# Patient Record
Sex: FEMALE | Race: WHITE | NOT HISPANIC OR LATINO | Employment: FULL TIME | URBAN - METROPOLITAN AREA
[De-identification: names, ages, dates, MRNs, and addresses within clinical notes are randomized per-mention and may not be internally consistent; named-entity substitution may affect disease eponyms.]

---

## 2019-02-12 ENCOUNTER — COMMUNICATION - HEALTHEAST (OUTPATIENT)
Dept: SURGERY | Facility: CLINIC | Age: 44
End: 2019-02-12

## 2019-02-21 ENCOUNTER — COMMUNICATION - HEALTHEAST (OUTPATIENT)
Dept: SURGERY | Facility: CLINIC | Age: 44
End: 2019-02-21

## 2019-02-22 DIAGNOSIS — Z12.31 VISIT FOR SCREENING MAMMOGRAM: ICD-10-CM

## 2019-02-22 PROCEDURE — 77067 SCR MAMMO BI INCL CAD: CPT | Mod: TC

## 2019-02-26 ENCOUNTER — MYC MEDICAL ADVICE (OUTPATIENT)
Dept: FAMILY MEDICINE | Facility: CLINIC | Age: 44
End: 2019-02-26

## 2019-02-26 NOTE — TELEPHONE ENCOUNTER
Per protocol, will route encounter to be cosigned by provider for Verbal Orders.  Alice Velasquez RN

## 2019-02-26 NOTE — TELEPHONE ENCOUNTER
Please call the number listed in the letter to see if orders are placed. This is confusing. Kristen Kehr PA-C

## 2019-02-26 NOTE — TELEPHONE ENCOUNTER
Per [facility] states when additional views are needed the orders are pended to the provider to sign them.   Patient is informed she can all and schedule an appointment.    Per protocol, will route encounter to be cosigned by provider for Verbal Orders.  Alice Velasquez RN

## 2019-03-04 ENCOUNTER — HOSPITAL ENCOUNTER (OUTPATIENT)
Dept: MAMMOGRAPHY | Facility: CLINIC | Age: 44
Discharge: HOME | End: 2019-03-04
Attending: PHYSICIAN ASSISTANT | Admitting: PHYSICIAN ASSISTANT
Payer: COMMERCIAL

## 2019-03-04 DIAGNOSIS — R92.8 ABNORMAL MAMMOGRAM: ICD-10-CM

## 2019-03-04 PROCEDURE — G0279 TOMOSYNTHESIS, MAMMO: HCPCS

## 2019-03-05 ENCOUNTER — OFFICE VISIT - HEALTHEAST (OUTPATIENT)
Dept: SURGERY | Facility: CLINIC | Age: 44
End: 2019-03-05

## 2019-03-05 ENCOUNTER — AMBULATORY - HEALTHEAST (OUTPATIENT)
Dept: LAB | Facility: CLINIC | Age: 44
End: 2019-03-05

## 2019-03-05 DIAGNOSIS — E66.01 MORBID OBESITY (H): ICD-10-CM

## 2019-03-05 DIAGNOSIS — R03.0 ELEVATED BLOOD PRESSURE READING WITHOUT DIAGNOSIS OF HYPERTENSION: ICD-10-CM

## 2019-03-05 LAB
ALBUMIN SERPL-MCNC: 4.2 G/DL (ref 3.5–5)
ALP SERPL-CCNC: 58 U/L (ref 45–120)
ALT SERPL W P-5'-P-CCNC: 18 U/L (ref 0–45)
ALT SERPL-CCNC: 18 U/L (ref 0–45)
ANION GAP SERPL CALCULATED.3IONS-SCNC: 12 MMOL/L (ref 5–18)
AST SERPL W P-5'-P-CCNC: 17 U/L (ref 0–40)
AST SERPL-CCNC: 17 U/L (ref 0–40)
BILIRUB SERPL-MCNC: 0.3 MG/DL (ref 0–1)
BUN SERPL-MCNC: 9 MG/DL (ref 8–22)
CALCIUM SERPL-MCNC: 9.5 MG/DL (ref 8.5–10.5)
CHLORIDE BLD-SCNC: 106 MMOL/L (ref 98–107)
CHOLEST SERPL-MCNC: 213 MG/DL
CHOLEST SERPL-MCNC: 213 MG/DL
CO2 SERPL-SCNC: 22 MMOL/L (ref 22–31)
CREAT SERPL-MCNC: 0.8 MG/DL (ref 0.6–1.1)
CREAT SERPL-MCNC: 0.8 MG/DL (ref 0.6–1.1)
ERYTHROCYTE [DISTWIDTH] IN BLOOD BY AUTOMATED COUNT: 11.3 % (ref 11–14.5)
FASTING STATUS PATIENT QL REPORTED: ABNORMAL
FERRITIN SERPL-MCNC: 22 NG/ML (ref 10–130)
FOLATE SERPL-MCNC: 9.9 NG/ML
GFR SERPL CREATININE-BSD FRML MDRD: >60 ML/MIN/1.73M2
GFR SERPL CREATININE-BSD FRML MDRD: >60 ML/MIN/1.73M2
GLUCOSE BLD-MCNC: 89 MG/DL (ref 70–125)
GLUCOSE SERPL-MCNC: 89 MG/DL (ref 70–125)
HBA1C MFR BLD: 5.3 % (ref 3.5–6)
HBA1C MFR BLD: 5.3 % (ref 3.5–6)
HCT VFR BLD AUTO: 42 % (ref 35–47)
HDLC SERPL-MCNC: 61 MG/DL
HDLC SERPL-MCNC: 61 MG/DL
HGB BLD-MCNC: 14.2 G/DL (ref 12–16)
LDLC SERPL CALC-MCNC: 122 MG/DL
LDLC SERPL CALC-MCNC: 122 MG/DL
MCH RBC QN AUTO: 30 PG (ref 27–34)
MCHC RBC AUTO-ENTMCNC: 33.9 G/DL (ref 32–36)
MCV RBC AUTO: 88 FL (ref 80–100)
PLATELET # BLD AUTO: 332 THOU/UL (ref 140–440)
PMV BLD AUTO: 7.7 FL (ref 7–10)
POTASSIUM BLD-SCNC: 4.1 MMOL/L (ref 3.5–5)
POTASSIUM SERPL-SCNC: 4.1 MMOL/L (ref 3.5–5)
PROT SERPL-MCNC: 7.2 G/DL (ref 6–8)
PTH-INTACT SERPL-MCNC: 86 PG/ML (ref 10–86)
RBC # BLD AUTO: 4.75 MILL/UL (ref 3.8–5.4)
SODIUM SERPL-SCNC: 140 MMOL/L (ref 136–145)
TRIGL SERPL-MCNC: 150 MG/DL
TRIGL SERPL-MCNC: 150 MG/DL
TSH SERPL DL<=0.005 MIU/L-ACNC: 2.12 UIU/ML (ref 0.3–5)
TSH SERPL-ACNC: 2.12 UIU/ML (ref 0.3–5)
VIT B12 SERPL-MCNC: 439 PG/ML (ref 213–816)
WBC: 7.1 THOU/UL (ref 4–11)

## 2019-03-05 ASSESSMENT — MIFFLIN-ST. JEOR: SCORE: 1868.06

## 2019-03-06 ENCOUNTER — AMBULATORY - HEALTHEAST (OUTPATIENT)
Dept: SURGERY | Facility: CLINIC | Age: 44
End: 2019-03-06

## 2019-03-06 LAB
25(OH)D3 SERPL-MCNC: 20.2 NG/ML (ref 30–80)
25(OH)D3 SERPL-MCNC: 20.2 NG/ML (ref 30–80)

## 2019-03-08 LAB
ANNOTATION COMMENT IMP: NORMAL
VIT A SERPL-MCNC: 0.51 MG/L (ref 0.3–1.2)
VIT B1 PYROPHOSHATE BLD-SCNC: 129 NMOL/L (ref 70–180)
VITAMIN A (RETINYL PALMITATE): 0.03 MG/L (ref 0–0.1)
ZINC SERPL-MCNC: 81 UG/DL (ref 60–120)

## 2019-03-14 NOTE — PROGRESS NOTES
"  SUBJECTIVE:   Lilian Quinones is a 43 year old female who presents to clinic today for the following health issues:    IUD consult    Patient is in the process of working with a bariatric surgeon in hopes of having surgery early this summer. One thing they have told her is that she will need to look into long term contraception. Currently using condoms. Between her and her , they have 4 children, so not planning on any further child bearing.  Has done oral contraceptive pills, vaginal ring and Depo Provera in the past-years ago. She is interested in an IUD. Menses likely due next week.     Problem list and histories reviewed & adjusted, as indicated.  Additional history: as documented    Patient Active Problem List   Diagnosis     NO ACTIVE PROBLEMS     Overweight     Atypical mole     Right knee pain, unspecified chronicity     HAO (generalized anxiety disorder)     Hyperhydrosis disorder     Past Surgical History:   Procedure Laterality Date     BIOPSY      Skin Biopsy     BREAST SURGERY      Breast reduction sergury        Social History     Tobacco Use     Smoking status: Never Smoker     Smokeless tobacco: Never Used   Substance Use Topics     Alcohol use: Yes     Family History   Problem Relation Age of Onset     Heart Disease Maternal Grandmother      Pancreatic Cancer Paternal Grandmother      Melanoma No family hx of            Reviewed and updated as needed this visit by clinical staff       Reviewed and updated as needed this visit by Provider         ROS:  Constitutional, HEENT, cardiovascular, pulmonary, gi and gu systems are negative, except as otherwise noted.    OBJECTIVE:     /78 (BP Location: Right arm, Patient Position: Sitting, Cuff Size: Adult Large)   Pulse 83   Temp 97.7  F (36.5  C) (Oral)   Ht 1.715 m (5' 7.5\")   Wt 116.6 kg (257 lb)   LMP 02/22/2019 (Exact Date)   SpO2 96%   BMI 39.66 kg/m    Body mass index is 39.66 kg/m .  GENERAL: healthy, alert and no " distress  RESP: lungs clear to auscultation - no rales, rhonchi or wheezes  CV: regular rate and rhythm, normal S1 S2, no S3 or S4, no murmur, click or rub, no peripheral edema and peripheral pulses strong  ABDOMEN: soft, nontender, no hepatosplenomegaly, no masses and bowel sounds normal  MS: no gross musculoskeletal defects noted, no edema  SKIN: no suspicious lesions or rashes  PSYCH: mentation appears normal, affect normal/bright    ASSESSMENT/PLAN:   1. Counseling for birth control regarding intrauterine device (IUD)  Discussed with bariatric surgery, avoiding pregnancy is recommended for the first 18 months after procedure and patient not interested in further child bearing. Discussed options for contraception with patient and she is interested in either Mirena or Kyleena. We discussed insertion, removal, possible side effects, menstrual changes. Reviewed risk of uterine perforation with insertion and discussed possible need for surgical removal. Patient advised to take 600 mg Ibuprofen prior to insertion. Patient will call with onset of menses-likely next week and can schedule insertion so this is in place and effective prior to bariatric surgery.    ASIF Mast [facility]

## 2019-03-15 ENCOUNTER — OFFICE VISIT (OUTPATIENT)
Dept: OBGYN | Facility: CLINIC | Age: 44
End: 2019-03-15
Payer: COMMERCIAL

## 2019-03-15 VITALS
HEIGHT: 68 IN | BODY MASS INDEX: 38.95 KG/M2 | SYSTOLIC BLOOD PRESSURE: 119 MMHG | DIASTOLIC BLOOD PRESSURE: 78 MMHG | OXYGEN SATURATION: 96 % | TEMPERATURE: 97.7 F | WEIGHT: 257 LBS | HEART RATE: 83 BPM

## 2019-03-15 DIAGNOSIS — Z30.09 COUNSELING FOR BIRTH CONTROL REGARDING INTRAUTERINE DEVICE (IUD): Primary | ICD-10-CM

## 2019-03-15 PROCEDURE — 99203 OFFICE O/P NEW LOW 30 MIN: CPT | Performed by: NURSE PRACTITIONER

## 2019-03-15 ASSESSMENT — MIFFLIN-ST. JEOR: SCORE: 1851.3

## 2019-03-15 ASSESSMENT — PAIN SCALES - GENERAL: PAINLEVEL: NO PAIN (0)

## 2019-03-20 ENCOUNTER — OFFICE VISIT (OUTPATIENT)
Dept: OBGYN | Facility: CLINIC | Age: 44
End: 2019-03-20
Payer: COMMERCIAL

## 2019-03-20 VITALS
DIASTOLIC BLOOD PRESSURE: 84 MMHG | HEIGHT: 68 IN | HEART RATE: 64 BPM | SYSTOLIC BLOOD PRESSURE: 132 MMHG | WEIGHT: 261.8 LBS | BODY MASS INDEX: 39.68 KG/M2 | TEMPERATURE: 97.9 F | OXYGEN SATURATION: 95 %

## 2019-03-20 DIAGNOSIS — Z30.430 ENCOUNTER FOR IUD INSERTION: Primary | ICD-10-CM

## 2019-03-20 PROCEDURE — 58300 INSERT INTRAUTERINE DEVICE: CPT | Performed by: NURSE PRACTITIONER

## 2019-03-20 ASSESSMENT — PAIN SCALES - GENERAL: PAINLEVEL: NO PAIN (0)

## 2019-03-20 ASSESSMENT — MIFFLIN-ST. JEOR: SCORE: 1873.08

## 2019-03-20 NOTE — PROGRESS NOTES
Lilian Quinones is a 43 year old  who presents today requesting placement of a Mirena IUD.    The patient meets and is agreeable to the following conditions:  She is not interested in conception in the near future.   She currently is in a stable, monogamous relationship.   There is no previous history of pelvic inflammatory disease.   There is no previous history of ectopic pregnancy.   She is willing to check monthly for the IUD string.   There is no history of unresolved abnormal uterine bleeding.   There is no history of an unresolved abnormal PAP smear.   She has no history of Salvatore's disease or an allergy to copper (for Paraguard).   She has had a PAP smear within the ACOG screening guideline.   She denies the possibility of pregnancy.     We discussed risks, benefits, and alternatives including but not limited to:   Possibility of pregnancy and ectopic pregnancy.  Possibility of pelvic inflammatory disease, particularly with new partners.  Risk of uterine perforation or IUD expulsion.  Possibility of difficult removal.  Spotting or heavy bleeding.  Cramping, pain or infection during or after insertion.    The patient was given patient information on the IUD and the patient education brochure from the .  The patient has given consent to proceed with placement of the IUD.  She wishes to proceed.  All questions answered.    PROCEDURE:    Type of IUD: Mirena    The patient has taken 600 mg of Ibuprofen prior to the procedure. She is placed in a dorsal lithotomy position and a pelvic exam is performed to determine the position of the uterus. Vagina is long and cervix was difficult to see clearly due to vaginal walls partially covering the os. With manipulation, the cervix is identified and cleaned with betadine. A single tooth tenaculum is applied to the anterior lip of the cervix for stabilization. The uterus sounded to 7.5 cm. (Target sound depth is 6.5 cm to 8.5 cm.) The IUD insertion tube is  prepared to manufacturers recommendations and inserted into the uterus under sterile conditions in the usual fashion. The IUD string is then cut to 2.5 cm.    The patient tolerated this procedure without immediate complication.  The patient is to return or call immediately for any unexplained fever, abdominal or pelvic pain, excessive bleeding, possibility of pregnancy, foul-smelling discharge, sense that the IUD has been expelled.  All questions were answered.    Return to clinic in 1 month for IUD check  Lot#: [id number]  Exp: 06/2021  NDC#: 10859-791-95    No GOLD CNP

## 2019-03-20 NOTE — LETTER
"                                                                          Affirmation of Informed Consent for Surgery or Invasive Procedure    1.  I, (print patient's name) Lilian Quinones,  1975,   a.  Agree that I will have (include both the medical term and patient words):           Chief Complaint   Patient presents with     IUD     Insertion      b.  At [facility].     c.  The reason for this procedure is (medical condition):  contraception.   d.  This will be done or supervised by:  ASIF Mast CNP.   e.  My doctor may have help from others.  Help could include opening or closing         the wound. Help might also include taking grafts, cutting out tissue,                           implanting devices.  I have been told who will help, if known.     2.  I have talked to my doctor or health care team about:   a.  What the procedure is and what will happen.   b   How it may help me (the benefits).   c.  How it may harm me (the most likely and most serious risks).   d.  The long-term effects the procedure might have.    e.  My other choices for treatment.  The risks and benefits of those choices.    f.   What will likely happen if I say \"no\" to this procedure.    g.  How I might feel right after and how quickly I might be expected to recover.      h.  What medicines will be used to manage pain or sedate me.     3.  I agree that:  (If I do not agree with a statement, I have crossed it out and              initialed next to it.)       a.  I will ask questions.     b.  No one has promised me definite results.    c.  If serious problems are found during the procedure, the treatment may                    change.   d.  If I have \"do not resuscitate\" (DNR) wishes, I have discussed this with my                              doctor and they will be put on hold during the procedure.   e. Students and other appropriate people, approved by the facility, may watch                      the procedure " and help with tasks they are qualified for.                                                    f.  Pictures or videos may be taken. They may be used for medical or                  educational reasons only.       g. Tissues or organs removed from my body as part of the normal course of the                    procedure may be used for research or teaching purposes. They will be                  disposed of with respect.                    h.  If a staff person is exposed to my blood or body fluids, my blood will be drawn                   and tested for HIV and hepatitis.  I understand that by law, the test results will                    go:         -  In my medical record.                         -  To the Employee Occupational Health Service and/or Infection Control                                  at this facility.    -  To the [place] [facility] officials.                 i.  I may have a blood transfusion: I have talked to my doctor or care team about:    -  Why I may need a blood transfusion.     - The risks, benefits, and side effects of transfusion - and the risks of not        Having one.     -  Blood safety and other options for treatment.        Consent for blood transfusion obtained during a hospital admission is valid for the entire hospital stay.  Consent  for blood transfusion obtained in the clinic setting is valid for a year from the signature date.                                4.  I understand that:   a.  I can change my mind.  If I do, I must tell my doctor or team as soon as                           possible.              b.  The team members may change during the procedure.                c.   The team will double-check who I am.  They will ask me what I am having                         done and the site of the site of the procedure.  This is done for my safety.    My questions have been answered.  I agree to the procedure.  I have made my special needs and instructions known.      Lilian BOWMAN  Joni      3/20/2019 3:04 PM  Patient (or representative)/Relationship to patient             Date  Time    For telephone consent:      3/20/2019  3:04 PM         Date  Time  Print name of patient (or representative)/relationship to patient    Witness:  I have verified that the signature is that of the patient or patient's representative and that this has been signed before the procedure:    NAME:        3/20/2019  3:04 PM         Date  Time  Person verifying patient's name or patient representative's signature     Provider:  I have discussed the procedure and the information stated above with the patient (or the patient's representative) and answered their questions. The patient or their representative consented to the procedure:      ASIF Mast CNP    3/20/2019  3:04 PM  Physician or Provider's Signature(s)   Date  Time       Intepreter (if used):       3/20/2019  3:04 PM                                   Name       Language/Organization Date  Time    Consent for procedure valid for 30 days after patient signature date     [facility]  AFFIRMATION OF INFORMED CONSENT FOR SURGERY OR INVASIVE PROCEDURE               Original - Medical Records

## 2019-04-04 ENCOUNTER — OFFICE VISIT - HEALTHEAST (OUTPATIENT)
Dept: SURGERY | Facility: CLINIC | Age: 44
End: 2019-04-04

## 2019-04-04 ENCOUNTER — COMMUNICATION - HEALTHEAST (OUTPATIENT)
Dept: SURGERY | Facility: CLINIC | Age: 44
End: 2019-04-04

## 2019-04-04 DIAGNOSIS — Z71.3 NUTRITIONAL COUNSELING: ICD-10-CM

## 2019-04-04 DIAGNOSIS — E66.01 OBESITY, CLASS III, BMI 40-49.9 (MORBID OBESITY) (H): ICD-10-CM

## 2019-04-04 ASSESSMENT — MIFFLIN-ST. JEOR: SCORE: 1858.98

## 2019-04-09 ENCOUNTER — OFFICE VISIT - HEALTHEAST (OUTPATIENT)
Dept: SURGERY | Facility: CLINIC | Age: 44
End: 2019-04-09

## 2019-04-09 DIAGNOSIS — E66.01 MORBID OBESITY WITH BMI OF 40.0-44.9, ADULT (H): ICD-10-CM

## 2019-05-02 ENCOUNTER — AMBULATORY - HEALTHEAST (OUTPATIENT)
Dept: SURGERY | Facility: CLINIC | Age: 44
End: 2019-05-02

## 2019-05-09 ENCOUNTER — TRANSFERRED RECORDS (OUTPATIENT)
Dept: HEALTH INFORMATION MANAGEMENT | Facility: CLINIC | Age: 44
End: 2019-05-09

## 2019-05-09 ENCOUNTER — OFFICE VISIT - HEALTHEAST (OUTPATIENT)
Dept: SURGERY | Facility: CLINIC | Age: 44
End: 2019-05-09

## 2019-05-09 DIAGNOSIS — E66.01 OBESITY, CLASS III, BMI 40-49.9 (MORBID OBESITY) (H): ICD-10-CM

## 2019-05-09 DIAGNOSIS — Z71.3 NUTRITIONAL COUNSELING: ICD-10-CM

## 2019-05-09 DIAGNOSIS — E66.01 MORBID OBESITY WITH BMI OF 40.0-44.9, ADULT (H): ICD-10-CM

## 2019-05-09 ASSESSMENT — MIFFLIN-ST. JEOR: SCORE: 1857.62

## 2019-05-13 ENCOUNTER — COMMUNICATION - HEALTHEAST (OUTPATIENT)
Dept: SURGERY | Facility: CLINIC | Age: 44
End: 2019-05-13

## 2019-05-13 ENCOUNTER — AMBULATORY - HEALTHEAST (OUTPATIENT)
Dept: SURGERY | Facility: CLINIC | Age: 44
End: 2019-05-13

## 2019-05-15 ENCOUNTER — OFFICE VISIT - HEALTHEAST (OUTPATIENT)
Dept: SURGERY | Facility: CLINIC | Age: 44
End: 2019-05-15

## 2019-05-15 ENCOUNTER — TRANSFERRED RECORDS (OUTPATIENT)
Dept: HEALTH INFORMATION MANAGEMENT | Facility: CLINIC | Age: 44
End: 2019-05-15

## 2019-05-15 DIAGNOSIS — E66.01 MORBID OBESITY (H): ICD-10-CM

## 2019-05-15 DIAGNOSIS — G89.29 CHRONIC PAIN OF LEFT KNEE: ICD-10-CM

## 2019-05-15 DIAGNOSIS — G89.29 CHRONIC PAIN OF RIGHT KNEE: ICD-10-CM

## 2019-05-15 DIAGNOSIS — M25.562 CHRONIC PAIN OF LEFT KNEE: ICD-10-CM

## 2019-05-15 DIAGNOSIS — M25.561 CHRONIC PAIN OF RIGHT KNEE: ICD-10-CM

## 2019-05-15 ASSESSMENT — MIFFLIN-ST. JEOR: SCORE: 1863.07

## 2019-05-17 ENCOUNTER — AMBULATORY - HEALTHEAST (OUTPATIENT)
Dept: SURGERY | Facility: CLINIC | Age: 44
End: 2019-05-17

## 2019-06-10 ENCOUNTER — MEDICAL CORRESPONDENCE (OUTPATIENT)
Dept: HEALTH INFORMATION MANAGEMENT | Facility: CLINIC | Age: 44
End: 2019-06-10

## 2019-06-10 ENCOUNTER — AMBULATORY - HEALTHEAST (OUTPATIENT)
Dept: SURGERY | Facility: CLINIC | Age: 44
End: 2019-06-10

## 2019-06-10 DIAGNOSIS — Z71.89 ENCOUNTER FOR PRE-BARIATRIC SURGERY COUNSELING AND EDUCATION: ICD-10-CM

## 2019-06-10 DIAGNOSIS — Z98.84 S/P BARIATRIC SURGERY: ICD-10-CM

## 2019-06-10 DIAGNOSIS — K21.9 ACID REFLUX: ICD-10-CM

## 2019-06-10 DIAGNOSIS — Z01.818 PRE-OP TESTING: ICD-10-CM

## 2019-06-10 DIAGNOSIS — K91.2 POSTSURGICAL MALABSORPTION: ICD-10-CM

## 2019-06-10 DIAGNOSIS — R63.4 RAPID WEIGHT LOSS: ICD-10-CM

## 2019-06-10 ASSESSMENT — MIFFLIN-ST. JEOR: SCORE: 1868.06

## 2019-06-14 NOTE — PROGRESS NOTES
[facility]  [address] [facility] [id number] [phone]  Dept: [phone]    PRE-OP EVALUATION:  Today's date: 2019    Lilian Quinones (: 1975) presents for pre-operative evaluation assessment as requested by Dr. Myles.  She requires evaluation and anesthesia risk assessment prior to undergoing surgery/procedure for treatment of weight control .    Fax number for surgical facility: [phone]  Primary Physician: Kehr, Kristen M  Type of Anesthesia Anticipated: General    Patient has a Health Care Directive or Living Will:  YES     Preop Questions 2019   Who is doing your surgery? Dr Hillman   What are you having done? Gastric sleeve   Date of Surgery/Procedure: 2019   Facility or Hospital where procedure/surgery will be performed: [facility]   1.  Do you have a history of Heart attack, stroke, stent, coronary bypass surgery, or other heart surgery? No   2.  Do you ever have any pain or discomfort in your chest? No   3.  Do you have a history of  Heart Failure? No   4.   Are you troubled by shortness of breath when:  walking on a level surface, or up a slight hill, or at night? No   5.  Do you currently have a cold, bronchitis or other respiratory infection? No   6.  Do you have a cough, shortness of breath, or wheezing? No   7.  Do you sometimes get pains in the calves of your legs when you walk? No   8. Do you or anyone in your family have previous history of blood clots? No   9.  Do you or does anyone in your family have a serious bleeding problem such as prolonged bleeding following surgeries or cuts? No   10. Have you ever had problems with anemia or been told to take iron pills? No   11. Have you had any abnormal blood loss such as black, tarry or bloody stools, or abnormal vaginal bleeding? No   12. Have you ever had a blood transfusion? No   13. Have you or any of your relatives ever had problems with anesthesia? No   14. Do you have sleep apnea,  "excessive snoring or daytime drowsiness? No   15. Do you have any prosthetic heart valves? No   16. Do you have prosthetic joints? No   17. Is there any chance that you may be pregnant? No         HPI:     HPI related to upcoming procedure: Pt to undergo laparoscopy sleeve gastrectomy due to morbid obesity      See problem list for active medical problems.  Problems all longstanding and stable, except as noted/documented.  See ROS for pertinent symptoms related to these conditions.      MEDICAL HISTORY:     Patient Active Problem List    Diagnosis Date Noted     HAO (generalized anxiety disorder) 08/22/2016     Priority: Medium     Hyperhydrosis disorder 08/22/2016     Priority: Medium     Overweight 07/14/2016     Priority: Medium     Atypical mole 07/14/2016     Priority: Medium     Right knee pain, unspecified chronicity 07/14/2016     Priority: Medium     NO ACTIVE PROBLEMS      Priority: Medium      Past Medical History:   Diagnosis Date     IUD (intrauterine device) in place 03/20/2019    Mirena     NO ACTIVE PROBLEMS      Past Surgical History:   Procedure Laterality Date     BIOPSY      Skin Biopsy     BREAST SURGERY      Breast reduction sergury      Current Outpatient Medications   Medication Sig Dispense Refill     levonorgestrel (MIRENA) 20 MCG/24HR IUD 1 each by Intrauterine route once       OTC products: None, except as noted above    Allergies   Allergen Reactions     Amoxicillin Rash     Penicillins Rash      Latex Allergy: NO    Social History     Tobacco Use     Smoking status: Never Smoker     Smokeless tobacco: Never Used   Substance Use Topics     Alcohol use: Yes     History   Drug Use No       REVIEW OF SYSTEMS:   Constitutional, neuro, ENT, endocrine, pulmonary, cardiac, gastrointestinal, genitourinary, musculoskeletal, integument and psychiatric systems are negative, except as otherwise noted.    EXAM:   /83   Pulse 81   Temp 98.8  F (37.1  C) (Oral)   Ht 1.715 m (5' 7.5\")   Wt 117 " kg (258 lb)   BMI 39.81 kg/m      GENERAL APPEARANCE: healthy, alert and no distress     EYES: EOMI, PERRL     HENT: ear canals and TM's normal and nose and mouth without ulcers or lesions     NECK: no adenopathy, no asymmetry, masses, or scars and thyroid normal to palpation     RESP: lungs clear to auscultation - no rales, rhonchi or wheezes     CV: regular rates and rhythm, normal S1 S2, no S3 or S4 and no murmur, click or rub     ABDOMEN:  soft, nontender, no HSM or masses and bowel sounds normal     MS: extremities normal- no gross deformities noted, no evidence of inflammation in joints, FROM in all extremities.     PSYCH: mentation appears normal. and affect normal/bright    DIAGNOSTICS:   EKG: appears normal, NSR, normal axis, normal intervals, no acute ST/T changes c/w ischemia, no LVH by voltage criteria    CXR:WNL    INR/PTT and CMP pending    Lab Results   Component Value Date    WBC 8.7 06/19/2019     Lab Results   Component Value Date    RBC 5.05 06/19/2019     Lab Results   Component Value Date    HGB 15.0 06/19/2019     Lab Results   Component Value Date    HCT 44.3 06/19/2019     No components found for: MCT  Lab Results   Component Value Date    MCV 88 06/19/2019     Lab Results   Component Value Date    MCH 29.7 06/19/2019     Lab Results   Component Value Date    MCHC 33.9 06/19/2019     Lab Results   Component Value Date    RDW 12.5 06/19/2019     Lab Results   Component Value Date     06/19/2019     With normal diff    UA RESULTS:  Recent Labs   Lab Test 06/19/19  1749   COLOR Yellow   APPEARANCE Clear   URINEGLC Negative   URINEBILI Negative   URINEKETONE Negative   SG 1.020   UBLD Trace*   URINEPH 5.5   PROTEIN Negative   UROBILINOGEN 0.2   NITRITE Negative   LEUKEST Negative   RBCU O - 2   WBCU 0 - 5           IMPRESSION:   Reason for surgery/procedure: Pt to undergo laparoscopic sleeve gastrectomy due to morbid obesity    The proposed surgical procedure is considered INTERMEDIATE  risk.    REVISED CARDIAC RISK INDEX  The patient has the following serious cardiovascular risks for perioperative complications such as (MI, PE, VFib and 3  AV Block):  No serious cardiac risks  INTERPRETATION: 0 risks: Class I (very low risk - 0.4% complication rate)    The patient has the following additional risks for perioperative complications:  No identified additional risks      ICD-10-CM    1. Preop general physical exam Z01.818 INR     Partial thromboplastin time     *UA reflex to Microscopic and Culture ([facility] (except [place] and [place])     Comprehensive metabolic panel     CBC with platelets and differential     XR Chest 2 Views     EKG 12-lead complete w/read - Clinics   2. Morbid obesity (H) E66.01        RECOMMENDATIONS:         --Patient is to take all scheduled medications on the day of surgery EXCEPT for modifications listed below.  --Patient is on no chronic medications    APPROVAL GIVEN to proceed with proposed procedure, without further diagnostic evaluation       Signed Electronically by: Arina Wilson MD    Copy of this evaluation report is provided to requesting physician.    [facility] Preop Guidelines    Revised Cardiac Risk Index

## 2019-06-19 ENCOUNTER — OFFICE VISIT (OUTPATIENT)
Dept: FAMILY MEDICINE | Facility: CLINIC | Age: 44
End: 2019-06-19
Payer: COMMERCIAL

## 2019-06-19 ENCOUNTER — ANCILLARY PROCEDURE (OUTPATIENT)
Dept: GENERAL RADIOLOGY | Facility: CLINIC | Age: 44
End: 2019-06-19
Attending: FAMILY MEDICINE
Payer: COMMERCIAL

## 2019-06-19 VITALS
HEIGHT: 68 IN | TEMPERATURE: 98.8 F | DIASTOLIC BLOOD PRESSURE: 83 MMHG | HEART RATE: 81 BPM | WEIGHT: 258 LBS | BODY MASS INDEX: 39.1 KG/M2 | SYSTOLIC BLOOD PRESSURE: 131 MMHG

## 2019-06-19 DIAGNOSIS — Z01.818 PREOP GENERAL PHYSICAL EXAM: Primary | ICD-10-CM

## 2019-06-19 DIAGNOSIS — Z01.818 PREOP GENERAL PHYSICAL EXAM: ICD-10-CM

## 2019-06-19 DIAGNOSIS — E66.01 MORBID OBESITY (H): ICD-10-CM

## 2019-06-19 LAB
ALBUMIN UR-MCNC: NEGATIVE MG/DL
APPEARANCE UR: CLEAR
BACTERIA #/AREA URNS HPF: ABNORMAL /HPF
BASOPHILS # BLD AUTO: 0 10E9/L (ref 0–0.2)
BASOPHILS NFR BLD AUTO: 0.3 %
BILIRUB UR QL STRIP: NEGATIVE
COLOR UR AUTO: YELLOW
DIFFERENTIAL METHOD BLD: NORMAL
EOSINOPHIL # BLD AUTO: 0.3 10E9/L (ref 0–0.7)
EOSINOPHIL NFR BLD AUTO: 3.5 %
ERYTHROCYTE [DISTWIDTH] IN BLOOD BY AUTOMATED COUNT: 12.5 % (ref 10–15)
GLUCOSE UR STRIP-MCNC: NEGATIVE MG/DL
HCT VFR BLD AUTO: 44.3 % (ref 35–47)
HGB BLD-MCNC: 15 G/DL (ref 11.7–15.7)
HGB UR QL STRIP: ABNORMAL
KETONES UR STRIP-MCNC: NEGATIVE MG/DL
LEUKOCYTE ESTERASE UR QL STRIP: NEGATIVE
LYMPHOCYTES # BLD AUTO: 2.5 10E9/L (ref 0.8–5.3)
LYMPHOCYTES NFR BLD AUTO: 28.2 %
MCH RBC QN AUTO: 29.7 PG (ref 26.5–33)
MCHC RBC AUTO-ENTMCNC: 33.9 G/DL (ref 31.5–36.5)
MCV RBC AUTO: 88 FL (ref 78–100)
MONOCYTES # BLD AUTO: 0.7 10E9/L (ref 0–1.3)
MONOCYTES NFR BLD AUTO: 7.8 %
NEUTROPHILS # BLD AUTO: 5.2 10E9/L (ref 1.6–8.3)
NEUTROPHILS NFR BLD AUTO: 60.2 %
NITRATE UR QL: NEGATIVE
PH UR STRIP: 5.5 PH (ref 5–7)
PLATELET # BLD AUTO: 310 10E9/L (ref 150–450)
RBC # BLD AUTO: 5.05 10E12/L (ref 3.8–5.2)
RBC #/AREA URNS AUTO: ABNORMAL /HPF
SOURCE: ABNORMAL
SP GR UR STRIP: 1.02 (ref 1–1.03)
UROBILINOGEN UR STRIP-ACNC: 0.2 EU/DL (ref 0.2–1)
WBC # BLD AUTO: 8.7 10E9/L (ref 4–11)
WBC #/AREA URNS AUTO: ABNORMAL /HPF

## 2019-06-19 PROCEDURE — 99214 OFFICE O/P EST MOD 30 MIN: CPT | Mod: 25 | Performed by: FAMILY MEDICINE

## 2019-06-19 PROCEDURE — 85025 COMPLETE CBC W/AUTO DIFF WBC: CPT | Performed by: FAMILY MEDICINE

## 2019-06-19 PROCEDURE — 36415 COLL VENOUS BLD VENIPUNCTURE: CPT | Performed by: FAMILY MEDICINE

## 2019-06-19 PROCEDURE — 93000 ELECTROCARDIOGRAM COMPLETE: CPT | Performed by: FAMILY MEDICINE

## 2019-06-19 PROCEDURE — 85610 PROTHROMBIN TIME: CPT | Performed by: FAMILY MEDICINE

## 2019-06-19 PROCEDURE — 81001 URINALYSIS AUTO W/SCOPE: CPT | Performed by: FAMILY MEDICINE

## 2019-06-19 PROCEDURE — 80053 COMPREHEN METABOLIC PANEL: CPT | Performed by: FAMILY MEDICINE

## 2019-06-19 PROCEDURE — 71046 X-RAY EXAM CHEST 2 VIEWS: CPT

## 2019-06-19 PROCEDURE — 85730 THROMBOPLASTIN TIME PARTIAL: CPT | Performed by: FAMILY MEDICINE

## 2019-06-19 ASSESSMENT — MIFFLIN-ST. JEOR: SCORE: 1850.84

## 2019-06-19 NOTE — LETTER
June 21, 2019    Lilian Quinones  [address] [address]            Dear Lilian,    Pre-op labs    If you have any questions or concerns, please call myself or my nurse at [phone].    Sincerely,    Arina Wilson MD/farheen    Results for orders placed or performed in visit on 06/19/19   INR   Result Value Ref [facility]    INR 1.00 0.86 - 1.14   Partial thromboplastin time   Result Value Ref [facility]    PTT 31 22 - 37 sec   *UA reflex to Microscopic and Culture ([facility] (except [place] and [place])   Result Value Ref [facility]    Color Urine Yellow     Appearance Urine Clear     Glucose Urine Negative NEG^Negative mg/dL    Bilirubin Urine Negative NEG^Negative    Ketones Urine Negative NEG^Negative mg/dL    Specific Gravity Urine 1.020 1.003 - 1.035    Blood Urine Trace (A) NEG^Negative    pH Urine 5.5 5.0 - 7.0 pH    Protein Albumin Urine Negative NEG^Negative mg/dL    Urobilinogen Urine 0.2 0.2 - 1.0 EU/dL    Nitrite Urine Negative NEG^Negative    Leukocyte Esterase Urine Negative NEG^Negative    Source Midstream Urine    Comprehensive metabolic panel   Result Value Ref [facility]    Sodium 139 133 - 144 mmol/L    Potassium 3.9 3.4 - 5.3 mmol/L    Chloride 106 94 - 109 mmol/L    Carbon Dioxide 24 20 - 32 mmol/L    Anion Gap 9 3 - 14 mmol/L    Glucose 94 70 - 99 mg/dL    Urea Nitrogen 18 7 - 30 mg/dL    Creatinine 0.72 0.52 - 1.04 mg/dL    GFR Estimate >90 >60 mL/min/[1.73_m2]    GFR Estimate If Black >90 >60 mL/min/[1.73_m2]    Calcium 9.2 8.5 - 10.1 mg/dL    Bilirubin Total 0.3 0.2 - 1.3 mg/dL    Albumin 4.4 3.4 - 5.0 g/dL    Protein Total 8.0 6.8 - 8.8 g/dL    Alkaline Phosphatase 53 40 - 150 U/L    ALT 33 0 - 50 U/L    AST 15 0 - 45 U/L   CBC with platelets and differential   Result Value Ref [facility]    WBC 8.7 4.0 - 11.0 10e9/L    RBC Count 5.05 3.8 - 5.2 10e12/L    Hemoglobin 15.0 11.7 - 15.7 g/dL    Hematocrit 44.3 35.0 - 47.0 %    MCV 88 78 - 100 fl    MCH 29.7 26.5 - 33.0 pg     MCHC 33.9 31.5 - 36.5 g/dL    RDW 12.5 10.0 - 15.0 %    Platelet Count 310 150 - 450 10e9/L    % Neutrophils 60.2 %    % Lymphocytes 28.2 %    % Monocytes 7.8 %    % Eosinophils 3.5 %    % Basophils 0.3 %    Absolute Neutrophil 5.2 1.6 - 8.3 10e9/L    Absolute Lymphocytes 2.5 0.8 - 5.3 10e9/L    Absolute Monocytes 0.7 0.0 - 1.3 10e9/L    Absolute Eosinophils 0.3 0.0 - 0.7 10e9/L    Absolute Basophils 0.0 0.0 - 0.2 10e9/L    Diff Method Automated Method    Urine Microscopic   Result Value Ref [facility]    WBC Urine 0 - 5 OTO5^0 - 5 /HPF    RBC Urine O - 2 OTO2^O - 2 /HPF    Bacteria Urine Few (A) NEG^Negative /HPF

## 2019-06-20 ENCOUNTER — TELEPHONE (OUTPATIENT)
Dept: FAMILY MEDICINE | Facility: CLINIC | Age: 44
End: 2019-06-20

## 2019-06-20 LAB
ALBUMIN SERPL-MCNC: 4.4 G/DL (ref 3.4–5)
ALP SERPL-CCNC: 53 U/L (ref 40–150)
ALT SERPL W P-5'-P-CCNC: 33 U/L (ref 0–50)
ANION GAP SERPL CALCULATED.3IONS-SCNC: 9 MMOL/L (ref 3–14)
APTT PPP: 31 SEC (ref 22–37)
AST SERPL W P-5'-P-CCNC: 15 U/L (ref 0–45)
BILIRUB SERPL-MCNC: 0.3 MG/DL (ref 0.2–1.3)
BUN SERPL-MCNC: 18 MG/DL (ref 7–30)
CALCIUM SERPL-MCNC: 9.2 MG/DL (ref 8.5–10.1)
CHLORIDE SERPL-SCNC: 106 MMOL/L (ref 94–109)
CO2 SERPL-SCNC: 24 MMOL/L (ref 20–32)
CREAT SERPL-MCNC: 0.72 MG/DL (ref 0.52–1.04)
GFR SERPL CREATININE-BSD FRML MDRD: >90 ML/MIN/{1.73_M2}
GLUCOSE SERPL-MCNC: 94 MG/DL (ref 70–99)
INR PPP: 1 (ref 0.86–1.14)
POTASSIUM SERPL-SCNC: 3.9 MMOL/L (ref 3.4–5.3)
PROT SERPL-MCNC: 8 G/DL (ref 6.8–8.8)
SODIUM SERPL-SCNC: 139 MMOL/L (ref 133–144)

## 2019-06-20 NOTE — TELEPHONE ENCOUNTER
[facility] is calling to request copy of pre op done 06/19, requesting all pre op, labs, EKG, and xray. Please FAX: [phone]. Thank you.

## 2019-06-21 ENCOUNTER — ANESTHESIA - HEALTHEAST (OUTPATIENT)
Dept: SURGERY | Facility: CLINIC | Age: 44
End: 2019-06-21

## 2019-06-21 ENCOUNTER — COMMUNICATION - HEALTHEAST (OUTPATIENT)
Dept: SURGERY | Facility: CLINIC | Age: 44
End: 2019-06-21

## 2019-06-21 NOTE — PROGRESS NOTES
I faxed the Pre Op, EKG, labs and chest xray to [facility]/Dr. Hillman @[phone].  Brigitte Capone,

## 2019-06-21 NOTE — TELEPHONE ENCOUNTER
I faxed the Pre Op, EKG, labs and chest xray to [facility]/Dr. Hillman @[phone].  Brigitte Capone,

## 2019-06-24 ENCOUNTER — SURGERY - HEALTHEAST (OUTPATIENT)
Dept: SURGERY | Facility: CLINIC | Age: 44
End: 2019-06-24

## 2019-06-24 ASSESSMENT — MIFFLIN-ST. JEOR: SCORE: 1854.45

## 2019-06-28 ENCOUNTER — COMMUNICATION - HEALTHEAST (OUTPATIENT)
Dept: SURGERY | Facility: CLINIC | Age: 44
End: 2019-06-28

## 2019-07-01 ENCOUNTER — AMBULATORY - HEALTHEAST (OUTPATIENT)
Dept: SURGERY | Facility: CLINIC | Age: 44
End: 2019-07-01

## 2019-07-01 DIAGNOSIS — Z98.84 S/P BARIATRIC SURGERY: ICD-10-CM

## 2019-07-01 DIAGNOSIS — Z71.3 DIETARY COUNSELING: ICD-10-CM

## 2019-07-01 DIAGNOSIS — E66.01 OBESITY, MORBID, BMI 40.0-49.9 (H): ICD-10-CM

## 2019-07-17 ENCOUNTER — OFFICE VISIT - HEALTHEAST (OUTPATIENT)
Dept: SURGERY | Facility: CLINIC | Age: 44
End: 2019-07-17

## 2019-07-17 DIAGNOSIS — E66.01 MORBID OBESITY DUE TO EXCESS CALORIES (H): ICD-10-CM

## 2019-07-17 ASSESSMENT — MIFFLIN-ST. JEOR: SCORE: 1763.73

## 2019-08-01 ENCOUNTER — AMBULATORY - HEALTHEAST (OUTPATIENT)
Dept: SURGERY | Facility: CLINIC | Age: 44
End: 2019-08-01

## 2019-08-01 DIAGNOSIS — Z71.3 NUTRITIONAL COUNSELING: ICD-10-CM

## 2019-08-01 DIAGNOSIS — Z98.84 BARIATRIC SURGERY STATUS: ICD-10-CM

## 2019-08-06 ASSESSMENT — ENCOUNTER SYMPTOMS
WEAKNESS: 0
ABDOMINAL PAIN: 0
JOINT SWELLING: 0
BREAST MASS: 0
FEVER: 0
SORE THROAT: 0
HEARTBURN: 0
NERVOUS/ANXIOUS: 0
PALPITATIONS: 0
SHORTNESS OF BREATH: 0
DYSURIA: 0
DIARRHEA: 0
ARTHRALGIAS: 0
FREQUENCY: 0
NAUSEA: 0
COUGH: 0
MYALGIAS: 1
CONSTIPATION: 1
PARESTHESIAS: 0
HEMATURIA: 0
HEADACHES: 0
DIZZINESS: 0
CHILLS: 0
EYE PAIN: 0
HEMATOCHEZIA: 0

## 2019-08-09 ENCOUNTER — OFFICE VISIT (OUTPATIENT)
Dept: FAMILY MEDICINE | Facility: CLINIC | Age: 44
End: 2019-08-09
Payer: COMMERCIAL

## 2019-08-09 ENCOUNTER — ANCILLARY PROCEDURE (OUTPATIENT)
Dept: GENERAL RADIOLOGY | Facility: CLINIC | Age: 44
End: 2019-08-09
Attending: NURSE PRACTITIONER
Payer: COMMERCIAL

## 2019-08-09 VITALS
OXYGEN SATURATION: 98 % | BODY MASS INDEX: 36.57 KG/M2 | HEART RATE: 77 BPM | WEIGHT: 237 LBS | TEMPERATURE: 98.2 F | SYSTOLIC BLOOD PRESSURE: 134 MMHG | RESPIRATION RATE: 14 BRPM | DIASTOLIC BLOOD PRESSURE: 80 MMHG

## 2019-08-09 DIAGNOSIS — S46.812A STRAIN OF LEFT TRAPEZIUS MUSCLE, INITIAL ENCOUNTER: ICD-10-CM

## 2019-08-09 DIAGNOSIS — M25.512 ACUTE PAIN OF LEFT SHOULDER: ICD-10-CM

## 2019-08-09 DIAGNOSIS — M25.512 ACUTE PAIN OF LEFT SHOULDER: Primary | ICD-10-CM

## 2019-08-09 PROCEDURE — 99214 OFFICE O/P EST MOD 30 MIN: CPT | Mod: 25 | Performed by: NURSE PRACTITIONER

## 2019-08-09 PROCEDURE — 73030 X-RAY EXAM OF SHOULDER: CPT | Mod: LT

## 2019-08-09 PROCEDURE — 96372 THER/PROPH/DIAG INJ SC/IM: CPT | Performed by: NURSE PRACTITIONER

## 2019-08-09 RX ORDER — TIZANIDINE 2 MG/1
2 TABLET ORAL 3 TIMES DAILY PRN
Qty: 30 TABLET | Refills: 0 | Status: SHIPPED | OUTPATIENT
Start: 2019-08-09 | End: 2019-11-22

## 2019-08-09 RX ORDER — HYDROCODONE BITARTRATE AND ACETAMINOPHEN 5; 325 MG/1; MG/1
1 TABLET ORAL EVERY 6 HOURS PRN
Qty: 10 TABLET | Refills: 0 | Status: SHIPPED | OUTPATIENT
Start: 2019-08-09 | End: 2019-08-16

## 2019-08-09 RX ORDER — KETOROLAC TROMETHAMINE 30 MG/ML
60 INJECTION, SOLUTION INTRAMUSCULAR; INTRAVENOUS ONCE
Status: COMPLETED | OUTPATIENT
Start: 2019-08-09 | End: 2019-08-09

## 2019-08-09 RX ADMIN — KETOROLAC TROMETHAMINE 60 MG: 30 INJECTION, SOLUTION INTRAMUSCULAR; INTRAVENOUS at 14:53

## 2019-08-09 NOTE — PATIENT INSTRUCTIONS
Suspect strain of trapezius muscle   toradol injection given today  (strong ibuprofen in shot form)   Tylenol 650 mg- 1000 mg three times a day as needed   Will treat with muscle relaxer   zanaflex three times a day as needed   Norco every 6 hours as needed for severe pain only   Ice 20 minutes on 20 minutes off   Start physical therapy if not improved in 1 week

## 2019-08-09 NOTE — PROGRESS NOTES
Subjective     Lilian Quinones is a 44 year old female who presents to clinic today for the following health issues:    HPI   Musculoskeletal problem/pain      Duration: 3 days    Description  Location: Left shoulder, into neck and back    Intensity:  moderate    Accompanying signs and symptoms: radiation of pain to neck and back    History  Previous similar problem: no   Previous evaluation:  none    Precipitating or alleviating factors:  Trauma or overuse: no   Aggravating factors include: using shoulder    Therapies tried and outcome: tylenol, not helping, heat/ice not helping        Reviewed and updated as needed this visit by Provider         Review of Systems   ROS COMP: Constitutional, HEENT, cardiovascular, pulmonary, gi and gu systems are negative, except as otherwise noted.      Objective    There were no vitals taken for this visit.  There is no height or weight on file to calculate BMI.  Physical Exam   Constitutional: She is oriented to person, place, and time. She appears well-developed. She appears distressed.   Eyes: Pupils are equal, round, and reactive to light. EOM are normal.   Neck: Full passive range of motion without pain.   Cardiovascular: Normal rate, regular rhythm and normal heart sounds.   Pulmonary/Chest: Effort normal and breath sounds normal.   Abdominal: Soft. Bowel sounds are normal.   Musculoskeletal:        Left shoulder: She exhibits decreased range of motion and tenderness. She exhibits no swelling.        Arms:  Neurological: She is alert and oriented to person, place, and time. No cranial nerve deficit.        Diagnostic Test Results:  Xray - left shoulder unremarkable        Assessment & Plan       ICD-10-CM    1. Acute pain of left shoulder M25.512 XR Shoulder Left 2 Views     ketorolac (TORADOL) injection 60 mg     PHYSICAL THERAPY REFERRAL   2. Strain of left trapezius muscle, initial encounter S46.812A tiZANidine (ZANAFLEX) 2 MG tablet     HYDROcodone-acetaminophen (NORCO)  5-325 MG tablet     ketorolac (TORADOL) injection 60 mg     PHYSICAL THERAPY REFERRAL      suspect strain for left trapezuis muscle   Patient was given a shot of toradol in office- unable to toelrate NSAIDs due to bariatric surgery HX   zanaflex prescribed   Small RX for norco prescribed   physical therapy referral placed   She has appointment planned with PCP next week         Patient Instructions     Suspect strain of trapezius muscle   toradol injection given today  (strong ibuprofen in shot form)   Tylenol 650 mg- 1000 mg three times a day as needed   Will treat with muscle relaxer   zanaflex three times a day as needed   Norco every 6 hours as needed for severe pain only   Ice 20 minutes on 20 minutes off   Start physical therapy if not improved in 1 week        Return in about 2 weeks (around 8/23/2019).    Brielle Hope NP  [facility]

## 2019-08-09 NOTE — NURSING NOTE
"Chief Complaint   Patient presents with     Neck Pain       Initial /80 (BP Location: Right arm, Patient Position: Chair, Cuff Size: Adult Large)   Pulse 77   Temp 98.2  F (36.8  C) (Tympanic)   Resp 14   Wt 107.5 kg (237 lb)   SpO2 98%   BMI 36.57 kg/m   Estimated body mass index is 36.57 kg/m  as calculated from the following:    Height as of 6/19/19: 1.715 m (5' 7.5\").    Weight as of this encounter: 107.5 kg (237 lb).    Patient presents to the clinic using No DME    Health Maintenance that is potentially due pending provider review:   04/05/1988  HIV SCREENING     07/14/2017  PREVENTIVE CARE VISIT          n/a    Is there anyone who you would like to be able to receive your results? No  If yes have patient fill out DANY      "

## 2019-08-12 ENCOUNTER — OFFICE VISIT (OUTPATIENT)
Dept: FAMILY MEDICINE | Facility: CLINIC | Age: 44
End: 2019-08-12
Payer: COMMERCIAL

## 2019-08-12 VITALS
DIASTOLIC BLOOD PRESSURE: 81 MMHG | WEIGHT: 235 LBS | OXYGEN SATURATION: 97 % | HEART RATE: 68 BPM | SYSTOLIC BLOOD PRESSURE: 122 MMHG | BODY MASS INDEX: 35.61 KG/M2 | TEMPERATURE: 98.2 F | HEIGHT: 68 IN

## 2019-08-12 DIAGNOSIS — Z12.4 SCREENING FOR MALIGNANT NEOPLASM OF CERVIX: ICD-10-CM

## 2019-08-12 DIAGNOSIS — S46.812D STRAIN OF LEFT TRAPEZIUS MUSCLE, SUBSEQUENT ENCOUNTER: ICD-10-CM

## 2019-08-12 DIAGNOSIS — Z00.00 ROUTINE GENERAL MEDICAL EXAMINATION AT A HEALTH CARE FACILITY: Primary | ICD-10-CM

## 2019-08-12 PROCEDURE — 87624 HPV HI-RISK TYP POOLED RSLT: CPT | Performed by: PHYSICIAN ASSISTANT

## 2019-08-12 PROCEDURE — 99396 PREV VISIT EST AGE 40-64: CPT | Performed by: PHYSICIAN ASSISTANT

## 2019-08-12 PROCEDURE — G0145 SCR C/V CYTO,THINLAYER,RESCR: HCPCS | Performed by: PHYSICIAN ASSISTANT

## 2019-08-12 PROCEDURE — 99212 OFFICE O/P EST SF 10 MIN: CPT | Performed by: PHYSICIAN ASSISTANT

## 2019-08-12 ASSESSMENT — ENCOUNTER SYMPTOMS
CONSTIPATION: 1
HEADACHES: 0
SHORTNESS OF BREATH: 0
WEAKNESS: 0
ARTHRALGIAS: 0
HEMATOCHEZIA: 0
NERVOUS/ANXIOUS: 0
DIARRHEA: 0
FEVER: 0
PARESTHESIAS: 0
HEARTBURN: 0
ABDOMINAL PAIN: 0
SORE THROAT: 0
EYE PAIN: 0
FREQUENCY: 0
BREAST MASS: 0
JOINT SWELLING: 0
DIZZINESS: 0
CHILLS: 0
HEMATURIA: 0
NAUSEA: 0
MYALGIAS: 1
PALPITATIONS: 0
COUGH: 0
DYSURIA: 0

## 2019-08-12 ASSESSMENT — PAIN SCALES - GENERAL: PAINLEVEL: NO PAIN (0)

## 2019-08-12 ASSESSMENT — MIFFLIN-ST. JEOR: SCORE: 1746.51

## 2019-08-12 NOTE — PROGRESS NOTES
SUBJECTIVE:   CC: Lilian Quinones is an 44 year old woman who presents for preventive health visit.     Healthy Habits:     Getting at least 3 servings of Calcium per day:  NO    Bi-annual eye exam:  NO    Dental care twice a year:  Yes    Sleep apnea or symptoms of sleep apnea:  None    Diet:  Other    Frequency of exercise:  2-3 days/week    Duration of exercise:  30-45 minutes    Taking medications regularly:  Yes    Medication side effects:  None    PHQ-2 Total Score: 0    Additional concerns today:  Yes            Today's PHQ-2 Score:   PHQ-2 ( 1999 [employer]) 8/6/2019   Q1: Little interest or pleasure in doing things 0   Q2: Feeling down, depressed or hopeless 0   PHQ-2 Score 0   Q1: Little interest or pleasure in doing things Not at all   Q2: Feeling down, depressed or hopeless Not at all   PHQ-2 Score 0       Abuse: Current or Past(Physical, Sexual or Emotional)- No  Do you feel safe in your environment? Yes    Social History     Tobacco Use     Smoking status: Never Smoker     Smokeless tobacco: Never Used   Substance Use Topics     Alcohol use: Yes     If you drink alcohol do you typically have >3 drinks per day or >7 drinks per week? No    Alcohol Use 8/6/2019   Prescreen: >3 drinks/day or >7 drinks/week? No   Prescreen: >3 drinks/day or >7 drinks/week? -   No flowsheet data found.    Reviewed orders with patient.  Reviewed health maintenance and updated orders accordingly - Yes  BP Readings from Last 3 Encounters:   08/12/19 122/81   08/09/19 134/80   06/19/19 131/83    Wt Readings from Last 3 Encounters:   08/12/19 106.6 kg (235 lb)   08/09/19 107.5 kg (237 lb)   06/19/19 117 kg (258 lb)                  Patient Active Problem List   Diagnosis     NO ACTIVE PROBLEMS     Overweight     Atypical mole     Right knee pain, unspecified chronicity     HAO (generalized anxiety disorder)     Hyperhydrosis disorder     Past Surgical History:   Procedure Laterality Date     BIOPSY      Skin Biopsy     BREAST  SURGERY      Breast reduction sergury        Social History     Tobacco Use     Smoking status: Never Smoker     Smokeless tobacco: Never Used   Substance Use Topics     Alcohol use: Yes     Family History   Problem Relation Age of Onset     Heart Disease Maternal Grandmother      Pancreatic Cancer Paternal Grandmother      Diabetes Paternal Grandmother      Breast Cancer Mother      Diabetes Father      Hypertension Maternal Grandfather      Hyperlipidemia Maternal Grandfather      Other Cancer Paternal Grandfather      Melanoma No family hx of          Current Outpatient Medications   Medication Sig Dispense Refill     HYDROcodone-acetaminophen (NORCO) 5-325 MG tablet Take 1 tablet by mouth every 6 hours as needed for severe pain 10 tablet 0     levonorgestrel (MIRENA) 20 MCG/24HR IUD 1 each by Intrauterine route once       omeprazole (PRILOSEC) 20 MG DR capsule Take 20 mg by mouth       tiZANidine (ZANAFLEX) 2 MG tablet Take 1 tablet (2 mg) by mouth 3 times daily as needed for muscle spasms 30 tablet 0     Ursodiol (ACTIGALL PO)        Allergies   Allergen Reactions     Amoxicillin Rash     Penicillins Rash       Mammogram Screening: Patient under age 50, mutual decision reflected in health maintenance.      Pertinent mammograms are reviewed under the imaging tab.  History of abnormal Pap smear:   Last 3 Pap Results:   PAP (no units)   Date Value   09/19/2016 NIL     Last 3 Pap and HPV Results:   PAP / HPV Latest Ref Rng & Units 9/19/2016   PAP - NIL   HPV 16 DNA NEG Negative   HPV 18 DNA NEG Negative   OTHER HR HPV NEG Negative     PAP / HPV Latest Ref Rng & Units 9/19/2016   PAP - NIL   HPV 16 DNA NEG Negative   HPV 18 DNA NEG Negative   OTHER HR HPV NEG Negative     Reviewed and updated as needed this visit by clinical staff  Tobacco  Allergies  Meds  Med Hx  Surg Hx  Fam Hx  Soc Hx        Reviewed and updated as needed this visit by Provider        Past Medical History:   Diagnosis Date     IUD  "(intrauterine device) in place 03/20/2019    Mirena     NO ACTIVE PROBLEMS       Past Surgical History:   Procedure Laterality Date     BIOPSY      Skin Biopsy     BREAST SURGERY      Breast reduction sergury        Review of Systems   Constitutional: Negative for chills and fever.   HENT: Negative for congestion, ear pain, hearing loss and sore throat.    Eyes: Negative for pain and visual disturbance.   Respiratory: Negative for cough and shortness of breath.    Cardiovascular: Negative for chest pain, palpitations and peripheral edema.   Gastrointestinal: Positive for constipation. Negative for abdominal pain, diarrhea, heartburn, hematochezia and nausea.   Breasts:  Negative for tenderness, breast mass and discharge.   Genitourinary: Negative for dysuria, frequency, genital sores, hematuria, pelvic pain, urgency, vaginal bleeding and vaginal discharge.   Musculoskeletal: Positive for myalgias. Negative for arthralgias and joint swelling.   Skin: Negative for rash.   Neurological: Negative for dizziness, weakness, headaches and paresthesias.   Psychiatric/Behavioral: Negative for mood changes. The patient is not nervous/anxious.      PROBLEM TO ADD....  Shoulder pain / posterior muscle strain on the left. She was seen in UC 3 days ago. She is taking the muscle relaxant, she has not made the appointment with Physical Therapy yet.     OBJECTIVE:   /81   Pulse 68   Temp 98.2  F (36.8  C) (Oral)   Ht 1.715 m (5' 7.5\")   Wt 106.6 kg (235 lb)   SpO2 97%   BMI 36.26 kg/m    Physical Exam  GENERAL: healthy, alert and no distress  EYES: Eyes grossly normal to inspection, PERRL and conjunctivae and sclerae normal  HENT: ear canals and TM's normal, nose and mouth without ulcers or lesions  NECK: no adenopathy, no asymmetry, masses, or scars and thyroid normal to palpation  RESP: lungs clear to auscultation - no rales, rhonchi or wheezes  BREAST: normal without masses, tenderness or nipple discharge and no " "palpable axillary masses or adenopathy  CV: regular rate and rhythm, normal S1 S2, no S3 or S4, no murmur, click or rub, no peripheral edema and peripheral pulses strong  ABDOMEN: soft, nontender, no hepatosplenomegaly, no masses and bowel sounds normal   (female): normal female external genitalia, normal urethral meatus, vaginal mucosa pink, moist, well rugated, and normal cervix/adnexa/uterus without masses. IUD strings visualized / bloody discharge.   MS: tenderness over the posterior aspect of the left shoulder. Rhomboids and trazpezius very tender. Limited movement of the shoulder secondary to pain.   SKIN: no suspicious lesions or rashes  NEURO: Normal strength and tone, mentation intact and speech normal  PSYCH: mentation appears normal, affect normal/bright    Diagnostic Test Results:  Labs reviewed in Epic    ASSESSMENT/PLAN:   1. Routine general medical examination at a health care facility  Health maintenance reviewed and updated.    2. Screening for malignant neoplasm of cervix  - Pap imaged thin layer screen with HPV - recommended age 30 - 65 years (select HPV order below)  - HPV High Risk Types DNA Cervical    3. Strain of left trapezius muscle, subsequent encounter  Continue use of the muscle relaxant. She is unable to use NSAIDS due to recent gastric surgery.   Start Physical Therapy.   Heat / stretching encouraged   Offered a refill of the muscle relaxant, but she has plenty as of now and will contact me via [employer] if needed.       COUNSELING:  Reviewed preventive health counseling, as reflected in patient instructions       Regular exercise       Healthy diet/nutrition    Estimated body mass index is 36.26 kg/m  as calculated from the following:    Height as of this encounter: 1.715 m (5' 7.5\").    Weight as of this encounter: 106.6 kg (235 lb).    Weight management plan: Discussed healthy diet and exercise guidelines     reports that she has never smoked. She has never used smokeless " tobacco.      Counseling Resources:  ATP IV Guidelines  Pooled Cohorts Equation Calculator  Breast Cancer Risk Calculator  FRAX Risk Assessment  ICSI Preventive Guidelines  Dietary Guidelines for Americans, 2010  USDA's MyPlate  ASA Prophylaxis  Lung CA Screening    Kristen M. Kehr, PA-C  [facility]

## 2019-08-12 NOTE — PATIENT INSTRUCTIONS
Make the appointment with Physical Therapy  Work with heat / stretching and muscle relaxant.         Preventive Health Recommendations  Female Ages 40 to 49    Yearly exam:     See your health care provider every year in order to  1. Review health changes.   2. Discuss preventive care.    3. Review your medicines if your doctor prescribed any.      Get a Pap test every three years (unless you have an abnormal result and your provider advises testing more often).      If you get Pap tests with HPV test, you only need to test every 5 years, unless you have an abnormal result. You do not need a Pap test if your uterus was removed (hysterectomy) and you have not had cancer.      You should be tested each year for STDs (sexually transmitted diseases), if you're at risk.     Ask your doctor if you should have a mammogram.      Have a colonoscopy (test for colon cancer) if someone in your family has had colon cancer or polyps before age 50.       Have a cholesterol test every 5 years.       Have a diabetes test (fasting glucose) after age 45. If you are at risk for diabetes, you should have this test every 3 years.    Shots: Get a flu shot each year. Get a tetanus shot every 10 years.     Nutrition:     Eat at least 5 servings of fruits and vegetables each day.    Eat whole-grain bread, whole-wheat pasta and brown rice instead of white grains and rice.    Get adequate Calcium and Vitamin D.      Lifestyle    Exercise at least 150 minutes a week (an average of 30 minutes a day, 5 days a week). This will help you control your weight and prevent disease.    Limit alcohol to one drink per day.    No smoking.     Wear sunscreen to prevent skin cancer.    See your dentist every six months for an exam and cleaning.

## 2019-08-12 NOTE — NURSING NOTE
"Chief Complaint   Patient presents with     Physical       Initial /81   Pulse 68   Temp 98.2  F (36.8  C) (Oral)   Ht 1.715 m (5' 7.5\")   Wt 106.6 kg (235 lb)   SpO2 97%   BMI 36.26 kg/m   Estimated body mass index is 36.26 kg/m  as calculated from the following:    Height as of this encounter: 1.715 m (5' 7.5\").    Weight as of this encounter: 106.6 kg (235 lb).  Medication Reconciliation: complete    OMERO Alvarado MA    "

## 2019-08-14 ENCOUNTER — HOSPITAL ENCOUNTER (OUTPATIENT)
Dept: PHYSICAL THERAPY | Facility: CLINIC | Age: 44
Setting detail: THERAPIES SERIES
End: 2019-08-14
Attending: NURSE PRACTITIONER
Payer: COMMERCIAL

## 2019-08-14 DIAGNOSIS — M25.512 ACUTE PAIN OF LEFT SHOULDER: ICD-10-CM

## 2019-08-14 DIAGNOSIS — S46.812A STRAIN OF LEFT TRAPEZIUS MUSCLE, INITIAL ENCOUNTER: ICD-10-CM

## 2019-08-14 PROCEDURE — 97110 THERAPEUTIC EXERCISES: CPT | Mod: GP | Performed by: PHYSICAL THERAPIST

## 2019-08-14 PROCEDURE — 97140 MANUAL THERAPY 1/> REGIONS: CPT | Mod: GP | Performed by: PHYSICAL THERAPIST

## 2019-08-14 PROCEDURE — 97162 PT EVAL MOD COMPLEX 30 MIN: CPT | Mod: GP | Performed by: PHYSICAL THERAPIST

## 2019-08-14 NOTE — PROGRESS NOTES
"   08/14/19 1600   General Information   Type of Visit Initial OP Ortho PT Evaluation   Start of Care Date 08/14/19   Referring Physician Brielle Hope NP   Patient/Family Goals Statement To not have pain   Orders Evaluate and Treat   Date of Order 08/09/19   Certification Required? No   Medical Diagnosis acute L shouler pain/ trapezius strain   Body Part(s)   Body Part(s) Cervical Spine   Presentation and Etiology   Pertinent history of current problem (include personal factors and/or comorbidities that impact the POC) Pt presents w/ L interscap / UT pain > neck pain which flared up 8/7/19.  Pt states she had a similar episode inthe winter which resolved and noted it was creeping back up a couple months ago.  Intermittent  pain 10/10.  Pt states it can also radiate into L upper arm.  Negative numbness/ tingling or HA.  No noted weakness has been \"babying it\".   Pt notes neck stiffness but this has improved from 8/9/19.   Shoulder xray in chart negative.   Pt is R dominant.  Meds: hydrocodone (has 1 left), tylenol,  mm relaxers at night.    PMHX: gastric sleeve June 2019.   Moderate: job requirements.    Impairments A. Pain;D. Decreased ROM;E. Decreased flexibility;F. Decreased strength and endurance   Pain quality A. Sharp;B. Dull;C. Aching;E. Shooting   Pain exacerbation comment Walking w/ arm hanging down.  Sleeping on R side.  Waking 2X/night even though she takes sleep meds.   cervical extension and R rotation.  Wearing purse on opposite shoulder currently due to pain.  Driving    Sitting at work --using the computer   Pain/symptoms eased by A. Sitting;C. Rest;E. Changing positions;H. Cold   Progression of symptoms since onset: Worsened  (in the past week)   Prior Level of Function   Functional Level Prior Comment less active recently due to surgery   Current Level of Function   Patient role/employment history A. 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     Fall Risk Screen   Fall screen completed by " PT   Have you fallen 2 or more times in the past year? No   Have you fallen and had an injury in the past year? No   Is patient a fall risk? No   Abuse Screen (yes response referral indicated)   Feels Unsafe at Home or Work/School no   Feels Threatened by Someone no   Does Anyone Try to Keep You From Having Contact with Others or Doing Things Outside Your Home? no   Cervical Spine   Posture FH and often times flexed fwd,  L shoulder slightly elevated. dowagers hump,  Decreased thoracic kyphosis   Cervical Flexion ROM WNL, notes pulling down the spine   Cervical Extension ROM to neutral stops due to pain   Cervical Right Side Bending ROM 50% notes pulling into L UE   Cervical Left Side Bending ROM 75% notes L sided pain and mild pull on R    Cervical Right Rotation ROM 75%   Cervical Left Rotation ROM 80%   Shoulder AROM Screen B shoulder AROM WNL noted discomfort w/ ABD   Shoulder Shrug (C2-C4) Strength 5/5   Shoulder Abd (C5) Strength did not fully assess strength due to pain / guarding   Alar Ligament Test neg but testing or sitting bothered the arm --pt was not sure which   Transverse Ligament Test neg.   Spurling Test unable to test as pt is unable to extend at all   Cervical Distraction Test supine manual traction increased arm symptoms.     Segmental Mobility-Cervical significant decrease in sideglides throughout the neck,  Pt is very guarded.      Segmental Mobility-Thoracic ERSL C7, T1   Palpation Mild tenderness L Levator and interscap.     Planned Therapy Interventions   Planned Therapy Interventions manual therapy;ROM;strengthening;stretching;neuromuscular re-education   Planned Modality Interventions   Planned Modality Interventions Electrical stimulation  (Pt has home tens unit)   Clinical Impression   Criteria for Skilled Therapeutic Interventions Met yes, treatment indicated   PT Diagnosis L trap strain, questionable cervical radiculopathy   Influenced by the following impairments pain, limited  motion, decreased strength   Functional limitations due to impairments sitting, walking, sleeping, neck mobility, driving   Clinical Presentation Evolving/Changing   Clinical Presentation Rationale recent flareup, irritable symptoms   Clinical Decision Making (Complexity) Moderate complexity   Therapy Frequency 2 times/Week   Predicted Duration of Therapy Intervention (days/wks) 4 weeks then1X/wk x 2 = 10 visits   Risk & Benefits of therapy have been explained Yes   Patient, Family & other staff in agreement with plan of care Yes   Education Assessment   Barriers to Learning No barriers   Ortho Goal 1   Goal Description 1.  Pt will have improved posture and neck extension to 50% for ADL's and pan no > 3/10   Target Date 09/04/19   Ortho Goal 2   Goal Description 2.  Pt will be able to walk 15 min w/ arm swing and pain no > 3/10   Target Date 10/03/19   Ortho Goal 3   Goal Description 3.  Pt will be able to sleep on R side and wake no > 4X/wk due to symptoms   Target Date 10/03/19   Ortho Goal 4   Goal Description 4.  Pt will be to sit X 1 hour at computer w/ pain no > 3/10   Target Date 10/03/19   Ortho Goal 5   Goal Description 5.  Pt will be independent and consistent w/ HEP   Target Date 10/03/19   Total Evaluation Time   PT Eval, Moderate Complexity Minutes (32496) 30     Thank you for this referral,    Mini Rubio, PT,  CEAS   #[id number]  [facility]  [phone]

## 2019-08-15 ENCOUNTER — COMMUNICATION - HEALTHEAST (OUTPATIENT)
Dept: SURGERY | Facility: CLINIC | Age: 44
End: 2019-08-15

## 2019-08-15 ENCOUNTER — TELEPHONE (OUTPATIENT)
Dept: FAMILY MEDICINE | Facility: CLINIC | Age: 44
End: 2019-08-15

## 2019-08-15 LAB
COPATH REPORT: NORMAL
PAP: NORMAL

## 2019-08-15 NOTE — TELEPHONE ENCOUNTER
Lilian saw Suzanne last Friday for her back.  She is seeing PT and now is requesting both Prednisone and pain meds.  She uses  Pharmacy and she knows she will need to  the pain RX..    Caren [facility]

## 2019-08-15 NOTE — TELEPHONE ENCOUNTER
08-09-19 OV, acute lt shoulder pain, strain lt trap.  Referred to PT, luke yesterday. Has PT appt again tomorrow. States therapist mentioned has a lot of inflammation, suggested possible steroid.  Pt calling to request prednisone and Norco refill for persistent pain. Was given Norco qty 10 tabs per OV.  Informed Suzanne back in clinic tomorrow.  Please advise.  CUATE Monterroso RN

## 2019-08-16 ENCOUNTER — HOSPITAL ENCOUNTER (OUTPATIENT)
Dept: PHYSICAL THERAPY | Facility: CLINIC | Age: 44
Setting detail: THERAPIES SERIES
End: 2019-08-16
Attending: NURSE PRACTITIONER
Payer: COMMERCIAL

## 2019-08-16 DIAGNOSIS — S46.812A STRAIN OF LEFT TRAPEZIUS MUSCLE, INITIAL ENCOUNTER: ICD-10-CM

## 2019-08-16 LAB
FINAL DIAGNOSIS: NORMAL
HPV HR 12 DNA CVX QL NAA+PROBE: NEGATIVE
HPV16 DNA SPEC QL NAA+PROBE: NEGATIVE
HPV18 DNA SPEC QL NAA+PROBE: NEGATIVE
SPECIMEN DESCRIPTION: NORMAL
SPECIMEN SOURCE CVX/VAG CYTO: NORMAL

## 2019-08-16 PROCEDURE — 97140 MANUAL THERAPY 1/> REGIONS: CPT | Mod: GP | Performed by: PHYSICAL THERAPIST

## 2019-08-16 PROCEDURE — 97110 THERAPEUTIC EXERCISES: CPT | Mod: GP | Performed by: PHYSICAL THERAPIST

## 2019-08-16 RX ORDER — PREDNISONE 20 MG/1
40 TABLET ORAL DAILY
Qty: 10 TABLET | Refills: 0 | Status: SHIPPED | OUTPATIENT
Start: 2019-08-16 | End: 2019-11-22

## 2019-08-16 RX ORDER — HYDROCODONE BITARTRATE AND ACETAMINOPHEN 5; 325 MG/1; MG/1
1 TABLET ORAL EVERY 6 HOURS PRN
Qty: 10 TABLET | Refills: 0 | Status: SHIPPED | OUTPATIENT
Start: 2019-08-16 | End: 2019-11-22

## 2019-08-16 NOTE — TELEPHONE ENCOUNTER
Refilled norco she will need to   Prednisone 40 mg for 5 days in AM   If not improved recheck in 1-2 weeks  Suzanne LINDA      Pt was informed/ advises as above.  Script placed at  for pt to .  CUATE Monterroso RN

## 2019-08-16 NOTE — PROGRESS NOTES
Refilled norco she will need to   Prednisone 40 mg for 5 days in AM   If not improved recheck in 1-2 weeks  Suzanne LINDA

## 2019-09-04 ENCOUNTER — TELEPHONE (OUTPATIENT)
Dept: FAMILY MEDICINE | Facility: CLINIC | Age: 44
End: 2019-09-04

## 2019-09-04 DIAGNOSIS — M25.512 LEFT SHOULDER PAIN, UNSPECIFIED CHRONICITY: Primary | ICD-10-CM

## 2019-09-04 NOTE — TELEPHONE ENCOUNTER
She will need to schedule an appointment in Orthopedics to discuss cortisone. There is a referral in place. Please give her information for Orthopedics.   Kristen Kehr PA-C

## 2019-09-04 NOTE — TELEPHONE ENCOUNTER
Reason for call:  Patient reporting a symptom    Symptom or request: shoulder pain    Duration (how long have symptoms been present): one month    Have you been treated for this before? Yes    Additional comments: she tried physical therapy and chiropractor with no relief. The chiropractor suggested she try cortisone injections. She would like a call back with suggestions on where she should go for this.     Phone Number patient can be reached at:  Home number on file [phone] (home)    Best Time:  any    Can we leave a detailed message on this number:  YES    Call taken on 9/4/2019 at 11:35 AM by Mora Lee

## 2019-09-04 NOTE — TELEPHONE ENCOUNTER
Patient c/o left shoulder pain, patient has gone to physical therapy and chiropractor. Was advised may need cortisone shot.   Did discuss shoulder pain with Kristen Kehr,PA-C when seen for physical last month  Would like to get cortisone injection at this time      To provider to advise    Lupe SHARMA, RN, CPN

## 2019-09-11 ENCOUNTER — ANCILLARY PROCEDURE (OUTPATIENT)
Dept: GENERAL RADIOLOGY | Facility: CLINIC | Age: 44
End: 2019-09-11
Attending: PEDIATRICS
Payer: COMMERCIAL

## 2019-09-11 ENCOUNTER — OFFICE VISIT (OUTPATIENT)
Dept: ORTHOPEDICS | Facility: CLINIC | Age: 44
End: 2019-09-11
Attending: PHYSICIAN ASSISTANT
Payer: COMMERCIAL

## 2019-09-11 VITALS
DIASTOLIC BLOOD PRESSURE: 82 MMHG | SYSTOLIC BLOOD PRESSURE: 126 MMHG | WEIGHT: 234 LBS | BODY MASS INDEX: 35.46 KG/M2 | HEIGHT: 68 IN

## 2019-09-11 DIAGNOSIS — M54.12 CERVICAL RADICULOPATHY: ICD-10-CM

## 2019-09-11 DIAGNOSIS — M54.12 CERVICAL RADICULOPATHY: Primary | ICD-10-CM

## 2019-09-11 PROCEDURE — 99244 OFF/OP CNSLTJ NEW/EST MOD 40: CPT | Performed by: PEDIATRICS

## 2019-09-11 PROCEDURE — 72040 X-RAY EXAM NECK SPINE 2-3 VW: CPT

## 2019-09-11 RX ORDER — CYCLOBENZAPRINE HCL 5 MG
5 TABLET ORAL 2 TIMES DAILY PRN
Qty: 30 TABLET | Refills: 0 | Status: SHIPPED | OUTPATIENT
Start: 2019-09-11 | End: 2019-11-22

## 2019-09-11 ASSESSMENT — MIFFLIN-ST. JEOR: SCORE: 1741.98

## 2019-09-11 NOTE — PATIENT INSTRUCTIONS
Plan:  - Today's Plan of Care:  MRI of the cervical spine - Call [phone] to schedule MRI  Cyclobenzaprine    -We also discussed other future treatment options:  Referral to Spine, Consideration of injection    Follow Up: In clinic with Dr. Galaviz after MRI (wait at least 1-2 days)    Concerning signs and symptoms were reviewed.  The patient expressed understanding of this management plan and all questions were answered at this time.    Richelle Galaviz MD CAQ  Primary Care Sports Medicine  [facility]

## 2019-09-11 NOTE — LETTER
9/11/2019         RE: Lilian Quinones  [address] [facility] [address]        Dear Colleague,    Thank you for referring your patient, Lilian Quinones, to the [facility]. Please see a copy of my visit note below.    Sports Medicine Clinic Visit    PCP: Kehr, Kristen M    Lilian Quinones is a 44 year old female who is seen  in consultation at the request of  Kristen M Kehr PA-C presenting with shoulder and left arm pain    Injury: She reports left shoulder and arm pain for 1 month. She reports pain in her neck occasionally and the pain radiates to her shoulder forearm and ulnar hand. She reports reports numbness in there last 3 finger. She states the pain increases with sleeping, driving, and typing (certain positions). She is right hand dominate    Location of Pain: right arm and neck  Duration of Pain: 1 month(s)  Rating of Pain at worst: 10/10  Rating of Pain Currently: 6/10  Symptoms are better with: Rest  Symptoms are worse with: sleeping, driving, typing  Additional Features:   Positive: paresthesias, numbness and weakness   Negative: swelling, bruising, popping, grinding, catching, locking and instability  Other evaluation and/or treatments so far consists of: Other medications: Physical therapy, chiropractic and prednisone, Zanaflex, Norco  Prior History of related problems: nothing    Social History:     Review of Systems  Skin: no bruising, no swelling  Musculoskeletal: as above  Neurologic: yes numbness, paresthesias  Remainder of review of systems is negative including constitutional, CV, pulmonary, GI, except as noted in HPI or medical history.    Patient's current problem list, past medical and surgical history, and family history were reviewed.    Patient Active Problem List   Diagnosis     NO ACTIVE PROBLEMS     Overweight     Atypical mole     Right knee pain, unspecified chronicity     HAO (generalized anxiety disorder)     Hyperhydrosis disorder  "    Past Medical History:   Diagnosis Date     IUD (intrauterine device) in place 03/20/2019    Mirena     NO ACTIVE PROBLEMS      Past Surgical History:   Procedure Laterality Date     BIOPSY      Skin Biopsy     BREAST SURGERY      Breast reduction sergury      Family History   Problem Relation Age of Onset     Heart Disease Maternal Grandmother      Pancreatic Cancer Paternal Grandmother      Diabetes Paternal Grandmother      Breast Cancer Mother      Diabetes Father      Hypertension Maternal Grandfather      Hyperlipidemia Maternal Grandfather      Other Cancer Paternal Grandfather      Melanoma No family hx of          Objective  /82   Ht 1.715 m (5' 7.5\")   Wt 106.1 kg (234 lb)   BMI 36.11 kg/m       GENERAL APPEARANCE: healthy, alert and no distress   GAIT: NORMAL  SKIN: no suspicious lesions or rashes  HEENT: Sclera clear, anicteric  CV: good peripheral pulses  RESP: Breathing not labored  NEURO: Normal strength and tone, mentation intact and speech normal  PSYCH:  mentation appears normal and affect normal/bright    Cervical Spine Exam    Inspection:       No visible deformity        normal lordotic curvature maintained    Posture:      rounded shoulders and upper back    Tender:      paraspinal muscles       upper border of trapezius    Non-Tender:      remainder of cervical spine area    Range of Motion:       Limited extension and lateral rotation left    Painful Motions:       extension        lateral rotation    Strength:     C4 (shoulder shrug)  symmetric 5/5       C5 (shoulder abduction) symmetric 5/5       C6 (elbow flexion) symmetric 5/5       C7 (elbow extension) symmetric 5/5       C8 (finger abduction, thumb flexion) symmetric 5/5    Reflexes:      C5 (biceps) symmetric normal       C6 (supinator) symmetric normal       C7 (triceps) symmetric normal    Sensation:     grossly intact througout bilateral upper extremities    Special Tests:      neg (-) Spurling    Skin:     well " perfused       capillary refill brisk    Lymphatics:      no edema noted in the upper extremities     Radiology  I ordered, visualized and reviewed these images with the patient  Xr Cervical Spine 2/3 Vws  Result Date: 9/11/2019  CERVICAL SPINE 2/3 VIEWS  9/11/2019 4:22 PM HISTORY: Cervical radiculopathy. COMPARISON: None.   IMPRESSION: Reversal of cervical spine lordosis. Alignment otherwise intact. Mild intervertebral disc space narrowing at C5-C6 and C6-C7. No definite prevertebral soft tissue swelling. No definite acute fracture. DAVON GONZALEZ MD    Assessment:  1. Cervical radiculopathy      With lack of improvement with physical therapy, will obtain an MRI.  Will trial cyclobenzaprine for pain control (I reviewed the mechanism of action as well as risk/benefit profile of medications. Questions answered.)    Plan:  - Today's Plan of Care:  MRI of the cervical spine - Call [phone] to schedule MRI  Cyclobenzaprine    -We also discussed other future treatment options:  Referral to Spine, Consideration of injection    Follow Up: In clinic with Dr. Galaviz after MRI (wait at least 1-2 days)    Concerning signs and symptoms were reviewed.  The patient expressed understanding of this management plan and all questions were answered at this time.    Thanks for the opportunity to participate in the care of this patient, I will keep you updated on their progress.    CC: Kristen M Kehr PA-C Sarah Kinsella, MD CAQ  Primary Care Sports Medicine  [facility]    Again, thank you for allowing me to participate in the care of your patient.        Sincerely,        Richelle Galaviz MD

## 2019-09-11 NOTE — PROGRESS NOTES
Sports Medicine Clinic Visit    PCP: Kehr, Kristen M Carriroque Quinones is a 44 year old female who is seen  in consultation at the request of  Kristen M Kehr PA-C presenting with shoulder and left arm pain    Injury: She reports left shoulder and arm pain for 1 month. She reports pain in her neck occasionally and the pain radiates to her shoulder forearm and ulnar hand. She reports reports numbness in there last 3 finger. She states the pain increases with sleeping, driving, and typing (certain positions). She is right hand dominate    Location of Pain: right arm and neck  Duration of Pain: 1 month(s)  Rating of Pain at worst: 10/10  Rating of Pain Currently: 6/10  Symptoms are better with: Rest  Symptoms are worse with: sleeping, driving, typing  Additional Features:   Positive: paresthesias, numbness and weakness   Negative: swelling, bruising, popping, grinding, catching, locking and instability  Other evaluation and/or treatments so far consists of: Other medications: Physical therapy, chiropractic and prednisone, Zanaflex, Norco  Prior History of related problems: nothing    Social History:     Review of Systems  Skin: no bruising, no swelling  Musculoskeletal: as above  Neurologic: yes numbness, paresthesias  Remainder of review of systems is negative including constitutional, CV, pulmonary, GI, except as noted in HPI or medical history.    Patient's current problem list, past medical and surgical history, and family history were reviewed.    Patient Active Problem List   Diagnosis     NO ACTIVE PROBLEMS     Overweight     Atypical mole     Right knee pain, unspecified chronicity     HAO (generalized anxiety disorder)     Hyperhydrosis disorder     Past Medical History:   Diagnosis Date     IUD (intrauterine device) in place 03/20/2019    Mirena     NO ACTIVE PROBLEMS      Past Surgical History:   Procedure Laterality Date     BIOPSY      Skin Biopsy     BREAST SURGERY      Breast reduction sergury   "    Family History   Problem Relation Age of Onset     Heart Disease Maternal Grandmother      Pancreatic Cancer Paternal Grandmother      Diabetes Paternal Grandmother      Breast Cancer Mother      Diabetes Father      Hypertension Maternal Grandfather      Hyperlipidemia Maternal Grandfather      Other Cancer Paternal Grandfather      Melanoma No family hx of          Objective  /82   Ht 1.715 m (5' 7.5\")   Wt 106.1 kg (234 lb)   BMI 36.11 kg/m      GENERAL APPEARANCE: healthy, alert and no distress   GAIT: NORMAL  SKIN: no suspicious lesions or rashes  HEENT: Sclera clear, anicteric  CV: good peripheral pulses  RESP: Breathing not labored  NEURO: Normal strength and tone, mentation intact and speech normal  PSYCH:  mentation appears normal and affect normal/bright    Cervical Spine Exam    Inspection:       No visible deformity        normal lordotic curvature maintained    Posture:      rounded shoulders and upper back    Tender:      paraspinal muscles       upper border of trapezius    Non-Tender:      remainder of cervical spine area    Range of Motion:       Limited extension and lateral rotation left    Painful Motions:       extension        lateral rotation    Strength:     C4 (shoulder shrug)  symmetric 5/5       C5 (shoulder abduction) symmetric 5/5       C6 (elbow flexion) symmetric 5/5       C7 (elbow extension) symmetric 5/5       C8 (finger abduction, thumb flexion) symmetric 5/5    Reflexes:      C5 (biceps) symmetric normal       C6 (supinator) symmetric normal       C7 (triceps) symmetric normal    Sensation:     grossly intact througout bilateral upper extremities    Special Tests:      neg (-) Spurling    Skin:     well perfused       capillary refill brisk    Lymphatics:      no edema noted in the upper extremities     Radiology  I ordered, visualized and reviewed these images with the patient  Xr Cervical Spine 2/3 Vws  Result Date: 9/11/2019  CERVICAL SPINE 2/3 VIEWS  9/11/2019 " 4:22 PM HISTORY: Cervical radiculopathy. COMPARISON: None.   IMPRESSION: Reversal of cervical spine lordosis. Alignment otherwise intact. Mild intervertebral disc space narrowing at C5-C6 and C6-C7. No definite prevertebral soft tissue swelling. No definite acute fracture. DAVON GONZALEZ MD    Assessment:  1. Cervical radiculopathy      With lack of improvement with physical therapy, will obtain an MRI.  Will trial cyclobenzaprine for pain control (I reviewed the mechanism of action as well as risk/benefit profile of medications. Questions answered.)    Plan:  - Today's Plan of Care:  MRI of the cervical spine - Call [phone] to schedule MRI  Cyclobenzaprine    -We also discussed other future treatment options:  Referral to Spine, Consideration of injection    Follow Up: In clinic with Dr. Galaviz after MRI (wait at least 1-2 days)    Concerning signs and symptoms were reviewed.  The patient expressed understanding of this management plan and all questions were answered at this time.    Thanks for the opportunity to participate in the care of this patient, I will keep you updated on their progress.    CC: Kristen M Kehr PA-C Sarah Kinsella, MD CA  Primary Care Sports Medicine  [facility]

## 2019-09-16 ENCOUNTER — HOSPITAL ENCOUNTER (OUTPATIENT)
Dept: MRI IMAGING | Facility: CLINIC | Age: 44
Discharge: HOME | End: 2019-09-16
Attending: PEDIATRICS | Admitting: PEDIATRICS
Payer: COMMERCIAL

## 2019-09-16 DIAGNOSIS — M54.12 CERVICAL RADICULOPATHY: ICD-10-CM

## 2019-09-16 PROCEDURE — 72141 MRI NECK SPINE W/O DYE: CPT

## 2019-09-17 NOTE — PROGRESS NOTES
"   OUTPATIENT PHYSICAL THERAPY DISCHARGE SUMMARY   Brielle Hope NP 8/14/19 to 08/16/19 1400   Signing Clinician's Name / Credentials   Signing clinician's name / credentials Mini Rubio, PT [id number]   Session Number   Session Number 2 [id number]   Ortho Goal 1   Goal Description 1.  Pt will have improved posture and neck extension to 50% for ADL's and pan no > 3/10   Target Date 09/04/19   Ortho Goal 2   Goal Description 2.  Pt will be able to walk 15 min w/ arm swing and pain no > 3/10   Target Date 10/03/19   Ortho Goal 3   Goal Description 3.  Pt will be able to sleep on R side and wake no > 4X/wk due to symptoms   Target Date 10/03/19   Ortho Goal 4   Goal Description 4.  Pt will be to sit X 1 hour at computer w/ pain no > 3/10   Target Date 10/03/19   Ortho Goal 5   Goal Description 5.  Pt will be independent and consistent w/ HEP   Target Date 10/03/19   Subjective Report   Subjective Report Pt states she is gradually improving.  Pt did get Rx for prednisone 2/ day x 5 days and 10 additional pain meds.  Pt had to drive to [place] to get the meds.   Pt states she cleaned her house today---had to modify her technque.  Pain level 6/10.  Pt states ex went good and she has used the tens unit which felt good.    Pt will be getting headphone for work.  Pt states in the middle of the night when she gets up to go to the bathroom she notes hot/pain and spasming lateral upper arm.      Objective Measures   Objective Measure Remains w/ FH but less severe compared to 8/14/19   Details CROM flex WNL, ext to neutral., B rot 80%    Therapeutic Procedure/exercise   Treatment Detail   shoulder rolls and neck rotation.  L UT and L levator stretch 30\" B.   Doorframe pec strech 30\"  Cervical ext jovanni X 10   Manual Therapy   Treatment Detail MET for ERSR T1.  MET X 2  and Direct inferior mobes L 1st rib.   MWM cervical rotation L.   Cervical sideglides    Plan   Home program ex as above, heat/ice, tens   Plan  Pt planned to follow " up w/ provider for possible additional tests.  Discharge from physical therapy as pt has not rescheduled.     Comments   Comments Pt cont to work towards goals.

## 2019-09-17 NOTE — ADDENDUM NOTE
Encounter addended by: Mini Rubio, PT on: 9/17/2019 1:55 PM   Actions taken: Sign clinical note, Flowsheet accepted, Episode resolved

## 2019-09-18 ENCOUNTER — OFFICE VISIT (OUTPATIENT)
Dept: ORTHOPEDICS | Facility: CLINIC | Age: 44
End: 2019-09-18
Payer: COMMERCIAL

## 2019-09-18 VITALS
DIASTOLIC BLOOD PRESSURE: 76 MMHG | BODY MASS INDEX: 35.46 KG/M2 | SYSTOLIC BLOOD PRESSURE: 111 MMHG | HEIGHT: 68 IN | WEIGHT: 234 LBS

## 2019-09-18 DIAGNOSIS — M54.12 CERVICAL RADICULOPATHY: Primary | ICD-10-CM

## 2019-09-18 DIAGNOSIS — M50.20 CERVICAL DISC HERNIATION: ICD-10-CM

## 2019-09-18 PROCEDURE — 99214 OFFICE O/P EST MOD 30 MIN: CPT | Performed by: PEDIATRICS

## 2019-09-18 ASSESSMENT — MIFFLIN-ST. JEOR: SCORE: 1741.98

## 2019-09-18 NOTE — PATIENT INSTRUCTIONS
Plan:  - Today's Plan of Care:  Referral to a Spine Surgeon  Referral to Pain Management for possible cervical WENCESLAO    Follow Up: as needed    If you have any further questions for your physician or physician s care team you can call [phone] and use option 3 to leave a voice message. Calls received during business hours will be returned same day.

## 2019-09-18 NOTE — PROGRESS NOTES
Sports Medicine Clinic Visit - Interim History September 18, 2019    PCP: Kehr, Kristen M Carrie GRACIE Joni is a 44 year old female who is seen in f/u up for    Cervical radiculopathy  Cervical disc herniation. Since last visit on 9/11/19 patient has completed an MRI of the cervical spine. She reports minimal improvement in her pain and symptoms. She states the Cyclobenzaprine helps her sleep better.  Pain is still into left shoulder blade and arm with numbness.  Here to review MRI.    Symptoms are better with: Rest  Symptoms are worse with: sleeping, driving, typing  Additional Features:   Positive: paresthesias, numbness and weakness   Negative: swelling, bruising, popping, grinding, catching, locking and instability    Social History:     Review of Systems  Skin: no bruising, no swelling  Musculoskeletal: as above  Neurologic: yes numbness, paresthesias  Remainder of review of systems is negative including constitutional, CV, pulmonary, GI, except as noted in HPI or medical history.    Patient's current problem list, past medical and surgical history, and family history were reviewed.    Patient Active Problem List   Diagnosis     NO ACTIVE PROBLEMS     Overweight     Atypical mole     Right knee pain, unspecified chronicity     HAO (generalized anxiety disorder)     Hyperhydrosis disorder     Past Medical History:   Diagnosis Date     IUD (intrauterine device) in place 03/20/2019    Mirena     NO ACTIVE PROBLEMS      Past Surgical History:   Procedure Laterality Date     BIOPSY      Skin Biopsy     BREAST SURGERY      Breast reduction sergury      Family History   Problem Relation Age of Onset     Heart Disease Maternal Grandmother      Pancreatic Cancer Paternal Grandmother      Diabetes Paternal Grandmother      Breast Cancer Mother      Diabetes Father      Hypertension Maternal Grandfather      Hyperlipidemia Maternal Grandfather      Other Cancer Paternal Grandfather      Melanoma No family hx of   "      Objective  /76 (BP Location: Left arm, Patient Position: Chair, Cuff Size: Adult Regular)   Ht 1.715 m (5' 7.5\")   Wt 106.1 kg (234 lb)   BMI 36.11 kg/m      GENERAL APPEARANCE: healthy, alert and no distress   GAIT: NORMAL  SKIN: no suspicious lesions or rashes  HEENT: Sclera clear, anicteric  CV: good peripheral pulses  RESP: Breathing not labored  NEURO: Normal strength and tone, mentation intact and speech normal  PSYCH:  mentation appears normal and affect normal/bright     Cervical Spine Exam  Inspection:       No visible deformity        normal lordotic curvature maintained     Posture:      rounded shoulders and upper back     Tender:      paraspinal muscles       upper border of trapezius     Non-Tender:      remainder of cervical spine area     Range of Motion:       Limited extension and lateral rotation left     Painful Motions:       extension        lateral rotation     Strength:     C4 (shoulder shrug)  symmetric 5/5       C5 (shoulder abduction) symmetric 5/5       C6 (elbow flexion) symmetric 5/5       C7 (elbow extension) symmetric 5/5       C8 (finger abduction, thumb flexion) symmetric 5/5     Reflexes:      C5 (biceps) symmetric normal       C6 (supinator) symmetric normal       C7 (triceps) symmetric normal     Sensation:     grossly intact througout bilateral upper extremities     Special Tests:      neg (-) Spurling     Skin:     well perfused       capillary refill brisk     Lymphatics:      no edema noted in the upper extremities     Radiology  I ordered, visualized and reviewed these images with the patient  MRI CERVICAL SPINE WITHOUT CONTRAST 9/16/2019 5:10 PM      HISTORY: Evaluate stenosis and left-sided narrowing. Cervical  radiculopathy.     TECHNIQUE: Multiplanar, multisequence MRI of the cervical spine  without contrast.      COMPARISON: Cervical spine radiographs 9/11/2019.     FINDINGS: Again seen is reversal of the normal cervical lordosis  without significant " spondylolisthesis in the sagittal plane. No acute  fracture. Mild to moderate disc space narrowing at C5-C6 and C6-C7. No  concerning focal marrow signal abnormality. Mild indentation on the  ventral aspect of the cord at multiple levels, without evidence for  cord signal change/edema or myelomalacia. Multiple small perineural  cysts are present in the cervical spine. Mildly low-lying position of  the cerebellar tonsils. Paraspinous soft tissues are unremarkable.     Segmental analysis:  C2-C3: No spinal or neural foraminal stenosis.     C3-C4: Shallow symmetric disc bulge with effacement of the ventral  thecal sac and minimal mass effect on the ventral cord without cord  signal change. Minimal uncovertebral and facet arthropathy. No  significant neural foraminal stenosis.     C4-C5: Shallow symmetric disc bulge with effacement of the ventral  thecal sac and minimal mass effect of the ventral cord without cord  signal change. Minimal uncovertebral and facet arthropathy. No  significant neural foraminal stenosis.     C5-C6: Shallow symmetric disc bulge with effacement of the ventral  thecal sac and minimal mass effect on the ventral cord without cord  signal change. Minimal uncovertebral and facet arthropathy. No  significant neural foraminal stenosis.     C6-C7: There is a large left central/lateral recess disc extrusion  that extends partially into the left neural foramen (series 9 image  26). The herniated disc material measures approximately 10 mm  transverse x 6 mm AP x 10 mm craniocaudal (series 6 image 10, series  10 image 45). The disc herniation indents the left ventral aspect of  the cord and leads to mild to moderate central spinal stenosis and  moderate to severe left neural foraminal stenosis. The right neural  foramen is patent. There is mild uncovertebral and facet arthropathy.     C7-T1: No spinal or neural foraminal stenosis.                                                                       IMPRESSION: Degenerative changes of the cervical spine, most  pronounced at C6-C7 where there is a left central/lateral recess disc  extrusion that partially extends into the left neural foramen. Please  see the body of the report for additional details.       Assessment:  1. Cervical radiculopathy    2. Cervical disc herniation      Large cervical disc herniation, we discussed spine referral.  Will trial cervical WENCESLAO for pain relief in the interim.    Plan:  - Today's Plan of Care:  Referral to a Spine Surgeon  Referral to Pain Management for possible cervical WENCESLAO    Follow Up: as needed    Concerning signs and symptoms were reviewed.  The patient expressed understanding of this management plan and all questions were answered at this time.    Richelle Galaviz MD CAQ  Primary Care Sports Medicine  [facility]

## 2019-09-18 NOTE — LETTER
9/18/2019         RE: Lilian Quinones  [id number] [facility] [address]        Dear Colleague,    Thank you for referring your patient, Lilian Quinones, to the [facility] [place]. Please see a copy of my visit note below.    Sports Medicine Clinic Visit - Interim History September 18, 2019    PCP: Kehr, Kristen M    Lilian Quinones is a 44 year old female who is seen in f/u up for    Cervical radiculopathy  Cervical disc herniation. Since last visit on 9/11/19 patient has completed an MRI of the cervical spine. She reports minimal improvement in her pain and symptoms. She states the Cyclobenzaprine helps her sleep better.  Pain is still into left shoulder blade and arm with numbness.  Here to review MRI.    Symptoms are better with: Rest  Symptoms are worse with: sleeping, driving, typing  Additional Features:   Positive: paresthesias, numbness and weakness   Negative: swelling, bruising, popping, grinding, catching, locking and instability    Social History: [facility]    Review of Systems  Skin: no bruising, no swelling  Musculoskeletal: as above  Neurologic: yes numbness, paresthesias  Remainder of review of systems is negative including constitutional, CV, pulmonary, GI, except as noted in HPI or medical history.    Patient's current problem list, past medical and surgical history, and family history were reviewed.    Patient Active Problem List   Diagnosis     NO ACTIVE PROBLEMS     Overweight     Atypical mole     Right knee pain, unspecified chronicity     HAO (generalized anxiety disorder)     Hyperhydrosis disorder     Past Medical History:   Diagnosis Date     IUD (intrauterine device) in place 03/20/2019    Mirena     NO ACTIVE PROBLEMS      Past Surgical History:   Procedure Laterality Date     BIOPSY      Skin Biopsy     BREAST SURGERY      Breast reduction sergury      Family History   Problem Relation Age of Onset     Heart Disease Maternal Grandmother      Pancreatic  "Cancer Paternal Grandmother      Diabetes Paternal Grandmother      Breast Cancer Mother      Diabetes Father      Hypertension Maternal Grandfather      Hyperlipidemia Maternal Grandfather      Other Cancer Paternal Grandfather      Melanoma No family hx of        Objective  /76 (BP Location: Left arm, Patient Position: Chair, Cuff Size: Adult Regular)   Ht 1.715 m (5' 7.5\")   Wt 106.1 kg (234 lb)   BMI 36.11 kg/m       GENERAL APPEARANCE: healthy, alert and no distress   GAIT: NORMAL  SKIN: no suspicious lesions or rashes  HEENT: Sclera clear, anicteric  CV: good peripheral pulses  RESP: Breathing not labored  NEURO: Normal strength and tone, mentation intact and speech normal  PSYCH:  mentation appears normal and affect normal/bright     Cervical Spine Exam  Inspection:       No visible deformity        normal lordotic curvature maintained     Posture:      rounded shoulders and upper back     Tender:      paraspinal muscles       upper border of trapezius     Non-Tender:      remainder of cervical spine area     Range of Motion:       Limited extension and lateral rotation left     Painful Motions:       extension        lateral rotation     Strength:     C4 (shoulder shrug)  symmetric 5/5       C5 (shoulder abduction) symmetric 5/5       C6 (elbow flexion) symmetric 5/5       C7 (elbow extension) symmetric 5/5       C8 (finger abduction, thumb flexion) symmetric 5/5     Reflexes:      C5 (biceps) symmetric normal       C6 (supinator) symmetric normal       C7 (triceps) symmetric normal     Sensation:     grossly intact througout bilateral upper extremities     Special Tests:      neg (-) Sp[place]ling     Skin:     well perfused       capillary refill brisk     Lymphatics:      no edema noted in the upper extremities     Radiology  I ordered, visualized and reviewed these images with the patient  MRI CERVICAL SPINE WITHOUT CONTRAST 9/16/2019 5:10 PM      HISTORY: Evaluate stenosis and left-sided narrowing. " Cervical  radiculopathy.     TECHNIQUE: Multiplanar, multisequence MRI of the cervical spine  without contrast.      COMPARISON: Cervical spine radiographs 9/11/2019.     FINDINGS: Again seen is reversal of the normal cervical lordosis  without significant spondylolisthesis in the sagittal plane. No acute  fracture. Mild to moderate disc space narrowing at C5-C6 and C6-C7. No  concerning focal marrow signal abnormality. Mild indentation on the  ventral aspect of the cord at multiple levels, without evidence for  cord signal change/edema or myelomalacia. Multiple small perineural  cysts are present in the cervical spine. Mildly low-lying position of  the cerebellar tonsils. Paraspinous soft tissues are unremarkable.     Segmental analysis:  C2-C3: No spinal or neural foraminal stenosis.     C3-C4: Shallow symmetric disc bulge with effacement of the ventral  thecal sac and minimal mass effect on the ventral cord without cord  signal change. Minimal uncovertebral and facet arthropathy. No  significant neural foraminal stenosis.     C4-C5: Shallow symmetric disc bulge with effacement of the ventral  thecal sac and minimal mass effect of the ventral cord without cord  signal change. Minimal uncovertebral and facet arthropathy. No  significant neural foraminal stenosis.     C5-C6: Shallow symmetric disc bulge with effacement of the ventral  thecal sac and minimal mass effect on the ventral cord without cord  signal change. Minimal uncovertebral and facet arthropathy. No  significant neural foraminal stenosis.     C6-C7: There is a large left central/lateral recess disc extrusion  that extends partially into the left neural foramen (series 9 image  26). The herniated disc material measures approximately 10 mm  transverse x 6 mm AP x 10 mm craniocaudal (series 6 image 10, series  10 image 45). The disc herniation indents the left ventral aspect of  the cord and leads to mild to moderate central spinal stenosis and  moderate  to severe left neural foraminal stenosis. The right neural  foramen is patent. There is mild uncovertebral and facet arthropathy.     C7-T1: No spinal or neural foraminal stenosis.                                                                      IMPRESSION: Degenerative changes of the cervical spine, most  pronounced at C6-C7 where there is a left central/lateral recess disc  extrusion that partially extends into the left neural foramen. Please  see the body of the report for additional details.       Assessment:  1. Cervical radiculopathy    2. Cervical disc herniation      Large cervical disc herniation, we discussed spine referral.  Will trial cervical WENCESLAO for pain relief in the interim.    Plan:  - Today's Plan of Care:  Referral to a Spine Surgeon  Referral to Pain Management for possible cervical WENCESLAO    Follow Up: as needed    Concerning signs and symptoms were reviewed.  The patient expressed understanding of this management plan and all questions were answered at this time.    Richelle Galaviz MD CA  Primary Care Sports Medicine  [facility]      Again, thank you for allowing me to participate in the care of your patient.        Sincerely,        Richelle Galaviz MD

## 2019-09-19 ENCOUNTER — TELEPHONE (OUTPATIENT)
Dept: PALLIATIVE MEDICINE | Facility: CLINIC | Age: 44
End: 2019-09-19

## 2019-09-19 NOTE — TELEPHONE ENCOUNTER
New order from Dr Galaviz came in for Lumbar WENCESLAO dx: Cervical Radiculopathy. When talking to pt she states her order says Cervical WENCESLAO. New order needs to be corrected and placed.      Carol DEL REAL    [facility]

## 2019-09-19 NOTE — TELEPHONE ENCOUNTER
Pre-screening Questions for Radiology Injections:    Injection to be done at which interventional clinic site? [facility] [place] or [place]    Instruct patient to arrive as directed prior to the scheduled appointment time:    [place] minutes before      [place]: 30 minutes before; if IV needed 1 hour before     Procedure ordered by Dr. Galaviz    Procedure ordered? Cervical WENCESLAO        Transforaminal Cervical WENCESLAO - Dr. Homa Evans ONLY    What insurance would patient like us to bill for this procedure? [employer] of [place]      Worker's comp or MVA (motor vehicle accident) -Any injection DO NOT SCHEDULE and route to Sonali Hensley.      [employer] insurance - For SI joint injections, DO NOT SCHEDULE and route Sonali Ayden.       [employer] - Any injection besides hip/shoulder/knee joint DO NOT SCHEDULE and route to Sonali Hensley. She will obtain PA and call pt back to schedule procedure or notify pt of denial.       [employer]-Route to Sonali for review      IF SCHEDULING IN [place] AND NEEDS A PA, IT IS OKAY TO SCHEDULE. [place] HANDLES THEIR OWN PA'S AFTER THE PATIENT IS SCHEDULED. PLEASE SCHEDULE AT LEAST 1 WEEK OUT SO A PA CAN BE OBTAINED.      Any chance of pregnancy? NO   If YES, do NOT schedule and route to RN pool    Is an  needed? No     Patient has a drive home? (mandatory) YES: INFORMED    Is patient taking any blood thinners (plavix, coumadin, jantoven, warfarin, heparin, pradaxa or dabigatran )? No   If hold needed, do NOT schedule, route to RN pool     Is patient taking any aspirin products (includes Excedrin and Fiorinal)? No     If more than 325mg/day do NOT schedule; route to RN pool     For CERVICAL procedures, hold all aspirin products for 6 days.     Tell pt that if aspirin product is not held for 6 days, the procedure WILL BE cancelled.      Does the patient have a bleeding or clotting disorder? No     If YES, okay to schedule AND route to RN nurse pool    For any  patients with platelet count <100, must be forwarded to provider    Is patient diabetic?  No  If YES, have them bring their glucometer.    Does patient have an active infection or treated for one within the past week? No     Is patient currently taking any antibiotics?  No     For patients on chronic, preventative, or prophylactic antibiotics, procedures may be scheduled.     For patients on antibiotics for active or recent infection:antibiotic course must have been completed for 4 days    Is patient currently taking any steroid medications? (i.e. Prednisone, Medrol)  No     For patients on steroid medications, course must have been completed for 4 days    Reviewed with patient:  If you are started on any steroids or antibiotics between now and your appointment, you must contact us because the procedure may need to be cancelled.  Yes    Is patient actively being treated for cancer or immunocompromised? No  If YES, do NOT schedule and route to RN pool     Are you able to get on and off an exam table with minimal or no assistance? Yes  If NO, do NOT schedule and route to RN pool    Are you able to roll over and lay on your stomach with minimal or no assistance? Yes  If NO, do NOT schedule and route to RN pool     Any allergies to contrast dye, iodine, shellfish, or numbing and steroid medications? No  If YES, route to RN pool AND add allergy information to appointment notes    Allergies: Amoxicillin and Penicillins      Has the patient had a flu shot or any other vaccinations within 7 days before or after the procedure.  No     Does patient have an MRI/CT?  YES: MRI  (SI joint, hip injections, lumbar sympathetic blocks, and stellate ganglion blocks do not require an MRI)    Was the MRI done w/in the last 3 years?  Yes    Was MRI done at [place]? Yes      If not, where was it done? N/A       If MRI was not done at [place], [facility] or [facility] do NOT schedule and route to nursing.  If pt has an imaging disc, the  injection may be scheduled but pt has to bring disc to appt. If they show up w/out disc the injection cannot be done    Reminders (please tell patient if applicable):       Instructed pt to arrive 30 minutes early for IV start if this is for a cervical procedure, ALL sympathetic (stellate ganglion, hypogastric, or lumbar sympathetic block) and all sedation procedures (RFA, spinal cord stimulation trials).  YES: INFORMED   -IVs are not routinely placed for Dr. Quinones cervical cases   -Dr. Levi: IVs for cervical ESIs and cervical TBDs (not CMBBs/facet inj)      If NPO for sedation, informed patient that it is okay to take medications with sips of water (except if they are to hold blood thinners).  Not Applicable   *DO take blood pressure medication if it is prescribed*      If this is for a cervical WENCESLAO, informed patient that aspirin needs to be held for 6 days.   YES: INFORMED      For all patients not having spinal cord stimulator (SCS) trials or radiofrequency ablations (RFAs), informed patient:    IV sedation is not provided for this procedure.  If you feel that an oral anti-anxiety medication is needed, you can discuss this further with your referring provider or primary care provider.  The Pain Clinic provider will discuss specifics of what the procedure includes at your appointment.  Most procedures last 10-20 minutes.  We use numbing medications to help with any discomfort during the procedure.  YES: INFORMED       Do not schedule procedures requiring IV placement in the first appointment of the day or first appointment after lunch. Do NOT schedule at 0745, 0815 or 1245. REVIEWED      For patients 85 or older we recommend having an adult stay w/ them for the remainder of the day.   N/A    Does the patient have any questions?  NO  Richelle Hummel  [facility]

## 2019-10-04 ENCOUNTER — ANCILLARY PROCEDURE (OUTPATIENT)
Dept: RADIOLOGY | Facility: CLINIC | Age: 44
End: 2019-10-04
Attending: PAIN MEDICINE
Payer: COMMERCIAL

## 2019-10-04 ENCOUNTER — RADIOLOGY INJECTION OFFICE VISIT (OUTPATIENT)
Dept: PALLIATIVE MEDICINE | Facility: CLINIC | Age: 44
End: 2019-10-04
Payer: COMMERCIAL

## 2019-10-04 VITALS
OXYGEN SATURATION: 98 % | RESPIRATION RATE: 16 BRPM | HEART RATE: 62 BPM | DIASTOLIC BLOOD PRESSURE: 63 MMHG | SYSTOLIC BLOOD PRESSURE: 105 MMHG

## 2019-10-04 DIAGNOSIS — M54.12 CERVICAL RADICULOPATHY: Primary | ICD-10-CM

## 2019-10-04 DIAGNOSIS — M54.12 CERVICAL RADICULOPATHY: ICD-10-CM

## 2019-10-04 PROCEDURE — 62321 NJX INTERLAMINAR CRV/THRC: CPT | Performed by: PAIN MEDICINE

## 2019-10-04 ASSESSMENT — PAIN SCALES - GENERAL: PAINLEVEL: MODERATE PAIN (4)

## 2019-10-04 NOTE — PROGRESS NOTES
[facility] - Procedure Note    Date of Visit: 10/4/2019    Procedure performed: C7-T1 interlaminar epidural steroid injection with fluoroscopic guidance  Diagnosis: Cervical spondylosis; Cervical radiculitis/radiculopathy  : Chema Levi MD  Anesthesia: none    Indications: Lilian Quinones is a 44 year old female who is seen for cervical epidural steroid injection. The patient describes left-sided neck pain into her upper extremity. The patient has been exhibiting symptoms consistent with cervical intraspinal inflammation and radiculopathy. Symptoms have been persistent, disabling, and intermittently severe. The patient reports minimal improvement with conservative treatment, including meds/PT.    Cervical MRI   Segmental analysis:  C2-C3: No spinal or neural foraminal stenosis.     C3-C4: Shallow symmetric disc bulge with effacement of the ventral  thecal sac and minimal mass effect on the ventral cord without cord  signal change. Minimal uncovertebral and facet arthropathy. No  significant neural foraminal stenosis.     C4-C5: Shallow symmetric disc bulge with effacement of the ventral  thecal sac and minimal mass effect of the ventral cord without cord  signal change. Minimal uncovertebral and facet arthropathy. No  significant neural foraminal stenosis.     C5-C6: Shallow symmetric disc bulge with effacement of the ventral  thecal sac and minimal mass effect on the ventral cord without cord  signal change. Minimal uncovertebral and facet arthropathy. No  significant neural foraminal stenosis.     C6-C7: There is a large left central/lateral recess disc extrusion  that extends partially into the left neural foramen (series 9 image  26). The herniated disc material measures approximately 10 mm  transverse x 6 mm AP x 10 mm craniocaudal (series 6 image 10, series  10 image 45). The disc herniation indents the left ventral aspect of  the cord and leads to mild to moderate central  spinal stenosis and  moderate to severe left neural foraminal stenosis. The right neural  foramen is patent. There is mild uncovertebral and facet arthropathy.     C7-T1: No spinal or neural foraminal stenosis.                                                                      IMPRESSION: Degenerative changes of the cervical spine, most  pronounced at C6-C7 where there is a left central/lateral recess disc  extrusion that partially extends into the left neural foramen. Please  see the body of the report for additional details.     Allergies:      Allergies   Allergen Reactions     Amoxicillin Rash     Penicillins Rash        Vitals:  /73   Pulse 73     Review of Systems: The patient denies recent fever, chills, illness, use of antibiotics or anticoagulants. All other 10-point review of systems negative.     Procedure: The procedure and risks were explained, and informed written consent was obtained from the patient. Risks include but are not limited to: infection, bleeding, increased pain, and damage to soft tissue, nerve, muscle, and vasculature structures. After getting informed consent, patient was brought into the procedure suite and was placed in a prone position on the procedure table. A Pause for the Cause was performed. Patient was prepped and draped in sterile fashion.     The C7-T1 interspace was identified with use of fluoroscopy in AP view. A 25-gauge, 1.5 inch needle was used to anesthetize the skin and subcutaneous tissue entry site with a total of 2 ml of 1% lidocaine. Under fluoroscopic visualization, a 22-gauge, 3.5 inch Tuohy epidural needle was slowly advanced towards the epidural space a few millimeters left of midline. The latter part of the needle advancement was guided with fluoroscopy in the lateral view. The epidural space was identified using loss of resistance technique. After negative aspiration for heme and cerebrospinal fluid, a total of 1 mL of Omnipaque was injected to  confirm needle placement. 9 mL of contrast was wasted. Epidurogram confirmed spread within the posterior epidural space. 2 ml of 10mg/ml of dexamethasone and 1 ml of preservative free 0.25% bupivacaine was injected. The needle was removed.  Images were saved to PACS.    The patient tolerated the procedure well, and there was no evidence of procedural complications. No new sensory or motor deficits were noted following the procedure. The patient was stable and able to ambulate on discharge home. Post-procedure instructions were provided.     Pre-procedure pain score: 8/10 in the neck, 8/10 in the arm  Post-procedure pain score: 2/10 in the neck, 2/10 in the arm    Assessment/Plan: Lilian Quinones is a 44 year old female s/p cervical interlaminar epidural steroid injection today for cervical spondylosis and radiculitis/radiculopathy.     1. Following today's procedure, the patient was advised to contact the [facility] for any of the following:   Fever, chills, or night sweats   New onset of pain, numbness, or weakness   Any questions/concerns regarding the procedure  If unable to contact the Pain Center, the patient was instructed to go to a local Emergency Room for any complications.   2. The patient will receive a follow-up call in 1 week.   3. Follow-up with referring provider in 2 weeks for post-procedure evaluation.    [facility]

## 2019-10-04 NOTE — NURSING NOTE
Discharge Information    IV Discontiued Time:  1500 intact    Amount of Fluid Infused:  SL    Discharge Criteria = When patient returns to baseline or as per MD order    Consciousness:  Pt is fully awake    Circulation:  BP +/- 20% of pre-procedure level    Respiration:  Patient is able to breathe deeply    O2 Sat:  Patient is able to maintain O2 Sat >92% on room air    Activity:  Moves 4 extremities on command    Ambulation:  Patient is able to stand and walk or stand and pivot into wheelchair    Dressing:  Clean/dry or No Dressing    Notes:   Discharge instructions and AVS given to patient    Patient meets criteria for discharge?  YES    Admitted to PCU?  No    Responsible adult present to accompany patient home?  Yes    Signature/Title:    Osvaldo Colvin RN  RN Care Coordinator  [facility]

## 2019-10-04 NOTE — PATIENT INSTRUCTIONS
[facility]   Procedure Discharge Instructions    Today you saw:    Dr. Chema Levi      You had an:  Cervical Epidural steroid injection    Medications used:  Lidocaine   Bupivacaine   Dexamethasone   Omnipaque               If you were holding your blood thinning medication, please restart taking it: N/A    Be cautious Numbness and/or weakness may occur for up to 6-8 hours after the procedure due to effect of the local anesthetic    Do not drive for 6 hours. The effect of the local anesthetic could slow your reflexes.     You may resume your regular activities after 24 hours    Avoid strenuous activity for the first 24 hours    You may shower, however avoid swimming, tub baths or hot tubs for 24 hours following your procedure    You may have a mild to moderate increase in pain for several days following the injection.    It may take up to 14 days for the steroid medication to start working although you may feel the effect as early as a few days after the procedure.       You may use ice packs for 10-15 minutes, 3 to 4 times a day at the injection site for comfort    Do not use heat to painful areas for 6 to 8 hours. This will give the local anesthetic time to wear off and prevent you from accidentally burning your skin.     Unless you have been directed to avoid the use of anti-inflammatory medications (NSAIDS), you may use medications such as ibuprofen, Aleve or Tylenol for pain control if needed.     Possible side effects of steroids that you may experience include flushing, elevated blood pressure, increased appetite, mild headaches and restlessness.  All of these symptoms will get better with time.    If you experience any of the following, call the Pain Clinic during work hours (Mon-Friday 8-4:30 pm) at [phone] or the Provider Line after hours at [phone]:  -Fever over 100 degree F  -Swelling, bleeding, redness, drainage, warmth at the injection site  -Progressive weakness or  numbness in your arms  -Unusual headache that is not relieved by Tylenol or other pain reliever  -Unusual new onset of pain that is not improving

## 2019-10-04 NOTE — NURSING NOTE
20 gauge Peripheral IV inserted into left anticubital - attempts: 1    Osvaldo Colvin, RN  Care Coordinator   [facility]

## 2019-10-04 NOTE — NURSING NOTE
Pre-procedure Intake    Have you been fasting? NA    If yes, for how long? NA    Are you taking a prescribed blood thinner such as coumadin, Plavix, Xarelto?    No    If yes, when did you take your last dose? NA    Do you take aspirin?  No    If cervical procedure, have you held aspirin for 6 days?   NA    Do you have any allergies to contrast dye, iodine, steroid and/or numbing medications?  NO    Are you currently taking antibiotics or have an active infection?  NO    Have you had a fever/elevated temperature within the past week? NO    Are you currently taking oral steroids? NO    Do you have a ? Yes       Are you pregnant or breastfeeding?  NO    Are the vital signs normal?  Yes      Jeannie Merritt CMA ([facility])

## 2019-10-17 ENCOUNTER — OFFICE VISIT (OUTPATIENT)
Dept: NEUROSURGERY | Facility: CLINIC | Age: 44
End: 2019-10-17
Payer: COMMERCIAL

## 2019-10-17 VITALS
TEMPERATURE: 98.3 F | BODY MASS INDEX: 32.74 KG/M2 | HEART RATE: 69 BPM | HEIGHT: 68 IN | OXYGEN SATURATION: 96 % | WEIGHT: 216 LBS | DIASTOLIC BLOOD PRESSURE: 75 MMHG | SYSTOLIC BLOOD PRESSURE: 107 MMHG

## 2019-10-17 DIAGNOSIS — M54.12 CERVICAL RADICULOPATHY: Primary | ICD-10-CM

## 2019-10-17 PROCEDURE — 99203 OFFICE O/P NEW LOW 30 MIN: CPT | Performed by: NEUROLOGICAL SURGERY

## 2019-10-17 ASSESSMENT — MIFFLIN-ST. JEOR: SCORE: 1668.27

## 2019-10-17 ASSESSMENT — PAIN SCALES - GENERAL: PAINLEVEL: MILD PAIN (2)

## 2019-10-17 NOTE — PROGRESS NOTES
I was asked by Dr. Galaviz to see this patient in consultation    44F w/ left C6-7 disc herniation.  Initially had pain last winter, but severe flare this summer.  Sharp pain from left neck to triceps and lateral three digits with numbness.  Did PT without improvement.  Underwent WENCESLAO 2 weeks ago with near complete pain resolution.  MR with left C6-7 disc herniation.       Past Medical History:   Diagnosis Date     IUD (intrauterine device) in place 03/20/2019    Mirena     NO ACTIVE PROBLEMS      Past Surgical History:   Procedure Laterality Date     BIOPSY      Skin Biopsy     BREAST SURGERY      Breast reduction sergury      Social History     Socioeconomic History     Marital status:      Spouse name: Not on file     Number of children: Not on file     Years of education: Not on file     Highest education level: Not on file   Occupational History     Not on file   Social Needs     Financial resource strain: Not on file     Food insecurity:     Worry: Not on file     Inability: Not on file     Transportation needs:     Medical: Not on file     Non-medical: Not on file   Tobacco Use     Smoking status: Never Smoker     Smokeless tobacco: Never Used   Substance and Sexual Activity     Alcohol use: Yes     Drug use: No     Sexual activity: Yes     Partners: Male     Birth control/protection: I.U.D.   Lifestyle     Physical activity:     Days per week: Not on file     Minutes per session: Not on file     Stress: Not on file   Relationships     Social connections:     Talks on phone: Not on file     Gets together: Not on file     Attends Mandaen service: Not on file     Active member of club or organization: Not on file     Attends meetings of clubs or organizations: Not on file     Relationship status: Not on file     Intimate partner violence:     Fear of current or ex partner: Not on file     Emotionally abused: Not on file     Physically abused: Not on file     Forced sexual activity: Not on file   Other  "Topics Concern     Parent/sibling w/ CABG, MI or angioplasty before 65F 55M? No   Social History Narrative     Not on file     Family History   Problem Relation Age of Onset     Heart Disease Maternal Grandmother      Pancreatic Cancer Paternal Grandmother      Diabetes Paternal Grandmother      Breast Cancer Mother      Diabetes Father      Hypertension Maternal Grandfather      Hyperlipidemia Maternal Grandfather      Other Cancer Paternal Grandfather      Melanoma No family hx of         ROS: 10 point ROS neg other than the symptoms noted above in the HPI.    Physical Exam  /75 (BP Location: Right arm, Patient Position: Sitting, Cuff Size: Adult Large)   Pulse 69   Temp 98.3  F (36.8  C) (Oral)   Ht 1.727 m (5' 8\")   Wt 98 kg (216 lb)   SpO2 96%   BMI 32.84 kg/m    HEENT:  Normocephalic, atraumatic.  PERRLA.  EOM s intact.  Visual fields full to gross exam  Neck:  Supple, non-tender, without lymphadenopathy.  Heart:  No peripheral edema  Lungs:  No SOB  Abdomen:  Non-distended.   Skin:  Warm and dry.  Extremities:  No edema, cyanosis or clubbing.  Psychiatric:  No apparent distress  Musculoskeletal:  Normal bulk and tone    NEUROLOGICAL EXAMINATION:     Mental status:  Alert and Oriented x 3, speech is fluent.  Cranial nerves:  II-XII intact.   Motor:    Shoulder Abduction:  Right:  5/5   Left:  5/5  Biceps:                      Right:  5/5   Left:  5/5  Triceps:                     Right:  5/5   Left:  5/5  Wrist Extensors:       Right:  5/5   Left:  5/5  Wrist Flexors:           Right:  5/5   Left:  5/5  interosseus :            Right:  5/5   Left:  5/5  Hip Flexion:                Right: 5/5  Left:  5/5  Quadriceps:             Right:  5/5  Left:  5/5  Hamstrings:             Right:  5/5  Left:  5/5  Gastroc Soleus:        Right:  5/5  Left:  5/5  Tib/Ant:                      Right:  5/5  Left:  5/5  EHL:                     Right:  5/5  Left:  5/5  Sensation:  Intact  Reflexes:  Negative " Babinski.  Negative Clonus.  Negative Ladd's.  Coordination:  Smooth finger to nose testing.   Negative pronator drift.  Smooth tandem walking.    A/P:    I had a discussion with the patient, reviewing the history, symptoms, and imaging     Improving after WENCESLAO  Plan for conservative management  If pain recurs, she will call our clinic, and we will either order another WENCESLAO or she will return to discuss possible C6-7 ACDF

## 2019-10-17 NOTE — NURSING NOTE
"Lilian Quinones is a 44 year old female who presents for:  Chief Complaint   Patient presents with     Consult For     Cervical disc herniation.         Initial Vitals:  /75 (BP Location: Right arm, Patient Position: Sitting, Cuff Size: Adult Large)   Pulse 69   Temp 98.3  F (36.8  C) (Oral)   Ht 5' 8\" (1.727 m)   Wt 216 lb (98 kg)   SpO2 96%   BMI 32.84 kg/m   Estimated body mass index is 32.84 kg/m  as calculated from the following:    Height as of this encounter: 5' 8\" (1.727 m).    Weight as of this encounter: 216 lb (98 kg).. Body surface area is 2.17 meters squared. BP completed using cuff size: large  Mild Pain (2)        Nursing Comments: Patient states since the epidural injection she only has pain down her left arm and in her left shoulder.         Lindsay Youssef, [facility]\    "

## 2019-11-21 ENCOUNTER — TELEPHONE (OUTPATIENT)
Dept: FAMILY MEDICINE | Facility: CLINIC | Age: 44
End: 2019-11-21

## 2019-11-21 NOTE — TELEPHONE ENCOUNTER
Please call this patient to get her scheduled on one of my same day appointment spots.   Kristen Kehr PA-C

## 2019-11-21 NOTE — TELEPHONE ENCOUNTER
Patient states her son committed suicide last week and she would like to know if you will prescribe something for her anxiety.  Please call if you do so.    Thank you.

## 2019-11-21 NOTE — TELEPHONE ENCOUNTER
ROSENDO at [phone], per provider wants her to be scheduled into her Same Day spot on 11/25/19.  BA Valencia

## 2019-11-21 NOTE — TELEPHONE ENCOUNTER
Increased anxiety, feeling overwhelmed. Is sleeping ok at night as takes Melatonin    Patient requesting prescription for anti-anxiety medication patient feels would help get her through the day    To provider to advise    Lupe JAMESN, RN, CPN

## 2019-11-22 ENCOUNTER — OFFICE VISIT (OUTPATIENT)
Dept: FAMILY MEDICINE | Facility: CLINIC | Age: 44
End: 2019-11-22
Payer: COMMERCIAL

## 2019-11-22 VITALS
TEMPERATURE: 99 F | BODY MASS INDEX: 31.57 KG/M2 | WEIGHT: 207.6 LBS | SYSTOLIC BLOOD PRESSURE: 121 MMHG | HEART RATE: 83 BPM | DIASTOLIC BLOOD PRESSURE: 73 MMHG

## 2019-11-22 DIAGNOSIS — F43.21 GRIEF REACTION: Primary | ICD-10-CM

## 2019-11-22 PROCEDURE — 99214 OFFICE O/P EST MOD 30 MIN: CPT | Performed by: NURSE PRACTITIONER

## 2019-11-22 RX ORDER — LORAZEPAM 1 MG/1
1 TABLET ORAL EVERY 8 HOURS PRN
Qty: 30 TABLET | Refills: 0 | Status: SHIPPED | OUTPATIENT
Start: 2019-11-22 | End: 2019-11-25

## 2019-11-22 ASSESSMENT — ENCOUNTER SYMPTOMS
NERVOUS/ANXIOUS: 1
DIAPHORESIS: 0
DECREASED CONCENTRATION: 1
SLEEP DISTURBANCE: 0
ACTIVITY CHANGE: 1
APPETITE CHANGE: 1
COUGH: 0
FEVER: 0
SHORTNESS OF BREATH: 0
AGITATION: 0
FATIGUE: 1

## 2019-11-22 NOTE — PROGRESS NOTES
Subjective     Lilian Quinones is a 44 year old female who presents to clinic today for the following health issues:    DARIEN Quintana is a 44 y.o female who presents with her mother for support.  She reports her 17 y.o son committed suicide 10 days ago on 11/12/19.  Her  found him at home.  Reports her family has been extremely supportive as well as her . States family has been at her home around the clock.  Reports she is sleeping well, taking Melatonin, which she has taken chronically.  States mornings when first waking up are the hardest- she feels extremely anxious and relives his death all over.  She is trying to eat regular meals and has been taking her vitamins and protein shakes.  She has attended 1 group therapy session with her  and felt it was helpful.  She is also looking into see a therapist as well.  She is scheduled to see her PCP in 3 days to follow-up.  She denies SI/HI or thoughts of such.       Patient Active Problem List   Diagnosis     NO ACTIVE PROBLEMS     Overweight     Atypical mole     Right knee pain, unspecified chronicity     HAO (generalized anxiety disorder)     Hyperhydrosis disorder     Past Surgical History:   Procedure Laterality Date     BIOPSY      Skin Biopsy     BREAST SURGERY      Breast reduction sergury        Social History     Tobacco Use     Smoking status: Never Smoker     Smokeless tobacco: Never Used   Substance Use Topics     Alcohol use: Yes     Family History   Problem Relation Age of Onset     Heart Disease Maternal Grandmother      Pancreatic Cancer Paternal Grandmother      Diabetes Paternal Grandmother      Breast Cancer Mother      Diabetes Father      Hypertension Maternal Grandfather      Hyperlipidemia Maternal Grandfather      Other Cancer Paternal Grandfather      Melanoma No family hx of          Current Outpatient Medications   Medication Sig Dispense Refill     levonorgestrel (MIRENA) 20 MCG/24HR IUD 1 each by Intrauterine route  once       LORazepam (ATIVAN) 1 MG tablet Take 1 tablet (1 mg) by mouth every 8 hours as needed for anxiety 30 tablet 0     Ursodiol (ACTIGALL PO)        Allergies   Allergen Reactions     Amoxicillin Rash     Penicillins Rash       Reviewed and updated as needed this visit by Provider  Tobacco  Allergies  Meds  Problems  Med Hx  Surg Hx  Fam Hx         Review of Systems   Constitutional: Positive for activity change, appetite change and fatigue. Negative for diaphoresis and fever.   Respiratory: Negative for cough and shortness of breath.    Cardiovascular: Negative for chest pain.   Psychiatric/Behavioral: Positive for decreased concentration and mood changes. Negative for agitation, behavioral problems, self-injury, sleep disturbance and suicidal ideas. The patient is nervous/anxious.             Objective    /73   Pulse 83   Temp 99  F (37.2  C) (Oral)   Wt 94.2 kg (207 lb 9.6 oz)   BMI 31.57 kg/m    Body mass index is 31.57 kg/m .  Physical Exam  Vitals signs reviewed.   Constitutional:       Appearance: Normal appearance. She is well-developed.   HENT:      Head: Normocephalic.   Cardiovascular:      Rate and Rhythm: Normal rate and regular rhythm.   Pulmonary:      Effort: Pulmonary effort is normal.      Breath sounds: Normal breath sounds.   Skin:     General: Skin is warm and dry.   Neurological:      Mental Status: She is alert and oriented to person, place, and time.   Psychiatric:         Attention and Perception: Attention normal.         Mood and Affect: Mood normal.         Speech: Speech normal.         Behavior: Behavior normal.         Thought Content: Thought content normal.         Judgment: Judgment normal.      Comments: Tearful.  Appropriate in conversation with good eye contact.                 Assessment & Plan     1. Grief reaction  - discussed potential drowsiness. Advised to not take if driving.  Keep follow-up appt with PCP in 3 days.  Recommend continue group therapy  in addition to therapist.  ER if develops SI/HI.   - LORazepam (ATIVAN) 1 MG tablet; Take 1 tablet (1 mg) by mouth every 8 hours as needed for anxiety  Dispense: 30 tablet; Refill: 0       Return in about 3 days (around 11/25/2019).    Denia Nuñez NP  [facility]

## 2019-11-22 NOTE — TELEPHONE ENCOUNTER
Call to patient, advise appointment today to bridge patient through the weekend until sees Kristen Kehr, PA-C.   Next 5 appointments (look out 90 days)    Nov 22, 2019  1:20 PM CST  SHORT with Denia Nuñez NP  [facility] [address] [facility] [address]  [phone]   Nov 25, 2019 12:00 PM CST  SHORT with Kristen M Kehr, PA-C  [facility] [address] [facility] [address]  [phone]        Patient/parent verbalized understanding of instructions provided and agreed with the plan of care  Alice Velasquez RN

## 2019-11-22 NOTE — TELEPHONE ENCOUNTER
Patient returned phone call to BA on voice mail. She was put on Kristen Kehr Schedule for Monday 11/25/19. She does not think she can make it to Monday because she cannot sleep and needs something sooner for the grief and anxiety. Patient's son committed suicide a week ago. She stated she can still come in and see Maliha on Monday but what can she do in the mean time. Please call patient to advise. Please inform patient appointment time on 11/25/19 with PCP.  BA Valencia

## 2019-11-25 ENCOUNTER — OFFICE VISIT (OUTPATIENT)
Dept: FAMILY MEDICINE | Facility: CLINIC | Age: 44
End: 2019-11-25
Payer: COMMERCIAL

## 2019-11-25 VITALS
OXYGEN SATURATION: 99 % | RESPIRATION RATE: 16 BRPM | SYSTOLIC BLOOD PRESSURE: 130 MMHG | TEMPERATURE: 98.5 F | DIASTOLIC BLOOD PRESSURE: 84 MMHG | WEIGHT: 212 LBS | BODY MASS INDEX: 32.23 KG/M2 | HEART RATE: 85 BPM

## 2019-11-25 DIAGNOSIS — F43.21 GRIEF REACTION: Primary | ICD-10-CM

## 2019-11-25 PROCEDURE — 99213 OFFICE O/P EST LOW 20 MIN: CPT | Performed by: PHYSICIAN ASSISTANT

## 2019-11-25 RX ORDER — LORAZEPAM 1 MG/1
1 TABLET ORAL EVERY 8 HOURS PRN
Qty: 60 TABLET | Refills: 0 | Status: SHIPPED | OUTPATIENT
Start: 2019-11-25 | End: 2019-12-30

## 2019-11-25 RX ORDER — CITALOPRAM HYDROBROMIDE 20 MG/1
TABLET ORAL
Qty: 30 TABLET | Refills: 1 | Status: SHIPPED | OUTPATIENT
Start: 2019-11-25 | End: 2019-12-30

## 2019-11-25 ASSESSMENT — ANXIETY QUESTIONNAIRES
1. FEELING NERVOUS, ANXIOUS, OR ON EDGE: NEARLY EVERY DAY
3. WORRYING TOO MUCH ABOUT DIFFERENT THINGS: NEARLY EVERY DAY
IF YOU CHECKED OFF ANY PROBLEMS ON THIS QUESTIONNAIRE, HOW DIFFICULT HAVE THESE PROBLEMS MADE IT FOR YOU TO DO YOUR WORK, TAKE CARE OF THINGS AT HOME, OR GET ALONG WITH OTHER PEOPLE: VERY DIFFICULT
GAD7 TOTAL SCORE: 16
6. BECOMING EASILY ANNOYED OR IRRITABLE: NOT AT ALL
7. FEELING AFRAID AS IF SOMETHING AWFUL MIGHT HAPPEN: NEARLY EVERY DAY
2. NOT BEING ABLE TO STOP OR CONTROL WORRYING: NEARLY EVERY DAY
5. BEING SO RESTLESS THAT IT IS HARD TO SIT STILL: MORE THAN HALF THE DAYS

## 2019-11-25 ASSESSMENT — PATIENT HEALTH QUESTIONNAIRE - PHQ9
5. POOR APPETITE OR OVEREATING: MORE THAN HALF THE DAYS
SUM OF ALL RESPONSES TO PHQ QUESTIONS 1-9: 14

## 2019-11-25 NOTE — PROGRESS NOTES
Subjective     Lilian Quinones is a 44 year old female who presents to clinic today for the following health issues:    Lilian lost her son to suicide 2 weeks ago. She is having a significant amount of anxiety / grief. She and her  are starting counseling together. She was seen in  and is using the lorazepam. This is helpful. She is also wanting to discuss longer term medication.     HPI     Anxiety Follow-Up    How are you doing with your anxiety since your last visit? Improved     Are you having other symptoms that might be associated with anxiety? No    Have you had a significant life event? Grief or Loss     Are you feeling depressed? No    Do you have any concerns with your use of alcohol or other drugs? No    Social History     Tobacco Use     Smoking status: Never Smoker     Smokeless tobacco: Never Used   Substance Use Topics     Alcohol use: Yes     Drug use: No     HAO-7 SCORE 8/22/2016 9/19/2016   Total Score 18 9     PHQ 9/19/2016   PHQ-9 Total Score 8   Q9: Thoughts of better off dead/self-harm past 2 weeks Not at all     Last PHQ-9 11/25/2019   1.  Little interest or pleasure in doing things 3   2.  Feeling down, depressed, or hopeless 3   3.  Trouble falling or staying asleep, or sleeping too much 0   4.  Feeling tired or having little energy 2   5.  Poor appetite or overeating 2   6.  Feeling bad about yourself 2   7.  Trouble concentrating 2   8.  Moving slowly or restless 0   Q9: Thoughts of better off dead/self-harm past 2 weeks 0   PHQ-9 Total Score 14   Difficulty at work, home, or with people Very difficult     HAO-7  11/25/2019   1. Feeling nervous, anxious, or on edge 3   2. Not being able to stop or control worrying 3   3. Worrying too much about different things 3   4. Trouble relaxing 2   5. Being so restless that it is hard to sit still 2   6. Becoming easily annoyed or irritable 0   7. Feeling afraid, as if something awful might happen 3   HAO-7 Total Score 16   If you checked  any problems, how difficult have they made it for you to do your work, take care of things at home, or get along with other people? Very difficult         How many servings of fruits and vegetables do you eat daily?  2-3    On average, how many sweetened beverages do you drink each day (soda, juice, sweet tea, etc)?   1    How many days per week do you miss taking your medication? 0      Patient Active Problem List   Diagnosis     NO ACTIVE PROBLEMS     Overweight     Atypical mole     Right knee pain, unspecified chronicity     HAO (generalized anxiety disorder)     Hyperhydrosis disorder     Past Surgical History:   Procedure Laterality Date     BIOPSY      Skin Biopsy     BREAST SURGERY      Breast reduction sergury        Social History     Tobacco Use     Smoking status: Never Smoker     Smokeless tobacco: Never Used   Substance Use Topics     Alcohol use: Yes     Family History   Problem Relation Age of Onset     Heart Disease Maternal Grandmother      Pancreatic Cancer Paternal Grandmother      Diabetes Paternal Grandmother      Breast Cancer Mother      Diabetes Father      Hypertension Maternal Grandfather      Hyperlipidemia Maternal Grandfather      Other Cancer Paternal Grandfather      Melanoma No family hx of          Allergies   Allergen Reactions     Amoxicillin Rash     Penicillins Rash         Reviewed and updated as needed this visit by Provider         Review of Systems   ROS COMP: Constitutional, HEENT, cardiovascular, pulmonary, GI, , musculoskeletal, neuro, skin, endocrine and psych systems are negative, except as otherwise noted.      Objective    /84   Pulse 85   Temp 98.5  F (36.9  C) (Oral)   Resp 16   Wt 96.2 kg (212 lb)   SpO2 99%   Breastfeeding No   BMI 32.23 kg/m    Body mass index is 32.23 kg/m .  Physical Exam   GENERAL: healthy, alert and no distress  PSYCH: mentation appears normal, tearful, anxious and judgement and insight intact    Diagnostic Test Results:  Labs  reviewed in Epic        Assessment & Plan     1. Grief reaction  Treat with the following  Side effects and how to take the medication discussed.  Plan to follow up in 1-2 months, sooner if needed.   - citalopram (CELEXA) 20 MG tablet; 1/2 tablet daily x 6 days, then increase to 1 tablet daily  Dispense: 30 tablet; Refill: 1  - LORazepam (ATIVAN) 1 MG tablet; Take 1 tablet (1 mg) by mouth every 8 hours as needed for anxiety  Dispense: 60 tablet; Refill: 0     Return in about 1 month (around 12/25/2019) for medication check.    Kristen M. Kehr, PA-C  [facility]

## 2019-11-26 ASSESSMENT — ANXIETY QUESTIONNAIRES: GAD7 TOTAL SCORE: 16

## 2019-11-27 ENCOUNTER — AMBULATORY - HEALTHEAST (OUTPATIENT)
Dept: SURGERY | Facility: CLINIC | Age: 44
End: 2019-11-27

## 2019-11-27 DIAGNOSIS — Z90.3 POSTGASTRECTOMY MALABSORPTION: ICD-10-CM

## 2019-11-27 DIAGNOSIS — K91.2 POSTGASTRECTOMY MALABSORPTION: ICD-10-CM

## 2019-11-27 DIAGNOSIS — Z98.84 S/P LAPAROSCOPIC SLEEVE GASTRECTOMY: ICD-10-CM

## 2019-11-27 DIAGNOSIS — E78.5 DYSLIPIDEMIA: ICD-10-CM

## 2019-12-04 ENCOUNTER — OFFICE VISIT - HEALTHEAST (OUTPATIENT)
Dept: SURGERY | Facility: CLINIC | Age: 44
End: 2019-12-04

## 2019-12-04 ENCOUNTER — AMBULATORY - HEALTHEAST (OUTPATIENT)
Dept: LAB | Facility: CLINIC | Age: 44
End: 2019-12-04

## 2019-12-04 DIAGNOSIS — E78.5 DYSLIPIDEMIA: ICD-10-CM

## 2019-12-04 DIAGNOSIS — M54.2 NECK PAIN: ICD-10-CM

## 2019-12-04 DIAGNOSIS — K91.2 POSTOPERATIVE INTESTINAL MALABSORPTION: ICD-10-CM

## 2019-12-04 DIAGNOSIS — E66.811 CLASS 1 OBESITY DUE TO EXCESS CALORIES WITH SERIOUS COMORBIDITY AND BODY MASS INDEX (BMI) OF 31.0 TO 31.9 IN ADULT: ICD-10-CM

## 2019-12-04 DIAGNOSIS — E66.09 CLASS 1 OBESITY DUE TO EXCESS CALORIES WITH SERIOUS COMORBIDITY AND BODY MASS INDEX (BMI) OF 31.0 TO 31.9 IN ADULT: ICD-10-CM

## 2019-12-04 DIAGNOSIS — Z90.3 POSTGASTRECTOMY MALABSORPTION: ICD-10-CM

## 2019-12-04 DIAGNOSIS — K91.2 POSTGASTRECTOMY MALABSORPTION: ICD-10-CM

## 2019-12-04 DIAGNOSIS — Z98.84 S/P LAPAROSCOPIC SLEEVE GASTRECTOMY: ICD-10-CM

## 2019-12-04 LAB
ALBUMIN SERPL-MCNC: 4 G/DL (ref 3.5–5)
ALP SERPL-CCNC: 50 U/L (ref 45–120)
ALT SERPL W P-5'-P-CCNC: <9 U/L (ref 0–45)
ANION GAP SERPL CALCULATED.3IONS-SCNC: 8 MMOL/L (ref 5–18)
AST SERPL W P-5'-P-CCNC: 12 U/L (ref 0–40)
BILIRUB SERPL-MCNC: 0.5 MG/DL (ref 0–1)
BUN SERPL-MCNC: 8 MG/DL (ref 8–22)
CALCIUM SERPL-MCNC: 9.3 MG/DL (ref 8.5–10.5)
CHLORIDE BLD-SCNC: 106 MMOL/L (ref 98–107)
CHOLEST SERPL-MCNC: 189 MG/DL
CO2 SERPL-SCNC: 26 MMOL/L (ref 22–31)
CREAT SERPL-MCNC: 0.79 MG/DL (ref 0.6–1.1)
ERYTHROCYTE [DISTWIDTH] IN BLOOD BY AUTOMATED COUNT: 10.7 % (ref 11–14.5)
FASTING STATUS PATIENT QL REPORTED: NORMAL
FERRITIN SERPL-MCNC: 27 NG/ML (ref 10–130)
FOLATE SERPL-MCNC: >20 NG/ML
GFR SERPL CREATININE-BSD FRML MDRD: >60 ML/MIN/1.73M2
GLUCOSE BLD-MCNC: 91 MG/DL (ref 70–125)
HCT VFR BLD AUTO: 44.3 % (ref 35–47)
HDLC SERPL-MCNC: 54 MG/DL
HGB BLD-MCNC: 14.7 G/DL (ref 12–16)
LDLC SERPL CALC-MCNC: 119 MG/DL
MCH RBC QN AUTO: 30.7 PG (ref 27–34)
MCHC RBC AUTO-ENTMCNC: 33.2 G/DL (ref 32–36)
MCV RBC AUTO: 93 FL (ref 80–100)
PLATELET # BLD AUTO: 275 THOU/UL (ref 140–440)
PMV BLD AUTO: 8.2 FL (ref 7–10)
POTASSIUM BLD-SCNC: 4.3 MMOL/L (ref 3.5–5)
PROT SERPL-MCNC: 6.5 G/DL (ref 6–8)
PTH-INTACT SERPL-MCNC: 68 PG/ML (ref 10–86)
RBC # BLD AUTO: 4.78 MILL/UL (ref 3.8–5.4)
SODIUM SERPL-SCNC: 140 MMOL/L (ref 136–145)
TRIGL SERPL-MCNC: 80 MG/DL
VIT B12 SERPL-MCNC: 649 PG/ML (ref 213–816)
WBC: 5.6 THOU/UL (ref 4–11)

## 2019-12-04 ASSESSMENT — MIFFLIN-ST. JEOR: SCORE: 1609.51

## 2019-12-05 LAB
25(OH)D3 SERPL-MCNC: 70.2 NG/ML (ref 30–80)
25(OH)D3 SERPL-MCNC: 70.2 NG/ML (ref 30–80)

## 2019-12-06 LAB — ZINC SERPL-MCNC: 82.6 UG/DL (ref 60–120)

## 2019-12-07 LAB
ANNOTATION COMMENT IMP: NORMAL
VIT A SERPL-MCNC: 0.49 MG/L (ref 0.3–1.2)
VITAMIN A (RETINYL PALMITATE): 0.06 MG/L (ref 0–0.1)

## 2019-12-08 LAB — VIT B1 PYROPHOSHATE BLD-SCNC: 137 NMOL/L (ref 70–180)

## 2019-12-30 ENCOUNTER — OFFICE VISIT (OUTPATIENT)
Dept: FAMILY MEDICINE | Facility: CLINIC | Age: 44
End: 2019-12-30
Payer: COMMERCIAL

## 2019-12-30 VITALS
TEMPERATURE: 98.7 F | HEART RATE: 75 BPM | BODY MASS INDEX: 31.02 KG/M2 | OXYGEN SATURATION: 96 % | DIASTOLIC BLOOD PRESSURE: 70 MMHG | WEIGHT: 204 LBS | SYSTOLIC BLOOD PRESSURE: 104 MMHG

## 2019-12-30 DIAGNOSIS — F43.21 GRIEF REACTION: ICD-10-CM

## 2019-12-30 PROCEDURE — 99213 OFFICE O/P EST LOW 20 MIN: CPT | Performed by: PHYSICIAN ASSISTANT

## 2019-12-30 RX ORDER — CITALOPRAM HYDROBROMIDE 20 MG/1
TABLET ORAL
Qty: 90 TABLET | Refills: 1 | Status: SHIPPED | OUTPATIENT
Start: 2019-12-30 | End: 2020-03-30

## 2019-12-30 RX ORDER — LORAZEPAM 2 MG/1
2 TABLET ORAL EVERY 8 HOURS PRN
Qty: 60 TABLET | Refills: 1 | Status: SHIPPED | OUTPATIENT
Start: 2019-12-30 | End: 2020-02-20

## 2019-12-30 ASSESSMENT — ANXIETY QUESTIONNAIRES
GAD7 TOTAL SCORE: 14
3. WORRYING TOO MUCH ABOUT DIFFERENT THINGS: NEARLY EVERY DAY
7. FEELING AFRAID AS IF SOMETHING AWFUL MIGHT HAPPEN: MORE THAN HALF THE DAYS
6. BECOMING EASILY ANNOYED OR IRRITABLE: NOT AT ALL
GAD7 TOTAL SCORE: 14
2. NOT BEING ABLE TO STOP OR CONTROL WORRYING: NEARLY EVERY DAY
4. TROUBLE RELAXING: NEARLY EVERY DAY
GAD7 TOTAL SCORE: 14
7. FEELING AFRAID AS IF SOMETHING AWFUL MIGHT HAPPEN: MORE THAN HALF THE DAYS
5. BEING SO RESTLESS THAT IT IS HARD TO SIT STILL: NOT AT ALL
1. FEELING NERVOUS, ANXIOUS, OR ON EDGE: NEARLY EVERY DAY

## 2019-12-30 ASSESSMENT — PATIENT HEALTH QUESTIONNAIRE - PHQ9
SUM OF ALL RESPONSES TO PHQ QUESTIONS 1-9: 15
SUM OF ALL RESPONSES TO PHQ QUESTIONS 1-9: 15
10. IF YOU CHECKED OFF ANY PROBLEMS, HOW DIFFICULT HAVE THESE PROBLEMS MADE IT FOR YOU TO DO YOUR WORK, TAKE CARE OF THINGS AT HOME, OR GET ALONG WITH OTHER PEOPLE: SOMEWHAT DIFFICULT

## 2019-12-30 ASSESSMENT — PAIN SCALES - GENERAL: PAINLEVEL: NO PAIN (0)

## 2019-12-30 NOTE — PROGRESS NOTES
Subjective     Lilian Quinones is a 44 year old female who presents to clinic today for the following health issues:    Lilian continues to grieve the loss of her son. She is going to counseling with her family. The investigators have ruled her son's death accidental. She continues to struggle with questions and constant anxiety. She is using the ativan twice daily. She is not sure if the citalopram has been helpful.     History of Present Illness        Mental Health Follow-up:  Patient presents to follow-up on Depression & Anxiety.Patient's depression since last visit has been:  No change  The patient is not having other symptoms associated with depression.  Patient's anxiety since last visit has been:  Worse  The patient is having other symptoms associated with anxiety.  Any significant life events: grief or loss  Patient is feeling anxious or having panic attacks.  Patient has no concerns about alcohol or drug use.     Social History  Tobacco Use    Smoking status: Never Smoker    Smokeless tobacco: Never Used  Alcohol use: Yes  Drug use: No      Today's PHQ-9         PHQ-9 Total Score:     (P) 15   PHQ-9 Q9 Thoughts of better off dead/self-harm past 2 weeks :   (P) Not at all   Thoughts of suicide or self harm:      Self-harm Plan:        Self-harm Action:          Safety concerns for self or others:                Patient Active Problem List   Diagnosis     NO ACTIVE PROBLEMS     Overweight     Atypical mole     Right knee pain, unspecified chronicity     HAO (generalized anxiety disorder)     Hyperhydrosis disorder     Past Surgical History:   Procedure Laterality Date     BIOPSY      Skin Biopsy     BREAST SURGERY      Breast reduction sergury        Social History     Tobacco Use     Smoking status: Never Smoker     Smokeless tobacco: Never Used   Substance Use Topics     Alcohol use: Yes     Family History   Problem Relation Age of Onset     Heart Disease Maternal Grandmother      Pancreatic Cancer  Paternal Grandmother      Diabetes Paternal Grandmother      Breast Cancer Mother      Diabetes Father      Hypertension Maternal Grandfather      Hyperlipidemia Maternal Grandfather      Other Cancer Paternal Grandfather      Melanoma No family hx of          Current Outpatient Medications   Medication Sig Dispense Refill     citalopram (CELEXA) 20 MG tablet 1 tablet daily 90 tablet 1     levonorgestrel (MIRENA) 20 MCG/24HR IUD 1 each by Intrauterine route once       LORazepam (ATIVAN) 2 MG tablet Take 1 tablet (2 mg) by mouth every 8 hours as needed for anxiety 60 tablet 1     Allergies   Allergen Reactions     Amoxicillin Rash     Penicillins Rash     BP Readings from Last 3 Encounters:   12/30/19 104/70   11/25/19 130/84   11/22/19 121/73    Wt Readings from Last 3 Encounters:   12/30/19 92.5 kg (204 lb)   11/25/19 96.2 kg (212 lb)   11/22/19 94.2 kg (207 lb 9.6 oz)                    Reviewed and updated as needed this visit by Provider         Review of Systems   ROS COMP: Constitutional, HEENT, cardiovascular, pulmonary, GI, , musculoskeletal, neuro, skin, endocrine and psych systems are negative, except as otherwise noted.      Objective    /70   Pulse 75   Temp 98.7  F (37.1  C) (Oral)   Wt 92.5 kg (204 lb)   SpO2 96%   BMI 31.02 kg/m    Body mass index is 31.02 kg/m .  Physical Exam   GENERAL: healthy, alert and no distress  PSYCH: tearful, anxious and judgement and insight intact    Diagnostic Test Results:  Labs reviewed in Epic        Assessment & Plan     1. Grief reaction  She will continue with the citalopram.   She will also attend counseling.   Refill of her medications given.   Increased the dose of the ativan temporarily to 2 mg.   Return in 2 months, sooner if needed.   - citalopram (CELEXA) 20 MG tablet; 1 tablet daily  Dispense: 90 tablet; Refill: 1  - LORazepam (ATIVAN) 2 MG tablet; Take 1 tablet (2 mg) by mouth every 8 hours as needed for anxiety  Dispense: 60 tablet; Refill:  1     20 minutes spent with Lilian today.   Over 50% of time taken for discussion of above, counseling and coordination of care.     Return in about 2 months (around 2/29/2020) for depression / anxiety, with primary provider.    Kristen M. Kehr, PA-C  [facility]

## 2019-12-30 NOTE — NURSING NOTE
"Chief Complaint   Patient presents with     Anxiety       Initial /70   Pulse 75   Temp 98.7  F (37.1  C) (Oral)   Wt 92.5 kg (204 lb)   SpO2 96%   BMI 31.02 kg/m   Estimated body mass index is 31.02 kg/m  as calculated from the following:    Height as of 10/17/19: 1.727 m (5' 8\").    Weight as of this encounter: 92.5 kg (204 lb).  Medication Reconciliation: complete    OMERO Alvarado MA    "

## 2019-12-31 ASSESSMENT — PATIENT HEALTH QUESTIONNAIRE - PHQ9: SUM OF ALL RESPONSES TO PHQ QUESTIONS 1-9: 15

## 2019-12-31 ASSESSMENT — ANXIETY QUESTIONNAIRES: GAD7 TOTAL SCORE: 14

## 2020-01-23 ENCOUNTER — MYC MEDICAL ADVICE (OUTPATIENT)
Dept: NEUROSURGERY | Facility: CLINIC | Age: 45
End: 2020-01-23

## 2020-01-23 ENCOUNTER — TELEPHONE (OUTPATIENT)
Dept: PALLIATIVE MEDICINE | Facility: CLINIC | Age: 45
End: 2020-01-23

## 2020-01-23 DIAGNOSIS — M54.12 CERVICAL RADICULOPATHY: Primary | ICD-10-CM

## 2020-01-23 NOTE — TELEPHONE ENCOUNTER
"Pre-screening Questions for Radiology Injections:    Injection to be done at which interventional clinic site? [facility] [place]    Instruct patient to arrive as directed prior to the scheduled appointment time:    [place] minutes before      [place]: 30 minutes before; if IV needed 1 hour before     Dr. Reveles-no IV needed for Cervical WENCESLAO; please instruct to arrive 30\" early    Procedure ordered by Dr. Garcia    Procedure ordered? CLRAISSA      Transforaminal Cervical WENCESLAO - no pain provider currently performing    What insurance would patient like us to bill for this procedure? BCBS      Worker's comp or MVA (motor vehicle accident) -Any injection DO NOT SCHEDULE and route to Sonali Hensley.      [employer] insurance - For SI joint injections, DO NOT SCHEDULE and route Sonali Hensley.       [employer] - Any injection besides hip/shoulder/knee joint DO NOT SCHEDULE and route to Sonali Hensley. She will obtain PA and call pt back to schedule procedure or notify pt of denial.       [employer]-Route to Sonali for review      **BCBS- ALL need to be routed to [place] for review if a PA is needed**      IF SCHEDULING IN [place] AND NEEDS A PA, IT IS OKAY TO SCHEDULE. [place] HANDLES THEIR OWN PA'S AFTER THE PATIENT IS SCHEDULED. PLEASE SCHEDULE AT LEAST 1 WEEK OUT SO A PA CAN BE OBTAINED.    Any chance of pregnancy? NO   If YES, do NOT schedule and route to RN pool    Is an  needed? No     Patient has a drive home? (mandatory) YES: informed     Is patient taking any blood thinners (i.e. plavix, coumadin, jantoven, warfarin, heparin, pradaxa or dabigatran, etc)? No   If hold needed, do NOT schedule, route to RN pool     Is patient taking any aspirin products (includes Excedrin and Fiorinal)? No     If more than 325mg/day do NOT schedule; route to RN pool     For CERVICAL procedures, hold all aspirin products for 6 days.     Tell pt that if aspirin product is not held for 6 days, the procedure WILL " BE cancelled.      Does the patient have a bleeding or clotting disorder? No     If YES, okay to schedule AND route to RN nurse pool    For any patients with platelet count <100, must be forwarded to provider    Is patient diabetic?  No  If YES, instruct them to bring their glucometer.    Does patient have an active infection or treated for one within the past week? No     Is patient currently taking any antibiotics?  No     For patients on chronic, preventative, or prophylactic antibiotics, procedures may be scheduled.     For patients on antibiotics for active or recent infection:antibiotic course must have been completed for 4 days    Is patient currently taking any steroid medications? (i.e. Prednisone, Medrol)  No     For patients on steroid medications, course must have been completed for 4 days    Reviewed with patient:  If you are started on any steroids or antibiotics between now and your appointment, you must contact us because the procedure may need to be cancelled.  Yes    Is patient actively being treated for cancer or immunocompromised? No  If YES, do NOT schedule and route to RN pool     Are you able to get on and off an exam table with minimal or no assistance? Yes  If NO, do NOT schedule and route to RN pool    Are you able to roll over and lay on your stomach with minimal or no assistance? Yes  If NO, do NOT schedule and route to RN pool     Any allergies to contrast dye, iodine, shellfish, or numbing and steroid medications? No  If YES, route to RN pool AND add allergy information to appointment notes    Allergies: Amoxicillin and Penicillins      Has the patient had a flu shot or any other vaccinations within 7 days before or after the procedure.  No     Does patient have an MRI/CT?  YES: 2019  Check Procedure Scheduling Grid to see if required.      Was the MRI done within the last 3 years?  Yes    If yes, where was the [facility] i[facility]e.[facility], [facility], [place], [facility] etc?        If no, do not schedule and route to RN pool    If MRI was not done at [place], [facility] or [facility] do NOT schedule and route to RN pool.      If pt has an imaging disc, the injection MAY be scheduled but pt has to bring disc to appt.     If they show up without the disc the injection cannot be done    Reminders (please tell patient if applicable):       Instructed pt to arrive 30 minutes early for IV start if required. (Check Procedure Scheduling Grid)  Not Applicable      If celiac plexus block, informed patient NPO for 6 hours and that it is okay to take medications with sips of water, especially blood pressure medications  Not Applicable         If this is for a cervical procedure, informed patient that aspirin needs to be held for 6 days.   Not Applicable      For all patients not having spinal cord stimulator (SCS) trials or radiofrequency ablations (RFAs), informed patient:    IV sedation is not provided for this procedure.  If you feel that an oral anti-anxiety medication is needed, you can discuss this further with your referring provider or primary care provider.  The Pain Clinic provider will discuss specifics of what the procedure includes at your appointment.  Most procedures last 10-20 minutes.  We use numbing medications to help with any discomfort during the procedure.  Not Applicable      Do not schedule procedures requiring IV placement in the first appointment of the day or first appointment after lunch. Do NOT schedule at 0745, 0815 or 1245.       For patients 85 or older we recommend having an adult stay w/ them for the remainder of the day.       Does the patient have any questions?  NO  Jeny Castelan  [facility]

## 2020-01-27 NOTE — TELEPHONE ENCOUNTER
No PA required, okay to schedule        Sonali REYEZ    [facility]

## 2020-02-04 ENCOUNTER — RADIOLOGY INJECTION OFFICE VISIT (OUTPATIENT)
Dept: PALLIATIVE MEDICINE | Facility: CLINIC | Age: 45
End: 2020-02-04
Payer: COMMERCIAL

## 2020-02-04 ENCOUNTER — ANCILLARY PROCEDURE (OUTPATIENT)
Dept: RADIOLOGY | Facility: CLINIC | Age: 45
End: 2020-02-04
Attending: PAIN MEDICINE
Payer: COMMERCIAL

## 2020-02-04 VITALS — SYSTOLIC BLOOD PRESSURE: 104 MMHG | DIASTOLIC BLOOD PRESSURE: 50 MMHG | OXYGEN SATURATION: 97 % | HEART RATE: 60 BPM

## 2020-02-04 DIAGNOSIS — M54.12 CERVICAL RADICULOPATHY: Primary | ICD-10-CM

## 2020-02-04 DIAGNOSIS — M54.12 CERVICAL RADICULOPATHY: ICD-10-CM

## 2020-02-04 DIAGNOSIS — M53.3 SI (SACROILIAC) JOINT DYSFUNCTION: ICD-10-CM

## 2020-02-04 PROCEDURE — 62321 NJX INTERLAMINAR CRV/THRC: CPT | Performed by: PAIN MEDICINE

## 2020-02-04 ASSESSMENT — PAIN SCALES - GENERAL: PAINLEVEL: MODERATE PAIN (4)

## 2020-02-04 NOTE — NURSING NOTE
Discharge Information    IV Discontiued Time:  1007 catheter intact    Amount of Fluid Infused:  Saline locked    Discharge Criteria = When patient returns to baseline or as per MD order    Consciousness:  Pt is fully awake    Circulation:  BP +/- 20% of pre-procedure level    Respiration:  Patient is able to breathe deeply    O2 Sat:  Patient is able to maintain O2 Sat >92% on room air    Activity:  Moves 4 extremities on command    Ambulation:  Patient is able to stand and walk or stand and pivot into wheelchair    Dressing:  Clean/dry or No Dressing    Notes:   Discharge instructions and AVS given to patient    Patient meets criteria for discharge?  YES    Admitted to PCU?  No    Responsible adult present to accompany patient home?  Yes    Signature/Title:    bertha stanley RN  RN Care Coordinator  [facility] [place]

## 2020-02-04 NOTE — PROGRESS NOTES
[facility] - Procedure Note    Date of Visit: 10/4/2019    Procedure performed: C7-T1 interlaminar epidural steroid injection with fluoroscopic guidance  Diagnosis: Cervical spondylosis; Cervical radiculitis/radiculopathy  : Chema eLvi MD  Anesthesia: none    Indications: Lilian Quinones is a 44 year old female who is seen for cervical epidural steroid injection. The patient describes left-sided neck pain into her upper extremity. The patient has been exhibiting symptoms consistent with cervical intraspinal inflammation and radiculopathy. Symptoms have been persistent, disabling, and intermittently severe. The patient reports minimal improvement with conservative treatment, including meds/PT/prior procedures    Cervical MRI   Segmental analysis:  C2-C3: No spinal or neural foraminal stenosis.     C3-C4: Shallow symmetric disc bulge with effacement of the ventral  thecal sac and minimal mass effect on the ventral cord without cord  signal change. Minimal uncovertebral and facet arthropathy. No  significant neural foraminal stenosis.     C4-C5: Shallow symmetric disc bulge with effacement of the ventral  thecal sac and minimal mass effect of the ventral cord without cord  signal change. Minimal uncovertebral and facet arthropathy. No  significant neural foraminal stenosis.     C5-C6: Shallow symmetric disc bulge with effacement of the ventral  thecal sac and minimal mass effect on the ventral cord without cord  signal change. Minimal uncovertebral and facet arthropathy. No  significant neural foraminal stenosis.     C6-C7: There is a large left central/lateral recess disc extrusion  that extends partially into the left neural foramen (series 9 image  26). The herniated disc material measures approximately 10 mm  transverse x 6 mm AP x 10 mm craniocaudal (series 6 image 10, series  10 image 45). The disc herniation indents the left ventral aspect of  the cord and leads to mild to  moderate central spinal stenosis and  moderate to severe left neural foraminal stenosis. The right neural  foramen is patent. There is mild uncovertebral and facet arthropathy.     C7-T1: No spinal or neural foraminal stenosis.                                                                      IMPRESSION: Degenerative changes of the cervical spine, most  pronounced at C6-C7 where there is a left central/lateral recess disc  extrusion that partially extends into the left neural foramen. Please  see the body of the report for additional details.     Allergies:      Allergies   Allergen Reactions     Amoxicillin Rash     Penicillins Rash        Vitals:  There were no vitals taken for this visit.    Review of Systems: The patient denies recent fever, chills, illness, use of antibiotics or anticoagulants. All other 10-point review of systems negative.     Procedure: The procedure and risks were explained, and informed written consent was obtained from the patient. Risks include but are not limited to: infection, bleeding, increased pain, and damage to soft tissue, nerve, muscle, and vasculature structures. After getting informed consent, patient was brought into the procedure suite and was placed in a prone position on the procedure table. A Pause for the Cause was performed. Patient was prepped and draped in sterile fashion.     The C7-T1 interspace was identified with use of fluoroscopy in AP view. A 25-gauge, 1.5 inch needle was used to anesthetize the skin and subcutaneous tissue entry site with a total of 2 ml of 1% lidocaine. Under fluoroscopic visualization, a 22-gauge, 3.5 inch Tuohy epidural needle was slowly advanced towards the epidural space a few millimeters left of midline. The latter part of the needle advancement was guided with fluoroscopy in the lateral view. The epidural space was identified using loss of resistance technique. After negative aspiration for heme and cerebrospinal fluid, a total of 1  mL of Omnipaque was injected to confirm needle placement. 9 mL of contrast was wasted. Epidurogram confirmed spread within the posterior epidural space. 2 ml of 10mg/ml of dexamethasone and 1 ml of preservative free 0.25% bupivacaine was injected. The needle was removed.  Images were saved to PACS.    The patient tolerated the procedure well, and there was no evidence of procedural complications. No new sensory or motor deficits were noted following the procedure. The patient was stable and able to ambulate on discharge home. Post-procedure instructions were provided.     Pre-procedure pain score: 7/10 in the neck, 7/10 in the arm  Post-procedure pain score: 5/10 in the neck, 5/10 in the arm    Assessment/Plan: Lilian Quinones is a 44 year old female s/p cervical interlaminar epidural steroid injection today for cervical spondylosis and radiculitis/radiculopathy.     1. Following today's procedure, the patient was advised to contact the [facility] for any of the following:   Fever, chills, or night sweats   New onset of pain, numbness, or weakness   Any questions/concerns regarding the procedure  If unable to contact the Pain Center, the patient was instructed to go to a local Emergency Room for any complications.   2. The patient will receive a follow-up call in 1 week.   3. Follow-up with referring provider in 2 weeks for post-procedure evaluation.    Chema Levi MD Pain Management

## 2020-02-04 NOTE — NURSING NOTE
Pre-procedure Intake    Have you been fasting? NA    If yes, for how long? No     Are you taking a prescribed blood thinner such as coumadin, Plavix, Xarelto?    No    If yes, when did you take your last dose? No     Do you take aspirin?  No    If cervical procedure, have you held aspirin for 6 days?   No     Do you have any allergies to contrast dye, iodine, steroid and/or numbing medications?  No   Are you currently taking antibiotics or have an active infection?  NO    Have you had a fever/elevated temperature within the past week? NO    Are you currently taking oral steroids? NO    Do you have a ? Yes       Are you pregnant or breastfeeding?  NO    Are the vital signs normal?  Yes    Chandni Eller MA

## 2020-02-04 NOTE — NURSING NOTE
20 gauge Peripheral IV inserted into right anticubital - attempts: 1    Jordyn Capone, RN-BSN  [facility]

## 2020-02-04 NOTE — PATIENT INSTRUCTIONS
[facility]   Procedure Discharge Instructions    Today you saw: Dr. Chema Levi     You had an:  Epidural steroid injection   -cervical      Medications used:  Lidocaine   Bupivacaine   Dexamethasone Omnipaque    Normal saline          Be cautious numbness and/or weakness in the upper extremities may occur for up to 6-8 hours after the procedure due to effect of the local anesthetic    Do not drive for 6 hours. The effect of the local anesthetic could slow your reflexes.     You may resume your regular activities after 24 hours    Avoid strenuous activity for the first 24 hours    You may shower, however avoid swimming, tub baths or hot tubs for 24 hours following your procedure    You may have a mild to moderate increase in pain for several days following the injection.    It may take up to 14 days for the steroid medication to start working although you may feel the effect as early as a few days after the procedure.       You may use ice packs for 10-15 minutes, 3 to 4 times a day at the injection site for comfort    Do not use heat to painful areas for 6 to 8 hours. This will give the local anesthetic time to wear off and prevent you from accidentally burning your skin.     Unless you have been directed to avoid the use of anti-inflammatory medications (NSAIDS), you may use medications such as ibuprofen, Aleve or Tylenol for pain control if needed.     If you were fasting, you may resume your normal diet and medications after the procedure    If you have diabetes, check your blood sugar more frequently than usual as your blood sugar may be higher than normal for 10-14 days following a steroid injection. Contact your doctor who manages your diabetes if your blood sugar is higher than usual    Possible side effects of steroids that you may experience include flushing, elevated blood pressure, increased appetite, mild headaches and restlessness.  All of these symptoms will get  better with time.    If you experience any of the following, call the Pain Clinic during work hours (Mon-Friday 8-4:30 pm) at [phone] or the Provider Line after hours at [phone]:  -Fever over 100 degree F  -Swelling, bleeding, redness, drainage, warmth at the injection site  -Progressive weakness or numbness in your legs or arms  -Loss of bowel or bladder function  -Unusual headache that is not relieved by Tylenol or other pain reliever  -Unusual new onset of pain that is not improving

## 2020-02-11 ENCOUNTER — TELEPHONE (OUTPATIENT)
Dept: PALLIATIVE MEDICINE | Facility: CLINIC | Age: 45
End: 2020-02-11

## 2020-02-11 NOTE — TELEPHONE ENCOUNTER
Patient had a C7-T1 interlaminar epidural steroid  injection on 2/4/20.  Called patient for an update.      Pt reported the following details:  Doing alright. Not a whole lot of pain relief at this moment.  States that she had to have the full time amount before noticing relief.       Told patient that the information will be forwarded to her provider.  Also explained that, if a steroid medication was used, it could take up to 14 days to feel the full effect and if pt has any further questions or concerns pt should call the clinic at [phone].

## 2020-02-18 ENCOUNTER — MYC MEDICAL ADVICE (OUTPATIENT)
Dept: FAMILY MEDICINE | Facility: CLINIC | Age: 45
End: 2020-02-18

## 2020-02-18 DIAGNOSIS — F43.21 GRIEF REACTION: ICD-10-CM

## 2020-02-18 NOTE — TELEPHONE ENCOUNTER
Controlled Substance Refill Request for Lorazepam  Problem List Complete:  No     PROVIDER TO CONSIDER COMPLETION OF PROBLEM LIST AND OVERVIEW/CONTROLLED SUBSTANCE AGREEMENT    Last Written Prescription Date:  12/30/19  Last Fill Quantity: 30,   # refills: 1    Last refill 1/16/20, patient reports take 1 tab twice daily.     THE MOST RECENT OFFICE VISIT MUST BE WITHIN THE PAST 3 MONTHS. AT LEAST ONE FACE TO FACE VISIT MUST OCCUR EVERY 6 MONTHS. ADDITIONAL VISITS CAN BE VIRTUAL.  (THIS STATEMENT SHOULD BE DELETED.)    Last Office Visit with [facility] primary care provider: 12/30/19    Future Office visit:     Controlled substance agreement:   Encounter-Level CSA:    There are no encounter-level csa.     Patient-Level CSA:    There are no patient-level csa.         Last Urine Drug Screen: No results found for: CDAUT, No results found for: COMDAT, No results found for: THC13, PCP13, COC13, MAMP13, OPI13, AMP13, BZO13, TCA13, MTD13, BAR13, OXY13, PPX13, BUP13     Processing:       [email or web address]       checked in past 3 months?  Yes 2/18/20     Alice Velasquez RN

## 2020-02-20 RX ORDER — LORAZEPAM 2 MG/1
2 TABLET ORAL EVERY 8 HOURS PRN
Qty: 60 TABLET | Refills: 0 | Status: SHIPPED | OUTPATIENT
Start: 2020-02-20 | End: 2020-06-02

## 2020-03-23 ENCOUNTER — VIRTUAL VISIT (OUTPATIENT)
Dept: FAMILY MEDICINE | Facility: OTHER | Age: 45
End: 2020-03-23

## 2020-03-23 NOTE — PROGRESS NOTES
"Date: 2020 01:03:36  Clinician: Camryn Hicks  Clinician NPI: [id number]  Patient: Lilian Quinones  Patient : 1975  Patient Address: [address] [id number]  Patient Phone: [phone]  Visit Protocol: URI  Patient Summary:  Lilian is a 44 year old ( : 1975 ) female who initiated a Visit for COVID-19 (Coronavirus) evaluation and screening. When asked the question \"Please sign me up to receive news, health information and promotions. \", Lilian responded \"No\".    Lilian states her symptoms started gradually 2-3 weeks ago. After her symptoms started, they improved and then got worse again.   Her symptoms consist of myalgia, a cough, malaise, chills, and a headache. Lilian also feels feverish but was unable to measure her temperature.   Symptom details     Cough: Lilian coughs a few times an hour and her cough is not more bothersome at night. Phlegm does not come into her throat when she coughs. She believes her cough is caused by post-nasal drip.     Headache: She states the headache is moderate (4-6 on a 10 point pain scale).      Lilian denies having ear pain, rhinitis, facial pain or pressure, wheezing, sore throat, nasal congestion, and teeth pain. She also denies having a sinus infection within the past year, taking antibiotic medication for the symptoms, and having recent facial or sinus surgery in the past 60 days. She is not experiencing dyspnea.   Precipitating events  She has not recently been exposed to someone with influenza. Lilian has been in close contact with the following high risk individuals: immunocompromised people, adults 65 or older, pregnant women, children under the age of 5, and people with asthma, heart disease or diabetes.   Pertinent COVID-19 (Coronavirus) information  Lilian has not traveled internationally or to the areas where COVID-19 (Coronavirus) is widespread, including cruise ship travel in the last 14 days before the start of her " symptoms.   Lilian has not had a close contact with a laboratory-confirmed COVID-19 patient within 14 days of symptom onset. She also has not had a close contact with a suspected COVID-19 patient within 14 days of symptom onset.   Lilian is not a healthcare worker and does not work in a healthcare facility.   Pertinent medical history  Lilian typically gets a yeast infection when she takes antibiotics. She has used fluconazole (Diflucan) to treat previous yeast infections. 2 doses of fluconazole (Diflucan) has typically been needed for symptoms to resolve in the past.  Lilian does not need a return to work/school note.   Weight: 185 lbs   Lilian does not smoke or use smokeless tobacco.   She denies pregnancy and denies breastfeeding. She has menstruated in the past month.   Additional information as reported by the patient (free text): Very bad diarrhea for weeks   Weight: 185 lbs    MEDICATIONS: Ativan oral, Celexa oral, ALLERGIES: Penicillins  Clinician Response:  Dear Lilian,      Based on the information you have provided, you do have symptoms that are consistent with Coronavirus (COVID-19).  The coronavirus causes mild to severe respiratory illness with the most common symptoms including fever, cough and difficulty breathing. Unfortunately, many viruses cause similar symptoms and it can be difficult to distinguish between viruses, especially in mild cases, so we are presuming that anyone with cough or fever has coronavirus at this time.  Coronavirus/COVID-19 has reached the point of community spread in [place], meaning that we are finding the virus in people with no known exposure risk for leonardo the virus. Given the increasing commonness of coronavirus in the community we are no longer testing patients who are not critically ill.  If you are a health care worker, you should refer to your employee health office for instructions about testing and returning to work.  For everyone else who has cough or  fever, you should assume you are infected with coronavirus. Since you will not be tested but have symptoms that may be consistent with coronavirus, the CDC recommends you stay in self-isolation until these three things have happened:    You have had no fever for at least 72 hours (that is three full days of no fever without the use of medicine that reduces fevers)    AND   Other symptoms have improved (for example, when your cough or shortness of breath have improved)   AND   At least 7 days have passed since your symptoms first appeared.   How to Isolate:   Isolate yourself at home.  Do Not allow any visitors  Do Not go to work or school  Do Not go to [place],  centers, shopping, or other public places.  Do Not shake hands.  Avoid close contact with others (hugging, kissing).   Protect Others:   Cover Your Mouth and Nose with a mask, disposable tissue or wash cloth to avoid spreading germs to others.  Wash your hands and face frequently with soap and water.   We know it can be scary to hear that you might have COVID-19. Our team can help track your symptoms and make sure you are doing ok over the next two weeks using a program called [employer] to keep in touch. When you receive an email from [employer], please consider enrolling in our monitoring program. There is no cost to you for monitoring. Here is a URL where you can learn more: [email or web address]  Managing Symptoms:   At this time, we primarily recommend Tylenol (Acetaminophen) for fever or pain. If you have liver or kidney problems, contact your primary care provider for instructions on use of tylenol. Adults can take 650 mg (two 325 mg pills) by mouth every 4-6 hours as needed OR 1,000 mg (two 500 mg pills) every 8 hours as needed. MAXIMUM DAILY DOSE: 3,000mg. For children, refer to dosing on bottle based on age or weight.   If you develop significant shortness of breath that prevents you from doing normal activities, please call  911 or proceed to the nearest emergency room and alert them immediately that you have been in self-isolation for possible coronavirus.  For more information about COVID19 and options for caring for yourself at home, please visit the CDC website at https://www.cdc.gov/coronavirus/2019-ncov/about/steps-when-sick.htmlFor more options for care at [facility], please visit our website at [email or web address]    Diagnosis: Cough  Diagnosis ICD: R05

## 2020-03-30 ENCOUNTER — E-VISIT (OUTPATIENT)
Dept: FAMILY MEDICINE | Facility: CLINIC | Age: 45
End: 2020-03-30
Payer: COMMERCIAL

## 2020-03-30 DIAGNOSIS — F43.21 GRIEF REACTION: ICD-10-CM

## 2020-03-30 PROCEDURE — 99421 OL DIG E/M SVC 5-10 MIN: CPT | Performed by: PHYSICIAN ASSISTANT

## 2020-03-30 RX ORDER — CITALOPRAM HYDROBROMIDE 20 MG/1
30 TABLET ORAL DAILY
Qty: 135 TABLET | Refills: 1 | Status: SHIPPED | OUTPATIENT
Start: 2020-03-30 | End: 2020-03-30

## 2020-03-30 ASSESSMENT — ANXIETY QUESTIONNAIRES
7. FEELING AFRAID AS IF SOMETHING AWFUL MIGHT HAPPEN: NEARLY EVERY DAY
6. BECOMING EASILY ANNOYED OR IRRITABLE: NEARLY EVERY DAY
5. BEING SO RESTLESS THAT IT IS HARD TO SIT STILL: NEARLY EVERY DAY
GAD7 TOTAL SCORE: 21
3. WORRYING TOO MUCH ABOUT DIFFERENT THINGS: NEARLY EVERY DAY
2. NOT BEING ABLE TO STOP OR CONTROL WORRYING: NEARLY EVERY DAY
GAD7 TOTAL SCORE: 21
GAD7 TOTAL SCORE: 21
1. FEELING NERVOUS, ANXIOUS, OR ON EDGE: NEARLY EVERY DAY
4. TROUBLE RELAXING: NEARLY EVERY DAY
7. FEELING AFRAID AS IF SOMETHING AWFUL MIGHT HAPPEN: NEARLY EVERY DAY

## 2020-03-30 ASSESSMENT — PATIENT HEALTH QUESTIONNAIRE - PHQ9
SUM OF ALL RESPONSES TO PHQ QUESTIONS 1-9: 17
SUM OF ALL RESPONSES TO PHQ QUESTIONS 1-9: 17
10. IF YOU CHECKED OFF ANY PROBLEMS, HOW DIFFICULT HAVE THESE PROBLEMS MADE IT FOR YOU TO DO YOUR WORK, TAKE CARE OF THINGS AT HOME, OR GET ALONG WITH OTHER PEOPLE: VERY DIFFICULT

## 2020-03-31 ASSESSMENT — ANXIETY QUESTIONNAIRES: GAD7 TOTAL SCORE: 21

## 2020-03-31 ASSESSMENT — PATIENT HEALTH QUESTIONNAIRE - PHQ9: SUM OF ALL RESPONSES TO PHQ QUESTIONS 1-9: 17

## 2020-04-08 ENCOUNTER — TELEPHONE (OUTPATIENT)
Dept: FAMILY MEDICINE | Facility: CLINIC | Age: 45
End: 2020-04-08

## 2020-04-08 NOTE — TELEPHONE ENCOUNTER
Called and spoke to the patient @ [phone]. I gave her Kristen Kehr's message and she understood completely. She stated she does not need a referral at this time and will wait until we are able to fully see patient's in clinic.  Richelle Underwood TC

## 2020-04-08 NOTE — TELEPHONE ENCOUNTER
Patient called today.    Patient is requesting to see Kristin Kehr PA at [facility] for a mole removal/check.    Otherwise, perhaps referral to [facility]?    Please contact patient.    Thank you.    Central Scheduling  Mary PLASENCIA

## 2020-04-08 NOTE — TELEPHONE ENCOUNTER
This is not an emergent matter and will have to wait until there are no longer COVID restrictions and we are seeing routine appointments again.   I can see her unless she specifically wants to see Dermatology.   Kristen Kehr PA-C

## 2020-04-28 ENCOUNTER — MYC MEDICAL ADVICE (OUTPATIENT)
Dept: FAMILY MEDICINE | Facility: CLINIC | Age: 45
End: 2020-04-28

## 2020-04-28 DIAGNOSIS — F43.21 GRIEF REACTION: ICD-10-CM

## 2020-04-28 RX ORDER — LORAZEPAM 2 MG/1
TABLET ORAL
Qty: 30 TABLET | Refills: 0 | Status: SHIPPED | OUTPATIENT
Start: 2020-04-28 | End: 2020-08-05

## 2020-04-28 NOTE — TELEPHONE ENCOUNTER
New prescription printed for 30 pills.   Please bring to the [facility] for her to  there.   She can use 1 daily.   No more refills of this medication.   Kristen Kehr PA-C

## 2020-04-28 NOTE — TELEPHONE ENCOUNTER
Ativan 2mg refill request; per her [email or web address] message she is requesting prescription for bid dosing. (see mychart message)  Per 3/30/20 EVISIT note plan was to increase the citalopram to 30mg (which you did) and ativan was to be used temporarily due to dependence potential.  Last refill: 3/25/20  #30 with once a day dosing.   reviewed and no red flags.  Nicky Rosales RN

## 2020-06-01 ENCOUNTER — MYC MEDICAL ADVICE (OUTPATIENT)
Dept: FAMILY MEDICINE | Facility: CLINIC | Age: 45
End: 2020-06-01

## 2020-06-01 DIAGNOSIS — F43.21 GRIEF REACTION: ICD-10-CM

## 2020-06-01 NOTE — LETTER
June 2, 2020    Lilian Quinones  [id number] [id number]    Dear Lilian,       We recently received a refill request for LORazepam (ATIVAN.  We have refilled this for a one time 30 day supply only because Kristen Kehr would like you to establish care with Psychiatry  She has placed a referral in your chart.    You should contact your insurance to see who is covered and schedule an appointment.   She will also place a referral through [facility].   [facility] contacts you to schedule an appointment.   She believes they are able to do some visits virtually so this may work well for you.       Kristen Kehr PA-C /  pardeep    Thank you,   Your [facility] Team/shivanir  [phone]

## 2020-06-02 RX ORDER — LORAZEPAM 2 MG/1
2 TABLET ORAL 2 TIMES DAILY
Qty: 60 TABLET | Refills: 0 | Status: SHIPPED | OUTPATIENT
Start: 2020-06-02 | End: 2020-06-16

## 2020-06-02 NOTE — TELEPHONE ENCOUNTER
Please contact this patient and let her know that I will go ahead and send a prescription for the month.   She will have to plan to transition to Psychiatry.   She can determine who she would like to establish care with in Psychiatry:   She should contact her insurance to see who is covered and schedule an appointment.   I will also place a referral through [facility]. [facility] contacts you to schedule an appointment. I believe they are able to do some visits virtually so this may work well for her.   Kristen Kehr PA-C

## 2020-06-02 NOTE — TELEPHONE ENCOUNTER
Patient would like to increase her ativan to 2 daily.   See patient's message.   Medication and pharmacy pended.     Mena JAMESN, RN

## 2020-06-10 ENCOUNTER — MYC MEDICAL ADVICE (OUTPATIENT)
Dept: FAMILY MEDICINE | Facility: CLINIC | Age: 45
End: 2020-06-10

## 2020-06-10 DIAGNOSIS — F43.21 GRIEF REACTION: ICD-10-CM

## 2020-06-11 ENCOUNTER — TELEPHONE (OUTPATIENT)
Dept: PSYCHIATRY | Facility: CLINIC | Age: 45
End: 2020-06-11

## 2020-06-11 NOTE — TELEPHONE ENCOUNTER
I don't see that she has an appointment. Where is she scheduled and does she have counseling scheduled?  I don't believe it takes that long to see a counselor or psychologist.   I can continue to prescribe medication, but still not looking at long term use of ativan. Plan to adjust her other daily medications.   Okay to schedule a virtual visit if she would like to discuss adjustment of medications until she can be seen by Psychiatry and psychology /counseling.   Kristen Kehr PA-C     No pertinent past medical history

## 2020-06-11 NOTE — TELEPHONE ENCOUNTER
PSYCHIATRY CLINIC PHONE INTAKE     SERVICES REQUESTED / INTERESTED IN          Med Management    Presenting Problem and Brief History                              What would you like to be seen for? Grief     Patient's son  in September.  She has been struggling with depression, anxiety and difficulty concentrating. She worries that her lack of concentration will affect her job. Patient reports SI, without plan or intent - states that some days it feels like it would be easier if she were dead.    Is there any history of developmental delay?  No     Do you have current thoughts of self-harm?  Yes    Do you currently have thoughts of harming others?  No         Treatment History     Are you currently seeing a mental health provider?  Yes            Who / month last seen:  Patient is seeing a grief counselor  Do you have mental health records elsewhere?  No  Contact information:   Do you have a current Primary Care Provider? Yes  Who/Location?  Kehr, Kristen M, [facility], [place]  Have you ever had psychological testing? No  When/where? NA  Have you ever been hospitalized for psychiatric reasons?  No  Where and when:  NA     Substance Use History     Do you have any history of alcohol / illicit drug use?  No  Describe:  NA  Have you ever received treatment for this?  No    Describe:  NA     Social History     Does the patient have a guardian?  No    Name / number: NA  Have you had an ACT team in last 12 months?  No  Describe: NA   Do you have any current or past legal issues?  No  Describe: NA   OK to leave a detailed voicemail?       Medications             Current Outpatient Medications   Medication Sig Dispense Refill     citalopram (CELEXA) 20 MG tablet Take 1.5 tablets (30 mg) by mouth daily 1 tablet daily 135 tablet 1     levonorgestrel (MIRENA) 20 MCG/24HR IUD 1 each by Intrauterine route once       LORazepam (ATIVAN) 2 MG tablet Take 1 tablet (2 mg) by mouth 2 times daily 60 tablet 0      LORazepam (ATIVAN) 2 MG tablet 1 tablet daily 30 tablet 0         DISPOSITION      Completed phone screen with patient. Scheduled evaluation with [facility].  Per patient, preference, e-mailed new patient packet to e-mail: [email or web address]     Stella Nunez,

## 2020-06-15 ENCOUNTER — TELEPHONE (OUTPATIENT)
Dept: PSYCHIATRY | Facility: CLINIC | Age: 45
End: 2020-06-15

## 2020-06-15 NOTE — TELEPHONE ENCOUNTER
GARRETT left message for patient. GARRETT identified Lilian had assessment scheduled with writer at end of July. Patient identified some suicidal ideation, but will not act on it due to her children.     SW encouraged Lilian to check in if she needed support or if symptoms increased prior to appointment. GARRETT will also keep patient on wait list for sooner appointment in case other eval spot opens up.    Update: Lilian returned call and left message, requesting call back.    GARRETT returned call. Lilian wanted more information about [facility] program and wanted to ensure there was a med management component. GARRETT reviewed the short term work on program and noted that near end of time we will work with her to identify recommendations and next steps. She requests that writer reach out to her PCP to let her know she has an upcoming appointment in clinic.    GARRETT checked on safety. Lilian reports she is safe and has no plans for suicide. She has 3 other kids. She shares there are times when it is really hard to go on and the pain is quite strong. It appears she is seeking relief from intense emotions and grief rather than intent to harm self. Lilian reports a desire to be able to function in day to day life, noting she had a panic attack at her first day back in the office (worked from home due to Covid pandemic).    Yesenia Phillips, HIWOT, [facility]  [phone]

## 2020-06-16 RX ORDER — CITALOPRAM HYDROBROMIDE 40 MG/1
40 TABLET ORAL DAILY
Qty: 90 TABLET | Refills: 0 | Status: SHIPPED | OUTPATIENT
Start: 2020-06-16 | End: 2020-10-16

## 2020-06-16 RX ORDER — LORAZEPAM 2 MG/1
2 TABLET ORAL 2 TIMES DAILY
Qty: 60 TABLET | Refills: 0 | Status: SHIPPED | OUTPATIENT
Start: 2020-06-16 | End: 2020-07-13

## 2020-06-16 NOTE — TELEPHONE ENCOUNTER
I have increased the ciltalopram to 40 mg daily and refilled the lorazepam.     Please let her know the increase in the citalopram dose, she will only be taking 1 pill of the 40 mg. She was on 1.5 of the 20 mg dose in the previously.    Kristen Kehr PA-C

## 2020-07-10 ENCOUNTER — MYC MEDICAL ADVICE (OUTPATIENT)
Dept: FAMILY MEDICINE | Facility: CLINIC | Age: 45
End: 2020-07-10

## 2020-07-10 DIAGNOSIS — F43.21 GRIEF REACTION: ICD-10-CM

## 2020-07-10 NOTE — TELEPHONE ENCOUNTER
See [employer] messages below.   Pt states she has 11 pills left, and has an appt with Yesenia TOVAR on July 29th at 2 pm -3:30 pm.    ERIKA AlvaresN, RN

## 2020-07-10 NOTE — TELEPHONE ENCOUNTER
See 6/10/20 MyChart encounter. Kristen Kehr, PA-C, indicated on 6/11/20 in a [employer] message that she did not plan to continue prescribing long term use of lorazepam (Ativan), as pt was to establish with Psychiatry.      Per review of , lorazepam last filled on 6/2/20 prescriber Kristen Kehr, PA-C, but last ordered on 6/12/20.    Controlled Substance Refill Request for lorazepam (Ativan)  Problem List Complete:  No     PROVIDER TO CONSIDER COMPLETION OF PROBLEM LIST AND OVERVIEW/CONTROLLED SUBSTANCE AGREEMENT    Last Written Prescription Date:  6/12/20  Last Fill Quantity: 60,   # refills: 0    THE MOST RECENT OFFICE VISIT MUST BE WITHIN THE PAST 3 MONTHS. AT LEAST ONE FACE TO FACE VISIT MUST OCCUR EVERY 6 MONTHS. ADDITIONAL VISITS CAN BE VIRTUAL.  (THIS STATEMENT SHOULD BE DELETED.)    Last Office Visit with [facility] primary care provider: Joann on 3/30/20    Future Office visit:     Controlled substance agreement:   Encounter-Level CSA:    There are no encounter-level csa.     Patient-Level CSA:    There are no patient-level csa.         Last Urine Drug Screen: No results found for: CDAUT, No results found for: COMDAT, No results found for: THC13, PCP13, COC13, MAMP13, OPI13, AMP13, BZO13, TCA13, MTD13, BAR13, OXY13, PPX13, BUP13     [email or web address]     checked in past 3 months?  Yes Per review of , lorazepam last filled on 6/2/20, but last ordered on 6/12/20.    Fay Lantigua, ERIAKN, RN

## 2020-07-13 RX ORDER — LORAZEPAM 2 MG/1
2 TABLET ORAL 2 TIMES DAILY
Qty: 60 TABLET | Refills: 0 | Status: SHIPPED | OUTPATIENT
Start: 2020-07-13 | End: 2020-08-05 | Stop reason: ALTCHOICE

## 2020-07-13 NOTE — TELEPHONE ENCOUNTER
I have prescribed for her to take the medication 2 times daily. She will have enough to get to her appointment on 8/5.   Kristen Kehr PA-C

## 2020-07-13 NOTE — TELEPHONE ENCOUNTER
Left message on answering machine for patient/parent to call back.   [phone].  Alice Velasquez RN

## 2020-07-13 NOTE — TELEPHONE ENCOUNTER
Patient reports, she is seeing Psychiatry 8/5/20,  Appointment with  7/29/20.   Grief counselor was on hold due to Covid 19, virtual visit with counselor 7/21/20 for EMDR.    Please advise  Alice Velasquez RN

## 2020-07-13 NOTE — TELEPHONE ENCOUNTER
Does she have an appointment with Psychiatry for medication management?     She has been advised to schedule an appointment multiple times with Psychiatry for medication management and counseling. I see she has the appointment with counseling, where is she with the medication management with psychiatry?     Kristen Kehr PA-C

## 2020-07-29 ENCOUNTER — VIRTUAL VISIT (OUTPATIENT)
Dept: PSYCHIATRY | Facility: CLINIC | Age: 45
End: 2020-07-29
Attending: SOCIAL WORKER
Payer: COMMERCIAL

## 2020-07-29 DIAGNOSIS — F43.10 POST TRAUMATIC STRESS DISORDER: Primary | ICD-10-CM

## 2020-07-29 NOTE — Clinical Note
Finally finished my note, just in case you need anything from it (just under the 31 day lacy, yirashaun!).    Bonny

## 2020-07-29 NOTE — Clinical Note
DEMETRIS. SARAH butler for tomorrow. I will try to add symptoms tomorrow, but have some family stuff going on, so I thought I would forward just in case I don't get to it.    Bonny
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
## 2020-08-05 ENCOUNTER — VIRTUAL VISIT (OUTPATIENT)
Dept: PSYCHIATRY | Facility: CLINIC | Age: 45
End: 2020-08-05
Attending: PHYSICIAN ASSISTANT
Payer: COMMERCIAL

## 2020-08-05 DIAGNOSIS — F43.10 PTSD (POST-TRAUMATIC STRESS DISORDER): Primary | ICD-10-CM

## 2020-08-05 RX ORDER — MAGNESIUM 200 MG
1000 TABLET ORAL DAILY
COMMUNITY
End: 2023-04-11

## 2020-08-05 RX ORDER — DIAZEPAM 10 MG
10 TABLET ORAL EVERY MORNING
Qty: 30 TABLET | Refills: 0 | Status: SHIPPED | OUTPATIENT
Start: 2020-08-05 | End: 2020-09-02

## 2020-08-05 RX ORDER — PROPRANOLOL HYDROCHLORIDE 20 MG/1
20 TABLET ORAL 2 TIMES DAILY
Qty: 60 TABLET | Refills: 0 | Status: SHIPPED | OUTPATIENT
Start: 2020-08-05 | End: 2020-08-24

## 2020-08-05 SDOH — SOCIAL STABILITY: SOCIAL NETWORK: ARE YOU MARRIED, WIDOWED, DIVORCED, SEPARATED, NEVER MARRIED, OR LIVING WITH A PARTNER?: MARRIED

## 2020-08-05 SDOH — SOCIAL STABILITY: SOCIAL NETWORK: HOW OFTEN DO YOU GET TOGETHER WITH FRIENDS OR RELATIVES?: ONCE A WEEK

## 2020-08-05 SDOH — SOCIAL STABILITY: SOCIAL NETWORK: HOW OFTEN DO YOU ATTEND CHURCH OR RELIGIOUS SERVICES?: NEVER

## 2020-08-05 SDOH — SOCIAL STABILITY: SOCIAL NETWORK: HOW OFTEN DO YOU ATTENT MEETINGS OF THE CLUB OR ORGANIZATION YOU BELONG TO?: PATIENT DECLINED

## 2020-08-05 SDOH — ECONOMIC STABILITY: FOOD INSECURITY: WITHIN THE PAST 12 MONTHS, YOU WORRIED THAT YOUR FOOD WOULD RUN OUT BEFORE YOU GOT MONEY TO BUY MORE.: NEVER TRUE

## 2020-08-05 SDOH — ECONOMIC STABILITY: TRANSPORTATION INSECURITY
IN THE PAST 12 MONTHS, HAS LACK OF TRANSPORTATION KEPT YOU FROM MEETINGS, WORK, OR FROM GETTING THINGS NEEDED FOR DAILY LIVING?: NO

## 2020-08-05 SDOH — SOCIAL STABILITY: SOCIAL NETWORK
IN A TYPICAL WEEK, HOW MANY TIMES DO YOU TALK ON THE PHONE WITH FAMILY, FRIENDS, OR NEIGHBORS?: MORE THAN THREE TIMES A WEEK

## 2020-08-05 SDOH — HEALTH STABILITY: MENTAL HEALTH: HOW MANY STANDARD DRINKS CONTAINING ALCOHOL DO YOU HAVE ON A TYPICAL DAY?: 1 OR 2

## 2020-08-05 SDOH — ECONOMIC STABILITY: FOOD INSECURITY: WITHIN THE PAST 12 MONTHS, THE FOOD YOU BOUGHT JUST DIDN'T LAST AND YOU DIDN'T HAVE MONEY TO GET MORE.: NEVER TRUE

## 2020-08-05 SDOH — ECONOMIC STABILITY: TRANSPORTATION INSECURITY
IN THE PAST 12 MONTHS, HAS THE LACK OF TRANSPORTATION KEPT YOU FROM MEDICAL APPOINTMENTS OR FROM GETTING MEDICATIONS?: NO

## 2020-08-05 SDOH — HEALTH STABILITY: MENTAL HEALTH: HOW OFTEN DO YOU HAVE 6 OR MORE DRINKS ON ONE OCCASION?: NEVER

## 2020-08-05 SDOH — HEALTH STABILITY: PHYSICAL HEALTH: ON AVERAGE, HOW MANY MINUTES DO YOU ENGAGE IN EXERCISE AT THIS LEVEL?: 60 MIN

## 2020-08-05 SDOH — SOCIAL STABILITY: SOCIAL NETWORK
DO YOU BELONG TO ANY CLUBS OR ORGANIZATIONS SUCH AS CHURCH GROUPS UNIONS, FRATERNAL OR ATHLETIC GROUPS, OR SCHOOL GROUPS?: NO

## 2020-08-05 SDOH — HEALTH STABILITY: PHYSICAL HEALTH: ON AVERAGE, HOW MANY DAYS PER WEEK DO YOU ENGAGE IN MODERATE TO STRENUOUS EXERCISE (LIKE A BRISK WALK)?: 2 DAYS

## 2020-08-05 SDOH — ECONOMIC STABILITY: INCOME INSECURITY: HOW HARD IS IT FOR YOU TO PAY FOR THE VERY BASICS LIKE FOOD, HOUSING, MEDICAL CARE, AND HEATING?: NOT HARD AT ALL

## 2020-08-05 SDOH — ECONOMIC STABILITY: INCOME INSECURITY: IN THE LAST 12 MONTHS, WAS THERE A TIME WHEN YOU WERE NOT ABLE TO PAY THE MORTGAGE OR RENT ON TIME?: NO

## 2020-08-05 SDOH — HEALTH STABILITY: MENTAL HEALTH: HOW OFTEN DO YOU HAVE A DRINK CONTAINING ALCOHOL?: MONTHLY OR LESS

## 2020-08-05 ASSESSMENT — ANXIETY QUESTIONNAIRES
3. WORRYING TOO MUCH ABOUT DIFFERENT THINGS: MORE THAN HALF THE DAYS
GAD7 TOTAL SCORE: 17
7. FEELING AFRAID AS IF SOMETHING AWFUL MIGHT HAPPEN: NEARLY EVERY DAY
GAD7 TOTAL SCORE: 17
1. FEELING NERVOUS, ANXIOUS, OR ON EDGE: NEARLY EVERY DAY
4. TROUBLE RELAXING: NEARLY EVERY DAY
GAD7 TOTAL SCORE: 17
6. BECOMING EASILY ANNOYED OR IRRITABLE: MORE THAN HALF THE DAYS
2. NOT BEING ABLE TO STOP OR CONTROL WORRYING: NEARLY EVERY DAY
5. BEING SO RESTLESS THAT IT IS HARD TO SIT STILL: SEVERAL DAYS
7. FEELING AFRAID AS IF SOMETHING AWFUL MIGHT HAPPEN: NEARLY EVERY DAY

## 2020-08-05 ASSESSMENT — PAIN SCALES - GENERAL: PAINLEVEL: NO PAIN (0)

## 2020-08-05 ASSESSMENT — PATIENT HEALTH QUESTIONNAIRE - PHQ9
10. IF YOU CHECKED OFF ANY PROBLEMS, HOW DIFFICULT HAVE THESE PROBLEMS MADE IT FOR YOU TO DO YOUR WORK, TAKE CARE OF THINGS AT HOME, OR GET ALONG WITH OTHER PEOPLE: VERY DIFFICULT
SUM OF ALL RESPONSES TO PHQ QUESTIONS 1-9: 21
SUM OF ALL RESPONSES TO PHQ QUESTIONS 1-9: 21

## 2020-08-05 NOTE — PROGRESS NOTES
"VIDEO VISIT  Lilian Quinones is a 45 year old patient who is being evaluated via a billable video visit.      The patient has been notified of following:   \"This video visit will be conducted via a call between you and your physician/provider. We have found that certain health care needs can be provided without the need for an in-person physical exam. This service lets us provide the care you need with a video conversation. If a prescription is necessary we can send it directly to your pharmacy. If lab work is needed we can place an order for that and you can then stop by our lab to have the test done at a later time. Insurers are generally covering virtual visits as they would in-office visits so billing should not be different than normal.  If for some reason you do get billed incorrectly, you should contact the billing office to correct it and that number is in the AVS .    Video Conference to be completed via:  Asif    Patient has given verbal consent for video visit?:  Yes    Patient would prefer that any video invitations be sent by: Text to cell phone: [phone]      How would patient like to obtain AVS?:  [employer]    AVS SmartPhrase [PsychAVS] has been placed in 'Patient Instructions':  Yes    "
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
## 2020-08-05 NOTE — PATIENT INSTRUCTIONS
Thank you for coming to the PSYCHIATRY CLINIC.    Lab Testing:  If you had lab testing today and your results are reassuring or normal they will be mailed to you or sent through [facility] within 7 days. If the lab tests need quick action we will call you with the results. The phone number we will call with results is # [phone] (home) [phone] (work). If this is not the best number please call our clinic and change the number.    Medication Refills:  If you need any refills please call your pharmacy and they will contact us. Our fax number for refills is [phone]. Please allow three business for refill processing. If you need to  your refill at a new pharmacy, please contact the new pharmacy directly. The new pharmacy will help you get your medications transferred.     Scheduling:  If you have any concerns about today's visit or wish to schedule another appointment please call our office during normal business hours [phone] (8-5:00 M-F)    Contact Us:  Please call [phone] during business hours (8-5:00 M-F).  If after clinic hours, or on the weekend, please call  [phone].    Financial Assistance [phone]  [facility] Billing [phone]  [facility]illing [facility], [facility]: [phone]  [facility] Billing [phone]  Medical Records [phone]      MENTAL HEALTH CRISIS NUMBERS:  For a medical emergency please call  911 or go to the nearest ER.     [facility]:   [facility] -[phone]   Crisis Residence [facility] -[phone]   Walk-In Counseling [facility] -[phone]   COPE 24/7 [facility] Mobile Team -[phone] (adults)/[phone] (child)  CHILD: [facility] needs assessment team - [phone]      [facility]:   [facility] - [phone]   Walk-in counseling [facility] - [phone]   Walk-in counseling [facility] - [phone]   Crisis Residence [facility] -  [phone]  Urgent Care Adult Mental [place]-[phone] mobile unit/ 24/7 crisis line    National Crisis Numbers:   National Suicide Prevention Lifeline: 4-834-565-TALK (364-464-2546)  Poison Control Center - 7-750-947-8151  PlaceFull/resources for a list of additional resources (SOS)  Trans Lifeline a hotline for transgender people 3-490-136-7970  The Veto Project a hotline for LGBT youth 1-898.732.9127  Crisis Text Line: For any crisis 24/7   To: 928491  see www.crisistextline.org  - IF MAKING A CALL FEELS TOO HARD, send a text!         Again thank you for choosing PSYCHIATRY CLINIC and please let us know how we can best partner with you to improve you and your family's health.    You may be receiving a survey regarding this appointment. We would love to have your feedback, both positive and negative. The survey is done by an external company, so your answers are anonymous.

## 2020-08-06 ASSESSMENT — ANXIETY QUESTIONNAIRES: GAD7 TOTAL SCORE: 17

## 2020-08-06 ASSESSMENT — PATIENT HEALTH QUESTIONNAIRE - PHQ9: SUM OF ALL RESPONSES TO PHQ QUESTIONS 1-9: 21

## 2020-09-02 ENCOUNTER — VIRTUAL VISIT (OUTPATIENT)
Dept: PSYCHIATRY | Facility: CLINIC | Age: 45
End: 2020-09-02
Attending: PSYCHIATRY & NEUROLOGY
Payer: COMMERCIAL

## 2020-09-02 DIAGNOSIS — F43.10 PTSD (POST-TRAUMATIC STRESS DISORDER): Primary | ICD-10-CM

## 2020-09-02 RX ORDER — DIAZEPAM 5 MG
2.5-5 TABLET ORAL DAILY PRN
Qty: 30 TABLET | Refills: 1 | Status: SHIPPED | OUTPATIENT
Start: 2020-09-02 | End: 2020-12-02

## 2020-09-02 RX ORDER — PROPRANOLOL HYDROCHLORIDE 40 MG/1
40 TABLET ORAL 2 TIMES DAILY
Qty: 60 TABLET | Refills: 1 | Status: SHIPPED | OUTPATIENT
Start: 2020-09-02 | End: 2020-10-28

## 2020-09-02 ASSESSMENT — ANXIETY QUESTIONNAIRES
7. FEELING AFRAID AS IF SOMETHING AWFUL MIGHT HAPPEN: MORE THAN HALF THE DAYS
GAD7 TOTAL SCORE: 7
GAD7 TOTAL SCORE: 7
3. WORRYING TOO MUCH ABOUT DIFFERENT THINGS: SEVERAL DAYS
1. FEELING NERVOUS, ANXIOUS, OR ON EDGE: SEVERAL DAYS
2. NOT BEING ABLE TO STOP OR CONTROL WORRYING: SEVERAL DAYS
GAD7 TOTAL SCORE: 7
7. FEELING AFRAID AS IF SOMETHING AWFUL MIGHT HAPPEN: MORE THAN HALF THE DAYS
6. BECOMING EASILY ANNOYED OR IRRITABLE: SEVERAL DAYS
5. BEING SO RESTLESS THAT IT IS HARD TO SIT STILL: NOT AT ALL
4. TROUBLE RELAXING: SEVERAL DAYS

## 2020-09-02 ASSESSMENT — PATIENT HEALTH QUESTIONNAIRE - PHQ9
SUM OF ALL RESPONSES TO PHQ QUESTIONS 1-9: 10
SUM OF ALL RESPONSES TO PHQ QUESTIONS 1-9: 10
10. IF YOU CHECKED OFF ANY PROBLEMS, HOW DIFFICULT HAVE THESE PROBLEMS MADE IT FOR YOU TO DO YOUR WORK, TAKE CARE OF THINGS AT HOME, OR GET ALONG WITH OTHER PEOPLE: VERY DIFFICULT

## 2020-09-02 NOTE — PATIENT INSTRUCTIONS
Thank you for coming to the PSYCHIATRY CLINIC.    Lab Testing:  If you had lab testing today and your results are reassuring or normal they will be mailed to you or sent through [employer] within 7 days. If the lab tests need quick action we will call you with the results. The phone number we will call with results is # [phone] (home) [phone] (work). If this is not the best number please call our clinic and change the number.    Medication Refills:  If you need any refills please call your pharmacy and they will contact us. Our fax number for refills is [phone]. Please allow three business for refill processing. If you need to  your refill at a new pharmacy, please contact the new pharmacy directly. The new pharmacy will help you get your medications transferred.     Scheduling:  If you have any concerns about today's visit or wish to schedule another appointment please call our office during normal business hours [phone] (8-5:00 M-F)    Contact Us:  Please call [phone] during business hours (8-5:00 M-F).  If after clinic hours, or on the weekend, please call  [phone].    Financial Assistance [phone]  [facility] Billing [phone]  [facility]illing [facility], [facility]: [phone]  [facility] Billing [phone]  Medical Records [phone]      MENTAL HEALTH CRISIS NUMBERS:  For a medical emergency please call  911 or go to the nearest ER.     [facility]:   [facility] -[phone]   Crisis Residence [facility] -[phone]   Walk-In Counseling [facility] -[phone]   COPE 24/7 [facility] Mobile Team -[phone] (adults)/[phone] (child)  CHILD: [facility] needs assessment team - [phone]      [facility]:   [place] - [phone]   Walk-in counseling [facility] - [phone]   Walk-in counseling [facility] - [phone]   Crisis Residence [facility] -  [phone]  Urgent Care Adult Mental [place]-[phone] mobile unit/ 24/7 crisis line    National Crisis Numbers:   National Suicide Prevention Lifeline: 9-034-145-TALK (677-777-8317)  Poison Control Center - 5-838-782-2433  [employer]/resources for a list of additional resources (SOS)  Trans Lifeline a hotline for transgender people 7-057-502-5000  The Veto Project a hotline for LGBT youth 1-782.662.2219  Crisis Text Line: For any crisis 24/7   To: 242242  see www.crisistextline.org  - IF MAKING A CALL FEELS TOO HARD, send a text!         Again thank you for choosing PSYCHIATRY CLINIC and please let us know how we can best partner with you to improve you and your family's health.    You may be receiving a survey regarding this appointment. We would love to have your feedback, both positive and negative. The survey is done by an external company, so your answers are anonymous.

## 2020-09-02 NOTE — PROGRESS NOTES
TELEPHONE VISIT  Lilian Quinones is a 45 year old pt. who is being evaluated via a billable telephone visit.      The patient has been notified of the following:    We have found that certain health care needs can be provided without the need for a physical exam. This service lets us provide the care you need with a short phone conversation. If a prescription is necessary we can send it directly to your pharmacy. If lab work is needed we can place an order for that and you can then stop by our lab to have the test done at a later time. Insurers are generally covering virtual visits as they would in-office visits so billing should not be different than normal.  If for some reason you do get billed incorrectly, you should contact the billing office to correct it and that number is in the AVS .    Patient has given verbal consent for a telephone visit?:  Yes   How would the pt like to obtain the AVS?:  [employer]  AVS SmartPhrase [PsychAVS] has been placed in 'Patient Instructions':  Yes     Start Time:  12:45 PM          End Time:  1:00 PM            [facility]  Psychiatry Clinic  Rapid Access Brief Treatment [RABT]  MEDICAL PROGRESS NOTE     Lilian Quinones is a 45 year old. Initial consultation on 08/05/20. Referred by Kristen M Kehr for evaluation of depression.   History was provided by the patient who was a good historian.   Psych critical item history includes trauma hx.    Interval History    Lilian does feel that anxiety has been a lot better recently, but also has been very tired. She did stop the meds over the weekend, but felt like this really messed things up for her. She ended up having to take a few days off of work, and did end up starting the medications back up again.   No dizziness issues with the propranolol.        Recent Substance Use  Alcohol- None  Tobacco- None  Caffeine- 1 cups/day of coffee  Opioids- None  Narcan Kit- N/A  Cannabis- Has tried CBD but didn't really  "help.   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   LIVING SITUATION / RELATIONSHIPS- Lives at home with . Son technically lives at home, but isn't around very often. Has 2 dogs and 2 cats.    SOCIAL/ SPIRITUAL SUPPORT- Yes, \"absolutely\", but still feels very lonely.   FEELS SAFE AT HOME- Yes     Medical Review of Systems    Dizziness/orthostasis- Has in the past, but generally no.   Headaches- No.   GI- No. Has lost 100 lbs since gastric sleeve surgery last year.   Has chronic pain related to bulging disc, gets shots once or twice per year.   A comprehensive review of systems was performed and is negative other than noted in the HPI.     Psych Summary Points   08/2020 - Started propranolol 40mg BID     Psychiatric Medication Trials       Drug /  Start Date Dose (mg) Helpful Adverse Effects DC Reason / Date   Citalopram 11/2019 40 probably     gabapentin       Benadryl       Lorazepam 11/2019 2 daily PRN yes     Diazepam 08/2020 10 yes sedating    Propranolol 08/2020 40 BID probably     melatonin          Past Medical History      CARE TEAM:   PCP- Kristen M. Kehr with [facility]  Therapist- Alejandra Lui at [facility] - Grief Counselor - Will be starting EMDR.     Neurologic Hx [head injury, seizures, etc.]: Concussion from skiing injury in 2012.   Patient Active Problem List   Diagnosis     Overweight     Atypical mole     Right knee pain, unspecified chronicity     HAO (generalized anxiety disorder)     Hyperhydrosis disorder     Past Medical History:   Diagnosis Date     IUD (intrauterine device) in place 03/20/2019    Mirena      Allergies    Amoxicillin and Penicillins     Medications      Current Outpatient Medications   Medication Sig Dispense Refill     citalopram (CELEXA) 40 MG tablet Take 1 tablet (40 mg) by mouth daily 1 tablet daily 90 tablet 0     cyanocobalamin (VITAMIN B-12) 1000 MCG SUBL " "sublingual tablet Place 1,000 mcg under the tongue daily       diazepam (VALIUM) 10 MG tablet Take 1 tablet (10 mg) by mouth every morning 30 tablet 0     diphenhydrAMINE HCl, Sleep, (ZZZQUIL PO) Take 50 mg by mouth nightly as needed (for sleep)       levonorgestrel (MIRENA) 20 MCG/24HR IUD 1 each by Intrauterine route once       melatonin 5 MG tablet Take 10 mg by mouth nightly as needed for sleep       Pediatric Multi Vit-Extra C-FA (FLINTSTONES/EXTRA C) CHEW Take 2 tablets by mouth       propranolol (INDERAL) 40 MG tablet Take 1 tablet (40 mg) by mouth 2 times daily 60 tablet 0      Physical Exam  (Vitals Only)   There were no vitals taken for this visit.    Pulse Readings from Last 5 Encounters:   02/04/20 60   12/30/19 75   11/25/19 85   11/22/19 83   10/17/19 69     Wt Readings from Last 5 Encounters:   12/30/19 92.5 kg (204 lb)   11/25/19 96.2 kg (212 lb)   11/22/19 94.2 kg (207 lb 9.6 oz)   10/17/19 98 kg (216 lb)   09/18/19 106.1 kg (234 lb)     BP Readings from Last 5 Encounters:   02/04/20 104/50   12/30/19 104/70   11/25/19 130/84   11/22/19 121/73   10/17/19 107/75      Mental Status Exam   Alertness: alert  and oriented  Appearance: adequately groomed  Behavior/Demeanor: cooperative and calm, with good eye contact   Speech: normal and regular rate and rhythm  Language: intact and no problems  Psychomotor: normal or unremarkable  Mood: \"Doing better, but still anxious\"  Affect: full range and appropriate; was congruent to mood; was congruent to content  Thought Process/Associations: unremarkable  Thought Content:  Reports passive SI, no intent or plan;  Denies violent ideation and delusions  Perception:  Reports none;  Denies auditory hallucinations and visual hallucinations  Insight: intact  Judgment: intact  Cognition: does  appear grossly intact; formal cognitive testing was not done  Gait and Station: not observed     Labs and Data    RATING SCALES:    CAGE-AID= 0/4    PHQ-9 SCORE 3/30/2020 8/5/2020 " 8/5/2020   PHQ-9 Total Score MyChart 17 (Moderately severe depression) - 21 (Severe depression)   PHQ-9 Total Score 17 21 21     HAO-7 SCORE 3/30/2020 8/5/2020 8/5/2020   Total Score 21 (severe anxiety) - 17 (severe anxiety)   Total Score 21 17 17       Recent Labs   Lab Test 06/19/19  1740 03/05/19   CR 0.72 0.80   GFRESTIMATED >90 >60     Recent Labs   Lab Test 06/19/19  1740 03/05/19   AST 15 17   ALT 33 18   ALKPHOS 53  --      Recent Labs   Lab Test 03/05/19 09/19/16  0935   TSH 2.12 2.50     ECG 6/19/19 QTc = 414ms     Assessment & Plan    Lilian Quinones is a 45 year old female who provides a history supporting the following diagnoses:  Complex grief / r/o PTSD (Generalized anxiety, panic, and trauma symptoms)    Pertinent History: Lilian does not report any history of psychiatric diagnoses prior to the death of her son in 11/2019. Since that time, she has contended with complex grief and trauma symptoms which have included elements of depression and anxiety.   TODAY: Lilian notes that she did experience an improvement in anxiety after the last meeting. She is still feeling significant anxiety and coping with grief, and understands that the process of healing is not linear.   Psychotherapy: Primary recommendation for the treatment of mood symptoms in the presence of acute stressors and grief. She has been seeing a grief counselor and is planning to start EMDR soon.   Pharmacotherapy: Lilian notes that while anxiety symptoms have improved somewhat, she is now contending with significant sedation during the day. She did go off of the new medications briefly, but then noted significant worsening of anxiety symptoms, so restarted them.   Given that diazepam is the more likely of the two to cause these issues, and given that this is the medication that is planned for taper anyway, I recommended reducing the dose to 5mg at this time, which she is in agreement with. Will plan to further reduce to 2.5mg in a month if  this continues to go well.   While it is difficult to parse apart the benefits of the two meds given that we made two changes at last appointment, it is likely that propanolol is playing a supporting role and is well tolerated, so will continue it at this time.   It is hard to gauge how effective the citalopram has been in the context of her current situation. The absence of more severe depressive symptoms is notable, and could indicate efficacy of the antidepressant, but at the same time, it is clearly not sufficient to aid with her anxiety symptoms alone. Would not recommend changing it at this time.     PSYCHOTROPIC DRUG INTERACTIONS: None   MANAGEMENT:  N/A     Plan     1) PSYCHOTROPIC MEDICATIONS:   - Continue citalopram 40mg daily  - Continue propranolol 40mg BID  - Decrease to diazepam 5mg daily PRN at this time, and in 4 weeks, if tolerating okay, decrease further to 2.5mg daily PRN. May adjust timing of dose to minimize side effects.     - Taking melatonin 10mg QHS PRN for sleep  - Taking diphenhydramine (Zzzquil) 50mg QHS PRN for sleep    [see the following SmartPhrase(s) for more information: PSYMEDINFOBENZOS]    2) THERAPY: Psychotherapy is a primary recommendation for the treatment of mood symptoms, even in the context of grief. Currently engaged in therapy with grief counselor, planning to start EMDR soon.     3) NEXT DUE:   Labs- Routine monitoring is not indicated for current psychotropic medication regimen   ECG- Routine monitoring is not indicated for current psychotropic medication regimen   Rating Scales- N/A    4) REFERRALS: None    5) : None    6) DISPOSITION:   - Pt would benefit from brief psychiatric intervention (up to ~5 visits) to adjust meds and gauge for benefit.   - Follow up with Joss Rod MD in 4 weeks. Plan to transition med management back to designated prescriber after brief care is complete.     Treatment Risk Statement:  The patient understands the risks,  benefits, adverse effects and alternatives. Agrees to treatment with the capacity to do so. No medical contraindications to treatment. Agrees to call clinic for any problems. The patient understands to call 911 or go to the nearest ED if life threatening or urgent symptoms occur. Crisis numbers are provided routinely in the After Visit Summary.       PROVIDER:  Joss Rod MD

## 2020-09-03 ASSESSMENT — PATIENT HEALTH QUESTIONNAIRE - PHQ9: SUM OF ALL RESPONSES TO PHQ QUESTIONS 1-9: 10

## 2020-09-03 ASSESSMENT — ANXIETY QUESTIONNAIRES: GAD7 TOTAL SCORE: 7

## 2020-09-04 ENCOUNTER — MYC MEDICAL ADVICE (OUTPATIENT)
Dept: NEUROSURGERY | Facility: CLINIC | Age: 45
End: 2020-09-04

## 2020-09-04 DIAGNOSIS — M54.12 CERVICAL RADICULOPATHY: Primary | ICD-10-CM

## 2020-09-04 NOTE — TELEPHONE ENCOUNTER
"Patient called clinic requesting to repeat injection.    Type of injection: C7-T1 interlaminar epidural steroid     Most recent injection date: 02/04/2020    LOV with Dr. Garcia on 10/17/2019  Last MRI 09/16/2019    How long did injection provide symptomatic relief: 6 months relief between shots    Current symptoms: \"neck, shoulder - down to my arm\" pain stating it is the same pain as prior to the previous injections.    Number of injections in last 12 months: 2 per patient report    Plan: requesting repeat    Patient voiced understanding and agreement with plan.     Advised patient to call back with any questions or concerns.    "

## 2020-10-16 ENCOUNTER — RADIOLOGY INJECTION OFFICE VISIT (OUTPATIENT)
Dept: PALLIATIVE MEDICINE | Facility: CLINIC | Age: 45
End: 2020-10-16
Payer: COMMERCIAL

## 2020-10-16 ENCOUNTER — ANCILLARY PROCEDURE (OUTPATIENT)
Dept: RADIOLOGY | Facility: CLINIC | Age: 45
End: 2020-10-16
Attending: PAIN MEDICINE
Payer: COMMERCIAL

## 2020-10-16 VITALS
HEART RATE: 44 BPM | OXYGEN SATURATION: 99 % | DIASTOLIC BLOOD PRESSURE: 65 MMHG | RESPIRATION RATE: 16 BRPM | SYSTOLIC BLOOD PRESSURE: 117 MMHG

## 2020-10-16 DIAGNOSIS — M54.12 CERVICAL RADICULOPATHY: ICD-10-CM

## 2020-10-16 DIAGNOSIS — M54.12 CERVICAL RADICULOPATHY: Primary | ICD-10-CM

## 2020-10-16 PROCEDURE — 62321 NJX INTERLAMINAR CRV/THRC: CPT | Performed by: PAIN MEDICINE

## 2020-10-16 RX ORDER — PROPRANOLOL HYDROCHLORIDE 20 MG/1
20 TABLET ORAL 2 TIMES DAILY
COMMUNITY
Start: 2020-08-05 | End: 2020-10-28

## 2020-10-16 ASSESSMENT — PAIN SCALES - GENERAL: PAINLEVEL: MODERATE PAIN (5)

## 2020-10-16 NOTE — NURSING NOTE
Discharge Information    IV Discontiued Time:  NA    Amount of Fluid Infused:  NA    Discharge Criteria = When patient returns to baseline or as per MD order    Consciousness:  Pt is fully awake    Circulation:  BP +/- 20% of pre-procedure level    Respiration:  Patient is able to breathe deeply    O2 Sat:  Patient is able to maintain O2 Sat >92% on room air    Activity:  Moves 4 extremities on command    Ambulation:  Patient is able to stand and walk or stand and pivot into wheelchair    Dressing:  Clean/dry or No Dressing    Notes:   Discharge instructions and AVS given to patient    Patient meets criteria for discharge?  YES    Admitted to PCU?  No    Responsible adult present to accompany patient home?  Yes    Signature/Title:    Osvaldo Colvin RN  RN Care Coordinator  [facility] [place]

## 2020-10-16 NOTE — PATIENT INSTRUCTIONS
[facility]   Procedure Discharge Instructions    Today you saw:  Dr. Chema Levi     You had an: Cervical Epidural steroid injection         If you were holding your blood thinning medication, please restart taking it: N/A    Be cautious Numbness and/or weakness may occur for up to 6-8 hours after the procedure due to effect of the local anesthetic    Do not drive for 6 hours. The effect of the local anesthetic could slow your reflexes.     You may resume your regular activities after 24 hours    Avoid strenuous activity for the first 24 hours    You may shower, however avoid swimming, tub baths or hot tubs for 24 hours following your procedure    You may have a mild to moderate increase in pain for several days following the injection.    It may take up to 14 days for the steroid medication to start working although you may feel the effect as early as a few days after the procedure.       You may use ice packs for 10-15 minutes, 3 to 4 times a day at the injection site for comfort    Do not use heat to painful areas for 6 to 8 hours. This will give the local anesthetic time to wear off and prevent you from accidentally burning your skin.     Unless you have been directed to avoid the use of anti-inflammatory medications (NSAIDS), you may use medications such as ibuprofen, Aleve or Tylenol for pain control if needed.     Possible side effects of steroids that you may experience include flushing, elevated blood pressure, increased appetite, mild headaches and restlessness.  All of these symptoms will get better with time.    If you experience any of the following, call the Pain Clinic during work hours (Mon-Friday 8-4:30 pm) at [phone] or the Provider Line after hours at [phone]:  -Fever over 100 degree F  -Swelling, bleeding, redness, drainage, warmth at the injection site  -Progressive weakness or numbness in your arms  -Unusual headache that is not relieved by Tylenol  or other pain reliever  -Unusual new onset of pain that is not improving

## 2020-10-16 NOTE — PROGRESS NOTES
[facility] - Procedure Note        Procedure performed: C7-T1 interlaminar epidural steroid injection with fluoroscopic guidance  Diagnosis: Cervical spondylosis; Cervical radiculitis/radiculopathy  : Chema Levi MD  Anesthesia: none    Indications: Lilian Quinones is a 44 year old female who is seen for cervical epidural steroid injection. The patient describes left-sided neck pain into her upper extremity. The patient has been exhibiting symptoms consistent with cervical intraspinal inflammation and radiculopathy. Symptoms have been persistent, disabling, and intermittently severe. The patient reports minimal improvement with conservative treatment, including meds/PT/prior procedures    Cervical MRI   Segmental analysis:  C2-C3: No spinal or neural foraminal stenosis.     C3-C4: Shallow symmetric disc bulge with effacement of the ventral  thecal sac and minimal mass effect on the ventral cord without cord  signal change. Minimal uncovertebral and facet arthropathy. No  significant neural foraminal stenosis.     C4-C5: Shallow symmetric disc bulge with effacement of the ventral  thecal sac and minimal mass effect of the ventral cord without cord  signal change. Minimal uncovertebral and facet arthropathy. No  significant neural foraminal stenosis.     C5-C6: Shallow symmetric disc bulge with effacement of the ventral  thecal sac and minimal mass effect on the ventral cord without cord  signal change. Minimal uncovertebral and facet arthropathy. No  significant neural foraminal stenosis.     C6-C7: There is a large left central/lateral recess disc extrusion  that extends partially into the left neural foramen (series 9 image  26). The herniated disc material measures approximately 10 mm  transverse x 6 mm AP x 10 mm craniocaudal (series 6 image 10, series  10 image 45). The disc herniation indents the left ventral aspect of  the cord and leads to mild to moderate central spinal  stenosis and  moderate to severe left neural foraminal stenosis. The right neural  foramen is patent. There is mild uncovertebral and facet arthropathy.     C7-T1: No spinal or neural foraminal stenosis.                                                                      IMPRESSION: Degenerative changes of the cervical spine, most  pronounced at C6-C7 where there is a left central/lateral recess disc  extrusion that partially extends into the left neural foramen. Please  see the body of the report for additional details.     Allergies:      Allergies   Allergen Reactions     Amoxicillin Rash     Penicillins Rash        Vitals:  There were no vitals taken for this visit.    Review of Systems: The patient denies recent fever, chills, illness, use of antibiotics or anticoagulants. All other 10-point review of systems negative.     Procedure: The procedure and risks were explained, and informed written consent was obtained from the patient. Risks include but are not limited to: infection, bleeding, increased pain, and damage to soft tissue, nerve, muscle, and vasculature structures. After getting informed consent, patient was brought into the procedure suite and was placed in a prone position on the procedure table. A Pause for the Cause was performed. Patient was prepped and draped in sterile fashion.     The C7-T1 interspace was identified with use of fluoroscopy in AP view. A 25-gauge, 1.5 inch needle was used to anesthetize the skin and subcutaneous tissue entry site with a total of 2 ml of 1% lidocaine. Under fluoroscopic visualization, a 22-gauge, 3.5 inch Tuohy epidural needle was slowly advanced towards the epidural space a few millimeters left of midline. The latter part of the needle advancement was guided with fluoroscopy in the lateral view. The epidural space was identified using loss of resistance technique. After negative aspiration for heme and cerebrospinal fluid, a total of 1 mL of Omnipaque was  injected to confirm needle placement. 9 mL of contrast was wasted. Epidurogram confirmed spread within the posterior epidural space. 2 ml of 10mg/ml of dexamethasone and 1 ml of preservative free 0.25% bupivacaine was injected. The needle was removed.  Images were saved to PACS.    The patient tolerated the procedure well, and there was no evidence of procedural complications. No new sensory or motor deficits were noted following the procedure. The patient was stable and able to ambulate on discharge home. Post-procedure instructions were provided.     Pre-procedure pain score: 8/10 in the neck, 8/10 in the arm  Post-procedure pain score: 5/10 in the neck, 5/10 in the arm    Assessment/Plan: Lilian Quinones is a 44 year old female s/p cervical interlaminar epidural steroid injection today for cervical spondylosis and radiculitis/radiculopathy.     1. Following today's procedure, the patient was advised to contact the [facility] for any of the following:   Fever, chills, or night sweats   New onset of pain, numbness, or weakness   Any questions/concerns regarding the procedure  If unable to contact the Pain Center, the patient was instructed to go to a local Emergency Room for any complications.   2. The patient will receive a follow-up call in 1 week.   3. Follow-up with referring provider in 2 weeks for post-procedure evaluation.    Chema Levi MD   Pain Management

## 2020-10-16 NOTE — NURSING NOTE
Pre-procedure Intake    Have you been fasting? NA    If yes, for how long? No     Are you taking a prescribed blood thinner such as coumadin, Plavix, Xarelto?    No    If yes, when did you take your last dose? No     Do you take aspirin?  No    If cervical procedure, have you held aspirin for 6 days?   NA    Do you have any allergies to contrast dye, iodine, steroid and/or numbing medications?  NO    Are you currently taking antibiotics or have an active infection?  NO    Have you had a fever/elevated temperature within the past week? NO    Are you currently taking oral steroids? NO    Do you have a ? Yes       Are you pregnant or breastfeeding?  NO    Are the vital signs normal?  Yes    Chandni Eller MA

## 2020-10-28 ENCOUNTER — TELEPHONE (OUTPATIENT)
Dept: PSYCHIATRY | Facility: CLINIC | Age: 45
End: 2020-10-28

## 2020-10-28 ENCOUNTER — VIRTUAL VISIT (OUTPATIENT)
Dept: PSYCHIATRY | Facility: CLINIC | Age: 45
End: 2020-10-28
Attending: PSYCHIATRY & NEUROLOGY
Payer: COMMERCIAL

## 2020-10-28 DIAGNOSIS — F43.10 PTSD (POST-TRAUMATIC STRESS DISORDER): Primary | ICD-10-CM

## 2020-10-28 PROCEDURE — 99214 OFFICE O/P EST MOD 30 MIN: CPT | Mod: 95 | Performed by: PSYCHIATRY & NEUROLOGY

## 2020-10-28 RX ORDER — BUPROPION HYDROCHLORIDE 150 MG/1
150 TABLET ORAL EVERY MORNING
Qty: 30 TABLET | Refills: 1 | Status: SHIPPED | OUTPATIENT
Start: 2020-10-28 | End: 2020-12-02

## 2020-10-28 RX ORDER — PROPRANOLOL HYDROCHLORIDE 20 MG/1
20 TABLET ORAL 2 TIMES DAILY
Qty: 60 TABLET | Refills: 1 | Status: SHIPPED | OUTPATIENT
Start: 2020-10-28 | End: 2020-12-02

## 2020-10-28 ASSESSMENT — ANXIETY QUESTIONNAIRES
5. BEING SO RESTLESS THAT IT IS HARD TO SIT STILL: MORE THAN HALF THE DAYS
4. TROUBLE RELAXING: MORE THAN HALF THE DAYS
GAD7 TOTAL SCORE: 13
7. FEELING AFRAID AS IF SOMETHING AWFUL MIGHT HAPPEN: MORE THAN HALF THE DAYS
3. WORRYING TOO MUCH ABOUT DIFFERENT THINGS: MORE THAN HALF THE DAYS
1. FEELING NERVOUS, ANXIOUS, OR ON EDGE: MORE THAN HALF THE DAYS
GAD7 TOTAL SCORE: 13
GAD7 TOTAL SCORE: 13
2. NOT BEING ABLE TO STOP OR CONTROL WORRYING: MORE THAN HALF THE DAYS
7. FEELING AFRAID AS IF SOMETHING AWFUL MIGHT HAPPEN: MORE THAN HALF THE DAYS
6. BECOMING EASILY ANNOYED OR IRRITABLE: SEVERAL DAYS

## 2020-10-28 ASSESSMENT — PATIENT HEALTH QUESTIONNAIRE - PHQ9
SUM OF ALL RESPONSES TO PHQ QUESTIONS 1-9: 12
SUM OF ALL RESPONSES TO PHQ QUESTIONS 1-9: 12
10. IF YOU CHECKED OFF ANY PROBLEMS, HOW DIFFICULT HAVE THESE PROBLEMS MADE IT FOR YOU TO DO YOUR WORK, TAKE CARE OF THINGS AT HOME, OR GET ALONG WITH OTHER PEOPLE: VERY DIFFICULT

## 2020-10-28 NOTE — TELEPHONE ENCOUNTER
On 10/28/2020, at 0841, writer called patient at [phone] to confirm Virtual Visit. Writer unable to make contact with patient. Writer left detailed voice message for call back. [phone] left as call back number. Sheree Cuellar MA

## 2020-10-28 NOTE — PATIENT INSTRUCTIONS
Decrease propanolol to 20mg twice daily.   Starting this weekend, start bupropion XL 150mg daily in the morning.       Thank you for coming to the [facility] [place] [facility].    Lab Testing:  If you had lab testing today and your results are reassuring or normal they will be mailed to you or sent through [employer] within 7 days. If the lab tests need quick action we will call you with the results. The phone number we will call with results is # [phone] (home) [phone] (work). If this is not the best number please call our clinic and change the number.    Medication Refills:  If you need any refills please call your pharmacy and they will contact us. Our fax number for refills is [phone]. Please allow three business for refill processing. If you need to  your refill at a new pharmacy, please contact the new pharmacy directly. The new pharmacy will help you get your medications transferred.     Scheduling:  If you have any concerns about today's visit or wish to schedule another appointment please call our office during normal business hours [phone] (8-5:00 M-F)    Contact Us:  Please call [phone] during business hours (8-5:00 M-F).  If after clinic hours, or on the weekend, please call  [phone].    Financial Assistance [phone]  [facility] B[facility] [phone]  Central Billing Office, [facility]: [phone]  [facility] [phone]  Medical Records [phone]      MENTAL HEALTH CRISIS NUMBERS:  For a medical emergency please call  911 or go to the nearest ER.     [facility]:   [facility] -[phone]   Crisis Residence [facility] -[phone]   Walk-In Counseling [facility] -[phone]   COPE 24/7 [place] Mobile Team -[phone] (adults)/[phone] (child)  CHILD: [facility] needs assessment team - [phone]      [facility]:   [facility] - [phone]   Walk-in counseling   [facility] - [phone]   Walk-in counseling [facility] - [phone]   Crisis Residence [facility] - [phone]  Urgent Care Adult Mental F[facility]mct-[phone] mobile unit/ 24/7 crisis line    National Crisis Numbers:   National Suicide Prevention Lifeline: 4-925-562-TALK (016-537-4981)  Poison Control Center - 3-919-150-7481  opentabs/resources for a list of additional resources (SOS)  Trans Lifeline a hotline for transgender people 7-680-378-5722  The [employer]eto Project a hotline for LGBT youth 1-841.171.8148  Crisis Text Line: For any crisis 24/7   To: 932248  see www.crisistextline.org  - IF MAKING A CALL FEELS TOO HARD, send a text!         Again thank you for choosing [facility] and please let us know how we can best partner with you to improve you and your family's health.    You may be receiving a survey regarding this appointment. We would love to have your feedback, both positive and negative. The survey is done by an external company, so your answers are anonymous.

## 2020-10-28 NOTE — PROGRESS NOTES
Telehealth Details  Type of service:  video visit for consult  Date of service: 10/28/2020  Time of service:    Start Time:  9:09 AM    End Time:  9:41 AM    Reason for video visit:  Services only offered telehealth  Originating Site (patient location):  Patient's home  Distant Site (provider location): [facility] PSYCHIATRY  Mode of Communication:  Video conference via AmWell  The patient consents to receiving services via telehealth.             [facility]  Psychiatry Clinic  Rapid Access Brief Treatment [RABT]  MEDICAL PROGRESS NOTE     Lilian Quinones is a 45 year old. Initial consultation on 08/05/20. Referred by Kristen M Kehr for evaluation of depression.   History was provided by the patient who was a good historian.   Psych critical item history includes trauma hx.    Interval History    Lilian notes that things have been going pretty much the same as within the last year. Currently approaching the anniversary of his death on 11/12, and his birthday was on [birth year], which was really hard too. Sister is in therapy, brother is in the . Brother just got out of rehab after starting to drink very heavily. She feels that he just works really hard and tries to bury things.   She has not been taking the diazepam every day, only when she really needs it.   She just wishes there was a pill to help her focus at work.   Did recently have an epidural steroid injection in her neck. It was noted that her HR was 44 at this time.   She feels that if she didn't have to work in such a high stress environment.   Her son's both were diagnosed with ADHD.        Recent Substance Use  Alcohol- None  Tobacco- None  Caffeine- 1 cups/day of coffee  Opioids- None  Narcan Kit- N/A  Cannabis- Has tried CBD but didn't really help.   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   LIVING SITUATION / RELATIONSHIPS- Lives at home with .  "Son technically lives at home, but isn't around very often. Has 2 dogs and 2 cats.    SOCIAL/ SPIRITUAL SUPPORT- \"Absolutely\", but still feels very lonely since her loss.   FEELS SAFE AT HOME- Yes     Medical Review of Systems    Dizziness/orthostasis- Has in the past, but generally no.   Headaches- No.   GI- No. Has lost 100 lbs since gastric sleeve surgery last year.   Has chronic pain related to bulging disc, gets shots once or twice per year.   A comprehensive review of systems was performed and is negative other than noted in the HPI.     Psych Summary Points   08/2020 - Started propranolol 40mg BID  10/2020 - Started bupropion. Decreased propranolol to 20mg due to drug intx and low HR.      Psychiatric Medication Trials       Drug /  Start Date Dose (mg) Helpful Adverse Effects DC Reason / Date   Citalopram 11/2019 40 probably     Bupropion XL 10/2020 150      gabapentin       Benadryl       Lorazepam 11/2019 2 daily PRN yes     Diazepam 08/2020 10 yes sedating    Propranolol 08/2020 40 BID probably     melatonin          Past Medical History      CARE TEAM:   PCP- Kristen M. Kehr with [facility]  Therapist- Alejandra Lui at [facility] - Grief Counselor - Will be starting EMDR.     Neurologic Hx [head injury, seizures, etc.]: Concussion from skiing injury in 2012.   Patient Active Problem List   Diagnosis     Overweight     Atypical mole     Right knee pain, unspecified chronicity     HAO (generalized anxiety disorder)     Hyperhydrosis disorder     Past Medical History:   Diagnosis Date     IUD (intrauterine device) in place 03/20/2019    Mirena      Allergies    Amoxicillin and Penicillins     Medications      Current Outpatient Medications   Medication Sig Dispense Refill     citalopram (CELEXA) 40 MG tablet Take 1 tablet (40 mg) by mouth daily 1 tablet daily 90 tablet 0     cyanocobalamin (VITAMIN B-12) 1000 MCG SUBL sublingual tablet Place 1,000 mcg under the tongue daily       " diazepam (VALIUM) 5 MG tablet Take 0.5-1 tablets (2.5-5 mg) by mouth daily as needed for anxiety 30 tablet 1     diphenhydrAMINE HCl, Sleep, (ZZZQUIL PO) Take 50 mg by mouth nightly as needed (for sleep)       levonorgestrel (MIRENA) 20 MCG/24HR IUD 1 each by Intrauterine route once       melatonin 5 MG tablet Take 10 mg by mouth nightly as needed for sleep       Pediatric Multi Vit-Extra C-FA (FLINTSTONES/EXTRA C) CHEW Take 2 tablets by mouth       propranolol (INDERAL) 20 MG tablet Take 20 mg by mouth 2 times daily       propranolol (INDERAL) 40 MG tablet Take 1 tablet (40 mg) by mouth 2 times daily 60 tablet 1      Physical Exam  (Vitals Only)   There were no vitals taken for this visit.    Pulse Readings from Last 5 Encounters:   10/16/20 (!) 44   02/04/20 60   12/30/19 75   11/25/19 85   11/22/19 83     Wt Readings from Last 5 Encounters:   12/30/19 92.5 kg (204 lb)   11/25/19 96.2 kg (212 lb)   11/22/19 94.2 kg (207 lb 9.6 oz)   10/17/19 98 kg (216 lb)   09/18/19 106.1 kg (234 lb)     BP Readings from Last 5 Encounters:   10/16/20 117/65   02/04/20 104/50   12/30/19 104/70   11/25/19 130/84   11/22/19 121/73      Mental Status Exam   Alertness: alert  and oriented  Appearance: adequately groomed  Behavior/Demeanor: cooperative and calm, with good eye contact   Speech: normal and regular rate and rhythm  Language: intact and no problems  Psychomotor: normal or unremarkable  Mood: Notes it's very hard getting close to the anniversary of the loss of her son  Affect: full range and appropriate; was congruent to mood; was congruent to content  Thought Process/Associations: unremarkable  Thought Content:  Reports passive SI, no intent or plan;  Denies violent ideation and delusions  Perception:  Reports none;  Denies auditory hallucinations and visual hallucinations  Insight: intact  Judgment: intact  Cognition: does  appear grossly intact; formal cognitive testing was not done  Gait and Station: not observed      Labs and Data      PHQ-9 SCORE 8/5/2020 9/2/2020 10/28/2020   PHQ-9 Total Score MyChart 21 (Severe depression) 10 (Moderate depression) 12 (Moderate depression)   PHQ-9 Total Score 21 10 12     HAO-7 SCORE 8/5/2020 9/2/2020 10/28/2020   Total Score 17 (severe anxiety) 7 (mild anxiety) 13 (moderate anxiety)   Total Score 17 7 13       Recent Labs   Lab Test 06/19/19  1740 03/05/19   CR 0.72 0.80   GFRESTIMATED >90 >60     Recent Labs   Lab Test 06/19/19  1740 03/05/19   AST 15 17   ALT 33 18   ALKPHOS 53  --      Recent Labs   Lab Test 03/05/19 09/19/16  0935   TSH 2.12 2.50     ECG 6/19/19 QTc = 414ms     Assessment & Plan    Lilian Quinones is a 45 year old female who provides a history supporting the following diagnoses:  Complex grief, r/o PTSD (Generalized anxiety, panic, and trauma symptoms)    Pertinent History: Lilian does not report any history of psychiatric diagnoses prior to the death of her son in 11/2019. Since that time, she has contended with complex grief and trauma symptoms which have included elements of depression and anxiety.   TODAY: Lilian has been struggling more with mood and concentration issues recently, as the anniversary of the loss of her son approaches on 11/12. She continues to see her grief counselor regularly and does find this helpful, but we discussed how nothing can really soften the blow of this first year. She still has those feelings that this can't get better, and we discussed this a bit. I noted that things will get better, and validated that it's okay to not feel like this is the case right now.   Concentration has been very disruptive, especially in her high stress job. We did discuss ADHD and she is planning on having ADHD testing done, but I also noted to her that the fact that she has never needed intervention for this issue before likely means that although with her family history she may have some attention deficit, what she is experiencing currently is likely more the  product of grief / trauma than primary ADHD.   Given that her anxiety seems to have improved somewhat, I did suggest trying bupropion at this time, noting that it can also have benefit for ADHD symptoms. Due to the interaction with propranolol, and her recent low HR, we will also decrease propranolol in half at this time.   She is no longer using diazepam regularly, and I encouraged her to continue minimizing use as able.   It is hard to gauge how effective the citalopram has been in the context of her current situation. The absence of more severe depressive symptoms is notable, and could indicate efficacy of the antidepressant, but at the same time, it is clearly not sufficient to aid with her anxiety symptoms alone. Would not recommend changing it at this time.   Psychotherapy: Primary recommendation for the treatment of mood symptoms in the presence of acute stressors and grief. She has been seeing a grief counselor and is now engaging EMDR.     PSYCHOTROPIC DRUG INTERACTIONS: None   MANAGEMENT:  N/A     Plan     1) PSYCHOTROPIC MEDICATIONS:  - Continue citalopram 40mg daily  - Start bupropion XL (Wellbutrin XL) 150mg in the morning  - Decrease to propranolol 20mg twice daily   - May continue diazepam 2.5mg daily as needed, continue to minimize use, with intention to discontinue within 2-3 months.     - Taking melatonin 10mg QHS PRN for sleep  - Taking diphenhydramine (Zzzquil) 50mg QHS PRN for sleep    [see the following SmartPhrase(s) for more information: PSYMEDINFOBUPROPION - PSYMEDINFOBENZOS]    2) THERAPY: Psychotherapy is a primary recommendation for the treatment of mood symptoms, even in the context of grief. Currently engaged in therapy with grief counselor, doing EMDR.     3) NEXT DUE:   Labs- Routine monitoring is not indicated for current psychotropic medication regimen   ECG- Routine monitoring is not indicated for current psychotropic medication regimen   Rating Scales- N/A    4) REFERRALS:  None    5) : None    6) DISPOSITION:   - Pt would benefit from brief psychiatric intervention (up to ~5 visits) to adjust meds and gauge for benefit.   - Follow up with Joss Rod MD in 4 weeks. Plan to transition med management back to designated prescriber after brief care is complete.     Treatment Risk Statement:  The patient understands the risks, benefits, adverse effects and alternatives. Agrees to treatment with the capacity to do so. No medical contraindications to treatment. Agrees to call clinic for any problems. The patient understands to call 911 or go to the nearest ED if life threatening or urgent symptoms occur. Crisis numbers are provided routinely in the After Visit Summary.       PROVIDER:  Joss Rod MD

## 2020-10-29 ASSESSMENT — ANXIETY QUESTIONNAIRES: GAD7 TOTAL SCORE: 13

## 2020-10-29 ASSESSMENT — PATIENT HEALTH QUESTIONNAIRE - PHQ9: SUM OF ALL RESPONSES TO PHQ QUESTIONS 1-9: 12

## 2020-11-21 NOTE — LETTER
10/17/2019         RE: Lilian Quinones  [id number] [facility] [address]        Dear Colleague,    Thank you for referring your patient, Lilian Quinones, to the [facility]. Please see a copy of my visit note below.    I was asked by Dr. Galaviz to see this patient in consultation    44F w/ left C6-7 disc herniation.  Initially had pain last winter, but severe flare this summer.  Sharp pain from left neck to triceps and lateral three digits with numbness.  Did PT without improvement.  Underwent WENCESLAO 2 weeks ago with near complete pain resolution.  MR with left C6-7 disc herniation.       Past Medical History:   Diagnosis Date     IUD (intrauterine device) in place 03/20/2019    Mirena     NO ACTIVE PROBLEMS      Past Surgical History:   Procedure Laterality Date     BIOPSY      Skin Biopsy     BREAST SURGERY      Breast reduction sergury      Social History     Socioeconomic History     Marital status:      Spouse name: Not on file     Number of children: Not on file     Years of education: Not on file     Highest education level: Not on file   Occupational History     Not on file   Social Needs     Financial resource strain: Not on file     Food insecurity:     Worry: Not on file     Inability: Not on file     Transportation needs:     Medical: Not on file     Non-medical: Not on file   Tobacco Use     Smoking status: Never Smoker     Smokeless tobacco: Never Used   Substance and Sexual Activity     Alcohol use: Yes     Drug use: No     Sexual activity: Yes     Partners: Male     Birth control/protection: I.U.D.   Lifestyle     Physical activity:     Days per week: Not on file     Minutes per session: Not on file     Stress: Not on file   Relationships     Social connections:     Talks on phone: Not on file     Gets together: Not on file     Attends [employer] service: Not on file     Active member of club or organization: Not on file     Attends meetings of clubs or organizations:  "Not on file     Relationship status: Not on file     Intimate partner violence:     Fear of current or ex partner: Not on file     Emotionally abused: Not on file     Physically abused: Not on file     Forced sexual activity: Not on file   Other Topics Concern     Parent/sibling w/ CABG, MI or angioplasty before 65F 55M? No   Social History Narrative     Not on file     Family History   Problem Relation Age of Onset     Heart Disease Maternal Grandmother      Pancreatic Cancer Paternal Grandmother      Diabetes Paternal Grandmother      Breast Cancer Mother      Diabetes Father      Hypertension Maternal Grandfather      Hyperlipidemia Maternal Grandfather      Other Cancer Paternal Grandfather      Melanoma No family hx of         ROS: 10 point ROS neg other than the symptoms noted above in the HPI.    Physical Exam  /75 (BP Location: Right arm, Patient Position: Sitting, Cuff Size: Adult Large)   Pulse 69   Temp 98.3  F (36.8  C) (Oral)   Ht 1.727 m (5' 8\")   Wt 98 kg (216 lb)   SpO2 96%   BMI 32.84 kg/m     HEENT:  Normocephalic, atraumatic.  PERRLA.  EOM s intact.  Visual fields full to gross exam  Neck:  Supple, non-tender, without lymphadenopathy.  Heart:  No peripheral edema  Lungs:  No SOB  Abdomen:  Non-distended.   Skin:  Warm and dry.  Extremities:  No edema, cyanosis or clubbing.  Psychiatric:  No apparent distress  Musculoskeletal:  Normal bulk and tone    NEUROLOGICAL EXAMINATION:     Mental status:  Alert and Oriented x 3, speech is fluent.  Cranial nerves:  II-XII intact.   Motor:    Shoulder Abduction:  Right:  5/5   Left:  5/5  Biceps:                      Right:  5/5   Left:  5/5  Triceps:                     Right:  5/5   Left:  5/5  Wrist Extensors:       Right:  5/5   Left:  5/5  Wrist Flexors:           Right:  5/5   Left:  5/5  interosseus :            Right:  5/5   Left:  5/5  Hip Flexion:                Right: 5/5  Left:  5/5  Quadriceps:             Right:  5/5  Left:  " 5/5  Hamstrings:             Right:  5/5  Left:  5/5  Gastroc Soleus:        Right:  5/5  Left:  5/5  Tib/Ant:                      Right:  5/5  Left:  5/5  EHL:                     Right:  5/5  Left:  5/5  Sensation:  Intact  Reflexes:  Negative Babinski.  Negative Clonus.  Negative Ladd's.  Coordination:  Smooth finger to nose testing.   Negative pronator drift.  Smooth tandem walking.    A/P:    I had a discussion with the patient, reviewing the history, symptoms, and imaging     Improving after WENCESLAO  Plan for conservative management  If pain recurs, she will call our clinic, and we will either order another WENCESLAO or she will return to discuss possible C6-7 ACDF    Again, thank you for allowing me to participate in the care of your patient.        Sincerely,        Milan Garcia MD     Right arm;

## 2020-11-23 ENCOUNTER — VIRTUAL VISIT (OUTPATIENT)
Dept: PSYCHOLOGY | Facility: CLINIC | Age: 45
End: 2020-11-23
Payer: COMMERCIAL

## 2020-11-23 DIAGNOSIS — Z86.59 HISTORY OF POSTTRAUMATIC STRESS DISORDER (PTSD): ICD-10-CM

## 2020-11-23 DIAGNOSIS — F32.A DEPRESSION, UNSPECIFIED DEPRESSION TYPE: Primary | ICD-10-CM

## 2020-11-23 PROCEDURE — 99207 PR NO BILLABLE SERVICE THIS VISIT: CPT | Mod: 95 | Performed by: PSYCHOLOGIST

## 2020-11-24 NOTE — PROGRESS NOTES
Progress Note - Initial Visit    Client Name:  Lilian Quinones Date: 11/23/2020         Service Type: Individual     Visit Start Time: 1307  Visit End Time: 1400    Visit #: 1    Attendees: Client    Service Modality:  Video Visit:      Provider verified identity through the following two step process.  Patient provided:  Patient photo    Telemedicine Visit: The patient's condition can be safely assessed and treated via synchronous audio and visual telemedicine encounter.      Reason for Telemedicine Visit: Services only offered telehealth    Originating Site (Patient Location): Patient's place of employment    Distant Site (Provider Location): Provider Remote Setting    Consent:  The patient/guardian has verbally consented to: the potential risks and benefits of telemedicine (video visit) versus in person care; bill my insurance or make self-payment for services provided; and responsibility for payment of non-covered services.     Patient would like the video invitation sent by: Send to e-mail at: [email or web address]    Mode of Communication:  Video Conference via Amwell    As the provider I attest to compliance with applicable laws and regulations related to telemedicine.       DATA:   Interactive Complexity: No   Crisis: No     Presenting Concerns/  Current Stressors:   ADHD Evaluation    ASSESSMENT:  Mental Status Assessment:  Appearance:   Appropriate   Eye Contact:   Fair   Psychomotor Behavior: Restless   Attitude:   Cooperative   Orientation:   All  Speech   Rate / Production: Normal/ Responsive   Volume:  Normal   Mood:    Anxious  Depressed   Affect:    Restricted   Thought Content:  Clear   Thought Form:  Goal Directed   Insight:    Fair     Safety Issues and Plan for Safety and Risk Management:     Schuyler Suicide Severity Rating Scale (Short Version)  Schuyler Suicide Severity Rating (Short Version) 11/23/2020   Over the past 2 weeks have you felt down, depressed, or hopeless?  yes   Over the past 2 weeks have you had thoughts of killing yourself? yes   Have you ever attempted to kill yourself? no   Q1 Wished to be Dead (Past Month) yes   Q2 Suicidal Thoughts (Past Month) no   Q3 Suicidal Thought Method no   Q4 Suicidal Intent without Specific Plan no   Q5 Suicide Intent with Specific Plan no   Q6 Suicide Behavior (Lifetime) no     Patient denies current fears or concerns for personal safety.  Patient reports the following current or recent suicidal ideation or behaviors: passive Si.  Patient denies current or recent homicidal ideation or behaviors.  Patient denies current or recent self injurious behavior or ideation.  Patient denies other safety concerns.  Recommended that patient call 911 or go to the local ED should there be a change in any of these risk factors.     Diagnostic Criteria:  A. The person has been exposed to a traumatic event in which both of the following were present:     (1) the person experienced, witnessed, or was confronted with an event or events that involved actual or threatened death or serious injury, or a threat to the physical integrity of self or others     (2) the person's response involved intense fear, helplessness, or horror. Note: In children, this may be expressed instead by disorganized or agitated behavior  B. The traumatic event is persistently reexperienced in one (or more) of the following ways:     - Intense psychological distress at exposure to internal or external cues that symbolize or resemble an aspect of the traumatic event.      - Physiological reactivity on exposure to internal or external cues that symbolize or resemble an aspect of the traumatic event.   C. Persistent avoidance of stimuli associated with the trauma and numbing of general responsiveness (not present before the trauma), as indicated by three (or more) of the following:     - Efforts to avoid thoughts, feelings, or conversations associated with the trauma.      - Inability  to recall an important aspect of the trauma.      - Markedly diminished interest or participation in significant activities.      - Feeling of detachment or estrangement from others.      - Restricted range of affect (e.g., unable to have loving feelings).      - Sense of a foreshortened future (e.g., does not expect to have a career, marriage, children, or a normal life span).   D. Persistent symptoms of increased arousal (not present before the trauma), as indicated by two (or more) of the following:     - Difficulty falling or staying asleep.      - Difficulty concentrating.      - Exaggerated startle response.   E. Duration of the disturbance is more than 1 month.  F. The disturbance causes clinically significant distress or impairment in social, occupational, or other important areas of functioning.    Current depressive symptoms    Rule out ADHD    Hx of trauma    Psychosocial & Contextual Factors: death of step-son November 2019  WHODAS 2.0 (12 item):   WHODAS 2.0 Total Score 11/17/2020   Total Score 19   Total Score MyChart 19     Intervention:  During today's session, this writer outlined the purpose and expectations of the ADHD Evaluation including Notice of Billing. Ms. Quinones's PHQ-9, HAO-7 and WHODAS scores were reviewed and her risk was assessed. This writer completed the Adult ADHD Evaluation Intake Form with Ms. Quinones. It was clear from her answers during the interview that she likely does not suffer from ADHD given that she did not positively endorse any ADHD features. She acknowledged mood symptoms which will be assessed further during the next interview(s) so that appropriate recommendations may be provided. Ms. Quinones agreed to the suspension on ADHD testing.   Collateral Reports Completed:  Routed note to Care Team Member(s)      PLAN: (Homework, other):  -Complete DA  -Complete symptoms screeners via My Chart.      Aditi Hernandez, PhD LP  November 23, 2020

## 2020-11-30 ENCOUNTER — VIRTUAL VISIT (OUTPATIENT)
Dept: PSYCHOLOGY | Facility: CLINIC | Age: 45
End: 2020-11-30
Payer: COMMERCIAL

## 2020-11-30 DIAGNOSIS — Z86.59 HISTORY OF POSTTRAUMATIC STRESS DISORDER (PTSD): Primary | ICD-10-CM

## 2020-11-30 DIAGNOSIS — F32.A DEPRESSION, UNSPECIFIED DEPRESSION TYPE: ICD-10-CM

## 2020-11-30 PROCEDURE — 99207 PR NO BILLABLE SERVICE THIS VISIT: CPT | Mod: 95 | Performed by: PSYCHOLOGIST

## 2020-11-30 NOTE — PROGRESS NOTES
Progress Note - Initial Visit    Client Name:  Lilian Quinones Date: 2020         Service Type: Individual     Visit Start Time: 140  Visit End Time:     Visit #: 1    Attendees: Client    Service Modality:  Video Visit:      Provider verified identity through the following two step process.  Patient provided:  Patient     Telemedicine Visit: The patient's condition can be safely assessed and treated via synchronous audio and visual telemedicine encounter.      Reason for Telemedicine Visit: Services only offered telehealth    Originating Site (Patient Location): Patient's place of employment    Distant Site (Provider Location): Provider Remote Setting    Consent:  The patient/guardian has verbally consented to: the potential risks and benefits of telemedicine (video visit) versus in person care; bill my insurance or make self-payment for services provided; and responsibility for payment of non-covered services.     Patient would like the video invitation sent by: Send to e-mail at: [email or web address]    Mode of Communication:  Video Conference via Amwell    As the provider I attest to compliance with applicable laws and regulations related to telemedicine.       DATA:   Interactive Complexity: No   Crisis: No     Presenting Concerns/  Current Stressors:   Ms. Quinones indicated she continues to experience symptoms of depression and anxiety. The ADHD Evaluation was discontinued last week due to a lack of symptoms but the assessment process will continue because of outlined distress and impairment due to mood symptoms.     ASSESSMENT:  Mental Status Assessment:  Appearance:   Appropriate   Eye Contact:   Good   Psychomotor Behavior: Normal   Attitude:   Cooperative   Orientation:   All  Speech   Rate / Production: Normal/ Responsive   Volume:  Normal   Mood:    Anxious  Depressed   Affect:    Restricted   Thought Content:  Clear   Thought Form:  Goal Directed   Insight:    Good      Safety Issues and Plan for Safety and Risk Management:     [place] Suicide Severity Rating Scale (Short Version)  [place] Suicide Severity Rating (Short Version) 11/23/2020   Over the past 2 weeks have you felt down, depressed, or hopeless? yes   Over the past 2 weeks have you had thoughts of killing yourself? yes   Have you ever attempted to kill yourself? no   Q1 Wished to be Dead (Past Month) yes   Q2 Suicidal Thoughts (Past Month) no   Q3 Suicidal Thought Method no   Q4 Suicidal Intent without Specific Plan no   Q5 Suicide Intent with Specific Plan no   Q6 Suicide Behavior (Lifetime) no     Patient denies current fears or concerns for personal safety.  Patient denies current or recent suicidal ideation or behaviors.  Patient denies current or recent homicidal ideation or behaviors.  Patient denies current or recent self injurious behavior or ideation.  Patient denies other safety concerns.  Recommended that patient call 911 or go to the local ED should there be a change in any of these risk factors.  Patient reports there are no firearms in the house.     Diagnostic Criteria:  Unspecified Depressive Disorder    Hx of Trauma    WHODAS 2.0 (12 item):   WHODAS 2.0 Total Score 11/17/2020   Total Score MyChart 19   Some encounter information is confidential and restricted. Go to Review Flowsheets activity to see all data.     Intervention:  During today's session, this writer gathered background information including developmental, educational, occupational and relational. Her mental health symptoms will be discussed at the next session along with diagnoses and treatment recommendations.   Collateral Reports Completed:  Routed note to PCP      PLAN: (Homework, other):  1. Provider will continue Diagnostic Assessment.  Patient was given the following to do until next session:  Continue with grief counseling.          Aditi Hernandez, PhD LP  November 30, 2020

## 2020-12-02 ENCOUNTER — VIRTUAL VISIT (OUTPATIENT)
Dept: PSYCHIATRY | Facility: CLINIC | Age: 45
End: 2020-12-02
Attending: PSYCHIATRY & NEUROLOGY
Payer: COMMERCIAL

## 2020-12-02 DIAGNOSIS — F43.21 GRIEF REACTION: ICD-10-CM

## 2020-12-02 DIAGNOSIS — F43.10 PTSD (POST-TRAUMATIC STRESS DISORDER): Primary | ICD-10-CM

## 2020-12-02 PROCEDURE — 99214 OFFICE O/P EST MOD 30 MIN: CPT | Mod: 95 | Performed by: PSYCHIATRY & NEUROLOGY

## 2020-12-02 RX ORDER — BUPROPION HYDROCHLORIDE 150 MG/1
150 TABLET ORAL EVERY MORNING
Qty: 90 TABLET | Refills: 0 | Status: SHIPPED | OUTPATIENT
Start: 2020-12-02 | End: 2021-01-27

## 2020-12-02 RX ORDER — PROPRANOLOL HYDROCHLORIDE 20 MG/1
20 TABLET ORAL 2 TIMES DAILY
Qty: 180 TABLET | Refills: 0 | Status: SHIPPED | OUTPATIENT
Start: 2020-12-02 | End: 2021-01-27

## 2020-12-02 RX ORDER — DIAZEPAM 5 MG
2.5-5 TABLET ORAL DAILY PRN
Qty: 30 TABLET | Refills: 1 | Status: SHIPPED | OUTPATIENT
Start: 2020-12-02 | End: 2021-01-27

## 2020-12-02 RX ORDER — CITALOPRAM HYDROBROMIDE 40 MG/1
40 TABLET ORAL DAILY
Qty: 90 TABLET | Refills: 0 | Status: SHIPPED | OUTPATIENT
Start: 2020-12-02 | End: 2021-01-27

## 2020-12-02 ASSESSMENT — PAIN SCALES - GENERAL: PAINLEVEL: NO PAIN (0)

## 2020-12-02 NOTE — PROGRESS NOTES
"VIDEO VISIT  Lilian Quinones is a 45 year old patient who is being evaluated via a billable video visit.      The patient has been notified of following:   \"This video visit will be conducted via a call between you and your physician/provider. We have found that certain health care needs can be provided without the need for an in-person physical exam. This service lets us provide the care you need with a video conversation. If a prescription is necessary we can send it directly to your pharmacy. If lab work is needed we can place an order for that and you can then stop by our lab to have the test done at a later time. Insurers are generally covering virtual visits as they would in-office visits so billing should not be different than normal.  If for some reason you do get billed incorrectly, you should contact the billing office to correct it and that number is in the AVS .    Video Conference to be completed via:  pinion-pins    Patient has given verbal consent for video visit?:  Yes    Patient would prefer that any video invitations be sent by: Send to e-mail at: [email or web address]      How would patient like to obtain AVS?:  SuperSonic Imagine    AVS SmartPhrase [PsychAVS] has been placed in 'Patient Instructions':  Yes      Telehealth Details  Type of service:  video visit for consult  Date of service: 12/02/2020  Time of service:    Start Time:  8:47 AM    End Time:  8:47 AM    Reason for video visit:  Services only offered telehealth  Originating Site (patient location):  Patient's home  Distant Site (provider location): [facility] PSY[facility]  Mode of Communication:  Video conference via Samplesaint  The patient consents to receiving services via telehealth.             [facility]  Psychiatry Clinic  Rapid Access Brief Treatment [RABT]  MEDICAL PROGRESS NOTE     Lilian Quinones is a 45 year old. Initial consultation on 08/05/20. Referred by Kristen M Kehr for evaluation of depression.   History was " "provided by the patient who was a good historian.   Psych pertinent item history includes trauma hx.    Interval History    Lilian notes that things are going okay all things considered. The holidays have been tough. This feels like the first holiday without him. Daughter might have COVID, so they are quarantined at home currently. She does feel that Wellbutrin may be helpful, feels like concentration at work and energy have been somewhat improved.   Anxiety continues to be an issue especially in the mornings. She does occasionally take the Valium for this in the mornings when it spikes. Has not been taking it every day, but does note that it helps with that heavy feeling in her chest. Last filled about 2 months ago, has 4 pills remaining.   Did recently see Aditi Hernandez for ADHD evaluation. She has he final meeting with her on Monday. They determined that her symptoms are more likely related to PTSD than ADHD. She found this to be a helpful experience.        Recent Substance Use  Alcohol- None  Tobacco- None  Caffeine- 1 cups/day of coffee  Opioids- None  Narcan Kit- N/A  Cannabis- Has tried CBD but didn't really help.   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   LIVING SITUATION / RELATIONSHIPS- Lives at home with . Son technically lives at home, but isn't around very often. Has 2 dogs and 2 cats.    SOCIAL/ SPIRITUAL SUPPORT- \"Absolutely\", but still feels very lonely since her loss.   FEELS SAFE AT HOME- Yes     Medical Review of Systems    Dizziness/orthostasis- Has in the past, but generally no.   Headaches- No.   GI- No. Has lost 100 lbs since gastric sleeve surgery last year.   Has chronic pain related to bulging disc, gets shots once or twice per year.   A comprehensive review of systems was performed and is negative other than noted in the HPI.     Psych Summary Points   08/2020 - Started propranolol 40mg BID  10/2020 - " Started bupropion. Decreased propranolol to 20mg due to drug intx and low HR.   11/2020 - Completed ADHD evaluation. Determined that acute symptoms are more likely due to PTSD than to ADHD>      Psychiatric Medication Trials       Drug /  Start Date Dose (mg) Helpful Adverse Effects DC Reason / Date   Citalopram 11/2019 40 probably     Bupropion XL 10/2020 150      gabapentin       Benadryl       Lorazepam 11/2019 2 daily PRN yes     Diazepam 08/2020 10 yes sedating    Propranolol 08/2020 40 BID probably     melatonin          Past Medical History      CARE TEAM:   PCP- Kristen M. Kehr with [facility]  Therapist- Alejandra Lui at [facility] - Grief Counselor - Will be starting EMDR.     Neurologic Hx [head injury, seizures, etc.]: Concussion from skiing injury in 2012.   Patient Active Problem List   Diagnosis     Overweight     Atypical mole     Right knee pain, unspecified chronicity     HAO (generalized anxiety disorder)     Hyperhydrosis disorder     Past Medical History:   Diagnosis Date     IUD (intrauterine device) in place 03/20/2019    Mirena      Allergies    Amoxicillin and Penicillins     Medications      Current Outpatient Medications   Medication Sig Dispense Refill     buPROPion (WELLBUTRIN XL) 150 MG 24 hr tablet Take 1 tablet (150 mg) by mouth every morning 30 tablet 1     citalopram (CELEXA) 40 MG tablet Take 1 tablet (40 mg) by mouth daily 1 tablet daily 90 tablet 0     cyanocobalamin (VITAMIN B-12) 1000 MCG SUBL sublingual tablet Place 1,000 mcg under the tongue daily       diazepam (VALIUM) 5 MG tablet Take 0.5-1 tablets (2.5-5 mg) by mouth daily as needed for anxiety 30 tablet 1     diphenhydrAMINE HCl, Sleep, (ZZZQUIL PO) Take 50 mg by mouth nightly as needed (for sleep)       levonorgestrel (MIRENA) 20 MCG/24HR IUD 1 each by Intrauterine route once       melatonin 5 MG tablet Take 10 mg by mouth nightly as needed for sleep       Pediatric Multi Vit-Extra C-FA  (FLINTSTONES/EXTRA C) CHEW Take 2 tablets by mouth       propranolol (INDERAL) 20 MG tablet Take 1 tablet (20 mg) by mouth 2 times daily 60 tablet 1      Physical Exam  (Vitals Only)   There were no vitals taken for this visit.    Pulse Readings from Last 5 Encounters:   10/16/20 (!) 44   02/04/20 60   12/30/19 75   11/25/19 85   11/22/19 83     Wt Readings from Last 5 Encounters:   12/30/19 92.5 kg (204 lb)   11/25/19 96.2 kg (212 lb)   11/22/19 94.2 kg (207 lb 9.6 oz)   10/17/19 98 kg (216 lb)   09/18/19 106.1 kg (234 lb)     BP Readings from Last 5 Encounters:   10/16/20 117/65   02/04/20 104/50   12/30/19 104/70   11/25/19 130/84   11/22/19 121/73      Mental Status Exam   Alertness: alert  and oriented  Appearance: adequately groomed  Behavior/Demeanor: cooperative and calm, with good eye contact   Speech: normal and regular rate and rhythm  Language: intact and no problems  Psychomotor: normal or unremarkable  Mood: Still very difficult due to holiday, but relatively stable  Affect: full range and appropriate; was congruent to mood; was congruent to content  Thought Process/Associations: unremarkable  Thought Content:  Reports none;  Denies suicidal ideation, violent ideation and delusions  Perception:  Reports none;  Denies auditory hallucinations and visual hallucinations  Insight: intact  Judgment: intact  Cognition: does  appear grossly intact; formal cognitive testing was not done  Gait and Station: not observed     Labs and Data      PHQ-9 SCORE 9/2/2020 10/28/2020 11/17/2020   PHQ-9 Total Score MyChart 10 (Moderate depression) 12 (Moderate depression) 12 (Moderate depression)   PHQ-9 Total Score 10 12 12     HAO-7 SCORE 9/2/2020 10/28/2020 11/17/2020   Total Score 7 (mild anxiety) 13 (moderate anxiety) 11 (moderate anxiety)   Total Score 7 13 11       Recent Labs   Lab Test 06/19/19 1740 03/05/19   CR 0.72 0.80   GFRESTIMATED >90 >60     Recent Labs   Lab Test 06/19/19 1740 03/05/19   AST 15 17   ALT  33 18   ALKPHOS 53  --      Recent Labs   Lab Test 03/05/19 09/19/16  0935   TSH 2.12 2.50     ECG 6/19/19 QTc = 414ms     Assessment & Plan    Lilian Quinones is a 45 year old female who provides a history supporting the following diagnoses:  Complex grief, r/o PTSD (Generalized anxiety, panic, and trauma symptoms)    Pertinent History: Lilian does not report any history of psychiatric diagnoses prior to the death of her son in 11/2019. Since that time, she has contended with complex grief and trauma symptoms which have included elements of depression and anxiety.   TODAY: Lilian did complete ADHD evaluation. Determined that cognitive symptoms have more likely been secondary to mood symptoms.   She did find the bupropion to be helpful. No side effects noted. No med changes indicated at this time.   Discussed that she can continue to use diazepam as needed for the time being, and the goal will be to move towards discontinuing use after the holidays and into the next year.     It is hard to gauge how effective the citalopram has been in the context of her current situation. The absence of more severe depressive symptoms is notable, and could indicate efficacy of the antidepressant, but at the same time, it is clearly not sufficient to aid with her anxiety symptoms alone.   Psychotherapy: Primary recommendation for the treatment of mood symptoms in the presence of acute stressors and grief. She has been seeing a grief counselor and is now engaging EMDR.     PSYCHOTROPIC DRUG INTERACTIONS: None   MANAGEMENT:  N/A     Plan     1) PSYCHOTROPIC MEDICATIONS:  - Continue citalopram 40mg daily  - Continue bupropion XL (Wellbutrin XL) 150mg in the morning  - Continue propranolol 20mg twice daily   - May continue diazepam 2.5mg daily as needed, continue to minimize use, with intention to discontinue within 2-3 months.     - Taking melatonin 10mg QHS PRN for sleep  - Taking diphenhydramine (Zzzquil) 50mg QHS PRN for  sleep    [see the following SmartPhrase(s) for more information: PSYMEDINFOBUPROPION - PSYMEDINFOBENZOS]    2) THERAPY: Psychotherapy is a primary recommendation for the treatment of mood symptoms, even in the context of grief. Currently engaged in therapy with grief counselor, doing EMDR.     3) NEXT DUE:   Labs- Routine monitoring is not indicated for current psychotropic medication regimen   ECG- Routine monitoring is not indicated for current psychotropic medication regimen   Rating Scales- N/A    4) REFERRALS: None    5) : None    6) DISPOSITION:   - Pt would benefit from brief psychiatric intervention (up to ~5 visits) to adjust meds and gauge for benefit.   - Follow up with Joss Rod MD in 8 weeks. Plan to transition med management back to designated prescriber after brief care is complete.     Treatment Risk Statement:  The patient understands the risks, benefits, adverse effects and alternatives. Agrees to treatment with the capacity to do so. No medical contraindications to treatment. Agrees to call clinic for any problems. The patient understands to call 911 or go to the nearest ED if life threatening or urgent symptoms occur. Crisis numbers are provided routinely in the After Visit Summary.       PROVIDER:  Joss Rod MD

## 2020-12-02 NOTE — PATIENT INSTRUCTIONS
Thank you for coming to the [facility] [place] [facility].    Lab Testing:  If you had lab testing today and your results are reassuring or normal they will be mailed to you or sent through Epyon within 7 days. If the lab tests need quick action we will call you with the results. The phone number we will call with results is # [phone] (home) [phone] (work). If this is not the best number please call our clinic and change the number.    Medication Refills:  If you need any refills please call your pharmacy and they will contact us. Our fax number for refills is [phone]. Please allow three business for refill processing. If you need to  your refill at a new pharmacy, please contact the new pharmacy directly. The new pharmacy will help you get your medications transferred.     Scheduling:  If you have any concerns about today's visit or wish to schedule another appointment please call our office during normal business hours [phone] (8-5:00 M-F)    Contact Us:  Please call [phone] during business hours (8-5:00 M-F).  If after clinic hours, or on the weekend, please call  [phone].    Financial Assistance [phone]  [facility] Billing [phone]  Central Billing Office, [facility]: [phone]  [facility] Billing [phone]  Medical Records [phone]  [facility] Patient Bill of Rights [email or web address]       MENTAL HEALTH CRISIS NUMBERS:  For a medical emergency please call  911 or go to the nearest ER.     [facility]:   [facility] -[phone]   Crisis Residence [facility] -[phone]   Walk-In Counseling Center [facility] -[phone]   COPE 24/7 [place] Mobile Team -[phone] (adults)/[phone] (child)  CHILD: [facility] needs assessment team - [phone]      [facility] - [phone]   Walk-in counseling  [facility] - [phone]   Walk-in counseling [facility] - [phone]   Crisis Residence [facility] - [phone]  Urgent Care Adult Mental [place]-[phone] mobile unit/ 24/7 crisis line    National Crisis Numbers:   National Suicide Prevention Lifeline: 2-374-043-TALK (218-486-0684)  Poison Control Center - 6-294-171-0299  PacketFront/resources for a list of additional resources (SOS)  Trans Lifeline a hotline for transgender people 6-387-682-7551  The Passbox Project a hotline for LGBT youth 1-648.674.4308  Crisis Text Line: For any crisis 24/7   To: 715485  see www.crisistextline.org  - IF MAKING A CALL FEELS TOO HARD, send a text!         Again thank you for choosing [facility] [place] [facility] and please let us know how we can best partner with you to improve you and your family's health.    You may be receiving a survey regarding this appointment. We would love to have your feedback, both positive and negative. The survey is done by an external company, so your answers are anonymous.

## 2020-12-07 ENCOUNTER — E-VISIT (OUTPATIENT)
Dept: FAMILY MEDICINE | Facility: CLINIC | Age: 45
End: 2020-12-07
Payer: COMMERCIAL

## 2020-12-07 ENCOUNTER — VIRTUAL VISIT (OUTPATIENT)
Dept: PSYCHOLOGY | Facility: CLINIC | Age: 45
End: 2020-12-07
Payer: COMMERCIAL

## 2020-12-07 DIAGNOSIS — F32.A DEPRESSION, UNSPECIFIED DEPRESSION TYPE: Primary | ICD-10-CM

## 2020-12-07 DIAGNOSIS — N89.8 VAGINAL DISCHARGE: Primary | ICD-10-CM

## 2020-12-07 DIAGNOSIS — F43.10 POSTTRAUMATIC STRESS DISORDER: ICD-10-CM

## 2020-12-07 PROCEDURE — 90791 PSYCH DIAGNOSTIC EVALUATION: CPT | Mod: 95 | Performed by: PSYCHOLOGIST

## 2020-12-07 PROCEDURE — 99207 PR NON-BILLABLE SERV PER CHARTING: CPT | Performed by: PHYSICIAN ASSISTANT

## 2020-12-07 NOTE — TELEPHONE ENCOUNTER
Provider E-Visit time total (minutes): 5 minutes      Patient has not scheduled a lab only appointment.  Encounter will be closed.   Kristen Kehr PA-C
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
## 2020-12-27 ENCOUNTER — HEALTH MAINTENANCE LETTER (OUTPATIENT)
Age: 45
End: 2020-12-27

## 2021-01-27 ENCOUNTER — VIRTUAL VISIT (OUTPATIENT)
Dept: PSYCHIATRY | Facility: CLINIC | Age: 46
End: 2021-01-27
Attending: PSYCHIATRY & NEUROLOGY
Payer: COMMERCIAL

## 2021-01-27 DIAGNOSIS — F32.A DEPRESSION, UNSPECIFIED DEPRESSION TYPE: Primary | ICD-10-CM

## 2021-01-27 DIAGNOSIS — F43.21 GRIEF REACTION: ICD-10-CM

## 2021-01-27 PROCEDURE — 99214 OFFICE O/P EST MOD 30 MIN: CPT | Mod: 95 | Performed by: PSYCHIATRY & NEUROLOGY

## 2021-01-27 RX ORDER — PROPRANOLOL HYDROCHLORIDE 20 MG/1
20 TABLET ORAL 2 TIMES DAILY
Qty: 180 TABLET | Refills: 0 | Status: SHIPPED | OUTPATIENT
Start: 2021-01-27 | End: 2021-04-28

## 2021-01-27 RX ORDER — BUPROPION HYDROCHLORIDE 150 MG/1
300 TABLET ORAL EVERY MORNING
Qty: 180 TABLET | Refills: 0 | Status: SHIPPED | OUTPATIENT
Start: 2021-01-27 | End: 2021-02-03

## 2021-01-27 RX ORDER — BUPROPION HYDROCHLORIDE 150 MG/1
150 TABLET ORAL EVERY MORNING
Qty: 90 TABLET | Refills: 0 | Status: CANCELLED | OUTPATIENT
Start: 2021-01-27

## 2021-01-27 RX ORDER — DIAZEPAM 5 MG
2.5-5 TABLET ORAL DAILY PRN
Qty: 30 TABLET | Refills: 1 | Status: SHIPPED | OUTPATIENT
Start: 2021-01-27 | End: 2021-04-28

## 2021-01-27 RX ORDER — CITALOPRAM HYDROBROMIDE 40 MG/1
40 TABLET ORAL DAILY
Qty: 90 TABLET | Refills: 0 | Status: SHIPPED | OUTPATIENT
Start: 2021-01-27 | End: 2021-04-28

## 2021-01-27 ASSESSMENT — ANXIETY QUESTIONNAIRES
GAD7 TOTAL SCORE: 14
2. NOT BEING ABLE TO STOP OR CONTROL WORRYING: NEARLY EVERY DAY
1. FEELING NERVOUS, ANXIOUS, OR ON EDGE: MORE THAN HALF THE DAYS
3. WORRYING TOO MUCH ABOUT DIFFERENT THINGS: MORE THAN HALF THE DAYS
GAD7 TOTAL SCORE: 14
5. BEING SO RESTLESS THAT IT IS HARD TO SIT STILL: MORE THAN HALF THE DAYS
6. BECOMING EASILY ANNOYED OR IRRITABLE: SEVERAL DAYS
7. FEELING AFRAID AS IF SOMETHING AWFUL MIGHT HAPPEN: MORE THAN HALF THE DAYS
4. TROUBLE RELAXING: MORE THAN HALF THE DAYS
GAD7 TOTAL SCORE: 14
7. FEELING AFRAID AS IF SOMETHING AWFUL MIGHT HAPPEN: MORE THAN HALF THE DAYS

## 2021-01-27 ASSESSMENT — PAIN SCALES - GENERAL: PAINLEVEL: NO PAIN (0)

## 2021-01-27 ASSESSMENT — PATIENT HEALTH QUESTIONNAIRE - PHQ9
SUM OF ALL RESPONSES TO PHQ QUESTIONS 1-9: 14
10. IF YOU CHECKED OFF ANY PROBLEMS, HOW DIFFICULT HAVE THESE PROBLEMS MADE IT FOR YOU TO DO YOUR WORK, TAKE CARE OF THINGS AT HOME, OR GET ALONG WITH OTHER PEOPLE: VERY DIFFICULT
SUM OF ALL RESPONSES TO PHQ QUESTIONS 1-9: 14

## 2021-01-27 NOTE — PATIENT INSTRUCTIONS
Increase bupropion (Wellbutrin) to 300mg in the morning.     **For crisis resources, please see the information at the end of this document**     Patient Education      Thank you for coming to the [facility] [place] [facility].    Lab Testing:  If you had lab testing today and your results are reassuring or normal they will be mailed to you or sent through [employer] within 7 days. If the lab tests need quick action we will call you with the results. The phone number we will call with results is # [phone] (home) [phone] (work). If this is not the best number please call our clinic and change the number.    Medication Refills:  If you need any refills please call your pharmacy and they will contact us. Our fax number for refills is [phone]. Please allow three business for refill processing. If you need to  your refill at a new pharmacy, please contact the new pharmacy directly. The new pharmacy will help you get your medications transferred.     Scheduling:  If you have any concerns about today's visit or wish to schedule another appointment please call our office during normal business hours [phone] (8-5:00 M-F)    Contact Us:  Please call [phone] during business hours (8-5:00 M-F).  If after clinic hours, or on the weekend, please call  [phone].    Financial Assistance [phone]  [facility] Billing [phone]  Central Billing Office, ealth: [phone]  [facility] Billing [phone]  Medical Records [phone]  [facility] Patient Bill of Rights [email or web address]       MENTAL HEALTH CRISIS NUMBERS:  For a medical emergency please call  911 or go to the nearest ER.     [facility]:   [facility] -[phone]   Crisis Residence [facility] -[phone]   Walk-In Counseling Center [facility] -[phone]   COPE 24/7 [facility] Team  -[phone] (adults)/[phone] (child)  [facility]: [facility] team - [phone]      [facility]:   [facility] - [phone]   Walk-in counseling [facility] - [phone]   Walk-in counseling [facility] - [phone]   Crisis Residence [facility] Residence - [phone]  Urgent Care Adult Mental Hgfiwg-[phone] mobile unit/ 24/7 crisis line    National Crisis Numbers:   National Suicide Prevention Lifeline: 2-403-874-TALK (671-070-0095)  Poison Control Center - 1-529.635.2257  Sape/resources for a list of additional resources (SOS)  Trans Lifeline a hotline for transgender people 2-642-240-0306  The Veto Project a hotline for LGBT youth 4-223-227-9561  Crisis Text Line: For any crisis 24/7   To: 581832  see www.crisistextline.org  - IF MAKING A CALL FEELS TOO HARD, send a text!         Again thank you for choosing [facility] [place] [facility] and please let us know how we can best partner with you to improve you and your family's health.    You may be receiving a survey regarding this appointment. We would love to have your feedback, both positive and negative. The survey is done by an external company, so your answers are anonymous.

## 2021-01-28 ASSESSMENT — PATIENT HEALTH QUESTIONNAIRE - PHQ9: SUM OF ALL RESPONSES TO PHQ QUESTIONS 1-9: 14

## 2021-01-28 ASSESSMENT — ANXIETY QUESTIONNAIRES: GAD7 TOTAL SCORE: 14

## 2021-03-06 ENCOUNTER — HEALTH MAINTENANCE LETTER (OUTPATIENT)
Age: 46
End: 2021-03-06

## 2021-04-28 ENCOUNTER — VIRTUAL VISIT (OUTPATIENT)
Dept: PSYCHIATRY | Facility: CLINIC | Age: 46
End: 2021-04-28
Attending: PSYCHIATRY & NEUROLOGY
Payer: COMMERCIAL

## 2021-04-28 DIAGNOSIS — F43.21 GRIEF REACTION: ICD-10-CM

## 2021-04-28 DIAGNOSIS — F32.A DEPRESSION, UNSPECIFIED DEPRESSION TYPE: Primary | ICD-10-CM

## 2021-04-28 PROCEDURE — 99214 OFFICE O/P EST MOD 30 MIN: CPT | Mod: 95 | Performed by: PSYCHIATRY & NEUROLOGY

## 2021-04-28 RX ORDER — CITALOPRAM HYDROBROMIDE 20 MG/1
40 TABLET ORAL DAILY
Qty: 180 TABLET | Refills: 0 | Status: SHIPPED | OUTPATIENT
Start: 2021-04-28 | End: 2021-10-01

## 2021-04-28 RX ORDER — PROPRANOLOL HYDROCHLORIDE 20 MG/1
20 TABLET ORAL DAILY PRN
Start: 2021-04-28 | End: 2021-10-01

## 2021-04-28 RX ORDER — DIAZEPAM 5 MG
2.5-5 TABLET ORAL DAILY PRN
Qty: 30 TABLET | Refills: 0 | Status: SHIPPED | OUTPATIENT
Start: 2021-04-28 | End: 2021-08-04

## 2021-04-28 ASSESSMENT — PAIN SCALES - GENERAL: PAINLEVEL: NO PAIN (0)

## 2021-04-28 NOTE — PATIENT INSTRUCTIONS
**For crisis resources, please see the information at the end of this document**     Patient Education      Thank you for coming to the [facility] [place] [facility].    Lab Testing:  If you had lab testing today and your results are reassuring or normal they will be mailed to you or sent through [employer] within 7 days. If the lab tests need quick action we will call you with the results. The phone number we will call with results is # [phone] (home) [phone] (work). If this is not the best number please call our clinic and change the number.    Medication Refills:  If you need any refills please call your pharmacy and they will contact us. Our fax number for refills is [phone]. Please allow three business for refill processing. If you need to  your refill at a new pharmacy, please contact the new pharmacy directly. The new pharmacy will help you get your medications transferred.     Scheduling:  If you have any concerns about today's visit or wish to schedule another appointment please call our office during normal business hours [phone] (8-5:00 M-F)    Contact Us:  Please call [phone] during business hours (8-5:00 M-F).  If after clinic hours, or on the weekend, please call  [phone].    Financial Assistance [phone]  [facility] Billing [phone]  Central Billing Office, [facility]: [phone]  [facility] Billing [phone]  Medical Records [phone]  [facility] Patient Bill of Rights [email or web address]       MENTAL HEALTH CRISIS NUMBERS:  For a medical emergency please call  911 or go to the nearest ER.     [facility]:   [facility] -[phone]   Crisis Residence [facility] -[phone]   Walk-In Counseling Center [facility] -[phone]   COPE 24/7 [place] Mobile Team -[phone] (adults)/[phone] (child)  CHILD: [facility]  needs assessment team - [phone]      [facility]:   [facility] - [phone]   Walk-in counseling [facility] - [phone]   Walk-in counseling [facility] - [phone]   Crisis Residence [facility] - [phone]  Urgent Care Adult Mental Nnmqca-[phone] mobile unit/ 24/7 crisis line    National Crisis Numbers:   National Suicide Prevention Lifeline: 3-084-314-TALK (323-344-0128)  Poison Control Center - 3-838-797-6927  emaze/resources for a list of additional resources (SOS)  Trans Lifeline a hotline for transgender people 6-494-228-6889  The Veto Project a hotline for LGBT youth 9-397-329-0710  Crisis Text Line: For any crisis 24/7   To: 839615  see www.crisistextline.org  - IF MAKING A CALL FEELS TOO HARD, send a text!         Again thank you for choosing [facility] and please let us know how we can best partner with you to improve you and your family's health.    You may be receiving a survey regarding this appointment. We would love to have your feedback, both positive and negative. The survey is done by an external company, so your answers are anonymous.

## 2021-04-28 NOTE — PROGRESS NOTES
"VIDEO VISIT  Lilian Quinones is a 45 year old patient who is being evaluated via a billable video visit.      The patient has been notified of following:   \"This video visit will be conducted via a call between you and your physician/provider. We have found that certain health care needs can be provided without the need for an in-person physical exam. This service lets us provide the care you need with a video conversation. If a prescription is necessary we can send it directly to your pharmacy. If lab work is needed we can place an order for that and you can then stop by our lab to have the test done at a later time. Insurers are generally covering virtual visits as they would in-office visits so billing should not be different than normal.  If for some reason you do get billed incorrectly, you should contact the billing office to correct it and that number is in the AVS .    Video Conference to be completed via:  [facility]    Patient has given verbal consent for video visit?:  Yes    Patient would prefer that any video invitations be sent by: Send to e-mail at: [email or web address]      How would patient like to obtain AVS?:  [employer]    AVS SmartPhrase [PsychAVS] has been placed in 'Patient Instructions':  Yes     Telehealth Details  Type of service:  video visit for consult  Date of service: 04/28/2021  Time of service:    Start Time:  10:13 AM    End Time:  10:43 AM    Reason for video visit:  Services only offered telehealth  Originating Site (patient location):  Patient's home  Distant Site (provider location): [facility]  Mode of Communication:  Video conference via [employer]            [facility]  Psychiatry Clinic  Rapid Access Brief Treatment [RABT]  MEDICAL PROGRESS NOTE     Lilian Quinones is a 46 year old. Initial consultation on 08/05/20. Referred by Kristen M Kehr for evaluation of depression.   History was provided by the patient who was a good historian.   Psych " pertinent item history includes trauma hx.    Interval History    Lilian notes that things have been going okay recently.   Tried buspirone in February after anxiety spiked after the death of her nephew, but it made her more anxious.   Feels that grief is still present, and feels normal at this point, doesn't feel abnormal as far as she can tell at this point.   She has a helping parents group that she has been going to and feels that this has been a good thing.   Overall she does continue to feel that she is doing so much better than she was a year and a half ago, despite the pain and grief still being a part of things.   Her goal at this point is to minimize the medications as much as possible, and hopefully to not be on any.        Discussion points from past appointments:   Has not been having issues with tiredness since being on bupropion.   Notes that she still tends to use diazepam with triggers, like when her son's friends email her. Social media has quite a few triggers. This year is particularly hard especially on social media as he would have been graduating.   She isn't sure if the medications are working at times. She still has anxiety, and doesn't expect it to go away, understands that grief if an ongoing process, and can last the rest of her life. Her goal would be that the medications would take the edge off, so she is able to work more.   Anxiety continues to be an issue especially in the mornings. She does occasionally take the diazepam (Valium) for this in the mornings when it spikes. Has not been taking it every day, but does note that it helps with that heavy feeling in her chest.     Recent Substance Use  Alcohol- None  Tobacco- None  Caffeine- 1 cups/day of coffee  Opioids- None  Narcan Kit- N/A  Cannabis- Has tried CBD but didn't really help.   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   LIVING SITUATION /  "RELATIONSHIPS- Lives at home with . Son technically lives at home, but isn't around very often. Has 2 dogs and 2 cats.    SOCIAL/ SPIRITUAL SUPPORT- \"Absolutely\", but still feels very lonely since her loss.   FEELS SAFE AT HOME- Yes     Medical Review of Systems    Dizziness/orthostasis- Has in the past, but generally no.   Headaches- No.   GI- No. Has lost 100 lbs since gastric sleeve surgery last year.   Has chronic pain related to bulging disc, gets shots once or twice per year.      Psych Summary Points   08/2020 - Started propranolol 40mg BID  10/2020 - Started bupropion. Decreased propranolol to 20mg due to drug intx and low HR.   11/2020 - Completed ADHD evaluation. Determined that acute symptoms are more likely due to PTSD than to ADHD.   01/2021 - Increased bupropion to 300mg, but anxiety increased, so decreased back down.   02/2021 - Tried buspirone after nephew passed away, felt that it increased anxiety.   04/2021 - Changed propranolol to PRN and discontinued bupropion     Psychiatric Medication Trials       Drug /  Start Date Dose (mg) Helpful Adverse Effects DC Reason / Date   Citalopram 11/2019 40 probably     Bupropion XL 10/2020 300 yes Anxiety / agitation at 300 Helped with concentration and energy   Buspirone 2/2021 5 BID no Increased anxiety    gabapentin       Benadryl       Lorazepam 11/2019 2 daily PRN yes     Diazepam 08/2020 10 yes sedating    Propranolol 08/2020 40 BID probably     melatonin          Past Medical History      CARE TEAM:   PCP- Kristen M. Kehr with [facility]  Therapist- Alejandra Lui at [facility] - Grief Counselor - Will be starting EMDR.     Neurologic Hx [head injury, seizures, etc.]: Concussion from skiing injury in 2012.   Patient Active Problem List   Diagnosis     Overweight     Atypical mole     Right knee pain, unspecified chronicity     HAO (generalized anxiety disorder)     Hyperhydrosis disorder     Past Medical History:   Diagnosis " Date     IUD (intrauterine device) in place 03/20/2019    Mirena      Allergies    Amoxicillin and Penicillins     Medications      Current Outpatient Medications   Medication Sig Dispense Refill     buPROPion (WELLBUTRIN XL) 150 MG 24 hr tablet Take 1 tablet (150 mg) by mouth every morning 180 tablet 0     busPIRone (BUSPAR) 5 MG tablet Take 1 tablet (5 mg) by mouth 2 times daily 60 tablet 0     citalopram (CELEXA) 40 MG tablet Take 1 tablet (40 mg) by mouth daily 1 tablet daily 90 tablet 0     cyanocobalamin (VITAMIN B-12) 1000 MCG SUBL sublingual tablet Place 1,000 mcg under the tongue daily       diazepam (VALIUM) 5 MG tablet Take 0.5-1 tablets (2.5-5 mg) by mouth daily as needed for anxiety 30 tablet 1     diphenhydrAMINE HCl, Sleep, (ZZZQUIL PO) Take 50 mg by mouth nightly as needed (for sleep)       levonorgestrel (MIRENA) 20 MCG/24HR IUD 1 each by Intrauterine route once       melatonin 5 MG tablet Take 10 mg by mouth nightly as needed for sleep       Pediatric Multi Vit-Extra C-FA (FLINTSTONES/EXTRA C) CHEW Take 2 tablets by mouth       propranolol (INDERAL) 20 MG tablet Take 1 tablet (20 mg) by mouth 2 times daily 180 tablet 0      Physical Exam  (Vitals Only)   There were no vitals taken for this visit.    Pulse Readings from Last 5 Encounters:   10/16/20 (!) 44   02/04/20 60   12/30/19 75   11/25/19 85   11/22/19 83     Wt Readings from Last 5 Encounters:   12/30/19 92.5 kg (204 lb)   11/25/19 96.2 kg (212 lb)   11/22/19 94.2 kg (207 lb 9.6 oz)   10/17/19 98 kg (216 lb)   09/18/19 106.1 kg (234 lb)     BP Readings from Last 5 Encounters:   10/16/20 117/65   02/04/20 104/50   12/30/19 104/70   11/25/19 130/84   11/22/19 121/73      Mental Status Exam   Alertness: alert  and oriented  Appearance: adequately groomed  Behavior/Demeanor: cooperative and calm, with good eye contact   Speech: normal and regular rate and rhythm  Language: intact and no problems  Psychomotor: normal or unremarkable  Mood: About  the same  Affect: full range and appropriate; was congruent to mood; was congruent to content  Thought Process/Associations: unremarkable  Thought Content:  Reports none;  Denies suicidal ideation, violent ideation and delusions  Perception:  Reports none;  Denies auditory hallucinations and visual hallucinations  Insight: intact  Judgment: intact  Cognition: does  appear grossly intact; formal cognitive testing was not done  Gait and Station: not observed     Labs and Data      PHQ-9 SCORE 10/28/2020 11/17/2020 1/27/2021   PHQ-9 Total Score MyChart 12 (Moderate depression) 12 (Moderate depression) 14 (Moderate depression)   PHQ-9 Total Score 12 12 14     HAO-7 SCORE 10/28/2020 11/17/2020 1/27/2021   Total Score 13 (moderate anxiety) 11 (moderate anxiety) 14 (moderate anxiety)   Total Score 13 11 14       Recent Labs   Lab Test 06/19/19  1740 03/05/19   CR 0.72 0.80   GFRESTIMATED >90 >60     Recent Labs   Lab Test 06/19/19  1740 03/05/19   AST 15 17   ALT 33 18   ALKPHOS 53  --      Recent Labs   Lab Test 03/05/19 09/19/16  0935   TSH 2.12 2.50     ECG 6/19/19 QTc = 414ms     Assessment & Plan    Lilian Quinones is a 46 year old female who provides a history supporting the following diagnoses:  Depressive disorder, unspecified   Complex grief, r/o PTSD    Pertinent History: Lilian does not report any history of psychiatric diagnoses prior to the death of her son in 11/2019. Since that time, she has contended with complex grief and trauma symptoms which have included elements of depression and anxiety. Her grief has been severe, and has included elements of generalized anxiety, panic, and trauma symptoms. She likely met criteria for PTSD at some point. How she is in an adjustment phase, but still with significant depressive symptoms, unclear how persistent, recurrent this will be.   TODAY: Lilian notably feels that things are more normal now. Still pain and grief, but not as severe as before.   She did try buspirone  in February, but noted increased anxiety. Has only been using propranolol once per day, and is interested in changing it to only as needed at this time, which I am agreeable with.   At this point, she has a goal of being off of medications. We talked about the easiest change to make being to discontinue the bupropion and monitor for a month or so. If this goes well, could continue with taper of the citalopram.   Given that she is still making use of the diazepam, it should be noted that this is an indication that the baseline may not be at goal. As medications are tapered, should monitor carefully for any increased need for diazepam, as this would also indicate that the meds may have been helping more than potentially recognized. Also, antidepressant taper should not interfere with the goal of being off of the diazepam within the next few months. I am a firm believe in using the least amount of medications to achieve a reasonable degree of comfort, but I generally would not support arbitrary taper of medications at the expense of baseline mood.     It is hard to gauge how effective the citalopram has been in the context of her current situation. The absence of more severe depressive symptoms is notable, and could indicate efficacy of the antidepressant, but at the same time, it is clearly not sufficient to aid with her anxiety symptoms alone.   She can continue to use diazepam as needed for the time being, and the goal will be to move towards discontinuing it in the coming months.     PSYCHOTROPIC DRUG INTERACTIONS: None   MANAGEMENT:  N/A     Plan     1) PSYCHOTROPIC MEDICATIONS:  - Continue citalopram (Celexa) 40mg at bedtime (May look at tapering moving forward)  - Discontinue bupropion XL (Wellbutrin)  - Change to propranolol 20mg daily as needed for anxiety  - May continue diazepam 2.5mg daily as needed, continue to minimize use, with intention to discontinue within 2-3 months.     - Taking melatonin 10mg QHS  PRN for sleep  - Taking diphenhydramine (Zzzquil) 50mg QHS PRN for sleep    [see the following SmartPhrase(s) for more information: PSYMEDINFOBUPROPION - PSYMEDINFOBENZOS]    2) THERAPY: Psychotherapy is a primary recommendation for the treatment of mood symptoms, even in the context of grief. Currently engaged in therapy with grief counselor, doing EMDR.     3) NEXT DUE:   Labs- Routine monitoring is not indicated for current psychotropic medication regimen   ECG- Routine monitoring is not indicated for current psychotropic medication regimen   Rating Scales- N/A    4) REFERRALS: None    5) : None    6) DISPOSITION: 4 weeks or sooner if needed    Treatment Risk Statement:  The patient understands the risks, benefits, adverse effects and alternatives. Agrees to treatment with the capacity to do so. No medical contraindications to treatment. Agrees to call clinic for any problems. The patient understands to call 911 or go to the nearest ED if life threatening or urgent symptoms occur. Crisis numbers are provided routinely in the After Visit Summary.       PROVIDER:  Joss Rod MD

## 2021-04-28 NOTE — PROGRESS NOTES
"VIDEO VISIT  Lilian Quinones is a 46 year old patient who is being evaluated via a billable video visit.      The patient has been notified of following:   \"This video visit will be conducted via a call between you and your physician/provider. We have found that certain health care needs can be provided without the need for an in-person physical exam. This service lets us provide the care you need with a video conversation. If a prescription is necessary we can send it directly to your pharmacy. If lab work is needed we can place an order for that and you can then stop by our lab to have the test done at a later time. Insurers are generally covering virtual visits as they would in-office visits so billing should not be different than normal.  If for some reason you do get billed incorrectly, you should contact the billing office to correct it and that number is in the AVS .    Video Conference to be completed via:  Asif    Patient has given verbal consent for video visit?:  Yes    Patient would prefer that any video invitations be sent by: Send to e-mail at: [email or web address]      How would patient like to obtain AVS?:  [employer] MarketsCharlotte Hunger[facility] HospitalW[facility]vision    AVS SmartPhrase [PsychAVS] has been placed in 'Patient Instructions':  Yes  "

## 2021-05-27 NOTE — PROGRESS NOTES
Initial Structured Weight Loss Supervised Diet Evaluation     Assessment:  Pt. is a 43 y.o. female is being seen today for initial RD nutritional evaluation. Pt. has been unsuccessful with non-surgical weight loss methods and is interested in bariatric surgery. Today we reviewed current eating habits and level of physical activity, and instructed on the changes that are required for successful bariatric outcomes.    Patient reports 80lb weight gain with pregnancy and has tried many diets. Patient would like to be able to keep up with her .    Workflow review: will attend support group on Tuesday, psych in progress, labs completed   Weight goal: at or below initial      Pt Active Problem List Diagnosis:    There is no problem list on file for this patient.      Pt's Initial Weight: 261 lbs  Weight: (!) 262 lb (118.8 kg)  Weight loss from initial: -1  % Weight loss: -0.38 %    BMI: Body mass index is 40.43 kg/m .    Estimated RMR (Gary-St Jeor equation): 1877 calories    Food allergies, intolerances, and Zoroastrian customs: NKFA    Vitamins- Vitamin D, biotin    Biggest struggle with weight loss:1 hour commute to and from work, tired all the time    Who does the grocery shopping for your household? Self and   Who prepares your meals at home?     Diet Recall/Time: wake up at 6a  Breakfast: breakfast on commute- protein shake (30g)  Am Snack: none  Lunch: [employer] with chips and a soda  Pm snack: deep fried cream cheese treat  Dinner: baked chicken with mashed potatoes and peas  HS Snack: none    Typical Snacks: cookies and chips    Meals per week away from home: 1x per week [employer], 1x week date night    Recommended limiting eating out to no more than 2x/week.  Patient and I reviewed the importance of eating three consistent meals per day; as well as meal timing to be spaced 4-5 hours apart.  Snack choices: 100-150 calories (1-2x/day if physically hungry), incorporating a fruit/vegetable  w/ protein source.    Portion Sizes problematic? No per patient/diet recall  Encouraged slowing meal times down, 20-30 minutes, chewing to applesauce consistency.   To aid in proper portion control and slow meal time down discussed consuming meals off smaller plates, use toddler/children utensils and set utensils down after each bite.    Protein, vegetables/fruits, carbohydrates:   Reviewed lean protein sources today. Recommended consuming 15-20gm protein at 3 meals daily    Beverages (Type/Oz. per day)  Water: 24oz  Coffee: 1 cup with cream  Tea: when eating out and in the summer  Milk: none  Regular soda: none  Diet soda: none  Juice: none  Gautam-Aid/lemonade/etc: none  Alcohol: when eating out    Discussed the importance of adequate hydration after surgery and the goal of 64+ oz. of fluid daily.   The patient understands the importance of avoiding all carbonated, caffeinated and sweetened drinks; instead choosing 64 oz. plain water.    Fluid-meal separation:  The patient and I reviewed the anatomy of the bypass and why  fluids from a meal is so important.    Exercise  Type: none-  Patient reports that her knees have been bothering her    Pt. s understands that 30-60 minutes of daily activity is an important part of bariatric surgery success.   Encouraged pt. to incorporate upper body strength training exercise, even if its lifting soup cans while watching TV at night, doing pushups/sit-ups, and abdominal work.    PES statement:   1. (NI-1.3)Excessive energy intake related to Food and nutrition related knowledge deficit concerning excessive energy/oral intake as evidenced by Intake of high caloric density foods/beverages (juice, soda, alcohol) at meals and/or snacks; large portions; frequent grazing; Estimated intake that exceeds estimated daily energy intake; Binge eating patterns; Frequent excessive fast food or restaurant intake; and BMI 40.43     2. (NC-3.3.5) Obese, class III, BMI ?40 related to  physical inactivity as evidenced by Infrequent, low-duration and or low intensity physical activity; and Large amounts of sedentary activities, no structured exercise regimen.     Intervention  Discussion:    1. Educated pt on the [place] to Bariatric Success handout.  2. Reviewed lean protein sources today. Recommended consuming 15-20gm protein at 3 meals daily  3. Gave food journal homework, to be completed for f/u appointment.  4. Reviewed carbohydrates and portion sizes.  5. Bariatric Plate: The patient and I discussed the importance of including lean/low  fat protein at each meal and limiting carbohydrate intake to less  than 25% of plate volume.    Instructions/Goals:     1. Include 15-20gm lean protein at each meal.  2. Increase vegetable/fruit intake, by having a vegetable or fruit with each meal daily. Recommended pt to increase vegetable/fruit intake to 4-5 servings daily.  3. Increase fluid intake to 64oz daily: choose plain or calorie/carbonation-free beverages.  4. Incorporate daily structured activity, 30-60 minutes most days of the week  5. Fill out food journal and bring to next month's visit.  6. Practice plate method: 1/2 plate lean/low fat protein source, vegetable/fruit, <25% of plate complex carbohydrates.  7. Read food labels more consistently: keeping total fat grams <10, total sugar grams <10, fiber >3gm per serving.  8. Separate fluids 30 minutes before/after meal times.  9. Practice eating off of smaller plates/bowls, chewing to applesauce consistency, taking 20-30 minutes to eat in a calm/relaxed environment without distractions of tv/email/cell phone.    Handouts Provided:  Pt. brought [facility] Bariatric [facility] Patient Handbook  Bariatric Plate  Food journal      Monitor/Evaluation:  Pt. s target weight: no gain from initial visit, pt. verbalized understanding.     Has realistic expectations for weight loss: Yes  Verbalizes understanding of dietary changes post procedure: Yes  Verbalizes  understanding of supplement needs: No  Verbalizes willingness to participate in physical activity: Yes  Motivation for change: high  Client s predicted compliance on a scale of 1 (low) to 10 (high): 9  RD s prediction for client success and compliance on a scale of 1 (low) to 10 (high):  8-9    Plan for next visit:   Review Bariatric plate and food journal homework.  Educate on dumping syndrome and reading food labels.  (Final Supervised Diet visit with RD) pre/post-op  diet progression, give review of surgery process.  Review [place] to Bariatric Success    Time In: 1:50p  Time Out: 2:30p    ABN signed: Yes

## 2021-05-27 NOTE — PROGRESS NOTES
Patient is very pleasant and bright.  She has no psychopathology.  She is doing well with her eating behaviors.  She will take MMPI, QOLI and MAST prior to my next appt with her.

## 2021-05-28 NOTE — PROGRESS NOTES
"I was consulted by Kehr, Kristen, PA-C to evaluate this pt for Bariatric Surgery.    HPI: Lilian Quinones is a 44 y.o. female here today for consideration of metabolic and bariatric surgery. she has had weight problems for the last 15 yrs. she Has tried multiple weight loss programs in the past with good success, she lost 40 lbs once.    she carries most of his/her obesity in through their abdomen, and hips.   This pt does not have Hypertention.   This pt does not have GERD.   The pt does not have Sleep Apnea.   This pt does not have diabetes.      Allergies:Amoxicillin and Penicillins    No past medical history on file.    Past Surgical History:   Procedure Laterality Date     REDUCTION MAMMAPLASTY  2008       CURRENT MEDS:      Family History   Problem Relation Age of Onset     Breast cancer Mother      Diabetes Father      Depression Sister         reports that she has never smoked. She has never used smokeless tobacco. She reports that she does not drink alcohol or use drugs.    Review of Systems - 12 point Review of Systems is negative except for the issues mentioned above.    PSYCHIATRIC: she has undergone a lifestyle assessment and has been deemed a good candidate for bariatric surgery by the psychologist.    /72 (Patient Site: Right Arm, Patient Position: Sitting, Cuff Size: Adult Large)   Pulse 74   Ht 5' 7.5\" (1.715 m)   Wt (!) 262 lb 14.4 oz (119.3 kg)   SpO2 97%   BMI 40.57 kg/m    Body mass index is 40.57 kg/m .    EXAM:  GENERAL: This is a well-developed 44 y.o. female who appears her stated age  HEAD & NECK: Grossly normal.  No palpable thyroid lesions  CARDIAC: RRR without murmur  CHEST/LUNG:  Clear to auscultation  ABDOMEN: Obese.  Nontender.  No hernias or masses appreciated.  LYMPHATIC:  No significant adenopathy appreciated.    EXTREMITIES: Grossly normal.  No evidence of chronic venous stasis.    NEUROLOGIC: Focally intact  INTEGUMENT: No open lesions or ulcers  PSYCHIATRIC: Normal " affect. she has a good grasp on the nature of her obesity and the treatment options.    LABS:  Lab Results   Component Value Date    WBC 7.1 03/05/2019    HGB 14.2 03/05/2019    HCT 42.0 03/05/2019    MCV 88 03/05/2019     03/05/2019     INR/Prothrombin Time      Lab Results   Component Value Date    HGBA1C 5.3 03/05/2019     Lab Results   Component Value Date    ALT 18 03/05/2019    AST 17 03/05/2019    ALKPHOS 58 03/05/2019    BILITOT 0.3 03/05/2019       Assessment/Plan: 44 y.o. female who is an excellent candidate for bariatric and metabolic surgery.  After a careful conversation with the patient it was decided that a  Laparoscopic Sleeve Gastrectomy. would be her best option.       I went over the surgery in detail with her.  I went over the nature of the operation and some of the potential consequences of the surgery.  I went over the expected hospital course and discussed laparoscopic versus open surgery, understanding that we will plan on doing this laparoscopically with the possibility of having to convert to an open operation.  I went over some of the risks and complications of the operation including, but not limited to, DVT, pulmonary emboli, pneumonia, postoperative bleeding, wound infection, staple line leak, intra-abdominal sepsis, and possible death.  I also went over some of the potential nutritional concerns such as vitamin B-12, iron, vitamin D, vitamin A, calcium and protein deficiencies.  I will also went over the need for lifelong nutritional surveillance.  The patient understands and wants to proceed with surgery.  We will submit for prior authorization.      Perfecto Enciso MD  [facility] Department of Surgery  [phone]

## 2021-05-28 NOTE — PROGRESS NOTES
Patient's MMPI, QOLI and MAST were normal and shows no psychopathology.  She is doing well with her eating behaviors.

## 2021-05-28 NOTE — PROGRESS NOTES
Attended support group. Workflow updated.    Melina Kingston [facility] Surgery  P: [phone]  F: [phone]

## 2021-05-28 NOTE — PROGRESS NOTES
Follow Up Surgical Weight Loss Supervised Diet Evaluation  Assessment:  Pt presents for follow up supervised diet visit with RD.    This patient is a 44 y.o.  She is being seen today for follow-up nutritional evaluation. Lilian Quinones has been unsuccessful with non-surgical weight loss methods and is interested in bariatric surgery. Today we reviewed the patients current eating habits and level of physical activity, and instructed on the changes that are required for successful bariatric outcomes.    Workflow review: psych initiated, patient attended support group  Weight goal: at or below initial     Pt Active Problem List Diagnosis:  There is no problem list on file for this patient.    Pt's Initial Weight: 261 lbs  Weight: (!) 261 lb 11.2 oz (118.7 kg)  Weight loss from initial: -0.7  % Weight loss: -0.27 %    Body mass index is 40.38 kg/m .    Calculated RMR (Nesmith-St Jeor equation): 1877 calories    Progress made since last visit: patient has been working on eating smaller portions and increasing her water intake.  Concerns: inadequate protein at breakfast     Diet Recall/Time:   Breakfast: 2 Tbsp peanut butter and 2 slices light bread (8g)  Am Snack: protein shake (30g)  Lunch: brat on pretzel bun and salad (14g)  Pm snack:none  Dinner: 1/2 cup taco meat with veggies and low carb tortilla (20g)  HS Snack: outshine fruit bar    Protein: 72g    Typical Snacks or snack times: protein shake and frozen fruit bar    Patient and I reviewed the importance of eating three consistent meals per day; as well as meal timing to be spaced 4-5 hours apart.  Snack choices: 100-150 calories (1-2x/day if physically hungry), incorporating a fruit/vegetable w/ protein source.    Meal Duration:15 minutes  Encouraged slowing meal times down, 20-30 minutes, chewing to applesauce consistency.     Portion Sizes problematic? No Per patient/diet recall   To aid in proper portion control and slow meal time down discussed consuming  meals off smaller plates, use toddler/children utensils and set utensils down after each bite.    Protein, vegetables/fruits, carbohydrates:   The patient and I discussed the importance of including lean/low fat protein at each meal and limiting carbohydrate intake to less than 25% of plate volume.     Vitamins   Post-op vitamin regimen: Multi Vit + iron 2x/day, calcium citrate 500-600 mg 2x/day, 0235-6412 mcg of Sublingual B-12 daily, and 5000 IU Vitamin D3 daily.    Beverages (Type/Oz. per day)  Water: 64oz  Coffee: 3 cups  Tea: none  Milk: none  Regular soda: none  Diet soda: none  Juice: none  Gautam-Aid/lemonade/etc: none  Alcohol: none    Discussed the importance of adequate hydration after surgery and the goal of 64+ oz of fluid daily.   The patient understands the importance of avoiding all carbonated, caffeinated and sweetened drinks; and instead choose 64 oz plain water.    Fluid-meal separation:   Pt is working on  fluids 30min before and 30 minutes after meals.  Fluids are  30min before and 30 minutes after meals.    Exercise  yard work and walking    Pt's understands that 30-60 minutes of daily activity is an important part of bariatric surgery success.   Encouraged pt to incorporate upper body strength training exercise, even if its lifting soup cans while watching TV at night, doing pushups/sit-ups, and abdominal work.    PES statement:   1. (NC-3.3.5) Obese, class III, BMI ?40 related to physical inactivity as evidenced by Infrequent, low-duration and or low intensity physical activity; and Large amounts of sedentary activities; no structured physical activity regimen       Intervention:  Discussion:   1. Reviewed the [place] to Bariatric Success handout.  2. Reviewed lean protein sources and recommended to consume 15-20gm protein at 3 meals daily.  3. Educated on pre/post-op diet progression, post-op vitamin regimen, gave review of surgery process.  4. Dumping syndrome: choosing foods  with less than 5-10gm fat/sugar per serving to avoid dumping syndrome for [facility] pts.  5. Reviewed Bariatric plate and food journal homework.  6. Bariatric Plate: The patient and I discussed the importance of including lean/low  fat protein at each meal and limiting carbohydrate intake to less  than 25% of plate volume.  Instructions/Goals:   1. Include 15-20gm protein at each meal.  2. Increase vegetable/fruit intake, by having a vegetable or fruit with each meal daily. Recommended pt to increase vegetable/fruit intake to 4-5 servings daily.  3. Increase fluid intake to 64oz daily: choose plain or calorie/carbonation-free beverages.  4. Incorporate daily structured activity, 30-60 minutes most days of the week  5. Practice plate method: 1/2 plate lean/low fat protein source, vegetable/fruit, <25% of plate complex carbohydrates.  6. Read food labels more consistently: keeping total fat grams <10, total sugar grams <10, fiber >3gm per serving.  7. Separate fluids 30 minutes before/after meal times.  8. Practice eating off of smaller plates/bowls, chewing to applesauce consistency, taking 20-30 minutes to eat in a calm/relaxed environment without distractions of tv/email/cell phone.    Handouts provided:  Pt. [facility] Bariatric Care Patient Handbook    Monitor/Evaluation:     Pt understands the importance of not gaining any weight from initial recorded weight.      Has realistic expectations for weight loss: Yes  Verbalizes understanding of dietary changes post procedure: Yes  Verbalizes understanding of supplement needs: Yes  Verbalizes willingness to participate in physical activity: Yes  Motivation for change: high  Client s predicted compliance on a scale of 1 (low) to 10 (high): 9  RD s prediction for client success and compliance on a scale of 1 (low) to 10 (high):  9    Pt has made the necessary changes, and is knowledgeable and well-informed of the dietary and physical activity requirements that are  necessary for successful bariatric outcomes. This Pt is an appropriate candidate for surgery from a nutrition standpoint at this time. The patient understands that surgery is a tool, and not a cure, and our aftercare program must be followed.    Plan for next visit:   3 month post op visit    Time In: 10:55a  Time Out: 11:20a      ABN signed: Yes

## 2021-05-28 NOTE — PROGRESS NOTES
I have submitted a prior authorization request on behalf of this patient to [facility] to be approved for a LSG with Dr. Perfecto Enciso.

## 2021-05-28 NOTE — PROGRESS NOTES
Workflow updated with Psych and RD clearance.  Workflow reviewed.  Patient needs to have her pap smear and then she will be ready for her AB Consult.  I sent her a message about this via [employer].  Michelle Braxton RN

## 2021-05-29 NOTE — PATIENT INSTRUCTIONS - [facility]
Your surgery is scheduled for 6/24/2019 at 7:30 am.  Please arrive at 5:30 am.    Medication Instructions:    Levonorgestrel (Mirena IUD)  Okay to leave in and continue use.     Multivitamin with Iron   If not already taking, start chewable MVI with at least 18mg of iron and 10-15 mg of zinc twice a day at least 2 weeks prior to surgery and resume the day after surgery for life. Do not take the morning of surgery.    Omeprazole (Brand Name: Prilosec)   If not already taking, you will get a prescription to start when you get home from the hospital.  Tablets cannot be cut or crushed.  If a capsule, entire capsule contents may be sprinkled on a spoonful of applesauce and taken immediately without chewing.  If only on a full liquid diet, dump capsule contents into a spoonful of liquid and take without chewing.  May swallow whole again starting 6 weeks after surgery but can try swallowing sooner if having issues with opening into food.  Continue after surgery for at least 3 months.    Ursodiol (Brand name: Actigall)  Start two weeks after surgery.  Swallow whole with warm water, do not cut, crush, or open capsule up.  This is a medication that will help to prevent gallstones from forming during the first 6 months post-op of rapid weight loss.  Prescription was sent to your pharmacy.    Vitamin B12   If not already taking, start sublingual (under the tongue) vitamin B12 1,000 mcg at least 2 weeks prior to surgery and resume the day after surgery for life. Do not take the morning of surgery.    [facility]   Laparoscopic Mahnaz-en-Y Gastric Bypass, Gastric Sleeve & Single Anastomosis Duodenal Switch  Home Discharge Instructions      Coughing and Deep Breathing   Use your incentive spirometer frequently after you get home. Continued coughing and deep breathing help prevent complications such as fevers and pneumonia. If you are fever free after one week, stop using it, and discard it.    Incision and Dressing   All  incisional coverings can get wet so you may continue to shower at home.  If you have Band-Aids, they should come off while in the hospital.  Remove all steri-strips one week from your surgery date unless told otherwise by the surgeon.  If you have gauze covered by a clear dressing, remove 2-3 days after surgery as directed by the surgeon.     Notify the clinic or your surgeon if:  You develop a fever orally of 101.5 or greater, see any unusual bright red or green infection-like drainage; see redness or swelling around the incision; have left shoulder pain or pain that does not go away with your narcotic; increasing anxiety or feeling that something is just  not right;  a persistent rapid heart rate (greater than or equal to 120 beats per minute) lasting longer than 15 minutes; progressive rapid breathing; increasing shortness of breath; continuous (non-stop) hiccups lasting longer than 15 minutes; persistent nausea; if you are unable to keep liquids down; have frothy vomiting; difficulty swallowing; excessive bloating; swelling, warmth, discoloration or pain in your lower legs; dark, bright red, black, or tarry bowel movements; or anything you are concerned might be an urgent problem or need reassurance about.    Dehydration/Nausea/Vomiting  Vomiting is not normal after surgery and can be caused by drinking with meals, eating large portions, not chewing thoroughly and drinking large sips.  If you continue to have nausea and vomiting despite following our recommendations, call the clinic.  Nausea can be caused by dehydration so make sure you are drinking the suggested amount of fluids.  Symptoms of dehydration include dark colored urine, lack of energy, nausea, dizziness, headache and a bad taste in your mouth.  If you notice any of these symptoms, please don t delay in calling the clinic.  Early identification gives us more options for treatment.    Bowel Movements  Consider that your stools might be loose since you  are currently only taking in fluids. Diarrhea may be caused by dumping syndrome if you had Gastric Bypass, so make sure you aren t drinking liquids containing excess sugar.  Otherwise, call the clinic if you have 3 or more diarrhea stools per day. If constipation is a problem, it is ok to use a Dulcolax  rectal suppository, Miralax or Milk of Magnesia . Other helpful ways to avoid constipation include: increasing your fluids; stop taking narcotic medications; and increasing your activity. Adding Benefiber  daily to your liquids once you have had a stool may help keep you regular until there is more natural fiber in your diet. You may also have foul smelling gas.    Risk for Blood Clots  For the next 6 weeks, if you are in any vehicle longer than 30 minutes, stop every 30 minutes, and walk for at least 3 minutes before continuing your journey. Traveling and/or flying are not recommended for 6 weeks.  If leaving the country within the first 3-6 months after surgery, check with your surgeon first.    Activity  Do not lift greater than 20 lbs. for 2 weeks unless told differently by your surgeon. After two weeks, let your body be your guide. You may drive only after you have been completely off of narcotics for a minimum of 24 hours. Continue to shower as you have in the hospital. NO BATHING IN THE BATHTUB, SWIMMING, etc. for 2-4 weeks.  (You need to be sure your incisions are well healed to prevent infection.)    Pain Control  You will go home with a prescription for pain medication. If your pain does not go away with this medication, please notify your surgeon. If the pain requires medication, but not something as strong as the prescription, you may try Tylenol  (acetaminophen) cut  <    inch or crushed.   DO NOT USE PRESCRIBED OR OVER THE COUNTER NONSTEROIDAL ANTI-  INFLAMMATORY MEDICATION LIKE MOTRIN, ADVIL, IBUPROFEN OR ASPIRIN.    Acid Reducer and Gallbladder Medication  It is important to reduce the amount of acid  in your new stomach for a couple months after surgery while it is healing.  We will prescribe an acid reducer that you should take daily.  It is important for you to let us know if you have any symptoms of heartburn or reflux.      For those of you who still have a gallbladder, we will also prescribe a medication called Actigall (ursodiol) and we recommend taking it twice a day for 6 months.  It is only effective if you take it twice a day.  You will start taking this two weeks after surgery.    ER Needs  If you need to go to the Emergency Room, we prefer you go to the [facility] ER.  Be sure to inform the ER staff that you are a bariatric surgery patient, and ask them to notify your surgeon that you are there.    Remember to refer to your bariatric program handbook and keep follow up appointments for long-term success. You will need regular labs to check your nutritional status and it is very important to take ALL your supplements and protein religiously,    For urgent concerns on evenings, weekends & holidays, call the clinic and the message will direct you to the surgeon on call.    [facility]: [phone]  Bariatric Nurse Line: [phone]

## 2021-05-29 NOTE — ANESTHESIA CARE TRANSFER NOTE
Last vitals:   Vitals:    06/24/19 0905   BP: 144/75   Pulse: 75   Resp: 28   Temp: 36.5  C (97.7  F)   SpO2: 98%     Patient's level of consciousness is drowsy  Spontaneous respirations: yes  Maintains airway independently: yes  Dentition unchanged: yes  Oropharynx: oropharynx clear of all foreign objects    QCDR Measures:  ASA# 20 - Surgical Safety Checklist: WHO surgical safety checklist completed prior to induction    PQRS# 430 - Adult PONV Prevention: 4558F - Pt received => 2 anti-emetic agents (different classes) preop & intraop  ASA# 8 - Peds PONV Prevention: NA - Not pediatric patient, not GA or 2 or more risk factors NOT present  PQRS# 424 - Laurie-op Temp Management: 4559F - At least one body temp DOCUMENTED => 35.5C or 95.9F within required timeframe  PQRS# 426 - PACU Transfer Protocol: - Transfer of care checklist used  ASA# 14 - Acute Post-op Pain: ASA14A - Patient experienced pain >= 7 out of 10

## 2021-05-29 NOTE — ANESTHESIA POSTPROCEDURE EVALUATION
Patient: Lilian Quinones  LAPAROSCOPIC SLEEVE GASTRECTOMY  Anesthesia type: general    Patient location: PACU  Last vitals:   Vitals Value Taken Time   /92 6/24/2019 11:17 AM   Temp 36.7  C (98.1  F) 6/24/2019 10:45 AM   Pulse 86 6/24/2019 11:17 AM   Resp 19 6/24/2019 11:17 AM   SpO2 96 % 6/24/2019 11:17 AM     Post vital signs: stable  Level of consciousness: awake and responds to simple questions  Post-anesthesia pain: pain controlled  Post-anesthesia nausea and vomiting: no  Pulmonary: unassisted, return to baseline  Cardiovascular: stable and blood pressure at baseline  Hydration: adequate  Anesthetic events: no    QCDR Measures:  ASA# 11 - Laurie-op Cardiac Arrest: ASA11B - Patient did NOT experience unanticipated cardiac arrest  ASA# 12 - Laurie-op Mortality Rate: ASA12B - Patient did NOT die  ASA# 13 - PACU Re-Intubation Rate: ASA13B - Patient did NOT require a new airway mgmt  ASA# 10 - Composite Anes Safety: ASA10A - No serious adverse event    Additional Notes:

## 2021-05-29 NOTE — PROGRESS NOTES
Patient attended group class to review pre-op instructions and dietary plan for upcoming surgery.    Pt will begin her 2 week liquid diet on 6/10/2019 and will do clear liquids the day before surgery.  Pt is scheduled for Gastric Sleeve on 6/24/2019 and will follow a 2 week post-op liquid diet.  Pt will f/u with an RD 1 week post-op for further diet advancement.    Educated pt on 2 week pre-op and 1-2 week post-op liquid diets.  Discussed appropriate liquids and demonstrated portions for each of the food groupings during each diet phase.  Reviewed appropriate calories/protein/fluid goals during the 2 week pre-op liquid diet. Educated on correct vitamins/minerals to take after surgery in correct dosage and frequency.  Provided grocery lists and sample menu plans for each diet stage, as well as unflavored protein powder samples.    Discussed admission process and hospital course.  Pharmacy information packet given and explained. Patient was given exercises to work on post-op for maintaining muscle mass and strengthening that was created by [employer] [facility] [employer].  Bariatric quiz given to pt for review on their own. Discharge instructions, information card and follow up appointments given and reviewed with pt at this time and patient verbalized understanding. She will have her H&P at [facility] in [place] on 6/19/2019 and do her pre-op testing at that visit.  Prescriptions for Omeprazole and Actigall sent to the patient's pharmacy to be started after surgery.      Crys Hong RN, CBN  [facility]  P [phone]  F [phone]

## 2021-05-29 NOTE — ANESTHESIA PREPROCEDURE EVALUATION
Anesthesia Evaluation      Patient summary reviewed   History of anesthetic complications     Airway   Mallampati: II  Neck ROM: full   Pulmonary     breath sounds clear to auscultation                         Cardiovascular   Exercise tolerance: > or = 4 METS  Rhythm: regular  Rate: normal,         Neuro/Psych    (+) anxiety/panic attacks,     Endo/Other    (+) obesity, pregnant     GI/Hepatic/Renal            Dental    (+) poor dentition                       Anesthesia Plan  Planned anesthetic: general endotracheal and spinal  ITN MSO4  ASA 3   Induction: intravenous   Anesthetic plan and risks discussed with: patient and spouse  Anesthesia plan special considerations: rapid sequence induction,   Post-op plan: routine recovery

## 2021-05-29 NOTE — ANESTHESIA PROCEDURE NOTES
Spinal Block    Patient location during procedure: OR  Start time: 6/24/2019 7:43 AM  End time: 6/24/2019 7:46 AM  Reason for block: at surgeon's request and primary anesthetic    Staffing:  Performing  Anesthesiologist: Kin Loza MD    Preanesthetic Checklist  Completed: patient identified, risks, benefits, and alternatives discussed, timeout performed, consent obtained, at patient's request, airway assessed, oxygen available, suction available, emergency drugs available and hand hygiene performed  Spinal Block  Patient position: sitting  Prep: ChloraPrep  Patient monitoring: heart rate, cardiac monitor, continuous pulse ox and blood pressure  Approach: midline  Location: L3-4  Injection technique: single-shot  Needle type: pencil-tip   Needle gauge: 24 G      Additional Notes:  SAB done, MSO4 given in SAB per Surgeon Request (order PER surgeon)  No contraindications, ok to administer.

## 2021-05-30 NOTE — PROGRESS NOTES
Time in: 10:00a   /Time out: 11:00a     BMI: There is no height or weight on file to calculate BMI.   Pt presents for 1 week post op dietitian follow up. Reports tolerating full liquids well. Denies n/v, constipation/diarrhea, or significant pain. Taking MVI and SL B12 appropriately. Taking 60 oz fluid/day. Educated pt on pureed and soft to bariatric regular diets. Provided grocery list and sample meal plan for each diet stage.  Pt will begin pureed diet on 7/8/2019 and advance as tolerated to softs/bariatric regular on 7/29/2019. Instructed pt to begin 500 mg calcium citrate BID and 5000IU Vitamin D3 on 7/29/2019. Pt to follow up with RD at 3 months post op.

## 2021-05-30 NOTE — TELEPHONE ENCOUNTER
Post-Op Phone Call  [facility]    Date/Time Called:   Date: 6/28/2019 Time: 1:52 PM   Attempt: Second    Pain Control:  Intensity: No Pain (0)  Duration/Location/Explain: Just normal incisional discomfort.  What makes it better/worse?     Medications:  Narcotic Use - No  Drug type: Gabapentin  Frequency: Stopped yesterday.    Non-prescription pain control: Tylenol stopped yesterday.    Other medications currently taking: See med list.    Complete Multivitamin + Iron BID? Yes    Vitamin B12? sublingual daily    Incisions:  Drainage? clean and dry  Comment: Dressings removed.    Intake/Output:  Fluid Intake(oz/day)? 40-50 oz per day.  Fluid type? water    Heartburn? No  Counseling: Omeprazole daily  Nausea? No  Vomiting? No  Explain:     Voiding Frequency? 4 or more/day     Voiding-Color/Amount? Good    Flatus? Yes    Bowel Movement?Yes     Are you using your incentive spirometer? If yes, how often? 3+/day    Any fever type symptoms? No  Explain:     Walking activity?   Frequency/Type: walking around, shopping.    In Preparation for Surgery:  On a scale of 1-5, with 5 being the highest, how well did the pre-op class prepare you for what actually happened in the hospital? 5  If you were unable to give us a 5, what could we have done to earn a 5?     Is there anything that you wish you would have known prior to surgery that you did not know? If yes, what? No.    On a scale of 1-5, with 5 being the highest, how was the service while you were in the hospital? 5  If you were unable to give us a 5, what could we have done to earn a 5?     Is/was there anyone in particular; nurse, aide, hospital staff, that did a great job and you would like us to recognize? If yes, whom. All the nurses were great!  Salvatore was great and her night nurse was awesome.  What did they do?     Would you recommend [facility] to others? Yes  If no, can you explain why?     Would you recommend [facility] to  others?Yes  If no, can you explain why?      Thank you for your time. Please do not hesitate to call us with any questions or concerns.    Call completed by:   Crys Hong RN, CBN  [facility]  P [phone]  F [phone]

## 2021-05-30 NOTE — TELEPHONE ENCOUNTER
Post-Op Phone Call  [facility]    Surgeon: Perfecto Enciso M.D.  Date of Surgery: 6/24/2019  Discharge Date: 6/25/2019    Date/Time Called:   Date: 6/28/2019 Time: 1:33 PM   Attempt: First    Patient unavailable, message left to call [facility] with questions/problems? Yes    Call completed by:   Crys Hong RN, CBN  [facility]  P [phone]  F [phone]

## 2021-05-30 NOTE — PROGRESS NOTES
"HPI: Pt is here for follow up of a Laparoscopic Sleeve Gastrectomy. she is doing well.  Drinking 40-50 oz. Taking po well. No vomiting. No fevers or chills. Ambulating without problems.       /68 (Patient Site: Right Arm, Patient Position: Sitting, Cuff Size: Adult Large)   Pulse 75   Temp 98.4  F (36.9  C) (Tympanic)   Ht 5' 7.5\" (1.715 m)   Wt (!) 241 lb (109.3 kg)   LMP 06/24/2019 (LMP Unknown)   SpO2 98%   BMI 37.19 kg/m    Wt Readings from Last 3 Encounters:   07/17/19 (!) 241 lb (109.3 kg)   06/24/19 (!) 261 lb (118.4 kg)   06/10/19 (!) 264 lb (119.7 kg)     Body mass index is 37.19 kg/m .    EXAM:  GENERAL:Appears well  ABDOMEN: Incisions healing well      Assessment/Plan: Pt s/p Laparoscopic Sleeve Gastrectomy. Doing well. Diet and activity discussed. she will f[facility] with us [facility] in 3 months.    Perfecto Enciso MD  [facility] Department of Surgery  "

## 2021-05-31 NOTE — PROGRESS NOTES
Time in: 4:00 pm Time out: 4:45 pm  .     Pt presents for 1 month post op dietitian follow up class . Reports tolerating soft food diet well. Denies n/v, constipation/diarrhea, or significant pain. Taking MVI and SL B12 appropriately.Instructed pt to begin taking 500 mg calcium citrate BID and 5000 IU Vitamin D3. Educated pt on soft to bariatric regular diets, medications, developing an exercise regimen, and other dietary points of care. Provided  Education handout on items discussed . Pt to follow up with RD at 3 months post op.

## 2021-06-02 VITALS — WEIGHT: 262 LBS | BODY MASS INDEX: 40.43 KG/M2

## 2021-06-02 VITALS — BODY MASS INDEX: 40.01 KG/M2 | HEIGHT: 68 IN | WEIGHT: 264 LBS

## 2021-06-02 VITALS — HEIGHT: 68 IN | WEIGHT: 262 LBS | BODY MASS INDEX: 39.71 KG/M2

## 2021-06-03 VITALS — HEIGHT: 68 IN | WEIGHT: 262.9 LBS | BODY MASS INDEX: 39.84 KG/M2

## 2021-06-03 VITALS — BODY MASS INDEX: 40.01 KG/M2 | HEIGHT: 68 IN | WEIGHT: 264 LBS

## 2021-06-03 VITALS — WEIGHT: 261.7 LBS | WEIGHT: 261 LBS | HEIGHT: 68 IN | BODY MASS INDEX: 40.28 KG/M2 | BODY MASS INDEX: 39.66 KG/M2

## 2021-06-03 VITALS — BODY MASS INDEX: 39.56 KG/M2 | HEIGHT: 68 IN | WEIGHT: 261 LBS

## 2021-06-03 VITALS — WEIGHT: 241 LBS | HEIGHT: 68 IN | BODY MASS INDEX: 36.53 KG/M2

## 2021-06-03 NOTE — PROGRESS NOTES
6 months post-op Sleeve  lab orders placed for patient and will be done at a [facility] lab  in preparation for appointment with Bekah Riley MD on 12/4/2019.    Michelle Braxton RN, N  [facility]ariat[facility]

## 2021-06-04 VITALS
DIASTOLIC BLOOD PRESSURE: 74 MMHG | HEIGHT: 68 IN | WEIGHT: 207 LBS | BODY MASS INDEX: 31.37 KG/M2 | SYSTOLIC BLOOD PRESSURE: 130 MMHG | RESPIRATION RATE: 16 BRPM | HEART RATE: 76 BPM | OXYGEN SATURATION: 98 %

## 2021-06-04 NOTE — PATIENT INSTRUCTIONS - HE
[facility] Bariatric Care  Nutritional Guidelines  Gastric Sleeve 6 Months Post Op    General Guidelines and Helpful Hints:    Eat 3 meals per day + protein supplement(s). No snacks between meals.  o Do not skip meals.  This can cause overeating at the next meal and will prevent adequate protein and nutritional intake.    Aim for 60-80 grams of protein per day.   o Always eat your protein first. This assists with optimal nutrition and helps you stay full longer.  o To achieve daily protein goals, it is recommended to drink approved protein supplement between meals.    Follow appropriate portion size: Use measuring cups to be accurate.    Months Post Op Portion Size per Meal   6 months 1/2 cup   7-8 months 1/2 - 2/3 cup   8 -9 months 2/3 - 3/4 cup   10-12 months 3/4 - 1 cup   12 months and beyond 1 cup maximum       Continue to use saucer/salad plates, infant/toddler silverware to keep portion sizes small and take small bites.    Eat S-L-O-W-L-Y to make each meal last 20-30 minutes. Always stop eating when satisfied.    Continue to use caution with foods containing skins, peels or membranes. Chew well!    Aim for 64 oz. of calorie-free fluids daily.  o Continue to avoid caffeine, carbonation and alcohol.  o Remember to avoid drinking during meals, 15-30 minutes before and 30 minutes after.    Aim for 30-60 minutes of physical activity most days of the week.    If having trouble tolerating meat, try using a crock-pot, tinfoil tent, steamer or other moist cooking method to create tender meats. Add broth or low-fat gravy to help meat stay moist.     Avoid high sugar and high fat foods to prevent high calorie intake. This will reduce your rate of weight loss.  o Check nutrition labels for less than 10 grams of sugar and less than 10 grams of fat per serving.    Continue Taking Vitamins/Minerals:  o 9265-7734 mcg of Sublingual B-12 daily  o 1 Multivitamin with Iron twice daily (chewable or swallow tabs)  o 500-600 mg  Calcium Citrate twice daily (chewable or swallow tabs)  o 5000 IU Vitamin D3 daily    Sample Grocery List    Protein:    Fat free Greek or light yogurt (less than 10 grams sugar)    Fat free or low-fat cottage cheese    String cheese or reduced fat cheese slices    Tuna, salmon, crab, egg, or chicken salad made with light or fat free mayonnaise    Egg or Egg Substitute    Lean/extra lean turkey, beef, bison, venison (ground, sirloin, round, flank)    Pork loin or tenderloin (grilled, baked, broiled)    Fish such as salmon, tuna, trout, tilapia, etc. (grilled, baked, broiled)    Tender cuts of lean (skinless) turkey or chicken    Lean deli meats: turkey, lean ham, chicken, lean roast beef    Beans such as kidney, garbanzo, black, oneal, or low-fat/fat free refried beans    Peanut butter (natural preferred). Limit to 1 Tbsp. per day.    Low-fat meatloaf (made with lean ground beef or turkey)    Sloppy Joes made with low-sugar ketchup and lean ground beef or turkey    Soy or vegetable protein (i.e. vegan crumbles, soy/veggie burger, tofu)    Hummus    Vegetables:    Fresh: cooked or raw (as tolerated)    Frozen vegetables    Canned vegetables (low sodium or no salt added, rinse before cooking/eating)    (Ok to have skins/peels/membranes/seeds - just chew well)    Fruits:    Fresh fruit    Frozen fruit (no sugar added)    Canned fruit (packed in its own juice, NOT syrup)    (Ok to have skins/peels/membranes/seeds - just chew well)    Starch:    Unsweetened whole-grain hot cereal (or high fiber cold cereal, dry)    Toasted whole wheat bread or Raymond Thins    Whole grain crackers    Baked/boiled/mashed potato/sweet potato    Cooked whole grain pasta, brown rice, or other cooked whole grains    Starchy vegetables: corn, peas, winter squash    Protein Supplement:     Ready to drink protein shake with:  o 15-30 grams protein per serving  o Less than 10 grams total carbohydrate per serving     Protein powder mixed  with:  o  Skim or 1% milk  o Low fat or fat free Lactaid milk, plain or no sugar added soymilk  o Water     Fats: (use in moderation)    1 teaspoon of soft tub margarine    1 teaspoon olive oil, canola oil, or peanut oil    1 tablespoon of low-fat ricardo or salad dressing     Sample Menu for 6 months after Gastric Sleeve    You do NOT need to eat/drink the full portion sizes listed below  Always stop when you are satisfied  Breakfast 6 Tbsp. 1% cottage cheese   2 Tbsp. peaches    Lunch 2 oz. lean hamburger or veggie burger  1-2 tsp. salsa   Supplement Approved Protein Shake   (Have between meals throughout the day)   Dinner 6 Tbsp. chicken breast  1-2 Tbsp. green beans     Breakfast 2 Eggs or   cup scrambled egg substitute product   Lunch 7 Tbsp. low-fat Sloppy Saji mixture   2 high fiber crackers   Supplement Approved Protein Shake   (Have between meals throughout the day)   Dinner 6 Tbsp. grilled, broiled, or baked lemon pepper salmon  2 Tbsp. asparagus     Breakfast 6 Tbsp. light yogurt with 2 Tbsp. Grape Nuts or high fiber cereal    Lunch   cup of chili made with extra lean ground beef and kidney beans   Dinner 5 Tbsp. pork loin made in a crock pot  2 Tbsp. cooked broccoli  1 Tbsp. baked potato with 2 sprays of spray margarine    Supplement Approved Protein Shake   (Have between meals throughout the day)     Breakfast 6 Tbsp. lean ham  2 Tbsp. seedless melon   Lunch 7 Tbsp. diced turkey with 1 teaspoon low-fat gravy  1 Tbsp. green beans   Dinner 6 Tbsp. extra lean ground beef mixed with low sugar spaghetti sauce  2 Tbsp. whole wheat pasta   Supplement Approved Protein Shake   (Have between meals throughout the day)     Breakfast 2 oz turkey or soy based sausage izzy    Lunch 6 Tbsp. low-fat cottage cheese  2 Tbsp. pineapple   Supplement Approved Protein Shake   (Have between meals throughout the day)   Dinner 7 Tbsp. beef tenderloin  1 Tbsp. asparagus     Breakfast 1/2 of a whole wheat English Muffin (toasted)  1  Tbsp. creamy natural peanut butter   Lunch   cup of black bean soup with 1 Tbsp. low fat shredded cheese   Dinner 7 Tbsp. low-fat turkey meat loaf   1 Tbsp. cooked carrots   Supplement Approved Protein Shake   (Have between meals throughout the day)     Breakfast 6 Tbsp. scrambled egg or scrambled egg substitute  1 Tbsp. finely chopped bell pepper  1 Tbsp. low fat shredded cheese   Lunch 7 Tbsp. lean diced ham  1 Tbsp. mandarin oranges   Supplement Approved Protein Shake   (Have between meals throughout the day)   Dinner 6 Tbsp. flaked fish   2 Tbsp. mashed sweet potato

## 2021-06-04 NOTE — PROGRESS NOTES
Bariatric Care Clinic Follow Up Visit for Previous Bariatric Surgery   Date of visit: 2019  Physician: Yesenia Riley MD  Primary Care is Kehr, Kristen, PA-C.  Lilian Quinones   44 y.o.  female    Date of Surgery: 2019  Initial Weight: 264 pounds  Initial BMI: 40.74  Today's Weight:   Wt Readings from Last 1 Encounters:   19 207 lb (93.9 kg)     Body mass index is 31.94 kg/m .  Weight: 207 lb (93.9 kg)       Assessment and Plan   Assessment: Lilian is a 44 y.o. year old female who is 6 months s/p  Sleeve Gastrectomy with Dr. Enciso.  They have had a durable weight loss of 61 lbs since surgery.  Overall compliance with the [facility] Bariatric Surgery Program has been poor- she missed her 3 month follow up with our dietician..    Lilian Quinones feels that she has achieved the goal(s) identified pre-operatively. She is grieving and just trying to get by day by day.       Plan:    1. Postoperative intestinal malabsorption  Patient is taking all vitamins as directed.  Recent routine postoperative labs were ordered and she will do them today.    2. Class 1 obesity due to excess calories with serious comorbidity and body mass index (BMI) of 31.0 to 31.9 in adult  Patient was congratulated on her success thus far. Healthy habits to assist with further weight loss were discussed. Written information was given.     3. Grief Reaction  As expected, she is struggling.  She has started Celexa and will be seeing a counselor starting tomorrow.  Encourage some light exercise as this can also be a stress reliever.  At this point she is just trying to get by day by day.    >25 min spent with patient, >50 % spent in counseling and coordination of care     Bariatric Surgery Review   Interim History/LifeChanges: Patient's son  2 weeks ago.  It appears to have been a suicide. She is grieving but states she has tremendous support with friends and family.    Patient Concerns: No concerns regarding the  surgery    Hunger 1-10: Not discussed    GERD none    Medication changes: no recent    Vitamin Intake:   Multivitamin   2 with iron   Vitamin D  Was taking 5000 IU, started 10,000 IU 2 weeks ago   Calcium  citrate   Vit. B-12    SL     Habits:            Alcohol Intake  none   NSAID Use  some this past summer   Caffeine Use  1 cup of coffee per day   Exercise  none currently, more walking and gardening during the summer   CPAP Use:  na   Birth Control  mirena IUD   Tobacco Use     no            Symptoms  Hair Loss: Yes  Reactive Hypoglycemia: No  Abdominal Pain: No  Nausea: No  Heartburn: No  Constipation: No  Diarrhea: No  Trouble Breathing or Chest Pain: No  Leg Swelling: No  Skin rashes under folds: No                         LABS: ordered      LABS:  Lab Results   Component Value Date    WBC 7.1 03/05/2019    HGB 14.2 03/05/2019    HCT 42.0 03/05/2019    MCV 88 03/05/2019     03/05/2019      Lab Results   Component Value Date    KIZXEMIU28SF 20.2 (L) 03/05/2019    Lab Results   Component Value Date    HGBA1C 5.3 03/05/2019      Lab Results   Component Value Date    CHOL 213 (H) 03/05/2019    Lab Results   Component Value Date    PTH 86 03/05/2019         Lab Results   Component Value Date    FERRITIN 22 03/05/2019      Lab Results   Component Value Date    HDL 61 03/05/2019      Lab Results   Component Value Date    ZLTZENLT77 439 03/05/2019    No results found for: 75209   Lab Results   Component Value Date    LDLCALC 122 03/05/2019    Lab Results   Component Value Date    TSH 2.12 03/05/2019    Lab Results   Component Value Date    FOLATE 9.9 03/05/2019      Lab Results   Component Value Date    TRIG 150 (H) 03/05/2019    Lab Results   Component Value Date    ALT 18 03/05/2019    AST 17 03/05/2019    ALKPHOS 58 03/05/2019    BILITOT 0.3 03/05/2019    No results found for: TESTOSTERONE     No components found for: CHOLHDL No results found for: 7597   @[facility](vitamin a: 1)@             Patient Profile  "  Social History     Social History Narrative     Not on file        Past Medical History   Past Medical History:   Diagnosis Date     Atypical mole      HAO (generalized anxiety disorder)      Knee pain     right     Overweight      PONV (postoperative nausea and vomiting)      Patient Active Problem List   Diagnosis     Morbid obesity due to excess calories (H)     Current Outpatient Medications   Medication Sig     citalopram (CELEXA) 20 MG tablet 1/2 tablet daily x 6 days, then increase to 1 tablet daily     cyanocobalamin, vitamin B-12, 1,000 mcg Subl Place 1,000 mcg under the tongue daily.     cyclobenzaprine (FLEXERIL) 5 MG tablet      gabapentin (NEURONTIN) 250 mg/5 mL solution Take 5 mL (250 mg total) by mouth every 8 (eight) hours as needed.     levonorgestrel (MIRENA) 20 mcg/24 hours (5 yrs) 52 mg IUD 1 each by Intrauterine route.     LORazepam (ATIVAN) 1 MG tablet Take 1 mg by mouth.     pediatric multivitamin (FLINTSTONES) Chew chewable tablet Chew 1 tablet 2 (two) times a day.     ursodiol (ACTIGALL) 300 mg capsule Take 1 capsule (300 mg total) by mouth 2 (two) times a day. Start 2 wks after surgery. Do not cut, crush or open. Take with warm liquids.     ursodiol (ACTIGALL) 300 mg capsule Take by mouth.       Past Surgical History  She has a past surgical history that includes Reduction mammaplasty (2008); Breast biopsy; and pr lap, rufino restrict proc, longitudinal gastrectomy (N/A, 6/24/2019).     Examination   /74   Pulse 76   Resp 16   Ht 5' 7.5\" (1.715 m)   Wt 207 lb (93.9 kg)   SpO2 98%   BMI 31.94 kg/m    Height: 5' 7.5\" (1.715 m) (12/4/2019 11:40 AM)  Initial Weight: 261 lbs (6/10/2019  3:00 PM)  Weight: 207 lb (93.9 kg) (12/4/2019 11:40 AM)  Weight loss from initial: -3 (6/10/2019  3:00 PM)  % Weight loss: -1.15 % (6/10/2019  3:00 PM)  BMI (Calculated): 31.9 (12/4/2019 11:40 AM)  SpO2: 98 % (12/4/2019 11:40 AM)  Waist Circumference (In): 42.75 Inches (3/5/2019 10:47 AM)  Hip " Circumference (In): 49 Inches (3/5/2019 10:47 AM)  Neck Circumference (In): 16 Inches (3/5/2019 10:47 AM)  NSAIDS: No (3/5/2019 10:47 AM)    General:  Alert and ambulatory,   HEENT:  No conjunctival pallor, moist mucous Membranes, neck is without LAD  Pulmonary:  Normal respiratory effort, no cough, no audible wheezes/crackles.  CV:  Regular rate and Rhythm, no murmurs  Abdominal: Scars well healed, BS normal,soft, NT without rebound or guarding  Extremities: no edema  Skin:  No rashes  Pscyh/Mood: grieving. She denies suicide ideation.         Counseling:   We reviewed the important post op bariatric recommendations:  -eating 3 meals daily  -eating protein first, getting >60gm protein daily  -eating slowly, chewing food well  -avoiding/limiting calorie containing beverages  -drinking water 15-30 minutes before or after meals  -choosing wheat, not white with breads, crackers, pastas, shawn, bagels, tortillas, rice  -limiting restaurant or cafeteria eating to twice a week or less    We discussed the importance of restorative sleep and stress management in maintaining a healthy weight.  We discussed the National Weight Control Registry healthy weight maintenance strategies and ways to optimize metabolism.  We discussed the importance of physical activity including cardiovascular and strength training in maintaining a healthier weight.  We discussed the importance of life-long vitamin supplementation and life-long  follow-up.    Lilian was reminded that, to avoid marginal ulcers she should avoid tobacco at all, alcohol in excess, caffeine in excess, and NSAIDS (unless indicated for cardioprotection or othewise and opposed by a PPI).    OMERO Riley MD  [facility].  12/4/2019  11:51 AM      Much or all of the text in this note was generated through the use of Dragon Dictate voice-to-text software. Errors in spelling or words which seem out of context are unintentional. Sound alike errors, in  particular, may have escaped editing.        No images are attached to the encounter.

## 2021-06-18 NOTE — LETTER
Letter by Yesenia Riley MD at      Author: Yesenia Riley MD Service: -- Author Type: --    Filed:  Encounter Date: 2/12/2019 Status: (Other)       2/12/2019      Lilian BOWMANManish Quinones  [address] [address] [facility] [address]      Dear Myla Quintana and thank you for your interest in the bariatric program at [facility]!     Your appointment is scheduled at our [place] Office on Tuesday March 5, 2019 at 11:00 AM with Dr. OMERO Riley.  We ask that you arrive 45 minutes prior to your appointment time to complete your registration.   We strive to avoid clinic delays for our patients, so patients arriving late will need to reschedule.    Your first appointment will take about two hours.     In preparation for this appointment you will need to bring the following:      Your insurance card and photo identification    Completed health history form (Available through [employer]).  Please make sure that you bring this form with you completed. There will not be time to complete this in the office so we will need to reschedule if you forget to do this.     All your medications in their original containers, including over-the-counter medications.    Any lab results that you have had done within the last 6 months.     If you are interested in our surgical program; call your insurance company prior to this visit, to verify that your specific insurance plan covers Weight-Loss Surgery and what your out-of-pocket costs may be.     Your appointment has been scheduled at our [place] Office- [address] [place] [address], [address], [place] [address]. [phone].    If you find yourself unable to keep this appointment, please call us to reschedule at your earliest convenience so we can accommodate other patients.    We are excited that you have chosen our program and look forward to serving you!    Sincerely,  The [facility] Bariatric Team

## 2021-06-24 NOTE — PROGRESS NOTES
I met with Lilian while she was in the clinic for her initial bariatric consult with Dr. Riley.  I reached out to her [facility] and was told that she has no plan exclusions and there is no specific number of SWL visits.  We did discuss her need to be cleared by our nutrition and psych teams.  She did have her labs drawn yesterday after her visit and does plan to attend the support group meeting this month.

## 2021-06-24 NOTE — PATIENT INSTRUCTIONS - HE
Before being submitted for insurance approval, you will need the following:    -Clearance by the Psychologist  -Clearance by the dietitian  -Attend Support Group ([address] [place] [address] at [facility]) 2nd Tuesday of the month 6:30-8pm. Make sure to sign in.  -Routine Health Care Maintenance must be up to date (mammograms yearly after age 40, paps as recommended by your primary provider,  colonoscopy after age 50, earlier if high risk.  -If you are on estrogen-estrogen will be discontinued one month prior to surgery. It may be resumed one month after surgery unless otherwise advised.  -Pre Operative Lab work-ordered today  -Structured weight loss visits IF mandated by your insurance carrier  -Surgeon consult  -You will need to be using CPAP for at least one month before surgery if you have sleep apnea. Make sure to bring your CPAP or BiPAP to the hospital at the time of surgery.  -You will need to be tobacco free for 2 months before surgery and remain a non-smoker thereafter. If you are currently smoking or have recently quit, your urine will be evaluated for tobacco metabolites pre-operatively.  -If you are on insulin, you might be referred to an endocrinologist who will manage your insulin during the liquid diet and around the time of surgery. This endocrinologist does not replace your primary provider or your endocrinologist.   -You will need an exercise plan which includes MOVE, ie., walking and MUSCLE, ie.,calisthenics, bands, weight, machines, etc...  ______________________________________________________________________    Remember that after your bariatric surgery, vitamin supplementation is a lifelong need.    You will take:    B-12 1000mcg or higher sublingual (under the tongue) daily or by injection 1-2X/month  D3 5000U daily   Multivitamin containing 18mg of iron twice a day  Calcium citrate 1 or 2 daily    To keep your weight off and your vitamin levels up, follow-up is important.    Your labs will be  monitored every 6 months for the first two years (every 3 months if you had a duodenal switch) and yearly thereafter.    To avoid ulcers in your stomach avoid tobacco forever, alcohol in excess, caffeine in excess and anti-inflammatories (NSAIDS)  (Aspirin, Ibuprofen, Naproxen and similar medications). Tylenol is fine.  If you are told by your physician take Aspirin to protect your heart or for another reason, make sure to take omeprazole or similar medication (protonix, nexium, prevacid) to protect your stomach.    Remember that alcohol affects you differently after bariatric surgery. If you have even ONE drink DO NOT DRIVE.

## 2021-06-24 NOTE — PROGRESS NOTES
Outpatient Bariatric Medicine Consultation  Indication: Medical Bariatric Consultation to [facility]  Primary Provider: Kehr, Kristen, PA-C  Requesting Physician: Dr. Enciso  Type of Bariatric Surgery: RNY gastric bypass    Impression Lilian Quinones is a 43 y.o. year old female with hyperhidrosis, knee pain, shoulder pain, history of gestational diabetes and morbid obesity who presents for medical bariatric consultation.     Poor functional capacity and musculoskeletal disability due to morbid obesity which satisfies NIH criteria for bariatric surgery. Her BMI is Body mass index is 40.74 kg/m ..    Lilian Quinones hopes to achieve the ability to move around better and increased energy following surgery and significant weight loss.    BARIATRIC RECOMMENDATIONS  Bariatric therapy is indicated for Lilian Quinones as a means of modifying her obesity related co-morbidities.  Therapeutic lifestyle changes have not lead to significant and or durable weight loss.  Surgical bariatric therapy is most likely to induce significant weight loss, promote long-term weight maintenance and lead to clinical improvement and/or resolution of her weight related co-morbidities.   -Medical Nutrition Therapy is indicated including comprehensive guidance of nutrition and lifestyle management.  -A Bariatric Psychological Assessment and clearance is indicated.  -Screening Bariatric Laboratory studies are indicated.  -Currency of Routine Healthcare Maintenance is indicated.  -Physical Activity Guidance was provided. Follow up of recommendations will be provided.    PERIOPERATIVE RECOMMENDATIONS  CARDIAC: Cardiac consultation and clearance will be required of patients with significant cardiac disease and/or multiple cardiac risk factors.  PULMONARY: Pre-operative therapy with CPAP/BIPAP is indicated for a minimum of one month for patients with sleep apnea. Complete tobacco abstinence for two months pre-operatively and  indefinitely thereafter is required.   RENAL: Diuretics will be discontinued 2 weeks before surgery at the time of liquid diet if the patient is on them at that time.  ENDOCRINE: Optimizing perioperative glycemic control is indicated. Our goal is an AIC of 8 or less at the time of surgery for optimal healing. Patients using insulin will be referred to a [facility] endocrinologist for perioperative insulin management.  GASTROINTESTINAL: Evaluation of the esophagus and stomach by EGD and/or UGI series will be considered in patients with severe GERD, previous weight loss surgery, or other indication.  GYNECOLOGIC: For patients on Estrogen- Estrogen will be discontinued 4 weeks prior to surgery and resumed 4 weeks after surgery unless otherwise advised. Reliable contraception is required post-operatively for 1 year for women of childbearing age. DEPOT PROVERA is contraindicated due to its association with weight gain. Post-operative birth control plan is to be decided- considering IUD  MUSCULOSKELETAL/RHEUMATOLOGIC: NSAIDS are contraindicated following surgery and lifelong abstinence is indicated. When indicated for cardioprotection or otherwise, patients should use an enteric coated ASA and concomitant proton pump inhibitor.  HEMATOLOGIC: Risks of deep venous thromboembolism have been assessed. Patients with history of DVT/PE or current anti-coagulation will be placed on the High Risk DVT Prophylaxis Protocol. Objections (if any) to receiving blood products if necessary have been documented.  DENTAL: Reasonable and functional dentition is indicated in order to chew food to applesauce consistency post-operatively.  NUTRITIONAL: Pre and post-operative nutritional and lifestyle modification guidance is indicated. Pre-operative weight reduction can reduce liver volume, improving technical aspects of surgery.    > 45 minutes spent with pt, > 50 % spent in counseling    History Surrounding Consultation  Struggles with weight  "started at age 23  Her weight at age 16 was 140#  She has had several past supervised and unsupervised weight loss attempts  The most weight lost was: unsure  Unfortunately there was not durable weight maintenance.  History of bulimia, anorexia, or binge eating disorder? no  If Present has eating disorder been in remission at least 3 years? na    Dietary History  Meals per day: 3  Snacks: varies  Typical Snack: chips  Who does the grocery shopping? Patient and   Who does the cooking? Patient and   A Typical meal includes: B: oatmeal with walnuts and craisons L: leftovers  D: protein and starch and veggies  Regular Pop: none  Juice: none  Caffeine: coffee, 1 cup per day with heavy whipping cream  Amount of restaurant eating per week: 1  Eating at the table with the TV off? yes    Physical Activity Patterns  Current physical activity routine includes: shoveling    Limitations from being physically active on a regular basis includes: time, knee pain    She describes her general health as: good    Past Medical History  HTN: no  Dyslipidemia: yes  ZEINAB: not tested  Obesity Hypoventilation: NO  DM2: no DX: na Most recent AIC: na  Neuropathy: no  Nephropathy: no  Kidney Stones: no  Ophthalmopathy: no  IFG or \"pre-DM\": no  MI: no  CVA:no  CHF: no  Heart Valves: no  Previous cardiac testing includes: no  Cancers: no  DVT: no  PE: no  Colitis: no  Crohn's: no  IBS: no  PUD: no  UGI/endoscopy: no  Fatty Liver: no  Abnormal LFTs: no  Hepatitis: no  Asthma: no  Bronchitis: no  Pneumonia: no  Other Lung Problems: no  Back Pain:no  DDD: no  Gout: no  Fibromyalgia: no  USI: no  Severe Headaches: no  Seizures: no If so, last seizure: na  Pseudotumor: no  PCOS: no  Hirsutism: no  Menstrual Irregularity: no  Menorrhagia: yes  Infertility: no  Thyroid problems: no  Thyroid medications: no  Glaucoma: no  HIV positive: NO  MRSA/VRE history: no  Hx of lower leg ulcers: no  History of Blood transfusion: no  Anemia: no    Pap " smear: - normal  Mammogram: 3/5/19  Colonoscopy: no    Medications   No current outpatient medications on file prior to visit.     No current facility-administered medications on file prior to visit.      Allergies   Allergies   Allergen Reactions     Amoxicillin Rash     Penicillins Rash     Past Surgical History  Past Surgical History:   Procedure Laterality Date     REDUCTION MAMMAPLASTY       History of problems with anesthesia: nausea and vomitting  History of Malignant Hyperthermia: NO    Gynecological History  Menarche: 12  Regular: yes  Currently: regular  : 3  Para: 0  C-S: 0  Vaginal deliveries: 2  SAB:1  EAB: 0  Gestational DM: yes  Gestational HTN: yes  Preeclampsia: yes  Current Birth Control: none    Family History  Family History   Problem Relation Age of Onset     Breast cancer Mother      Diabetes Father      Depression Sister        Social History  Social History     Socioeconomic History     Marital status:      Spouse name: None     Number of children: None     Years of education: None     Highest education level: None   Occupational History     None   Social Needs     Financial resource strain: None     Food insecurity:     Worry: None     Inability: None     Transportation needs:     Medical: None     Non-medical: None   Tobacco Use     Smoking status: Never Smoker     Smokeless tobacco: Never Used   Substance and Sexual Activity     Alcohol use: No     Frequency: Never     Drug use: No     Sexual activity: Yes     Partners: Male   Lifestyle     Physical activity:     Days per week: None     Minutes per session: None     Stress: None   Relationships     Social connections:     Talks on phone: None     Gets together: None     Attends Bahai service: None     Active member of club or organization: None     Attends meetings of clubs or organizations: None     Relationship status: None     Intimate partner violence:     Fear of current or ex partner: None     Emotionally  "abused: None     Physically abused: None     Forced sexual activity: None   Other Topics Concern     None   Social History Narrative     None       Addiction History  Smoking History: No  Started smoking: na Quit smoking: na Total years of tobacco use: na  Alcohol use: Yes- 1-2 drinks per month  Current or Past history of alcohol or substance abuse: no  Last used: na  Chemical Dependency Treatment History: no  Chemicals: na    Psychiatric History  Diagnoses: anxiety (stressfull job)  Treated by: none  Psychiatric Hospitalizations: no  ECT: no  Panic attacks: no  History of Abuse: no    Palliative Medicine History  Involvement in a pain clinic: no    Dental History  Missing teeth: 2  Pending Dental Work: no  Regular Dental Visits: yes  Dentures/Partials: no      ROS  General  Fatigue: yes  Sleep Quality:good, uses melatonin and zzzquil  HEENT  Visual changes: no  Cardiovascular  Chest Pain with Exertion: no  Dyspnea with Exertion: yes  Palpitations: no  Lower Extremity Edema: no  Syncope: no  Pulmonary  Shortness of breath at rest: no  Snoring: no  STOP BANG score: 2  Butler Score: 4  Witnessed Apnea: no  Wheezing: no  CPAP use: no  Gastrointestinal  Trouble swallowing:no  Heartburn: no  Abdominal pain: no  Hematochezia: no  Urologic  Hesitancy: no  Urgency: no  Neurologic  Severe headache:no  Psychiatric  Moods Stable: yes  Rheumatologic  Myalgias: yes  Arthralgias: yes  Endocrine  Polydipsia: no  Polyuria: no  Galactorrhea: no  Heat intolerance: yes  Musculoskeletal  Back Pain: no  Knee Pain: yes  Foot Pain: no  Hip Pain: no  Falls: no  Use of cane, crutch or motorized scooter: no  Hematologic  Abnormal Bleeding or Clotting: no  Dermatologic  Furuncless: no  Acne: no  Intertrigo: no        Physical Exam  Vitals: BP (!) 128/92 (Patient Site: Left Arm, Patient Position: Sitting, Cuff Size: Adult Large)   Pulse 72   Ht 5' 7.5\" (1.715 m)   Wt (!) 264 lb (119.7 kg)   Breastfeeding? No   BMI 40.74 kg/m    Weight: " "  Wt Readings from Last 4 Encounters:   03/05/19 (!) 264 lb (119.7 kg)     Height: 5' 7.5\" (1.715 m) (3/5/2019 10:47 AM)  Initial Weight: 261 lbs (3/5/2019 10:47 AM)  Weight: (!) 264 lb (119.7 kg) (3/5/2019 10:47 AM)  Weight loss from initial: -3 (3/5/2019 10:47 AM)  % Weight loss: -1.15 % (3/5/2019 10:47 AM)  BMI (Calculated): 40.7 (3/5/2019 10:47 AM)  Waist Circumference (In): 42.75 Inches (3/5/2019 10:47 AM)  Hip Circumference (In): 49 Inches (3/5/2019 10:47 AM)  Neck Circumference (In): 16 Inches (3/5/2019 10:47 AM)  NSAIDS: No (3/5/2019 10:47 AM)    BMI: Body mass index is 40.74 kg/m .    General Appearance  No acute distress. Obesity: morbid  Alert: yes  HEENT  PERRLA  Neck  Stout:  No carotid bruits  Cardiovascular  Rhythm regular  Murmur: no  Pulmonary  Lungs clear to ascultation  Abdomen  No rashes.   Post surgical Scars: absent  Extremities:  Pitting edema: no  Varicose Veins: no  Neurologic  Tremors: no  Psychiatric  Thought Content Organized  Moods stable  Endocrine  Moon Facies: NO  Dorsal Thoracic Prominence: NO  Skin Tags: no  Acanthosis nigricans: no  Dermatologic  Intertrigo: no    We reviewed the important post op bariatric recommendations:  -eating 3 meals daily  -eating protein first, getting >60gm protein daily  -eating slowly, chewing food well  -avoiding/limiting calorie containing beverages  -drinking water 15-30 minutes before or after meals  -choosing wheat, not white with breads, crackers, pastas, shawn, bagels, tortillas, rice  -limiting restaurant or cafeteria eating to twice a week or less    We discussed the importance of restorative sleep and stress management in maintaining a healthy weight.  We discussed the National Weight Control Registry healthy weight maintenance strategies and ways to optimize metabolism.  We discussed the importance of physical activity including cardiovascular and strength training in maintaining a healthier weight.  We discussed the importance of lifelong " follow-up.    Lilian Quinones was reminded that, postoperatively,  to avoid marginal ulcers she should avoid tobacco at all, alcohol in excess, caffeine in excess, and NSAIDS (unless indicated for cardioprotection or othewise and opposed by a PPI).           Much or all of the text in this note was generated through the use of Dragon Dictate voice-to-text software. Errors in spelling or words which seem out of context are unintentional. Sound alike errors, in particular, may have escaped editing.                                    Answers for HPI/ROS submitted by the patient on 2/26/2019   BARIATRIC NEW PATIENT  Interested in Surgery?: Yes  [place] About?: Family/Friend  Barriers to learning:: No  Attended Seminar?: No  Email for Newsletter:: [email or web address]  PCP:: Kirsten Kehr  Specialist:: none  Mental Health Provider:: DENA  Present Ht:: 67  Present Wt:: 270  Age Overweight:: 23  Age 100lbs:: 28  Wt 18yrs:: 140  Wt 5yrs ago:: 240  # of Wt Loss Efforts:: 5+  Prescribed Wt Loss Meds?: No  OTC Wt Loss Meds?: No  Surgeon Choice:: undecided  Procedure Choice:: Mahnaz-en Y gastric by-pass  Program1:: diet and exercise  Program2:: Medifast  Program3:: Weight Watchers  Program4:: Keto  Program5:: Atkins

## 2021-08-11 ENCOUNTER — MYC MEDICAL ADVICE (OUTPATIENT)
Dept: NEUROSURGERY | Facility: CLINIC | Age: 46
End: 2021-08-11

## 2021-08-11 DIAGNOSIS — M54.12 CERVICAL RADICULOPATHY: Primary | ICD-10-CM

## 2021-08-11 NOTE — TELEPHONE ENCOUNTER
Per Melissa Clemens, CNP: Okay to order repeat injection.     Order placed with [facility] as this is where patient has gone previously.

## 2021-08-11 NOTE — TELEPHONE ENCOUNTER
Repeat Injection Request    Type of injection: C7-T1 interlaminar WENCESLAO     Most recent injection date: 10/16/20    How long did injection provide symptomatic relief: patient states that she got about 6 months of relief from previous injection.     Current symptoms: Pain is located in her neck and shoulder, also radiates down to her arm. Same pain as prior to previous injection     Number of injections in last 12 months: 2    Plan:     Patient voiced understanding and agreement with plan.     Advised patient to call back with any questions or concerns.

## 2021-08-31 ENCOUNTER — RADIOLOGY INJECTION OFFICE VISIT (OUTPATIENT)
Dept: PALLIATIVE MEDICINE | Facility: CLINIC | Age: 46
End: 2021-08-31
Payer: COMMERCIAL

## 2021-08-31 VITALS — OXYGEN SATURATION: 98 % | DIASTOLIC BLOOD PRESSURE: 81 MMHG | HEART RATE: 61 BPM | SYSTOLIC BLOOD PRESSURE: 142 MMHG

## 2021-08-31 DIAGNOSIS — M54.12 CERVICAL RADICULOPATHY: ICD-10-CM

## 2021-08-31 PROCEDURE — 62321 NJX INTERLAMINAR CRV/THRC: CPT | Performed by: PAIN MEDICINE

## 2021-08-31 ASSESSMENT — PAIN SCALES - GENERAL: PAINLEVEL: SEVERE PAIN (7)

## 2021-08-31 NOTE — NURSING NOTE
Discharge Information    IV Discontiued Time:  NA    Amount of Fluid Infused:  NA    Discharge Criteria = When patient returns to baseline or as per MD order    Consciousness:  Pt is fully awake    Circulation:  BP +/- 20% of pre-procedure level    Respiration:  Patient is able to breathe deeply    O2 Sat:  Patient is able to maintain O2 Sat >92% on room air    Activity:  Moves 4 extremities on command    Ambulation:  Patient is able to stand and walk or stand and pivot into wheelchair    Dressing:  Clean/dry or No Dressing    Notes:   Discharge instructions and AVS given to patient    Patient meets criteria for discharge?  YES    Admitted to PCU?  No    Responsible adult present to accompany patient home?  Yes    Signature/Title:    bertha stanley RN  RN Care Coordinator  [facility] [place]

## 2021-08-31 NOTE — PROGRESS NOTES
[facility] - Procedure Note        Procedure performed: C7-T1 interlaminar epidural steroid injection with fluoroscopic guidance  Diagnosis: Cervical spondylosis; Cervical radiculitis/radiculopathy  : Chema Levi MD  Anesthesia: none    Indications: Lilian Quinones is a 44 year old female who is seen for cervical epidural steroid injection. The patient describes left-sided neck pain into her upper extremity. The patient has been exhibiting symptoms consistent with cervical intraspinal inflammation and radiculopathy. Symptoms have been persistent, disabling, and intermittently severe. The patient reports minimal improvement with conservative treatment, including meds/PT/prior procedures    Cervical MRI   Segmental analysis:  C2-C3: No spinal or neural foraminal stenosis.     C3-C4: Shallow symmetric disc bulge with effacement of the ventral  thecal sac and minimal mass effect on the ventral cord without cord  signal change. Minimal uncovertebral and facet arthropathy. No  significant neural foraminal stenosis.     C4-C5: Shallow symmetric disc bulge with effacement of the ventral  thecal sac and minimal mass effect of the ventral cord without cord  signal change. Minimal uncovertebral and facet arthropathy. No  significant neural foraminal stenosis.     C5-C6: Shallow symmetric disc bulge with effacement of the ventral  thecal sac and minimal mass effect on the ventral cord without cord  signal change. Minimal uncovertebral and facet arthropathy. No  significant neural foraminal stenosis.     C6-C7: There is a large left central/lateral recess disc extrusion  that extends partially into the left neural foramen (series 9 image  26). The herniated disc material measures approximately 10 mm  transverse x 6 mm AP x 10 mm craniocaudal (series 6 image 10, series  10 image 45). The disc herniation indents the left ventral aspect of  the cord and leads to mild to moderate central spinal  stenosis and  moderate to severe left neural foraminal stenosis. The right neural  foramen is patent. There is mild uncovertebral and facet arthropathy.     C7-T1: No spinal or neural foraminal stenosis.                                                                      IMPRESSION: Degenerative changes of the cervical spine, most  pronounced at C6-C7 where there is a left central/lateral recess disc  extrusion that partially extends into the left neural foramen. Please  see the body of the report for additional details.     Allergies:      Allergies   Allergen Reactions     Amoxicillin Rash     Penicillins Rash        Vitals:  There were no vitals taken for this visit.    Review of Systems: The patient denies recent fever, chills, illness, use of antibiotics or anticoagulants. All other 10-point review of systems negative.     Procedure: The procedure and risks were explained, and informed written consent was obtained from the patient. Risks include but are not limited to: infection, bleeding, increased pain, and damage to soft tissue, nerve, muscle, and vasculature structures. After getting informed consent, patient was brought into the procedure suite and was placed in a prone position on the procedure table. A Pause for the Cause was performed. Patient was prepped and draped in sterile fashion.     The C7-T1 interspace was identified with use of fluoroscopy in AP view. A 25-gauge, 1.5 inch needle was used to anesthetize the skin and subcutaneous tissue entry site with a total of 2 ml of 1% lidocaine. Under fluoroscopic visualization, a 22-gauge, 3.5 inch Tuohy epidural needle was slowly advanced towards the epidural space a few millimeters left of midline. The latter part of the needle advancement was guided with fluoroscopy in the lateral view. The epidural space was identified using loss of resistance technique. After negative aspiration for heme and cerebrospinal fluid, a total of 1 mL of Omnipaque was  injected to confirm needle placement. 9 mL of contrast was wasted. Epidurogram confirmed spread within the posterior epidural space. 2 ml of 10mg/ml of dexamethasone and 1 ml of preservative free 0.25% bupivacaine was injected. The needle was removed.  Images were saved to PACS.    The patient tolerated the procedure well, and there was no evidence of procedural complications. No new sensory or motor deficits were noted following the procedure. The patient was stable and able to ambulate on discharge home. Post-procedure instructions were provided.     Pre-procedure pain score: 8/10 in the neck, 8/10 in the arm  Post-procedure pain score: 5/10 in the neck, 5/10 in the arm    Assessment/Plan: Lilian Quinones is a 44 year old female s/p cervical interlaminar epidural steroid injection today for cervical spondylosis and radiculitis/radiculopathy.     1. Following today's procedure, the patient was advised to contact the [facility] for any of the following:   Fever, chills, or night sweats   New onset of pain, numbness, or weakness   Any questions/concerns regarding the procedure  If unable to contact the Pain Center, the patient was instructed to go to a local Emergency Room for any complications.   2. The patient will receive a follow-up call in 1 week.   3. Follow-up with referring provider in 2 weeks for post-procedure evaluation.    Chema Levi MD   Pain Management

## 2021-08-31 NOTE — NURSING NOTE
Pre-procedure Intake    Have you been fasting? NA    If yes, for how long?     Are you taking a prescribed blood thinner such as coumadin, Plavix, Xarelto?    No    If yes, when did you take your last dose?     Do you take aspirin?  No    If cervical procedure, have you held aspirin for 6 days?   NA    Do you have any allergies to contrast dye, iodine, steroid and/or numbing medications?  NO    Are you currently taking antibiotics or have an active infection?  NO    Have you had a fever/elevated temperature within the past week? NO    Are you currently taking oral steroids? NO    Do you have a ? Yes       Are you pregnant or breastfeeding?  NO    Have you received the COVID-19 vaccine? No       If yes, was it your 1st, 2nd or only dose needed?     Date of most recent vaccine:     Notify provider and RNs if systolic BP >170, diastolic BP >100, P >100 or O2 sats < 90%    Chandni Eller MA

## 2021-08-31 NOTE — PATIENT INSTRUCTIONS
[facility]   Procedure Discharge Instructions    Today you saw:  Dr. Chema Levi     You had an:  Epidural steroid injection  -cervical      Medications used:  Lidocaine   Bupivacaine   Dexamethasone Omnipaque  Normal saline          Be cautious as numbness and/or weakness in the upper extremities may occur for up to 6-8 hours after the procedure due to effect of the local anesthetic    Do not drive for 6 hours. The effect of the local anesthetic could slow your reflexes.     You may resume your regular activities after 24 hours    Avoid strenuous activity for the first 24 hours    You may shower, however avoid swimming, tub baths or hot tubs for 24 hours following your procedure    You may have a mild to moderate increase in pain for several days following the injection.    It may take up to 14 days for the steroid medication to start working although you may feel the effect as early as a few days after the procedure.       You may use ice packs for 10-15 minutes, 3 to 4 times a day at the injection site for comfort    Do not use heat to painful areas for 6 to 8 hours. This will give the local anesthetic time to wear off and prevent you from accidentally burning your skin.     Unless you have been directed to avoid the use of anti-inflammatory medications (NSAIDS), you may use medications such as ibuprofen, Aleve or Tylenol for pain control if needed.     If you were fasting, you may resume your normal diet and medications after the procedure    If you have diabetes, check your blood sugar more frequently than usual as your blood sugar may be higher than normal for 10-14 days following a steroid injection. Contact your doctor who manages your diabetes if your blood sugar is higher than usual    Possible side effects of steroids that you may experience include flushing, elevated blood pressure, increased appetite, mild headaches and restlessness.  All of these symptoms will get  better with time.    If you experience any of the following, call the Pain Clinic during work hours (Mon-Friday 8-4:30 pm) at [phone] or the Provider Line after hours at [phone]:  -Fever over 100 degree F  -Swelling, bleeding, redness, drainage, warmth at the injection site  -Progressive weakness or numbness in your legs or arms  -Loss of bowel or bladder function  -Unusual headache that is not relieved by Tylenol or other pain reliever  -Unusual new onset of pain that is not improving

## 2021-09-02 RX ORDER — DEXAMETHASONE SODIUM PHOSPHATE 10 MG/ML
10 INJECTION INTRAMUSCULAR; INTRAVENOUS ONCE
Status: COMPLETED | OUTPATIENT
Start: 2021-09-02 | End: 2021-09-02

## 2021-09-02 RX ADMIN — DEXAMETHASONE SODIUM PHOSPHATE 10 MG: 10 INJECTION INTRAMUSCULAR; INTRAVENOUS at 15:22

## 2021-09-15 ENCOUNTER — ANCILLARY PROCEDURE (OUTPATIENT)
Dept: MAMMOGRAPHY | Facility: CLINIC | Age: 46
End: 2021-09-15
Attending: PHYSICIAN ASSISTANT
Payer: COMMERCIAL

## 2021-09-15 DIAGNOSIS — Z12.31 VISIT FOR SCREENING MAMMOGRAM: ICD-10-CM

## 2021-09-15 PROCEDURE — 77067 SCR MAMMO BI INCL CAD: CPT | Mod: TC | Performed by: RADIOLOGY

## 2021-10-01 DIAGNOSIS — F32.A DEPRESSION, UNSPECIFIED DEPRESSION TYPE: ICD-10-CM

## 2021-10-01 RX ORDER — DIAZEPAM 5 MG
2.5-5 TABLET ORAL DAILY PRN
Qty: 20 TABLET | Refills: 0 | Status: SHIPPED | OUTPATIENT
Start: 2021-10-01 | End: 2021-12-15

## 2021-10-01 NOTE — TELEPHONE ENCOUNTER
----- Message from Michelle Neff RN sent at 10/1/2021 11:08 AM CDT -----  Regarding: FW: Jeffs pt - resend refills to new pharmacy  Contact: [phone]    ----- Message -----  From: Arielle Boothe  Sent: 10/1/2021  11:02 AM CDT  To: [facility]iat[facility]  Subject: Viola's pt - resend refills to new pharmacy    Hi Team,    Pt is going to be leaving today to [place]. They are wondering if RN can contact them back within 30mins about calling in the prescription. They need meds to be sent to a new pharmacy and for the old [facility] pharmacy in [place] to be removed from their chart.    Medication: diazepam (VALIUM) 5 MG tablet  NEW Location: [facility] - [address] [place] [id number]    Phone: [phone]  Okay to [facility].    Thank you,  Arielle

## 2021-10-01 NOTE — TELEPHONE ENCOUNTER
--Rx sent to alternate pharmacy as per pt request.  --Placed outgoing phone call to pt. No answer; LVM with update.   --Spoke with [facility] - [place], [place] - [address]  and cancelled order for Valium sent earlier today.

## 2021-10-06 ENCOUNTER — VIRTUAL VISIT (OUTPATIENT)
Dept: PSYCHIATRY | Facility: CLINIC | Age: 46
End: 2021-10-06
Attending: PSYCHIATRY & NEUROLOGY
Payer: COMMERCIAL

## 2021-10-06 DIAGNOSIS — F33.1 MODERATE EPISODE OF RECURRENT MAJOR DEPRESSIVE DISORDER (H): Primary | ICD-10-CM

## 2021-10-06 DIAGNOSIS — F43.10 PTSD (POST-TRAUMATIC STRESS DISORDER): ICD-10-CM

## 2021-10-06 PROBLEM — F33.9 MAJOR DEPRESSION, RECURRENT (H): Status: ACTIVE | Noted: 2021-10-06

## 2021-10-06 PROCEDURE — 99214 OFFICE O/P EST MOD 30 MIN: CPT | Mod: 95 | Performed by: PSYCHIATRY & NEUROLOGY

## 2021-10-06 RX ORDER — SERTRALINE HYDROCHLORIDE 100 MG/1
TABLET, FILM COATED ORAL
Qty: 30 TABLET | Refills: 0 | Status: SHIPPED | OUTPATIENT
Start: 2021-10-06 | End: 2021-11-03

## 2021-10-06 ASSESSMENT — PAIN SCALES - GENERAL: PAINLEVEL: NO PAIN (0)

## 2021-10-06 NOTE — PATIENT INSTRUCTIONS
- Start sertraline 50mg daily for 2 weeks, then if tolerating, increase to 100mg daily    To be able to figure out whether this medication is helpful, please keep track of your symptoms in a calendar journal, marking a number for mood level at least once a week after starting the medication. This can help to identify subtle trends in mood over time.       **For crisis resources, please see the information at the end of this document**     Patient Education      Thank you for coming to the [facility] [place] [facility].    Lab Testing:  If you had lab testing today and your results are reassuring or normal they will be mailed to you or sent through [employer] within 7 days. If the lab tests need quick action we will call you with the results. The phone number we will call with results is # [phone] (home) [phone] (work). If this is not the best number please call our clinic and change the number.    Medication Refills:  If you need any refills please call your pharmacy and they will contact us. Our fax number for refills is [phone]. Please allow three business for refill processing. If you need to  your refill at a new pharmacy, please contact the new pharmacy directly. The new pharmacy will help you get your medications transferred.     Scheduling:  If you have any concerns about today's visit or wish to schedule another appointment please call our office during normal business hours [phone] (8-5:00 M-F)    Contact Us:  Please call [phone] during business hours (8-5:00 M-F).  If after clinic hours, or on the weekend, please call  [phone].    Financial Assistance [phone]  [facility] Billing [phone]  Central Billing Office, [facility]: [phone]  [facility] Billing [phone]  Medical Records [phone]  [facility] Patient Bill of Rights [email or web address]       MENTAL HEALTH  CRISIS NUMBERS:  For a medical emergency please call  911 or go to the nearest ER.     [facility]:   [facility] -[phone]   Crisis Residence [facility] -[phone]   Walk-In Counseling Center [facility] -[phone]   COPE 24/7 [place] Mobile Team -[phone] (adults)/[phone] (child)  CHILD: [facility] needs assessment team - [phone]      The Medical Center:   [facility] - [phone]   Walk-in counseling [facility] - [phone]   Walk-in counseling [facility] - [phone]   Crisis Residence [facility] - [phone]  Urgent Care Adult Mental Xqjkhj-[phone] mobile unit/ 24/7 crisis line    National Crisis Numbers:   National Suicide Prevention Lifeline: 7-394-626-TALK (391-177-4802)  Poison Control Center - 1-447.586.1568  [employer].com/resources for a list of additional resources (SOS)  Trans Lifeline a hotline for transgender people 1-835.989.4538  The Veto Project a hotline for LGBT youth 1-163.375.9199  Crisis Text Line: For any crisis 24/7   To: 378425  see www.crisistextline.org  - IF MAKING A CALL FEELS TOO HARD, send a text!         Again thank you for choosing [facility] M[facility]AL HEALTH & ADDICTION [facility] and please let us know how we can best partner with you to improve you and your family's health.    You may be receiving a survey regarding this appointment. We would love to have your feedback, both positive and negative. The survey is done by an external company, so your answers are anonymous.

## 2021-10-06 NOTE — PROGRESS NOTES
"VIDEO VISIT  Lilian Quinones is a 46 year old patient that has consented to receive services via billable video visit.      The patient has been notified of following:   \"This video visit will be conducted via a call between you and your physician/provider. We have found that certain health care needs can be provided without the need for an in-person physical exam. This service lets us provide the care you need with a video conversation. If a prescription is necessary we can send it directly to your pharmacy. If lab work is needed we can place an order for that and you can then stop by our lab to have the test done at a later time. Insurers are generally covering virtual visits as they would in-office visits so billing should not be different than normal.  If for some reason you do get billed incorrectly, you should contact the billing office to correct it and that number is in the AVS .    Video Conference to be completed via:  [employer]    If patient is unable to join the video via [employer], they would prefer that the provider send them a video invitation via:   Text to cell phone: [phone]      How would patient like to obtain AVS?:  [employer]   Video invitation was sent to [phone] per patient/guardian request.     Telehealth Details  Type of service:  video visit for consult  Date of service: 10/06/2021  Time of service:    Start Time:  9:12 AM    End Time:  9:37 AM    Reason for video visit:  Services only offered telehealth  Originating Site (patient location):  Patient's home  Distant Site (provider location): [facility] PSYCHIATRY  Mode of Communication:  Video conference via [employer]            [facility]  Psychiatry Clinic  Rapid Access Brief Treatment [RABT]  MEDICAL PROGRESS NOTE     Lilian Quinones is a 46 year old. Initial consultation on 08/05/20. Referred by Kristen M Kehr for evaluation of depression.   History was provided by the patient who was a good historian.   Psych " pertinent item history includes trauma hx.    Assessment & Plan    Lilian Quinones is a 46 year old female who provides a history supporting the following diagnoses:  Major depressive disorder, recurrent, moderate  PTSD  Complex grief    Pertinent History: Lilian does not report any history of psychiatric diagnoses prior to the death of her son in 11/2019. Since that time, she has contended with complex grief and trauma symptoms which have included elements of depression and anxiety. Her grief has been severe, and has included elements of generalized anxiety, panic, and trauma symptoms. She likely met criteria for PTSD at some point. How she is in an adjustment phase, but still with significant depressive symptoms, unclear how persistent, recurrent this will be.   TODAY: Last time I spoke with Lilian, she felt like things were more normal. Still in pain and grief, but not as severe as before. Currently, things are severe. She is impaired by her symptoms, she does not feel well. They had to sell their house because the memory of their son was too great there.   She stopped all medications because she was not sure they were helping. She still feels like they didn't really do much. But objectively, things seem much worse now. I am concerned that the benefits of the medications may have been present, but subtle in comparison to the anxiety issues, which were not fully addressed, but likely worse now.   I advised her to start sertraline at this time, in addition to needing significant trauma oriented therapy, but that is harder to accomplish. I don't think this will improve without significant therapy support, so I will try to find options for her.   I requested that she journal symptoms weekly on a blank calendar to try to get an impression of the subtle benefits of the medications, which can be hard to detect. She agreed to this.   She continues to require diazepam to control anxiety, which we discussed is not ideal  and not sustainable. It is not bad to use the diazepam, but it is the opposite of an intervention that would help her achieve her goal of being okay. She can continue to use it, but it must absolutely not be the only intervention she has available. Her symptoms will not be at goal until she no longer needs the diazepam. Goal would be to be off of it within 6 months.     It is hard to gauge how effective the citalopram has been in the context of her current situation. The absence of more severe depressive symptoms is notable, and could indicate efficacy of the antidepressant, but at the same time, it is clearly not sufficient to aid with her anxiety symptoms alone.   She can continue to use diazepam as needed for the time being, and the goal will be to move towards discontinuing it in the coming months.     PSYCHOTROPIC DRUG INTERACTIONS: None   MANAGEMENT:  N/A     Plan     1) PSYCHOTROPIC MEDICATIONS:  - Start sertraline 50mg daily for 2 weeks, then if tolerating, increase to 100mg daily    - May take diazepam 2.5mg daily as needed, continue to minimize use, with intention to discontinue within a few months.     - Taking melatonin 10mg at bedtime as needed for sleep  - Taking diphenhydramine (Zzzquil) 50mg QHS PRN for sleep    [see the following SmartPhrase(s) for more information: PSYMEDINFOBUPROPION - PSYMEDINFOBENZOS]    2) THERAPY: Psychotherapy is a primary recommendation for the treatment of mood symptoms, even in the context of grief.   Previously engaged in therapy with grief counselor, doing EMDR.   Sent potential referral message for trauma therapy at this time.     3) NEXT DUE:   Labs- Routine monitoring is not indicated for current psychotropic medication regimen   ECG- Routine monitoring is not indicated for current psychotropic medication regimen   Rating Scales- N/A    4) REFERRALS: None    5) : None    6) DISPOSITION: 4 weeks or sooner if needed    Treatment Risk Statement:  The  patient understands the risks, benefits, adverse effects and alternatives. Agrees to treatment with the capacity to do so. No medical contraindications to treatment. Agrees to call clinic for any problems. The patient understands to call 911 or go to the nearest ED if life threatening or urgent symptoms occur. Crisis numbers are provided routinely in the After Visit Summary.       PROVIDER: Joss Rod MD       Interval History    Tx plan update  Things have been hard recently. Yesterday was Reese's birthday, and the day of her death is coming up.   She felt like she couldn't keep going the way she was going, so is on a trip currently in [place], needed to get away from the house where she was struggling with the memories there.   She went off of all of the medications, didn't feel that they were really helping. She is not seeing a therapist at the moment, but is not sure that she won't again. Things are getting harder at the moment, maybe just since it's been almost 2 years.   On top of all of that, her job is really stressful at the moment. 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 But it does keep her occupied which is important. She is working remotely now. Being in person was hard because she couldn't really put on the face she needed to all the time, it was exhausting. But then going remote is a loss too because that was her work family, and she needed support.   They sold their house and bought a fifth wheel and are just traveling. They are still based out of [place], and do intend to come back and buy something at some point.   Denies any suicidal thoughts.        Discussion points from past appointments:   Has not been having issues with tiredness since being on bupropion.   Notes that she still tends to use diazepam with triggers, like when her son's friends email her. Social media has quite a few triggers. This year is particularly hard especially on social media as he would have  "been graduating.   She isn't sure if the medications are working at times. She still has anxiety, and doesn't expect it to go away, understands that grief if an ongoing process, and can last the rest of her life. Her goal would be that the medications would take the edge off, so she is able to work more.   Anxiety continues to be an issue especially in the mornings. She does occasionally take the diazepam (Valium) for this in the mornings when it spikes. Has not been taking it every day, but does note that it helps with that heavy feeling in her chest.     Recent Substance Use  Alcohol- None  Tobacco- None  Caffeine- 1 cups/day of coffee  Opioids- None  Narcan Kit- N/A  Cannabis- Has tried CBD but didn't really help.   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   LIVING SITUATION / RELATIONSHIPS- Lives at home with . Son technically lives at home, but isn't around very often. Has 2 dogs and 2 cats.    SOCIAL/ SPIRITUAL SUPPORT- \"Absolutely\", but still feels very lonely since her loss.   FEELS SAFE AT HOME- Yes     Medical Review of Systems    Dizziness/orthostasis- Has in the past, but generally no.   Headaches- No.   GI- No. Has lost 100 lbs since gastric sleeve surgery last year.   Has chronic pain related to bulging disc, gets shots once or twice per year.      Psych Summary Points   08/2020 - Started propranolol 40mg BID  10/2020 - Started bupropion. Decreased propranolol to 20mg due to drug intx and low HR.   11/2020 - Completed ADHD evaluation. Determined that acute symptoms are more likely due to PTSD than to ADHD.   01/2021 - Increased bupropion to 300mg, but anxiety increased, so decreased back down.   02/2021 - Tried buspirone after nephew passed away, felt that it increased anxiety.   04/2021 - Changed propranolol to PRN and discontinued bupropion     Psychiatric Medication Trials       Drug /  Start Date Dose (mg) Helpful " Adverse Effects DC Reason / Date   Citalopram 11/2019 40 probably     Bupropion XL 10/2020 300 yes Anxiety / agitation at 300 Helped with concentration and energy   Buspirone 2/2021 5 BID no Increased anxiety    gabapentin       Benadryl       Lorazepam 11/2019 2 daily PRN yes     Diazepam 08/2020 10 yes sedating    Propranolol 08/2020 40 BID probably     melatonin          Past Medical History      CARE TEAM:   PCP- Kristen M. Kehr with [facility]  Therapist- Alejandra Lui at [facility] - Grief Counselor - Will be starting EMDR.     Neurologic Hx [head injury, seizures, etc.]: Concussion from skiing injury in 2012.   Patient Active Problem List   Diagnosis     Overweight     Atypical mole     Right knee pain, unspecified chronicity     HAO (generalized anxiety disorder)     Hyperhydrosis disorder     Past Medical History:   Diagnosis Date     IUD (intrauterine device) in place 03/20/2019    Mirena      Allergies    Amoxicillin and Penicillins     Medications      Current Outpatient Medications   Medication Sig Dispense Refill     cyanocobalamin (VITAMIN B-12) 1000 MCG SUBL sublingual tablet Place 1,000 mcg under the tongue daily       diazepam (VALIUM) 5 MG tablet Take 0.5-1 tablets (2.5-5 mg) by mouth daily as needed for anxiety Short term medication, goal to minimize use and discontinue by 4/2022. 20 tablet 0     diphenhydrAMINE HCl, Sleep, (ZZZQUIL PO) Take 50 mg by mouth nightly as needed (for sleep)       levonorgestrel (MIRENA) 20 MCG/24HR IUD 1 each by Intrauterine route once       melatonin 5 MG tablet Take 10 mg by mouth nightly as needed for sleep       Pediatric Multi Vit-Extra C-FA (FLINTSTONES/EXTRA C) CHEW Take 2 tablets by mouth        Physical Exam  (Vitals Only)   There were no vitals taken for this visit.    Pulse Readings from Last 5 Encounters:   08/31/21 61   10/16/20 (!) 44   02/04/20 60   12/30/19 75   12/04/19 76     Wt Readings from Last 5 Encounters:   12/30/19 92.5  kg (204 lb)   12/04/19 93.9 kg (207 lb)   11/25/19 96.2 kg (212 lb)   11/22/19 94.2 kg (207 lb 9.6 oz)   10/17/19 98 kg (216 lb)     BP Readings from Last 5 Encounters:   08/31/21 (!) 142/81   10/16/20 117/65   02/04/20 104/50   12/30/19 104/70   12/04/19 130/74      Mental Status Exam   Alertness: alert  and oriented  Appearance: adequately groomed  Behavior/Demeanor: cooperative and calm, with good eye contact   Speech: normal and regular rate and rhythm  Language: intact and no problems  Psychomotor: normal or unremarkable  Mood: Has been very difficult, very high anxiety recently  Affect: full range and appropriate; was congruent to mood; was congruent to content  Thought Process/Associations: unremarkable  Thought Content:  Reports none;  Denies suicidal ideation, violent ideation and delusions  Perception:  Reports none;  Denies auditory hallucinations and visual hallucinations  Insight: intact  Judgment: intact  Cognition: does  appear grossly intact; formal cognitive testing was not done  Gait and Station: not observed     Labs and Data      PHQ-9 SCORE 10/28/2020 11/17/2020 1/27/2021   PHQ-9 Total Score MyChart 12 (Moderate depression) 12 (Moderate depression) 14 (Moderate depression)   PHQ-9 Total Score 12 12 14     HAO-7 SCORE 10/28/2020 11/17/2020 1/27/2021   Total Score 13 (moderate anxiety) 11 (moderate anxiety) 14 (moderate anxiety)   Total Score 13 11 14       Recent Labs   Lab Test 12/04/19  1223 06/19/19  1740 03/05/19  1232   CR 0.79 0.72 0.80   GFRESTIMATED >60 >90 >60     Recent Labs   Lab Test 12/04/19  1223 06/19/19  1740   AST 12 15   ALT <9 33   ALKPHOS 50 53     Recent Labs   Lab Test 03/05/19  1232 03/05/19  0000 09/19/16  0935   TSH 2.12 2.12 2.50     ECG 6/19/19 QTc = 414ms      Psychiatry Individual Psychotherapy Note   Psychotherapy start time - 9:22 AM  Psychotherapy end time - 9:37 AM    Date last reviewed - 10/06/21  Subjective: This supportive psychotherapy session addressed issues  related to goals of therapy and current psychosocial stressors.   Interactive complexity indicated? No  Plan: RTC in timeframe noted above  Psychotherapy services during this visit included myself and the patient.     PROVIDER: Lance Rod MD

## 2021-10-09 ENCOUNTER — HEALTH MAINTENANCE LETTER (OUTPATIENT)
Age: 46
End: 2021-10-09

## 2021-11-03 ENCOUNTER — VIRTUAL VISIT (OUTPATIENT)
Dept: PSYCHIATRY | Facility: CLINIC | Age: 46
End: 2021-11-03
Attending: PSYCHIATRY & NEUROLOGY
Payer: COMMERCIAL

## 2021-11-03 DIAGNOSIS — F33.1 MODERATE EPISODE OF RECURRENT MAJOR DEPRESSIVE DISORDER (H): Primary | ICD-10-CM

## 2021-11-03 DIAGNOSIS — F43.10 PTSD (POST-TRAUMATIC STRESS DISORDER): ICD-10-CM

## 2021-11-03 PROCEDURE — 90833 PSYTX W PT W E/M 30 MIN: CPT | Mod: 95 | Performed by: PSYCHIATRY & NEUROLOGY

## 2021-11-03 PROCEDURE — 99214 OFFICE O/P EST MOD 30 MIN: CPT | Mod: 95 | Performed by: PSYCHIATRY & NEUROLOGY

## 2021-11-03 RX ORDER — SERTRALINE HYDROCHLORIDE 100 MG/1
150 TABLET, FILM COATED ORAL DAILY
Qty: 45 TABLET | Refills: 0 | Status: SHIPPED | OUTPATIENT
Start: 2021-11-03 | End: 2021-11-12

## 2021-11-03 NOTE — PROGRESS NOTES
Telehealth Details  Type of service:  video visit for medication management  Date of service: 11/03/2021  Time of service:    Start Time:  9:08 AM    End Time:  9:35 AM    Reason for video visit:  Services only offered telehealth  Originating Site (patient location):  Patient's home  Distant Site (provider location): [facility]  Mode of Communication:  Video conference via oneD[employer]            [facility]  Psychiatry Clinic  MEDICAL PROGRESS NOTE     Lilian Quinones is a 46 year old. Initial consultation on 08/05/20. Referred by Kristen M Kehr for evaluation of depression.      Assessment & Plan    History and interview support the following diagnoses:  Major depressive disorder, recurrent, moderate  PTSD  Complex grief    This is a difficult time of year for Lilian. The two year anniversary of her son's passing is on 11/12. She feels ready to start trauma therapy but is aware there is yet a wait list and I am working to clarify a general timeframe for what she can expect here.   She did have initial anxiety with the sertraline, but feels like it evened out a bit and may be helping now, noting that she despite really struggling, she is doing better than she feels like she might have given this anniversary.   She uses diazepam sparingly to help with anxiety. She is aware that this is not a sustainable solution. It is not bad to use the diazepam, but it is the opposite of an intervention that would help her achieve her goal of being okay. She can continue to use it, but it must not be the only intervention she has available. Her symptoms will not be at goal until she no longer needs the diazepam. Goal would be to be off of it within 6 months.     PSYCHOTROPIC DRUG INTERACTIONS: None   MANAGEMENT:  N/A     Plan     1) PSYCHOTROPIC MEDICATIONS:  - Increase to sertraline 150mg daily    - May take diazepam 2.5mg daily as needed, continue to minimize use, with intention to discontinue within a  few months.     - Taking melatonin 10mg at bedtime as needed for sleep  - Taking diphenhydramine (Zzzquil) 50mg QHS PRN for sleep    [see the following SmartPhrase(s) for more information: PSYMEDINFOBUPROPION - PSYMEDINFOBENZOS]    2) THERAPY: Psychotherapy is a primary recommendation for the treatment of mood symptoms, even in the context of grief.   Previously engaged in therapy with grief counselor, doing EMDR.   Was added to the waitlist for trauma therapy on 10/15/21.     3) NEXT DUE:   Labs- Routine monitoring is not indicated for current psychotropic medication regimen   ECG- Routine monitoring is not indicated for current psychotropic medication regimen   Rating Scales- N/A    4) REFERRALS: None    5) : None    6) DISPOSITION: 6 weeks or sooner if needed    Treatment Risk Statement:  The patient understands the risks, benefits, adverse effects and alternatives. Agrees to treatment with the capacity to do so. No medical contraindications to treatment. Agrees to call clinic for any problems. The patient understands to call 911 or go to the nearest ED if life threatening or urgent symptoms occur. Crisis numbers are provided routinely in the After Visit Summary.       PROVIDER: Joss Rod MD       Pertinent Background   Lilian does not report any history of psychiatric diagnoses prior to the death of her son on 11/12/2019. Since that time, she has contended with complex grief and trauma symptoms which have included elements of depression and anxiety. Her grief has been severe, and has included elements of generalized anxiety, panic, and trauma symptoms. She likely met criteria for PTSD at some point. How she is in an adjustment phase, but still with significant depressive symptoms, unclear how persistent, recurrent this will be.      Interval History    Tx plan update  Lilian notes that at first she did feel like the Zoloft may have made her feel very anxious, but things have gotten better  since then. Did note that there were other stressors.   This is the month that her son , so this has been a really hard time for her. Despite things being hard overall, things do seem to have gotten better.   They are still trying to figure out a new lifestyle. She is still working full time remotely which is important for keeping her busy.   She only uses the diazepam when things get very severe, maybe 2-3x per week. She does try to minimize this.        Discussion points from past appointments:   Has not been having issues with tiredness since being on bupropion.   Notes that she still tends to use diazepam with triggers, like when her son's friends email her. Social media has quite a few triggers. This year is particularly hard especially on social media as he would have been graduating.   She isn't sure if the medications are working at times. She still has anxiety, and doesn't expect it to go away, understands that grief if an ongoing process, and can last the rest of her life. Her goal would be that the medications would take the edge off, so she is able to work more.   Anxiety continues to be an issue especially in the mornings. She does occasionally take the diazepam (Valium) for this in the mornings when it spikes. Has not been taking it every day, but does note that it helps with that heavy feeling in her chest.     Recent Substance Use  Alcohol- None  Tobacco- None  Caffeine- 1 cups/day of coffee  Opioids- None  Narcan Kit- N/A  Cannabis- Has tried CBD but didn't really help.   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   LIVING SITUATION / RELATIONSHIPS- Currently living and working remotely. Sold their house because it was too much of a reminder of their son. Thinking about buying another house now. Has 2 dogs and 2 cats.  Son lives in [place], recently had a child. Youngest son is in [employer] in [place]. Daughter is in the  "[employer], had a child in 2021.   SOCIAL/ SPIRITUAL SUPPORT- \"Absolutely\", but still feels very lonely since her loss.   FEELS SAFE AT HOME- Yes     Medical Review of Systems    Dizziness/orthostasis- Has in the past, but generally no.   Headaches- No.   GI- No. Has lost 100 lbs since gastric sleeve surgery last year.   Has chronic pain related to bulging disc, gets shots once or twice per year.      Psych Summary Points   08/2020 - Started propranolol 40mg BID  10/2020 - Started bupropion. Decreased propranolol to 20mg due to drug intx and low HR.   11/2020 - Completed ADHD evaluation. Determined that acute symptoms are more likely due to PTSD than to ADHD.   01/2021 - Increased bupropion to 300mg, but anxiety increased, so decreased back down.   02/2021 - Tried buspirone after nephew passed away, felt that it increased anxiety.   04/2021 - Changed propranolol to PRN and discontinued bupropion     Psychiatric Medication Trials       Drug /  Start Date Dose (mg) Helpful Adverse Effects DC Reason / Date   Citalopram 11/2019 40 probably     Sertraline 10/2021 100 yes Initial anxiety    Bupropion XL 10/2020 300 yes Anxiety / agitation at 300 Helped with concentration and energy   Buspirone 2/2021 5 BID no Increased anxiety    gabapentin       Benadryl       Lorazepam 11/2019 2 daily PRN yes     Diazepam 08/2020 10 yes sedating    Propranolol 08/2020 40 BID probably     melatonin          Past Medical History      CARE TEAM:   PCP- Kristen M. Kehr with [facility]  Therapist- Alejanrda Lui at [facility] - Grief Counselor - Will be starting EMDR.     Neurologic Hx [head injury, seizures, etc.]: Concussion from skiing injury in 2012.   Patient Active Problem List   Diagnosis     Overweight     Atypical mole     Right knee pain, unspecified chronicity     HAO (generalized anxiety disorder)     Hyperhydrosis disorder     Major depression, recurrent (H)     Past Medical History:   Diagnosis Date     IUD " (intrauterine device) in place 03/20/2019    Mirena      Allergies    Amoxicillin and Penicillins     Medications      Current Outpatient Medications   Medication Sig Dispense Refill     cyanocobalamin (VITAMIN B-12) 1000 MCG SUBL sublingual tablet Place 1,000 mcg under the tongue daily       diazepam (VALIUM) 5 MG tablet Take 0.5-1 tablets (2.5-5 mg) by mouth daily as needed for anxiety Short term medication, goal to minimize use and discontinue by 4/2022. 20 tablet 0     diphenhydrAMINE HCl, Sleep, (ZZZQUIL PO) Take 50 mg by mouth nightly as needed (for sleep)       levonorgestrel (MIRENA) 20 MCG/24HR IUD 1 each by Intrauterine route once       melatonin 5 MG tablet Take 10 mg by mouth nightly as needed for sleep       Pediatric Multi Vit-Extra C-FA (FLINTSTONES/EXTRA C) CHEW Take 2 tablets by mouth       sertraline (ZOLOFT) 100 MG tablet Take 0.5 tablets (50 mg) by mouth daily for 14 days, THEN 1 tablet (100 mg) daily. 30 tablet 0      Physical Exam  (Vitals Only)   There were no vitals taken for this visit.    Pulse Readings from Last 5 Encounters:   08/31/21 61   10/16/20 (!) 44   02/04/20 60   12/30/19 75   12/04/19 76     Wt Readings from Last 5 Encounters:   12/30/19 92.5 kg (204 lb)   12/04/19 93.9 kg (207 lb)   11/25/19 96.2 kg (212 lb)   11/22/19 94.2 kg (207 lb 9.6 oz)   10/17/19 98 kg (216 lb)     BP Readings from Last 5 Encounters:   08/31/21 (!) 142/81   10/16/20 117/65   02/04/20 104/50   12/30/19 104/70   12/04/19 130/74      Mental Status Exam   Alertness: alert  and oriented  Appearance: adequately groomed  Behavior/Demeanor: cooperative and calm, with good eye contact   Speech: normal and regular rate and rhythm  Language: intact and no problems  Psychomotor: normal or unremarkable  Mood: Still very anxious and having a hard time, but doing a little better  Affect: full range and appropriate; was congruent to mood; was congruent to content  Thought Process/Associations: unremarkable  Thought  Content:  Reports none;  Denies suicidal ideation, violent ideation and delusions  Perception:  Reports none;  Denies auditory hallucinations and visual hallucinations  Insight: intact  Judgment: intact  Cognition: does  appear grossly intact; formal cognitive testing was not done  Gait and Station: not observed     Labs and Data      PHQ-9 SCORE 10/28/2020 11/17/2020 1/27/2021   PHQ-9 Total Score MyChart 12 (Moderate depression) 12 (Moderate depression) 14 (Moderate depression)   PHQ-9 Total Score 12 12 14     HAO-7 SCORE 10/28/2020 11/17/2020 1/27/2021   Total Score 13 (moderate anxiety) 11 (moderate anxiety) 14 (moderate anxiety)   Total Score 13 11 14       Recent Labs   Lab Test 12/04/19  1223 06/19/19  1740 03/05/19  1232   CR 0.79 0.72 0.80   GFRESTIMATED >60 >90 >60     Recent Labs   Lab Test 12/04/19  1223 06/19/19  1740   AST 12 15   ALT <9 33   ALKPHOS 50 53     Recent Labs   Lab Test 03/05/19  1232 03/05/19  0000 09/19/16  0935   TSH 2.12 2.12 2.50     ECG 6/19/19 QTc = 414ms      Psychiatry Individual Psychotherapy Note   Psychotherapy start time - 9:08 AM  Psychotherapy end time - 9:25 AM    Date last reviewed - 10/06/21  Subjective: This supportive psychotherapy session addressed issues related to goals of therapy and current psychosocial stressors.   Interactive complexity indicated? No  Plan: RTC in timeframe noted above  Psychotherapy services during this visit included myself and the patient.     PROVIDER: Lance Rod MD

## 2021-12-15 ENCOUNTER — VIRTUAL VISIT (OUTPATIENT)
Dept: PSYCHIATRY | Facility: CLINIC | Age: 46
End: 2021-12-15
Attending: PSYCHIATRY & NEUROLOGY
Payer: COMMERCIAL

## 2021-12-15 ENCOUNTER — TELEPHONE (OUTPATIENT)
Dept: PSYCHIATRY | Facility: CLINIC | Age: 46
End: 2021-12-15
Payer: COMMERCIAL

## 2021-12-15 DIAGNOSIS — F43.10 PTSD (POST-TRAUMATIC STRESS DISORDER): ICD-10-CM

## 2021-12-15 DIAGNOSIS — F33.1 MODERATE EPISODE OF RECURRENT MAJOR DEPRESSIVE DISORDER (H): Primary | ICD-10-CM

## 2021-12-15 PROCEDURE — 99214 OFFICE O/P EST MOD 30 MIN: CPT | Mod: 95 | Performed by: PSYCHIATRY & NEUROLOGY

## 2021-12-15 RX ORDER — DIAZEPAM 5 MG
2.5-5 TABLET ORAL DAILY PRN
Qty: 20 TABLET | Refills: 0 | Status: SHIPPED | OUTPATIENT
Start: 2021-12-15 | End: 2022-05-01

## 2021-12-15 RX ORDER — SERTRALINE HYDROCHLORIDE 100 MG/1
200 TABLET, FILM COATED ORAL DAILY
Qty: 60 TABLET | Refills: 1 | Status: SHIPPED | OUTPATIENT
Start: 2021-12-15 | End: 2022-02-07

## 2021-12-15 ASSESSMENT — PAIN SCALES - GENERAL: PAINLEVEL: MODERATE PAIN (4)

## 2021-12-15 NOTE — PROGRESS NOTES
"VIDEO VISIT  Lilian Quinones is a 46 year old patient that has consented to receive services via billable video visit.      The patient has been notified of following:   \"This video visit will be conducted via a call between you and your physician/provider. We have found that certain health care needs can be provided without the need for an in-person physical exam. This service lets us provide the care you need with a video conversation. If a prescription is necessary we can send it directly to your pharmacy. If lab work is needed we can place an order for that and you can then stop by our lab to have the test done at a later time. Insurers are generally covering virtual visits as they would in-office visits so billing should not be different than normal.  If for some reason you do get billed incorrectly, you should contact the billing office to correct it and that number is in the AVS .    Patient will join video visit via:  Exploret[employer] (Patient / guardian confirmed to join via Exploretrip)    If patient attempts to join the video via Exploretrip at appointment start time, but is unable to, they would prefer that the provider send them a video invitation via:   Send to preferred e-mail: [email or web address]      How would patient like to obtain AVS?:  Exploretrip    Telehealth Details  Type of service:  video visit for medication management  Date of service: 12/15/2021  Time of service:    Start Time:  1:46 PM    End Time:  1:56 PM    Reason for video visit:  Services only offered telehealth  Originating Site (patient location):  Patient's home  Distant Site (provider location): [facility] PSYCHIATRY  Mode of Communication:  Video conference via [employer]  Psychiatry Clinic  MEDICAL PROGRESS NOTE     Lilian Quinones is a 46 year old. Initial consultation on 08/05/20. Referred by Kristen M Kehr for evaluation of depression.      Assessment & Plan    History and interview support the " following diagnoses:  Major depressive disorder, recurrent, moderate  PTSD  Complex grief    This is a difficult time of year for Lilian. The two year anniversary of her son's passing was on 11/12. Overall, considering everything, she does feel that things are relatively stable at the moment. She notes that anxiety seems to be less overall, and does feel like sertraline may be playing a part in this. I did recommend increasing the dose further at this time, and we talked about the process for decreasing the dose again after 6-12 months of symptoms heading in the right direction.   She feels ready to start trauma therapy but is aware there is yet a wait list and I am working to clarify a general timeframe for what she can expect here.     She uses diazepam sparingly to help with anxiety. She is aware that this is not a sustainable solution. It is not bad to use the diazepam, but it is the opposite of an intervention that would help her achieve her goal of being okay. She can continue to use it, but it must not be the only intervention she has available. Her symptoms will not be at goal until she no longer needs the diazepam. Goal would be to be off of it within 6 months.     PSYCHOTROPIC DRUG INTERACTIONS: None   MANAGEMENT:  N/A     Plan     1) PSYCHOTROPIC MEDICATIONS:  - Increase to sertraline 200mg daily    - May take diazepam 2.5mg daily as needed, continue to minimize use, with intention to discontinue within a few months.     - Taking melatonin 10mg at bedtime as needed for sleep  - Taking diphenhydramine (Zzzquil) 50mg QHS PRN for sleep    [see the following SmartPhrase(s) for more information: PSYMEDINFOBUPROPION - PSYMEDINFOBENZOS]    2) THERAPY: Psychotherapy is a primary recommendation for the treatment of mood symptoms, even in the context of grief.   Previously engaged in therapy with grief counselor, doing EMDR.   Was added to the waitlist for trauma therapy on 10/15/21.     3) NEXT DUE:   Labs-  Routine monitoring is not indicated for current psychotropic medication regimen   ECG- Routine monitoring is not indicated for current psychotropic medication regimen   Rating Scales- N/A    4) REFERRALS: None    5) : None    6) DISPOSITION: 2 months or sooner if needed    Treatment Risk Statement:  The patient understands the risks, benefits, adverse effects and alternatives. Agrees to treatment with the capacity to do so. No medical contraindications to treatment. Agrees to call clinic for any problems. The patient understands to call 911 or go to the nearest ED if life threatening or urgent symptoms occur. Crisis numbers are provided routinely in the After Visit Summary.       PROVIDER: Joss Rod MD    Level of Medical Decision Making:   - At least 1 chronic problem that is not stable  - Engaged in prescription drug management during visit (discussed any medication benefits, side effects, alternatives, etc.)        Pertinent Background   Lilian does not report any history of psychiatric diagnoses prior to the death of her son on 11/12/2019. Since that time, she has contended with complex grief and trauma symptoms which have included elements of depression and anxiety. Her grief has been severe, and has included elements of generalized anxiety, panic, and trauma symptoms. She likely met criteria for PTSD at some point. How she is in an adjustment phase, but still with significant depressive symptoms, unclear how persistent, recurrent this will be.      Interval History    Tx plan update  Lilian notes that things have been going okay. No side effects noted with the sertraline.   Feels that things are going as good as can be expected right now.   They will be flying to [place] soon for the holidays, will be staying with their daughter in [place] and bouncing to a few other places.   They are still trying to figure out a new lifestyle. She is still working full time remotely which is important  "for keeping her busy.   She only uses the diazepam when things get very severe, maybe 2-3x per week. She does try to minimize this.        Discussion points from past appointments:   Has not been having issues with tiredness since being on bupropion.   Notes that she still tends to use diazepam with triggers, like when her son's friends email her. Social media has quite a few triggers. This year is particularly hard especially on social media as he would have been graduating.   She isn't sure if the medications are working at times. She still has anxiety, and doesn't expect it to go away, understands that grief if an ongoing process, and can last the rest of her life. Her goal would be that the medications would take the edge off, so she is able to work more.   Anxiety continues to be an issue especially in the mornings. She does occasionally take the diazepam (Valium) for this in the mornings when it spikes. Has not been taking it every day, but does note that it helps with that heavy feeling in her chest.     Recent Substance Use  Alcohol- None  Tobacco- None  Caffeine- 1 cups/day of coffee  Opioids- None  Narcan Kit- N/A  Cannabis- Has tried CBD but didn't really help.   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   LIVING SITUATION / RELATIONSHIPS- Currently living and working remotely. Sold their house because it was too much of a reminder of their son. Thinking about buying another house now. Has 2 dogs and 2 cats.  Son lives in [place], recently had a child. Youngest son is in [employer] in [place]. Daughter is in the [employer], had a child in 2021.   SOCIAL/ SPIRITUAL SUPPORT- \"Absolutely\", but still feels very lonely since her loss.   FEELS SAFE AT HOME- Yes     Medical Review of Systems    Dizziness/orthostasis- Has in the past, but generally no.   Headaches- No.   GI- No. Has lost 100 lbs since gastric sleeve surgery last year.   Has chronic " pain related to bulging disc, gets shots once or twice per year.      Psych Summary Points   08/2020 - Started propranolol 40mg BID  10/2020 - Started bupropion. Decreased propranolol to 20mg due to drug intx and low HR.   11/2020 - Completed ADHD evaluation. Determined that acute symptoms are more likely due to PTSD than to ADHD.   01/2021 - Increased bupropion to 300mg, but anxiety increased, so decreased back down.   02/2021 - Tried buspirone after nephew passed away, felt that it increased anxiety.   04/2021 - Changed propranolol to PRN and discontinued bupropion  11/2021 - Increased sertraline to 150mg.   12/2021 - Increased sertraline to 200mg.      Psychiatric Medication Trials       Drug /  Start Date Dose (mg) Helpful Adverse Effects DC Reason / Date   Citalopram 11/2019 40 probably     Sertraline 10/2021 200 yes Initial anxiety    Bupropion XL 10/2020 300 yes Anxiety / agitation at 300 Helped with concentration and energy   Buspirone 2/2021 5 BID no Increased anxiety    gabapentin       Benadryl       Lorazepam 11/2019 2 daily PRN yes     Diazepam 08/2020 10 yes sedating    Propranolol 08/2020 40 BID probably     melatonin          Past Medical History      CARE TEAM:   PCP- Kristen M. Kehr with [facility]  Therapist- Alejandra Lui at [facility] - Grief Counselor - Will be starting EMDR.     Neurologic Hx [head injury, seizures, etc.]: Concussion from skiing injury in 2012.   Patient Active Problem List   Diagnosis     Overweight     Atypical mole     Right knee pain, unspecified chronicity     HAO (generalized anxiety disorder)     Hyperhydrosis disorder     Major depression, recurrent (H)     Past Medical History:   Diagnosis Date     IUD (intrauterine device) in place 03/20/2019    Mirena      Allergies    Amoxicillin and Penicillins     Medications      Current Outpatient Medications   Medication Sig Dispense Refill     cyanocobalamin (VITAMIN B-12) 1000 MCG SUBL sublingual  tablet Place 1,000 mcg under the tongue daily       diazepam (VALIUM) 5 MG tablet Take 0.5-1 tablets (2.5-5 mg) by mouth daily as needed for anxiety Short term medication, goal to minimize use and discontinue by 4/2022. 20 tablet 0     diphenhydrAMINE HCl, Sleep, (ZZZQUIL PO) Take 50 mg by mouth nightly as needed (for sleep)       levonorgestrel (MIRENA) 20 MCG/24HR IUD 1 each by Intrauterine route once       melatonin 5 MG tablet Take 10 mg by mouth nightly as needed for sleep       Pediatric Multi Vit-Extra C-FA (FLINTSTONES/EXTRA C) CHEW Take 2 tablets by mouth       sertraline (ZOLOFT) 100 MG tablet Take 1.5 tablets (150 mg) by mouth daily 45 tablet 0      Physical Exam  (Vitals Only)   There were no vitals taken for this visit.    Pulse Readings from Last 5 Encounters:   08/31/21 61   10/16/20 (!) 44   02/04/20 60   12/30/19 75   12/04/19 76     Wt Readings from Last 5 Encounters:   12/30/19 92.5 kg (204 lb)   12/04/19 93.9 kg (207 lb)   11/25/19 96.2 kg (212 lb)   11/22/19 94.2 kg (207 lb 9.6 oz)   10/17/19 98 kg (216 lb)     BP Readings from Last 5 Encounters:   08/31/21 (!) 142/81   10/16/20 117/65   02/04/20 104/50   12/30/19 104/70   12/04/19 130/74      Mental Status Exam   Alertness: alert  and oriented  Appearance: adequately groomed  Behavior/Demeanor: cooperative and calm, with good eye contact   Speech: normal and regular rate and rhythm  Language: intact and no problems  Psychomotor: normal or unremarkable  Mood: Still very anxious and having a hard time, but doing a little better  Affect: full range and appropriate; was congruent to mood; was congruent to content  Thought Process/Associations: unremarkable  Thought Content:  Reports none;  Denies suicidal ideation, violent ideation and delusions  Perception:  Reports none;  Denies auditory hallucinations and visual hallucinations  Insight: intact  Judgment: intact  Cognition: does  appear grossly intact; formal cognitive testing was not done  Gait  and Station: not observed     Labs and Data      PHQ-9 SCORE 10/28/2020 11/17/2020 1/27/2021   PHQ-9 Total Score MyChart 12 (Moderate depression) 12 (Moderate depression) 14 (Moderate depression)   PHQ-9 Total Score 12 12 14     HAO-7 SCORE 10/28/2020 11/17/2020 1/27/2021   Total Score 13 (moderate anxiety) 11 (moderate anxiety) 14 (moderate anxiety)   Total Score 13 11 14       Recent Labs   Lab Test 12/04/19  1223 06/19/19  1740 03/05/19  1232   CR 0.79 0.72 0.80   GFRESTIMATED >60 >90 >60     Recent Labs   Lab Test 12/04/19  1223 06/19/19  1740   AST 12 15   ALT <9 33   ALKPHOS 50 53     Recent Labs   Lab Test 03/05/19  1232 03/05/19  0000 09/19/16  0935   TSH 2.12 2.12 2.50     ECG 6/19/19 QTc = 414ms      Psychiatry Individual Psychotherapy Note   Psychotherapy start time - 1:45 PM  Psychotherapy end time - 1:46 PM    Date last reviewed - 10/06/21  Subjective: This supportive psychotherapy session addressed issues related to goals of therapy and current psychosocial stressors.   Interactive complexity indicated? No  Plan: RTC in timeframe noted above  Psychotherapy services during this visit included myself and the patient.     PROVIDER: Lance Rod MD

## 2021-12-15 NOTE — TELEPHONE ENCOUNTER
On December 15, 2021, at 12:22 PM, writer called patient at mobile to confirm Virtual Visit. Writer unable to make contact with patient. Writer left detailed voice message for call back, with [phone] left as callback number. Link for [employer] was provided via: Send to preferred e-mail: [email or web address]. Le Lyman, EMT    Writer called patient back at 1:15 PM at mobile to confirm virtual visit. Writer was still unable to reach patient. Le Lyman, EMT

## 2021-12-15 NOTE — PATIENT INSTRUCTIONS
**For crisis resources, please see the information at the end of this document**   Patient Education    Thank you for coming to the [facility] [place] [facility].    Lab Testing:  If you had lab testing today and your results are reassuring or normal they will be mailed to you or sent through [employer] within 7 days. If the lab tests need quick action we will call you with the results. The phone number we will call with results is # [phone] (home) [phone] (work). If this is not the best number please call our clinic and change the number.    Medication Refills:  If you need any refills please call your pharmacy and they will contact us. Our fax number for refills is [phone]. Please allow three business for refill processing. If you need to  your refill at a new pharmacy, please contact the new pharmacy directly. The new pharmacy will help you get your medications transferred.     Scheduling:  If you have any concerns about today's visit or wish to schedule another appointment please call our office during normal business hours [phone] (8-5:00 M-F)    Contact Us:  Please call [phone] during business hours (8-5:00 M-F).  If after clinic hours, or on the weekend, please call  [phone].    Financial Assistance [phone]  [facility] Billing [phone]  Central Billing Office, [facility]: [phone]  [facility] Billing [phone]  Medical Records [phone]  [facility] Patient Bill of Rights [email or web address]       MENTAL HEALTH CRISIS NUMBERS:  For a medical emergency please call  911 or go to the nearest ER.     [facility]:   [facility] -[phone]   Crisis Residence [facility] -[phone]   Walk-In Counseling Center [facility] -[phone]   COPE 24/7 [place] Mobile Team -[phone] (adults)/[phone] (child)  CHILD: [facility] needs  assessment team - [phone]      [facility]:   [facility] - [phone]   Walk-in counseling [facility] - [phone]   Walk-in counseling [facility] - [phone]   Crisis Residence [facility] Residence - [phone]  Urgent Care Adult Mental [facility]-[phone] mobile unit/ 24/7 crisis line    National Crisis Numbers:   National Suicide Prevention Lifeline: 2-590-644-TALK (014-721-7759)  Poison Control Center - 2-303-136-8026  [facility]/resources for a list of additional resources (SOS)  Trans Lifeline a hotline for transgender people 5-300-100-4372  The Veto Project a hotline for LGBT youth 2-729-648-3142  Crisis Text Line: For any crisis 24/7   To: 838218  see www.crisistextline.org  - IF MAKING A CALL FEELS TOO HARD, send a text!         Again thank you for choosing [facility] M[facility]AL HEALTH & ADDICTION [facility] and please let us know how we can best partner with you to improve you and your family's health.    You may be receiving a survey regarding this appointment. We would love to have your feedback, both positive and negative. The survey is done by an external company, so your answers are anonymous.

## 2022-01-29 ENCOUNTER — HEALTH MAINTENANCE LETTER (OUTPATIENT)
Age: 47
End: 2022-01-29

## 2022-02-02 ENCOUNTER — TELEPHONE (OUTPATIENT)
Dept: FAMILY MEDICINE | Facility: CLINIC | Age: 47
End: 2022-02-02
Payer: COMMERCIAL

## 2022-02-02 NOTE — TELEPHONE ENCOUNTER
Patient Quality Outreach    Patient is due for the following:   Depression  -  PHQ-9 Needed    NEXT STEPS:   PHQ9/HAO sent to patient to complete and return    Type of outreach:    Sent [employer] message.      Questions for provider review:    None     Fallon Valencia, CMA

## 2022-02-04 DIAGNOSIS — F43.10 PTSD (POST-TRAUMATIC STRESS DISORDER): ICD-10-CM

## 2022-02-04 DIAGNOSIS — F33.1 MODERATE EPISODE OF RECURRENT MAJOR DEPRESSIVE DISORDER (H): ICD-10-CM

## 2022-02-07 RX ORDER — SERTRALINE HYDROCHLORIDE 100 MG/1
200 TABLET, FILM COATED ORAL DAILY
Qty: 60 TABLET | Refills: 0 | Status: SHIPPED | OUTPATIENT
Start: 2022-02-07 | End: 2022-02-17

## 2022-02-07 NOTE — CONFIDENTIAL NOTE
sertraline (ZOLOFT) 100 MG   Last refilled: 12/15/21  Qty: 60 : 1    Last seen: 12/15/21  RTC: 2  MOS  Cancel: 0  No-show: 0  Next appt: 2/17/22  Refilled for 30 days per protocol.

## 2022-02-17 ENCOUNTER — VIRTUAL VISIT (OUTPATIENT)
Dept: PSYCHIATRY | Facility: CLINIC | Age: 47
End: 2022-02-17
Attending: PSYCHIATRY & NEUROLOGY
Payer: COMMERCIAL

## 2022-02-17 DIAGNOSIS — F43.10 POSTTRAUMATIC STRESS DISORDER: ICD-10-CM

## 2022-02-17 DIAGNOSIS — F33.1 MODERATE EPISODE OF RECURRENT MAJOR DEPRESSIVE DISORDER (H): Primary | ICD-10-CM

## 2022-02-17 PROCEDURE — 99214 OFFICE O/P EST MOD 30 MIN: CPT | Mod: 95 | Performed by: PSYCHIATRY & NEUROLOGY

## 2022-02-17 RX ORDER — SERTRALINE HYDROCHLORIDE 100 MG/1
200 TABLET, FILM COATED ORAL DAILY
Qty: 60 TABLET | Refills: 3 | Status: SHIPPED | OUTPATIENT
Start: 2022-02-17 | End: 2022-05-19

## 2022-02-17 NOTE — PROGRESS NOTES
"VIDEO VISIT  Lilian Quinones is a 46 year old patient that has consented to receive services via billable video visit.      The patient has been notified of following:   \"This video visit will be conducted via a call between you and your physician/provider. We have found that certain health care needs can be provided without the need for an in-person physical exam. This service lets us provide the care you need with a video conversation. If a prescription is necessary we can send it directly to your pharmacy. If lab work is needed we can place an order for that and you can then stop by our lab to have the test done at a later time. Insurers are generally covering virtual visits as they would in-office visits so billing should not be different than normal.  If for some reason you do get billed incorrectly, you should contact the billing office to correct it and that number is in the AVS .    Patient will join video visit via:  ClipMine (Patient / guardian confirmed to join via ClipMine)    If patient attempts to join the video via ClipMine at appointment start time, but is unable to, they would prefer that the provider send them a video invitation via:   Text to preferred phone: [phone]      How would patient like to obtain AVS?:  ClipMine     Telehealth Details  Type of service:  video visit for medication management  Date of service: 02/17/2022  Time of service:    Start Time:  10:33 AM    End Time:  11:03 AM    Reason for video visit:  Services only offered telehealth  Originating Site (patient location):  Patient's home  Distant Site (provider location): [facility]  Mode of Communication:  Video conference via [facility]  MEDICAL PROGRESS NOTE   Lilian Quinones is a 46 year old. Initial consultation on 08/05/20. Referred by Kristen M Kehr for evaluation of depression.      Assessment & Plan    History and interview support the following " diagnoses:  Major depressive disorder, recurrent, moderate  PTSD  Complex grief    Lilian notes that things are going okay at the moment. The last few months were difficult, but improving. She is interested in what the timeframe should be for getting off of medications. I discussed with her being mindful about the benefits that meds provide in relation to side effects, and that care must be taken to try to maintain at a stable level of symptoms as long as possible, because the longer stability lasts, the better than chances of remaining stable.   She still does not have a therapist, but did do a 2 hour trauma training program last week that was intensive and she found it very helpful. We discussed this in some detail.   I am a little concerned that her symptoms are not still at target, but did agree that it would be reasonable to consider going down on the dose of the sertraline at next visit to see how this goes, if things stay stable.     Previously reported feeling ready to start trauma therapy but is aware there is yet a wait list and I am working to clarify a general timeframe for what she can expect here.     She uses diazepam sparingly to help with anxiety. She is aware that this is not a sustainable solution. It is not bad to use the diazepam, but it is the opposite of an intervention that would help her achieve her goal of being okay. She can continue to use it, but it must not be the only intervention she has available. Her symptoms will not be at goal until she no longer needs the diazepam. Goal would be to be off of it within 6 months.     PSYCHOTROPIC DRUG INTERACTIONS: None   MANAGEMENT:  N/A     Plan     1) PSYCHOTROPIC MEDICATIONS:  - Continue sertraline 200mg daily   - Will plan to potentially decrease at next visit in 5/2022    - May take diazepam 2.5mg daily as needed, continue to minimize use, with intention to discontinue within a few months.     - Taking melatonin 10mg at bedtime as needed for  sleep  - Taking diphenhydramine (Zzzquil) 50mg QHS PRN for sleep    [see the following SmartPhrase(s) for more information: PSYMEDINFOBUPROPION - PSYMEDINFOBENZOS]    2) THERAPY: Psychotherapy is a primary recommendation for the treatment of mood symptoms, even in the context of grief.   Did recently do a   Previously engaged in therapy with grief counselor, doing EMDR.   10/15/21 - Added to the waitlist for trauma therapy    3) NEXT DUE:   Labs- Routine monitoring is not indicated for current psychotropic medication regimen   ECG- Routine monitoring is not indicated for current psychotropic medication regimen   Rating Scales- N/A    4) REFERRALS: None    5) : None    6) DISPOSITION: 3 months or sooner if needed    Treatment Risk Statement:  The patient understands the risks, benefits, adverse effects and alternatives. Agrees to treatment with the capacity to do so. No medical contraindications to treatment. Agrees to call clinic for any problems. The patient understands to call 911 or go to the nearest ED if life threatening or urgent symptoms occur. Crisis numbers are provided routinely in the After Visit Summary.       PROVIDER: Joss Rod MD    Level of Medical Decision Making:   - At least 1 chronic problem that is not stable  - Engaged in prescription drug management during visit (discussed any medication benefits, side effects, alternatives, etc.)        Pertinent Background   Lilian does not report any history of psychiatric diagnoses prior to the death of her son on 11/12/2019. Since that time, she has contended with complex grief and trauma symptoms which have included elements of depression and anxiety. Her grief has been severe, and has included elements of generalized anxiety, panic, and trauma symptoms. She likely met criteria for PTSD at some point. How she is in an adjustment phase, but still with significant depressive symptoms, unclear how persistent, recurrent this will be.       Interval History    Tx plan update  Things have been okay. Lilian has been determined when she might not need to be on the medications anymore.   Last Friday did a tactical resiliency process that is used in trauma treatment. It is a 2 hour program of brain retraining. Notes that it was a good experience, focused on some of her very difficult ruminative thoughts. Involves visually.   They are still down in [place] at the moment. Sister in law lost her son in a car accident last year, so they have been talking and supporting each other as well. They are in a Helping Parents [employer]al group together which she feels has been a good thing. It focuses on the afterlife. This helps her.        Discussion points from past visits:   She only uses the diazepam when things get very severe, maybe 2-3x per week. She does try to minimize this.   Has not been having issues with tiredness since being on bupropion.   Notes that she still tends to use diazepam with triggers, like when her son's friends email her. Social media has quite a few triggers. This year is particularly hard especially on social media as he would have been graduating.   She isn't sure if the medications are working at times. She still has anxiety, and doesn't expect it to go away, understands that grief if an ongoing process, and can last the rest of her life. Her goal would be that the medications would take the edge off, so she is able to work more.   Anxiety continues to be an issue especially in the mornings. She does occasionally take the diazepam (Valium) for this in the mornings when it spikes. Has not been taking it every day, but does note that it helps with that heavy feeling in her chest.     Recent Substance Use  Alcohol- None  Tobacco- None  Caffeine- 1 cups/day of coffee  Opioids- None  Narcan Kit- N/A  Cannabis- Has tried CBD but didn't really help.   Other Illicit Drugs- none    Current Social Hx:  FINANCIAL SUPPORT- Works as .  " works as caterer.   LIVING SITUATION / RELATIONSHIPS- Currently living and working remotely. Sold their house because it was too much of a reminder of their son. Thinking about buying another house now. Has 2 dogs and 2 cats.  Son lives in [place], recently had a child. Youngest son is in [employer] in [place]. Daughter is in the [employer], had a child in 2021.   SOCIAL/ SPIRITUAL SUPPORT- \"Absolutely\", but still feels very lonely since her loss.   FEELS SAFE AT HOME- Yes     Medical Review of Systems    Dizziness/orthostasis- Has in the past, but generally no.   Headaches- No.   GI- No. Has lost 100 lbs since gastric sleeve surgery last year.   Has chronic pain related to bulging disc, gets shots once or twice per year.      Psych Summary Points   08/2020 - Started propranolol 40mg BID  10/2020 - Started bupropion. Decreased propranolol to 20mg due to drug intx and low HR.   11/2020 - Completed ADHD evaluation. Determined that acute symptoms are more likely due to PTSD than to ADHD.   01/2021 - Increased bupropion to 300mg, but anxiety increased, so decreased back down.   02/2021 - Tried buspirone after nephew passed away, felt that it increased anxiety.   04/2021 - Changed propranolol to PRN and discontinued bupropion  11/2021 - Increased sertraline to 150mg.   12/2021 - Increased sertraline to 200mg.      Psychiatric Medication Trials       Drug /  Start Date Dose (mg) Helpful Adverse Effects DC Reason / Date   Citalopram 11/2019 40 probably     Sertraline 10/2021 200 yes Initial anxiety    Bupropion XL 10/2020 300 yes Anxiety / agitation at 300 Helped with concentration and energy   Buspirone 2/2021 5 BID no Increased anxiety    gabapentin       Benadryl       Lorazepam 11/2019 2 daily PRN yes     Diazepam 08/2020 10 yes sedating    Propranolol 08/2020 40 BID probably     melatonin          Past Medical History      CARE TEAM:   PCP- Kristen M. Kehr with [facility]  Therapist- Alejandra Lui at [facility] - Grief Counselor - Will be starting EMDR.     Neurologic Hx [head injury, seizures, etc.]: Concussion from skiing injury in 2012.   Patient Active Problem List   Diagnosis     Overweight     Atypical mole     Right knee pain, unspecified chronicity     HAO (generalized anxiety disorder)     Hyperhydrosis disorder     Major depression, recurrent (H)     Past Medical History:   Diagnosis Date     IUD (intrauterine device) in place 03/20/2019    Mirena      Allergies    Amoxicillin and Penicillins     Medications      Current Outpatient Medications   Medication Sig Dispense Refill     diazepam (VALIUM) 5 MG tablet Take 0.5-1 tablets (2.5-5 mg) by mouth daily as needed for anxiety Short term medication, goal to minimize use and discontinue by 4/2022. 20 tablet 0     diphenhydrAMINE HCl, Sleep, (ZZZQUIL PO) Take 50 mg by mouth nightly as needed (for sleep)       melatonin 5 MG tablet Take 10 mg by mouth nightly as needed for sleep       sertraline (ZOLOFT) 100 MG tablet Take 2 tablets (200 mg) by mouth daily 60 tablet 0     cyanocobalamin (VITAMIN B-12) 1000 MCG SUBL sublingual tablet Place 1,000 mcg under the tongue daily (Patient not taking: Reported on 2/17/2022)       levonorgestrel (MIRENA) 20 MCG/24HR IUD 1 each by Intrauterine route once       Pediatric Multi Vit-Extra C-FA (FLINTSTONES/EXTRA C) CHEW Take 2 tablets by mouth (Patient not taking: Reported on 2/17/2022)        Physical Exam  (Vitals Only)   There were no vitals taken for this visit.    Pulse Readings from Last 5 Encounters:   08/31/21 61   10/16/20 (!) 44   02/04/20 60   12/30/19 75   12/04/19 76     Wt Readings from Last 5 Encounters:   12/30/19 92.5 kg (204 lb)   12/04/19 93.9 kg (207 lb)   11/25/19 96.2 kg (212 lb)   11/22/19 94.2 kg (207 lb 9.6 oz)   10/17/19 98 kg (216 lb)     BP Readings from Last 5 Encounters:   08/31/21 (!) 142/81   10/16/20 117/65   02/04/20 104/50   12/30/19 104/70   12/04/19 130/74      Mental Status Exam    Alertness: alert  and oriented  Appearance: adequately groomed  Behavior/Demeanor: cooperative and calm, with good eye contact   Speech: normal and regular rate and rhythm  Language: intact and no problems  Psychomotor: normal or unremarkable  Mood: Doing better recently.   Affect: full range and appropriate; was congruent to mood; was congruent to content  Thought Process/Associations: unremarkable  Thought Content:  Reports none;  Denies suicidal ideation, violent ideation and delusions  Perception:  Reports none;  Denies auditory hallucinations and visual hallucinations  Insight: intact  Judgment: intact  Cognition: does  appear grossly intact; formal cognitive testing was not done  Gait and Station: not observed     Labs and Data      PHQ-9 SCORE 10/28/2020 11/17/2020 1/27/2021   PHQ-9 Total Score MyChart 12 (Moderate depression) 12 (Moderate depression) 14 (Moderate depression)   PHQ-9 Total Score 12 12 14     HAO-7 SCORE 10/28/2020 11/17/2020 1/27/2021   Total Score 13 (moderate anxiety) 11 (moderate anxiety) 14 (moderate anxiety)   Total Score 13 11 14       Recent Labs   Lab Test 12/04/19  1223 06/19/19  1740 03/05/19  1232   CR 0.79 0.72 0.80   GFRESTIMATED >60 >90 >60     Recent Labs   Lab Test 12/04/19  1223 06/19/19  1740   AST 12 15   ALT <9 33   ALKPHOS 50 53     Recent Labs   Lab Test 03/05/19  1232 03/05/19  0000 09/19/16  0935   TSH 2.12 2.12 2.50     ECG 6/19/19 QTc = 414ms      Psychiatry Individual Psychotherapy Note   Psychotherapy start time - 10:33 AM  Psychotherapy end time - 10:33 AM    Date last reviewed - 10/06/21  Subjective: This supportive psychotherapy session addressed issues related to goals of therapy and current psychosocial stressors.   Interactive complexity indicated? No  Plan: RTC in timeframe noted above  Psychotherapy services during this visit included myself and the patient.     PROVIDER: Lacne Rod MD

## 2022-02-17 NOTE — PATIENT INSTRUCTIONS
**For crisis resources, please see the information at the end of this document**   Patient Education    Thank you for coming to the [facility] [place] [facility].    Lab Testing:  If you had lab testing today and your results are reassuring or normal they will be mailed to you or sent through [employer] within 7 days. If the lab tests need quick action we will call you with the results. The phone number we will call with results is [phone]. If this is not the best number please call our clinic and change the number.    Medication Refills:  If you need any refills please call your pharmacy and they will contact us. Our fax number for refills is [phone]. Please allow three business for refill processing. If you need to  your refill at a new pharmacy, please contact the new pharmacy directly. The new pharmacy will help you get your medications transferred.     Scheduling:  If you have any concerns about today's visit or wish to schedule another appointment please call our office during normal business hours [phone] (8-5:00 M-F)    Contact Us:  Please call [phone] during business hours (8-5:00 M-F).  If after clinic hours, or on the weekend, please call  [phone].    Financial Assistance [phone]  [facility] Billing [phone]  Central Billing Office, [facility]: [phone]  [place] Billing [phone]  Medical Records [phone]       MENTAL HEALTH CRISIS NUMBERS:  For a medical emergency please call  911 or go to the nearest ER.     [facility]:   [facility] -[phone]   Crisis Residence [facility] Residence -[phone]   Walk-In Counseling [facility] -[phone]   COPE 24/7 [place] Mobile Team -[phone] (adults)/[phone] (child)  CHILD: [facility] needs assessment team - [phone]      [facility]:   [facility] - [phone]   Walk-in counseling [facility]  [phone]   Walk-in counseling [facility] - [phone]   Crisis Residence [facility] - [phone]  Urgent Care Adult Mental Dysriq-[phone] mobile unit/ 24/7 crisis line    National Crisis Numbers:   National Suicide Prevention Lifeline: 9-585-439-TALK (650-994-6563)  Poison Control Center - 1-450.235.9710  [employer]/resources for a list of additional resources (SOS)  Trans Lifeline a hotline for transgender people 1-987-902-9222  The Veto Project a hotline for LGBT youth 1-162.973.3546  Crisis Text Line: For any crisis 24/7   To: 292068  see www.crisistextline.org  - IF MAKING A CALL FEELS TOO HARD, send a text!

## 2022-03-06 DIAGNOSIS — F43.10 POSTTRAUMATIC STRESS DISORDER: ICD-10-CM

## 2022-03-06 DIAGNOSIS — F33.1 MODERATE EPISODE OF RECURRENT MAJOR DEPRESSIVE DISORDER (H): ICD-10-CM

## 2022-03-07 RX ORDER — SERTRALINE HYDROCHLORIDE 100 MG/1
TABLET, FILM COATED ORAL
Qty: 60 TABLET | Refills: 3 | OUTPATIENT
Start: 2022-03-07

## 2022-03-18 ENCOUNTER — MYC MEDICAL ADVICE (OUTPATIENT)
Dept: PSYCHIATRY | Facility: CLINIC | Age: 47
End: 2022-03-18
Payer: COMMERCIAL

## 2022-05-19 ENCOUNTER — VIRTUAL VISIT (OUTPATIENT)
Dept: PSYCHIATRY | Facility: CLINIC | Age: 47
End: 2022-05-19
Attending: PSYCHIATRY & NEUROLOGY
Payer: COMMERCIAL

## 2022-05-19 DIAGNOSIS — F43.10 POSTTRAUMATIC STRESS DISORDER: ICD-10-CM

## 2022-05-19 DIAGNOSIS — F33.0 MILD EPISODE OF RECURRENT MAJOR DEPRESSIVE DISORDER (H): Primary | ICD-10-CM

## 2022-05-19 PROCEDURE — 99214 OFFICE O/P EST MOD 30 MIN: CPT | Mod: 95 | Performed by: PSYCHIATRY & NEUROLOGY

## 2022-05-19 RX ORDER — SERTRALINE HYDROCHLORIDE 100 MG/1
TABLET, FILM COATED ORAL
Qty: 45 TABLET | Refills: 1 | Status: SHIPPED | OUTPATIENT
Start: 2022-05-19 | End: 2023-04-11

## 2022-05-19 NOTE — PROGRESS NOTES
Lilian Quinones is a 47 year old who has consented to receive services via billable video visit.      Pt will join video visit via: [facility]  If there are problems joining the visit, send backup video invite via: Send to preferred e-mail: [email or web address]      Originating Location (patient location): Patient's home  Distant Location (provider location): [facility] [place] [facility]    Will anyone else be joining the video visit? No    How would you prefer to obtain AVS?: MyChart     Telehealth Details  Type of service:  video visit for medication management  Date of service: 05/19/2022  Time of service:    Start Time:  10:36 AM    End Time:  10:57 AM    Reason for video visit:  Services only offered telehealth  Originating Site (patient location):  Patient's home  Distant Site (provider location): [facility] PSYCHIATRY  Mode of Communication:  Video conference via [facility]        [facility]  MEDICAL PROGRESS NOTE   Lilian Quinones is a 47 year old. Initial consultation on 08/05/20. Referred by Kristen M Kehr for evaluation of depression.      Assessment & Plan    History and interview support the following diagnoses:  Major depressive disorder, recurrent, mild  PTSD  Complex grief    Lilian notes that things have been better recently. She has been encouraged by not having had panic symptoms in a long time, and feels that moving away from their home was a big improvement. I am not sure that she would still meet criteria for PTSD at this point, so decided to drop her off the trauma therapy wait list.   She would still likely benefit from individual therapy if she opted to do this.   She wants to taper down on the sertraline at this time, noting that it has consistently caused side effects, noting night sweats and tremors in her hands. I am agreeable with this, and the key thing to observe will be how things go when anniversary  time of year arrives this fall.   May consider alternatives like escitalopram if needed, given side effects with sertraline.     Previously reported feeling ready to start trauma therapy but is aware there is yet a wait list and I am working to clarify a general timeframe for what she can expect here.     She uses diazepam sparingly to help with anxiety. She is aware that this is not a sustainable solution. It is not bad to use the diazepam, but it is the opposite of an intervention that would help her achieve her goal of being okay. She can continue to use it, but it must not be the only intervention she has available. Her symptoms will not be at goal until she no longer needs the diazepam. Goal would be to be off of it within 6 months.     PSYCHOTROPIC DRUG INTERACTIONS: None   MANAGEMENT:  N/A     Plan     1) PSYCHOTROPIC MEDICATIONS:  - Decrease to sertraline 150mg daily   (Plan to decrease by 50mg per month)    - Taking melatonin 10mg at bedtime as needed for sleep  - Taking diphenhydramine (Zzzquil) 50mg QHS PRN for sleep    [see the following SmartPhrase(s) for more information: PSYMEDINFOBUPROPION - PSYMEDINFOBENZOS]    2) THERAPY: Psychotherapy is a primary recommendation for the treatment of mood symptoms, even in the context of grief.   Previously engaged in therapy with grief counselor, doing EMDR.     3) NEXT DUE:   Labs- Routine monitoring is not indicated for current psychotropic medication regimen   ECG- Routine monitoring is not indicated for current psychotropic medication regimen   Rating Scales- N/A    4) REFERRALS: Recommended annual physical with PCP.     5) : None    6) DISPOSITION: 2 months or sooner if needed. Will plan to likely complete care once med taper is completed.     Treatment Risk Statement:  The patient understands the risks, benefits, adverse effects and alternatives. Agrees to treatment with the capacity to do so. No medical contraindications to treatment. Agrees  to call clinic for any problems. The patient understands to call 911 or go to the nearest ED if life threatening or urgent symptoms occur. Crisis numbers are provided routinely in the After Visit Summary.       PROVIDER: Joss Rod MD    Level of Medical Decision Making:   - At least 1 chronic problem that is not stable  - Engaged in prescription drug management during visit (discussed any medication benefits, side effects, alternatives, etc.)        Pertinent Background   Lilian does not report any history of psychiatric diagnoses prior to the death of her son on 11/12/2019. Since that time, she has contended with complex grief and trauma symptoms which have included elements of depression and anxiety. Her grief has been severe, and has included elements of generalized anxiety, panic, and trauma symptoms. She likely met criteria for PTSD at some point. How she is in an adjustment phase, but still with significant depressive symptoms, unclear how persistent, recurrent this will be.      Interval History    Tx plan update  Things have been going okay lately.   She doesn't feel like she is at the point where she needs trauma therapy.   Doesn't feel like she has had panic symptoms for a long time.   Selling their house has been the most helpful thing, not hearing the familiar things.   She does still have triggers, like [facility], which was very stressful, but notes that it didn't linger around all day like it has before.   The only thing that she doesn't like about the meds is that it makes her feel shaky, has an action tremor in her hands, and gets night sweats.        Discussion points from past visits:   She only uses the diazepam when things get very severe, maybe 2-3x per week. She does try to minimize this.   Has not been having issues with tiredness since being on bupropion.   Notes that she still tends to use diazepam with triggers, like when her son's friends email her. Social media  "has quite a few triggers. This year is particularly hard especially on social media as he would have been graduating.   She isn't sure if the medications are working at times. She still has anxiety, and doesn't expect it to go away, understands that grief if an ongoing process, and can last the rest of her life. Her goal would be that the medications would take the edge off, so she is able to work more.   Anxiety continues to be an issue especially in the mornings. She does occasionally take the diazepam (Valium) for this in the mornings when it spikes. Has not been taking it every day, but does note that it helps with that heavy feeling in her chest.     Recent Substance Use  Alcohol- None  Tobacco- None  Caffeine- 1 cups/day of coffee  Opioids- None  Narcan Kit- N/A  Cannabis- Has tried CBD but didn't really help.   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   LIVING SITUATION / RELATIONSHIPS- Currently living and working remotely. Sold their house because it was too much of a reminder of their son. Thinking about buying another house now. Has 2 dogs and 2 cats.  Son lives in [place], recently had a child. Youngest son is in [employer] in [place]. Daughter is in the [employer], had a child in 2021.   SOCIAL/ SPIRITUAL SUPPORT- \"Absolutely\", but still feels very lonely since her loss.   FEELS SAFE AT HOME- Yes     Medical Review of Systems    Dizziness/orthostasis- Has in the past, but generally no.   Headaches- No.   GI- No. Has lost 100 lbs since gastric sleeve surgery last year.   Has chronic pain related to bulging disc, gets shots once or twice per year.      Psych Summary Points   08/2020 - Started propranolol 40mg BID  10/2020 - Started bupropion. Decreased propranolol to 20mg due to drug intx and low HR.   11/2020 - Completed ADHD evaluation. Determined that acute symptoms are more likely due to PTSD than to ADHD.   01/2021 - Increased " bupropion to 300mg, but anxiety increased, so decreased back down.   02/2021 - Tried buspirone after nephew passed away, felt that it increased anxiety.   04/2021 - Changed propranolol to PRN and discontinued bupropion  11/2021 - Increased sertraline to 150mg.   12/2021 - Increased sertraline to 200mg.   05/2022 - Started tapering down on sertraline     Psychiatric Medication Trials       Drug /  Start Date Dose (mg) Helpful Adverse Effects DC Reason / Date   Citalopram 11/2019 40 probably     Sertraline 10/2021 200 yes Initial anxiety    Bupropion XL 10/2020 300 yes Anxiety / agitation at 300 Helped with concentration and energy   Buspirone 2/2021 5 BID no Increased anxiety    gabapentin       Benadryl       Lorazepam 11/2019 2 daily PRN yes     Diazepam 08/2020 10 yes sedating    Propranolol 08/2020 40 BID probably     melatonin          Past Medical History      CARE TEAM:   PCP- Kristen M. Kehr with [facility]  Therapist- Alejandra Lui at [facility] - Grief Counselor - Will be starting EMDR.     Neurologic Hx [head injury, seizures, etc.]: Concussion from skiing injury in 2012.   Patient Active Problem List   Diagnosis     Overweight     Atypical mole     Right knee pain, unspecified chronicity     HAO (generalized anxiety disorder)     Hyperhydrosis disorder     Major depression, recurrent (H)     Past Medical History:   Diagnosis Date     IUD (intrauterine device) in place 03/20/2019    Mirena      Allergies    Amoxicillin and Penicillins     Medications      Current Outpatient Medications   Medication Sig Dispense Refill     diphenhydrAMINE HCl, Sleep, (ZZZQUIL PO) Take 50 mg by mouth nightly as needed (for sleep)       levonorgestrel (MIRENA) 20 MCG/24HR IUD 1 each by Intrauterine route once       melatonin 5 MG tablet Take 10 mg by mouth nightly as needed for sleep       Pediatric Multi Vit-Extra C-FA (FLINTSTONES/EXTRA C) CHEW Take 2 tablets by mouth       cyanocobalamin (VITAMIN  B-12) 1000 MCG SUBL sublingual tablet Place 1,000 mcg under the tongue daily (Patient not taking: No sig reported)       sertraline (ZOLOFT) 100 MG tablet Take 2 tablets (200 mg) by mouth daily (Patient not taking: Reported on 5/19/2022) 60 tablet 3      Physical Exam  (Vitals Only)   There were no vitals taken for this visit.    Pulse Readings from Last 5 Encounters:   08/31/21 61   10/16/20 (!) 44   02/04/20 60   12/30/19 75   12/04/19 76     Wt Readings from Last 5 Encounters:   12/30/19 92.5 kg (204 lb)   12/04/19 93.9 kg (207 lb)   11/25/19 96.2 kg (212 lb)   11/22/19 94.2 kg (207 lb 9.6 oz)   10/17/19 98 kg (216 lb)     BP Readings from Last 5 Encounters:   08/31/21 (!) 142/81   10/16/20 117/65   02/04/20 104/50   12/30/19 104/70   12/04/19 130/74      Mental Status Exam   Alertness: alert  and oriented  Appearance: adequately groomed  Behavior/Demeanor: cooperative and calm, with good eye contact   Speech: normal and regular rate and rhythm  Language: intact and no problems  Psychomotor: normal or unremarkable  Mood: Doing better recently.   Affect: full range and appropriate; was congruent to mood; was congruent to content  Thought Process/Associations: unremarkable  Thought Content:  Reports none;  Denies suicidal ideation, violent ideation and delusions  Perception:  Reports none;  Denies auditory hallucinations and visual hallucinations  Insight: intact  Judgment: intact  Cognition: does  appear grossly intact; formal cognitive testing was not done  Gait and Station: not observed     Labs and Data      PHQ-9 SCORE 10/28/2020 11/17/2020 1/27/2021   PHQ-9 Total Score MyChart 12 (Moderate depression) 12 (Moderate depression) 14 (Moderate depression)   PHQ-9 Total Score 12 12 14     HAO-7 SCORE 10/28/2020 11/17/2020 1/27/2021   Total Score 13 (moderate anxiety) 11 (moderate anxiety) 14 (moderate anxiety)   Total Score 13 11 14       Recent Labs   Lab Test 12/04/19  1223 06/19/19  1740 03/05/19  1232   CR 0.79  0.72 0.80   GFRESTIMATED >60 >90 >60     Recent Labs   Lab Test 12/04/19  1223 06/19/19  1740   AST 12 15   ALT <9 33   ALKPHOS 50 53     Recent Labs   Lab Test 03/05/19  1232 03/05/19  0000 09/19/16  0935   TSH 2.12 2.12 2.50     ECG 6/19/19 QTc = 414ms      Psychiatry Individual Psychotherapy Note   Psychotherapy start time - 10:36 AM  Psychotherapy end time - 10:36 AM    Date last reviewed - 10/06/21  Subjective: This supportive psychotherapy session addressed issues related to goals of therapy and current psychosocial stressors.   Interactive complexity indicated? No  Plan: RTC in timeframe noted above  Psychotherapy services during this visit included myself and the patient.     PROVIDER: Lance Rod MD

## 2022-05-19 NOTE — PATIENT INSTRUCTIONS
How to taper off of sertraline.     Go down by 50mg increments every 1-2 months, and look out for any general trends in mood or stress tolerance / resilience.     For the last 50mg, can go down to 25mg, but not necessary.       **For crisis resources, please see the information at the end of this document**   Patient Education    Thank you for coming to the [facility] [place] [facility].    Lab Testing:  If you had lab testing today and your results are reassuring or normal they will be mailed to you or sent through [employer] within 7 days. If the lab tests need quick action we will call you with the results. The phone number we will call with results is # [phone]. If this is not the best number please call our clinic and change the number.     Medication Refills:  If you need any refills please call your pharmacy and they will contact us. Our fax number for refills is [phone].   Three business days of notice are needed for general medication refill requests.   Five business days of notice are needed for controlled substance refill requests.   If you need to change to a different pharmacy, please contact the new pharmacy directly. The new pharmacy will help you get your medications transferred.     Contact Us:  Please call [phone] during business hours (8-5:00 M-F).  If you have medication related questions after clinic hours, or on the weekend, please call [phone].    Financial Assistance [phone]  Medical Records [phone]       MENTAL HEALTH CRISIS RESOURCES:  For a emergency help, please call 911 or go to the nearest Emergency Department.     Emergency Walk-In Options:   EmPATH Unit @ [facility] ([place]): [phone] - Specialized mental health emergency area designed to be calming  [facility] ([place]): [phone]  [facility] ([place]): [phone]  [facility] ([place]):  [phone]    [facility] Crisis Information:   [place]: [phone]  [place]: [phone]  [place] ([place]) - Adult: [phone]     Child: [phone]  [place] - Adult: [phone]     Child: [phone]  [place]: [phone]  List of all [facility] resources:   https://[place].gov/dhs/people-we-serve/adults/health-care/mental-health/resources/crisis-contacts.jsp    National Crisis Information:   Crisis Text Line: Text  [place]  to 060085  National Suicide Prevention Lifeline: 9-635-112-EUPQ (1-539.635.1298)       For online chat options, visit https://suicidepreventionlifeline.org/chat/  Poison Control Center: 1-116.172.4390  Trans Lifeline: 1-792.614.5905 - Hotline for transgender people of all ages  The Veto Project: 1-672-200-0912 - Hotline for LGBT youth     For Non-Emergency Support:   Fast Tracker: Mental Health & Substance Use Disorder Resources -   https://www.[employer].org/

## 2022-05-26 ENCOUNTER — TELEPHONE (OUTPATIENT)
Dept: FAMILY MEDICINE | Facility: CLINIC | Age: 47
End: 2022-05-26
Payer: COMMERCIAL

## 2022-05-26 ENCOUNTER — TELEPHONE (OUTPATIENT)
Dept: NEUROSURGERY | Facility: OTHER | Age: 47
End: 2022-05-26
Payer: COMMERCIAL

## 2022-05-26 DIAGNOSIS — M54.12 CERVICAL RADICULOPATHY: Primary | ICD-10-CM

## 2022-05-26 NOTE — TELEPHONE ENCOUNTER
Attempted to reach out to patient, no answer. Left voice message for patient to call clinic back to further discuss.     Patient called clinic requesting to repeat injection.    Type of injection: C7-T1 interlaminar epidural steroid injection with fluoroscopic guidance with Dr. Levi  Most recent injection date: 8/31/21  Most recent MRI: 9/16/2019  Number of injections in last 12 months: 1

## 2022-05-26 NOTE — TELEPHONE ENCOUNTER
Patient is requesting referral for additional injection. Pain has returned along with headaches. Last injection was fall of 2021

## 2022-05-26 NOTE — TELEPHONE ENCOUNTER
Patient Quality Outreach    Patient is due for the following:   Colon Cancer Screening -  FIT, Colonoscopy and Cologuard  Depression  -  Patient see Psychiatry for her depression/anxiety  Physical     NEXT STEPS:   Schedule a yearly physical    Type of outreach:    Sent [employer] message.      Questions for provider review:    None     Yaw Alvarado MA

## 2022-05-27 NOTE — TELEPHONE ENCOUNTER
Called patient to confirm symptoms. Patient reports occasional cracking sound in neck along with pain in right shoulder, right arm, right fingers.

## 2022-05-27 NOTE — TELEPHONE ENCOUNTER
Patient called clinic requesting to repeat injection.    Type of injection: C7-T1 interlaminar epidural steroid injection with fluoroscopic guidance with Dr. Levi    Most recent injection date: 8/31/21    Most recent MRI: 9/16/2019    How long did injection provide symptomatic relief: Patient reports really good relief from last injection. She had relief up until recently - almost 9 months of relief.     Current symptoms: 5-6/10 constant pain with periods of more severe pain. Pain is located in right side of neck into shoulder and down arm and fingers. Patient reports that headaches have also returned. Same symptoms as previously. No new symptoms.    Number of injections in last 12 months: 1    Plan: Will route to provider for review and recommendations    Patient voiced understanding and agreement with plan.     Advised patient to call back with any questions or concerns.

## 2022-05-31 NOTE — TELEPHONE ENCOUNTER
Per Therese Vargas NP since patient is having right arm symptoms she recommends new cervical spine MRI and follow-up appointment in clinic to review.     Called patient to provide update. Patient voiced agreement and understanding. Patient will call to scheduled MRI and follow-up appointment in clinic with Therese Vargas NP.     Patient mentioned that she remembers that last time she was having left arm symptoms and now she is having right arm symptoms.    Order placed for MRI.

## 2022-06-30 NOTE — TELEPHONE ENCOUNTER
Received [employer] message from patient. Contacted patient to discuss.     Patient reports neck pain that returned a few weeks ago. She saw Dr. Garcia on 10/17/19 and had WENCESLAO prior to that on 10/4/19. At that visit, he said if pain returned patient could try another WENCESLAO or RTC to discuss possible C6-7 ACDF.    Today, patient reports sharp neck pain that radiates to bilateral shoulders. She was only having left sided pain at the visit with Dr. Garcia. She denies any paresthesias or weakness. Denies any falls or injuries that may have caused the recent increase in pain. She believes the WENCESLAO provided pain relief for a few months but now has worn off. Patient is interested in trying WENCESLAO again.    Placed order for repeat cervical WENCESLAO. Advised patient to call back if symptoms persist 10-14 days after WENCESLAO. Patient voiced agreement with plan.   
able to assess patient non verbal, dementia

## 2022-07-28 ENCOUNTER — TELEPHONE (OUTPATIENT)
Dept: PSYCHIATRY | Facility: CLINIC | Age: 47
End: 2022-07-28

## 2022-07-28 NOTE — TELEPHONE ENCOUNTER
Received a message from the virtual visit facilitators that patient is in [place] and therefore cannot attend her virtual appointment.

## 2022-07-28 NOTE — TELEPHONE ENCOUNTER
Called Lilian to check in:   No answer x3 from 10:45-10:55, so left a message letting her know that I am assuming that she is off of sertraline now and would like to confirm that, and that I would also like to confirm what her plans are going forward. If she is comfortable considering her care with us complete at this time, I am okay with that. However, if she would like to follow up again, I am also okay with that. Told her I will send [employer] message at this time.

## 2022-09-11 ENCOUNTER — HEALTH MAINTENANCE LETTER (OUTPATIENT)
Age: 47
End: 2022-09-11

## 2022-12-14 ENCOUNTER — TRANSFERRED RECORDS (OUTPATIENT)
Dept: HEALTH INFORMATION MANAGEMENT | Facility: CLINIC | Age: 47
End: 2022-12-14

## 2023-01-18 ENCOUNTER — TRANSFERRED RECORDS (OUTPATIENT)
Dept: HEALTH INFORMATION MANAGEMENT | Facility: CLINIC | Age: 48
End: 2023-01-18

## 2023-01-19 ENCOUNTER — TELEPHONE (OUTPATIENT)
Dept: PSYCHIATRY | Facility: CLINIC | Age: 48
End: 2023-01-19
Payer: COMMERCIAL

## 2023-01-19 DIAGNOSIS — F43.21 GRIEF REACTION: ICD-10-CM

## 2023-01-19 DIAGNOSIS — F32.A DEPRESSION, UNSPECIFIED DEPRESSION TYPE: Primary | ICD-10-CM

## 2023-01-19 RX ORDER — DIAZEPAM 5 MG
2.5-5 TABLET ORAL DAILY PRN
Qty: 10 TABLET | Refills: 0 | Status: SHIPPED | OUTPATIENT
Start: 2023-01-19 | End: 2023-04-27

## 2023-01-19 NOTE — TELEPHONE ENCOUNTER
[facility] Call Center    Phone Message    May a detailed message be left on voicemail: yes     Reason for Call: Symptoms or Concerns     If patient has red-flag symptoms, warm transfer to triage line    Current symptom or concern: Patient called looking to see Dr Rod and hopefully restart medication. She is currently in [place] and will be back to [place] in April. Offered to schedule for when she is back, but pt declined as she wants/needs something much sooner.     Patient says that she had been doing well this past summer, so was able to be off medications. Her son recently passed away and she is not doing well. She would like to restart medications and see provider.     Has patient previously been seen for this? Yes    By: Dr Rod    Date: 5/19/22    Are there any new or worsening symptoms? Yes: see above    Action Taken: Message routed to:  Other: Psych Clinic nurse pool    Travel Screening: Not Applicable

## 2023-01-19 NOTE — Clinical Note
Lance Batres!    Please see letter - are you okay with an SHERRELL letter? This is for the apartment they are moving to (so they don't have to pay the extra rent for the pets - $600 initially + $90 per month).    Thank you!  VV

## 2023-01-19 NOTE — TELEPHONE ENCOUNTER
Called patient to get more information. She said that for the past couple of months, she feels as if she is regressing. She has been going down dark rabbit holes with her grief about her son. She was feeling fine in the summer and early fall, and then her world started turning gray again. She is trying to figure out how to make life more tolerable.    She feels depressed and anxious. She has been worrying a lot about her other children dying, or her  dying. There are also random times when she would feel severe anxiety that can last up to an hour. During these episodes, she shakes, finds it hard to breathe, and feels a heaviness in her chest. To cope, she turns her attention to other things/projects around the house or watches a happy show. She also still has a few tablets of diazepam which she takes very sparingly when non-pharmacological interventions are not effective.    She takes ZZquil and melatonin for sleep, so sleep has not been an issue. She wakes up feeling refreshed. Appetite is unchanged. She endorses anhedonia - asks herself what life is all about. She denies safety concerns.     She and her  are coming back to [place] at the beginning of April to be near her kids and grandkids. They are moving into an apartment and she is wondering if she can get a letter for an emotional support animal for her dog and cat. Her cat, especially, is a source of comfort.    She would like to start an antidepressant - sertraline was helpful and she does not recall intolerable side effects and just wanted to discontinue because she was feeling better and did not want to be on medication forever. She would also like a few more diazepam tabs, if possible.    Prescriptions can be sent to [facility] in [address] (marked as preferred).     The SHERRELL letter can be emailed to her at [email or web address].    Routed to Dr. Rod for review and recommendations.

## 2023-01-19 NOTE — TELEPHONE ENCOUNTER
Thank you April so much for the detailed documentation. I agree with restarting sertraline, and agree with your idea to start it at 25mg for a week, given that it caused her initial anxiety previously when starting it at 50mg. I also agree with providing a limited quantity of diazepam given the circumstance.     Please call her again in 4 weeks to check up on how things are going and if symptoms are continuing at that time, we will recommend further increasing the dose to 100mg.

## 2023-01-19 NOTE — LETTER
January 19, 2023      RE: Lilian Quinones  [address] [id number] [facility] [address]       To Whom It May Concern,    Lilian Quinones is currently under my care. I am familiar with her history and the functional limitations imposed by her diagnosis. She meets the definition of disability under the Americans with Disabilities Act, the Fair Housing Act, and the Rehabilitation Act of 1973.    Due to this emotional/mental disability, Lilian has certain limitations related to coping with stress/anxiety. In order to help alleviate these difficulties, and to enhance the ability to live independently and to fully use and enjoy the housing unit where they live, I have prescribed Lilian the use of an Emotional Support Animal (SHERRELL) and this should be accommodated to the extent legally allowed.    There is a significant evidence base for the therapeutic benefits of ESAs in the treatment of certain mental health conditions, such as that for which Lilian is currently being treated.     This letter is in support of the use of an SHERRELL as an appropriate and evidence based medical intervention. The training, certification, and veterinary care of the animal is the responsibility of the animal's owner, who agrees to appropriate care and responsibility for the animal and its behaviors.       Sincerely,        Joss Rod MD

## 2023-01-19 NOTE — TELEPHONE ENCOUNTER
Called Lilian to let her know that the prescriptions have been sent to the pharmacy. Went over the dose instructions for sertraline, and she agreed with starting at 25 mg. Will check in on her in a few weeks to see how she is doing, and the dose might be further increased at that time.     She would like to know if she can have a letter regarding her animals being important sources of emotional support.

## 2023-01-20 NOTE — TELEPHONE ENCOUNTER
Called patient to confirm that the email went through. She did receive the letter and expressed her appreciation. She agrees with the plan to check in in a few weeks.    Routed to Dr. Rod for FYI.

## 2023-01-23 ENCOUNTER — HEALTH MAINTENANCE LETTER (OUTPATIENT)
Age: 48
End: 2023-01-23

## 2023-02-10 ENCOUNTER — TELEPHONE (OUTPATIENT)
Dept: PSYCHIATRY | Facility: CLINIC | Age: 48
End: 2023-02-10
Payer: COMMERCIAL

## 2023-02-10 NOTE — TELEPHONE ENCOUNTER
Called patient and left VM requesting a return call to follow up on how she is doing and whether she has noticed any improvement since restarting sertraline.

## 2023-02-17 ENCOUNTER — TELEPHONE (OUTPATIENT)
Dept: PSYCHIATRY | Facility: CLINIC | Age: 48
End: 2023-02-17
Payer: COMMERCIAL

## 2023-02-17 NOTE — TELEPHONE ENCOUNTER
Received a return call from Lilian. She said that the sertraline is helpful, and she would like to stay at the same dose for now (50 mg daily) until she absolutely has to increase. Her depression and anxiety have improved since starting the medicine, although there are still times when she becomes anxious related to the death of her son, and concerns about her living children.     She and her  are still in [place] but will be moving to [place], [place] next month because she would like to spend some time on the beach before heading back to [place] in April. She is looking forward to spending time with her two grandchildren. She and her  have been traveling in their RV for 1.5 years, and this has been good for her.     She expressed appreciation for the SHERRELL letter which was accepted by the  in [place] so she will be able to have her pets with her.     Notified her that Dr. Rod sent a refill for sertraline 50 mg tablets to [facility] in [place].     Appointment scheduled for April 27, 2023 at 8:30 AM.

## 2023-02-17 NOTE — TELEPHONE ENCOUNTER
Called patient and left  requesting a return call to touch base re:    - any change in symptoms and severity since restarting sertraline  - thoughts about whether there is a need to increase the dose

## 2023-04-05 ENCOUNTER — TELEPHONE (OUTPATIENT)
Dept: NEUROSURGERY | Facility: CLINIC | Age: 48
End: 2023-04-05
Payer: COMMERCIAL

## 2023-04-05 NOTE — TELEPHONE ENCOUNTER
I called patient and LM to reschedule her Dr. Valenzuela appt, she is scheduled in a 30 min slot instead of the 60min new pt slot.  She has not seen Nicolas DESIR.  Shannan Alcala LPN

## 2023-04-05 NOTE — TELEPHONE ENCOUNTER
SPINE PATIENTS - NEW PROTOCOL PREVISIT    RECORDS RECEIVED FROM: In process   REASON FOR VISIT: piriformus syndrome/lumbar bulging disc   Date of Appt: 04/11/2023   NOTES (FOR ALL VISITS) STATUS DETAILS   OFFICE NOTE from referring provider In process  [facility]   OFFICE NOTE from other specialist N/A    DISCHARGE SUMMARY from hospital N/A    DISCHARGE REPORT from ER N/A    OPERATIVE REPORT N/A    EMG REPORT N/A    MEDICATION LIST N/A    IMAGING  (FOR ALL VISITS)     MRI (HEAD, NECK, SPINE) N/A    XRAY (SPINE) *NEUROSURGERY* N/A    CT (HEAD, NECK, SPINE) N/A       Records and images received

## 2023-04-05 NOTE — PROGRESS NOTES
SUBJECTIVE:  HPI:  Lilian Quinones  Is a 48 year old female who presents today for new patient evaluation of low back pain, lumbar bulging disks and suspected piriformis syndrome.      Records from [facility] reviewed:    Initial visit 11/1/2022 was primarily for right upper extremity symptoms and pain in the left hip.  Tenderness in the left SI joint and positive left SLR documented.  Tender to palpation of the left gluteal lateral and near the ischial tuberosity and greater trochanteric bursa    1/18/2023 consultation documents neck pain radiating to upper extremities, pain in back radiating to thighs limiting shopping and leisure.   Tizanidine has been used with some relief and better sleep;  no improvement with trial of tramadol.  Lower extremity neurologic exam that day was normal.  Plan was for a left piriformis injection and if unhelpful consideration of Botox.    Lumbar MRI 11/3/2022: nonimpinging the far left lateral disc bulges L 2-3, and L3-4.  Mild bilateral neural foraminal narrowing with lateral disc bulging at L4-5.  L5-S1 left paracentral disc and foraminal disc protrusion compressing the left S1 nerve root in the subarticular recess.  No other foraminal stenosis and no central stenosis at any level.    Cervical MRI 12/14/2022 compared to prior MRI 9/11/2019, multilevel cervical spondylosis and cord signal normal.  Large left C6-7 central and lateral recess disc extrusion leading to mild-moderate central spinal stenosis, and moderate to severe left neural foraminal stenosis.  There is no right-sided neuroforaminal stenosis at any level.  Multiple small perineural cysts.    12/7/2022: Left L4-5/L5-S1 TFLESI  1/11/2023 left L5-S1 TFESI      Diagnosis of left piriformis syndrome and a piriformis muscle injection on 2/8/2023 with anesthetic and steroid that was fluoroscopically guided.  Preinjection pain 7/10, postinjection pain 3/10    History today:  She reports the onset was  without an inciting event in 2022.  She tried some physical therapy and the injections above and nothing is really given her lasting relief.  Because of her bariatric surgery, she is trying to avoid ibuprofen but she has been alternating ibuprofen and an over-the-counter supplement called Kraton which seems to be helping.  She and her  are on a long road trip while emotionally recovering from her son's death this past November.  Now they are back in town because she has a new grandchild.  She does have intermittent symptoms of pain shooting down her left lateral thigh in roughly an L5 distribution.    She has a separate issue with right upper quadrant pain and possible radicular pain.  We are not going to have time to evaluate that today but she will come back at another date      SYMPTOMS WORSENED WITH sitting upright, standing and walk    SYMPTOMS IMPROVED WITH sitting reclining, current medications as above.    Pain score and diagram reviewed.  See questionnaire.      ROS: Recent left-sided hearing loss-brain MRI pending.  Otherwise negative for bowel/bladder dysfunction, balance changes, headache, leg pain/numbness/weakness, fevers, chills, night sweats, unexplained weight loss;  otherwise unremarkable.   See the patient's intake questionnaire from today for details.    Treatment to Date: As above in [place]    MEDICATIONS:  Reviewed.    ALLERGIES:  Reviewed.     Reviewed past medical, surgical, and family history.    Pertinent for depression, anxiety, overweight, status post breast reduction, status post bariatric surgery (sleeve gastrectomy)-100 pound weight loss, subsequent 25 pound weight gain.    SOCIAL HX: She is  and has had 4 children 1 of whom  in 2019.  She and her  sold her home and have been on the road for 18 months and they have now moved back because she has a new grandchild.  She works as a bank lender.  Sports hobbies and activity biking, kayaking, travel,  ermias      OBJECTIVE:    --CONSTITUTIONAL:   No acute distress.  The patient is well nourished and well groomed.  She transitions well.  She moves fluidly.  BMI is modestly increased.  --PSYCHIATRIC:  Appropriate mood and affect. The patient is awake, alert, oriented to person, place, time and answering questions appropriately with clear speech.    --SKIN:  Skin over the face, bilateral lower extremities, and posterior torso is clean, dry, intact without rashes.    --RESPIRATORY: Normal respiratory excursion and effort, and no dyspnea.   --GAIT:  is non-antalgic. Flat foot, heel and toe walking:  normal   .  Squat and rise       FCI limited by left gluteal pain.  --STANDING EXAMINATION:    Symmetry of spine/pelvis   unremarkable   .      Range of motion limited in flexion with pain in the left gluteal area.  Full and painless for all others.   Standing flexion   positive right.    Carisa's sign negative.     Stork test       positive right with pain in the left sciatic notch .  Points to the left sciatic notch as her pain site  --NEUROLOGICAL:     ROMBERG, TANDEM WALK, PRONATOR DRIFT:   Normal.   .  SENSATION to light touch is slightly diminished in the lateral 3 left toes but otherwise normal in bilateral thighs, lower legs and feet.   REFLEXES:  patellar 2+, and achilles 2+ right, absent left.  Babinski is negative. No clonus.  MANUAL MOTOR TESTING:  L3- S1 Myotomes, Femoral, Obturator, Peroneal and Tibial nerves 5/5   DURAL STRETCH TESTS:  SLR positive left for left lateral thigh and calf discomfort, negative right.  Femoral Stretch Test not done.   --PELVIC/HIP JOINTS:                Long Sitting     right long to short.    Hip scour   negative   .    Hip Impingement   negative   .   MARIAM   negative    .     Piriformis   negative   .   Spring testing negative SI and spine.      PELVIC ALIGNMENT right posterior innominate rotation, right posterior sacral torsion.   --LUMBAR/GLUTEAL MUSCLES: Negative glutes,  lumbar, and sciatic notches for tenderness, spasms, trigger points.    --ABDOMINAL:  Non-distended.  Nontender  --VASCULAR: Femoral pulses 2+. Lower extremity capillary refill, temperature and color normal.       Procedure note-OMT:  Manual medicine corrected the Pelvic Joint Dysfunction, leg lengths became even with negative long sitting test, stork and standing flexion test became negative, forward flexion was hands to the toes.  SLR remained positive with a slump test on the left.      IMAGING: None available.  See old record review above for prior lumbar MRI    ASSESSMENT: Lilian Quinones is a 48 year old female who presents  today for new patient evaluation of:      Left-sided buttock and leg symptoms    Pelvic Joint Dysfunction manifesting as a right posterior innominate rotation and right sacral torsion-indeterminate response to OMT    Suspected left gluteal myofascial pain    Multilevel DDD from L2-S1 with the L5-S1 left lateral disc herniation compressing the left S1 nerve root in the lateral recess    Neurologic exam compatible with a chronic left S1 radiculopathy    Status post  left transforaminal WENCESLAO, at L4-5 and L5-S1 x2    Status post left piriformis injection      PLAN:  Some of her persistent left leg symptoms are likely due to radiculitis of the left S1 nerve root, but her symptoms are not severe enough to mandate surgery.  I am hopeful that by treating the Pelvic Joint Dysfunction and doing the gluteal myofascial protocols that she will have enough pain relief to be able to move on with physical activities.  Interestingly, the problem is on the right side of her pelvis even though her symptoms are on the left, so there is probably multifactorial pain etiology.    Advised patient to call or return early if symptoms worsen, or having problems controlling bladder and bowel function or worsening leg weakness.     Please note: Voice recognition software was used in this dictation.  It may therefore  contain typographical errors.    Terrell Valenzuela MD

## 2023-04-10 ENCOUNTER — HOSPITAL ENCOUNTER (OUTPATIENT)
Dept: MAMMOGRAPHY | Facility: CLINIC | Age: 48
Discharge: HOME | End: 2023-04-10
Attending: PHYSICIAN ASSISTANT | Admitting: PHYSICIAN ASSISTANT
Payer: COMMERCIAL

## 2023-04-10 DIAGNOSIS — Z12.31 VISIT FOR SCREENING MAMMOGRAM: ICD-10-CM

## 2023-04-10 PROCEDURE — 77067 SCR MAMMO BI INCL CAD: CPT

## 2023-04-11 ENCOUNTER — PRE VISIT (OUTPATIENT)
Dept: NEUROSURGERY | Facility: CLINIC | Age: 48
End: 2023-04-11

## 2023-04-11 ENCOUNTER — OFFICE VISIT (OUTPATIENT)
Dept: NEUROSURGERY | Facility: CLINIC | Age: 48
End: 2023-04-11
Payer: COMMERCIAL

## 2023-04-11 VITALS
WEIGHT: 191 LBS | BODY MASS INDEX: 29.47 KG/M2 | DIASTOLIC BLOOD PRESSURE: 86 MMHG | HEART RATE: 91 BPM | SYSTOLIC BLOOD PRESSURE: 131 MMHG

## 2023-04-11 DIAGNOSIS — M79.18 MYOFASCIAL PAIN: ICD-10-CM

## 2023-04-11 DIAGNOSIS — M54.16 LUMBAR RADICULOPATHY: ICD-10-CM

## 2023-04-11 DIAGNOSIS — M99.04 SACROILIAC JOINT SOMATIC DYSFUNCTION: Primary | ICD-10-CM

## 2023-04-11 PROCEDURE — 98925 OSTEOPATH MANJ 1-2 REGIONS: CPT | Performed by: PREVENTIVE MEDICINE

## 2023-04-11 PROCEDURE — 99204 OFFICE O/P NEW MOD 45 MIN: CPT | Mod: 25 | Performed by: PREVENTIVE MEDICINE

## 2023-04-11 NOTE — NURSING NOTE
Reason For Visit:   Chief Complaint   Patient presents with     New Patient     Piriformis syndrome         Occupation: banking  Currently working? Yes.  Work status?  Full time.    Sports: no  Activities: biking kayaking             /86 (BP Location: Right arm, Patient Position: Sitting, Cuff Size: Adult Regular)   Pulse 91   Wt 86.6 kg (191 lb)   BMI 29.47 kg/m        Allergies   Allergen Reactions     Amoxicillin Rash     Penicillins Rash       Current Outpatient Medications   Medication     diazepam (VALIUM) 5 MG tablet     diphenhydrAMINE HCl, Sleep, (ZZZQUIL PO)     levonorgestrel (MIRENA) 20 MCG/24HR IUD     melatonin 5 MG tablet     sertraline (ZOLOFT) 50 MG tablet     cyanocobalamin (VITAMIN B-12) 1000 MCG SUBL sublingual tablet     Pediatric Multi Vit-Extra C-FA (FLINTSTONES/EXTRA C) CHEW     sertraline (ZOLOFT) 100 MG tablet     No current facility-administered medications for this visit.         Jaja Masters LPN

## 2023-04-11 NOTE — LETTER
4/11/2023         RE: Lilian Quinones  [address]  [place] [address]        Dear Colleague,    Thank you for referring your patient, Lilian Quinones, to the [facility] [place]. Please see a copy of my visit note below.        SUBJECTIVE:  HPI:  Lilian Quinones  Is a 48 year old female who presents today for new patient evaluation of low back pain, lumbar bulging disks and suspected piriformis syndrome.      Records from [facility] reviewed:    Initial visit 11/1/2022 was primarily for right upper extremity symptoms and pain in the left hip.  Tenderness in the left SI joint and positive left SLR documented.  Tender to palpation of the left gluteal lateral and near the ischial tuberosity and greater trochanteric bursa    1/18/2023 consultation documents neck pain radiating to upper extremities, pain in back radiating to thighs limiting shopping and leisure.   Tizanidine has been used with some relief and better sleep;  no improvement with trial of tramadol.  Lower extremity neurologic exam that day was normal.  Plan was for a left piriformis injection and if unhelpful consideration of Botox.    Lumbar MRI 11/3/2022: nonimpinging the far left lateral disc bulges L 2-3, and L3-4.  Mild bilateral neural foraminal narrowing with lateral disc bulging at L4-5.  L5-S1 left paracentral disc and foraminal disc protrusion compressing the left S1 nerve root in the subarticular recess.  No other foraminal stenosis and no central stenosis at any level.    Cervical MRI 12/14/2022 compared to prior MRI 9/11/2019, multilevel cervical spondylosis and cord signal normal.  Large left C6-7 central and lateral recess disc extrusion leading to mild-moderate central spinal stenosis, and moderate to severe left neural foraminal stenosis.  There is no right-sided neuroforaminal stenosis at any level.  Multiple small perineural cysts.    12/7/2022: Left L4-5/L5-S1  TFLESI  1/11/2023 left L5-S1 TFESI      Diagnosis of left piriformis syndrome and a piriformis muscle injection on 2/8/2023 with anesthetic and steroid that was fluoroscopically guided.  Preinjection pain 7/10, postinjection pain 3/10    History today:  She reports the onset was without an inciting event in July 2022.  She tried some physical therapy and the injections above and nothing is really given her lasting relief.  Because of her bariatric surgery, she is trying to avoid ibuprofen but she has been alternating ibuprofen and an over-the-counter supplement called Kraton which seems to be helping.  She and her  are on a long road trip while emotionally recovering from her son's death this past November.  Now they are back in town because she has a new grandchild.  She does have intermittent symptoms of pain shooting down her left lateral thigh in roughly an L5 distribution.    She has a separate issue with right upper quadrant pain and possible radicular pain.  We are not going to have time to evaluate that today but she will come back at another date      SYMPTOMS WORSENED WITH sitting upright, standing and walk    SYMPTOMS IMPROVED WITH sitting reclining, current medications as above.    Pain score and diagram reviewed.  See questionnaire.      ROS: Recent left-sided hearing loss-brain MRI pending.  Otherwise negative for bowel/bladder dysfunction, balance changes, headache, leg pain/numbness/weakness, fevers, chills, night sweats, unexplained weight loss;  otherwise unremarkable.   See the patient's intake questionnaire from today for details.    Treatment to Date: As above in [place]    MEDICATIONS:  Reviewed.    ALLERGIES:  Reviewed.     Reviewed past medical, surgical, and family history.    Pertinent for depression, anxiety, overweight, status post breast reduction, status post bariatric surgery (sleeve gastrectomy)-100 pound weight loss, subsequent 25 pound weight gain.    SOCIAL HX: She is   and has had 4 children 1 of whom  in 2019.  She and her  sold her home and have been on the road for 18 months and they have now moved back because she has a new grandchild.  She works as a bank lender.  Sports hobbies and activity biking, kayaking, travel, hiking      OBJECTIVE:    --CONSTITUTIONAL:   No acute distress.  The patient is well nourished and well groomed.  She transitions well.  She moves fluidly.  BMI is modestly increased.  --PSYCHIATRIC:  Appropriate mood and affect. The patient is awake, alert, oriented to person, place, time and answering questions appropriately with clear speech.    --SKIN:  Skin over the face, bilateral lower extremities, and posterior torso is clean, dry, intact without rashes.    --RESPIRATORY: Normal respiratory excursion and effort, and no dyspnea.   --GAIT:  is non-antalgic. Flat foot, heel and toe walking:  normal   .  Squat and rise       senior care limited by left gluteal pain.  --STANDING EXAMINATION:    Symmetry of spine/pelvis   unremarkable   .      Range of motion limited in flexion with pain in the left gluteal area.  Full and painless for all others.   Standing flexion   positive right.    Carisa's sign negative.     Stork test       positive right with pain in the left sciatic notch .  Points to the left sciatic notch as her pain site  --NEUROLOGICAL:     ROMBERG, TANDEM WALK, PRONATOR DRIFT:   Normal.   .  SENSATION to light touch is slightly diminished in the lateral 3 left toes but otherwise normal in bilateral thighs, lower legs and feet.   REFLEXES:  patellar 2+, and achilles 2+ right, absent left.  Babinski is negative. No clonus.  MANUAL MOTOR TESTING:  L3- S1 Myotomes, Femoral, Obturator, Peroneal and Tibial nerves 5/5   DURAL STRETCH TESTS:  SLR positive left for left lateral thigh and calf discomfort, negative right.  Femoral Stretch Test not done.   --PELVIC/HIP JOINTS:                Long Sitting     right long to short.    Hip scour    negative   .    Hip Impingement   negative   .   MARIAM   negative    .     Piriformis   negative   .   Spring testing negative SI and spine.      PELVIC ALIGNMENT right posterior innominate rotation, right posterior sacral torsion.   --LUMBAR/GLUTEAL MUSCLES: Negative glutes, lumbar, and sciatic notches for tenderness, spasms, trigger points.    --ABDOMINAL:  Non-distended.  Nontender  --VASCULAR: Femoral pulses 2+. Lower extremity capillary refill, temperature and color normal.       Procedure note-OMT:  Manual medicine corrected the Pelvic Joint Dysfunction, leg lengths became even with negative long sitting test, stork and standing flexion test became negative, forward flexion was hands to the toes.  SLR remained positive with a slump test on the left.      IMAGING: None available.  See old record review above for prior lumbar MRI    ASSESSMENT: Lilian Quinones is a 48 year old female who presents  today for new patient evaluation of:    Left-sided buttock and leg symptoms  Pelvic Joint Dysfunction manifesting as a right posterior innominate rotation and right sacral torsion-indeterminate response to OMT  Suspected left gluteal myofascial pain  Multilevel DDD from L2-S1 with the L5-S1 left lateral disc herniation compressing the left S1 nerve root in the lateral recess  Neurologic exam compatible with a chronic left S1 radiculopathy  Status post  left transforaminal WENCESLAO, at L4-5 and L5-S1 x2  Status post left piriformis injection      PLAN:  Some of her persistent left leg symptoms are likely due to radiculitis of the left S1 nerve root, but her symptoms are not severe enough to mandate surgery.  I am hopeful that by treating the Pelvic Joint Dysfunction and doing the gluteal myofascial protocols that she will have enough pain relief to be able to move on with physical activities.  Interestingly, the problem is on the right side of her pelvis even though her symptoms are on the left, so there is probably  multifactorial pain etiology.    Advised patient to call or return early if symptoms worsen, or having problems controlling bladder and bowel function or worsening leg weakness.     Please note: Voice recognition software was used in this dictation.  It may therefore contain typographical errors.    Terrell Valenzuela MD             Again, thank you for allowing me to participate in the care of your patient.        Sincerely,        Terrell Valenzuela MD

## 2023-04-11 NOTE — PATIENT INSTRUCTIONS
Care you do have a Pelvic Joint Dysfunction, but it is unclear whether fixing that is going to help you although I think it is worth a try.  There is also evidence that 1 of those herniated disks has probably put some pressure on that nerve that goes down your left leg.  Ultimately we can always have you talk to a surgeon about that but I am not sure it is worth it.  Do the self correction I taught you and work with Haleigh Brown in [place] for some pelvic stabilization therapy and gluteal strengthening and stretching and I am looking forward to seeing you back in 4 months.  See the assessment and plan below for further details of our discussion today and for instructions on the self-correction below.    ASSESSMENT: Lilian Quinones is a 48 year old female who presents  today for new patient evaluation of:    Left-sided buttock and leg symptoms  Pelvic Joint Dysfunction manifesting as a right posterior innominate rotation and right sacral torsion-indeterminate response to OMT  Suspected left gluteal myofascial pain  Multilevel DDD from L2-S1 with the L5-S1 left lateral disc herniation compressing the left S1 nerve root in the lateral recess  Neurologic exam compatible with a chronic left S1 radiculopathy  Status post 3 left transforaminal WENCESLAO, at L4-5 and L5-S1 at various times  Status post left piriformis injection      PLAN:  Some of her persistent left leg symptoms are likely due to radiculitis of the left S1 nerve root, but her symptoms are not severe enough to mandate surgery.  I am hopeful that by treating the Pelvic Joint Dysfunction and doing the gluteal myofascial protocols that she will have enough pain relief to be able to move on with physical activities.  Interestingly, the problem is on the right side of her pelvis even though her symptoms are on the left, so there is probably multifactorial pain etiology.        PELVIC JOINT SELF CORRECTION EXERCISES      It is best to do this first thing in the morning,  "and can be repeated once or twice during the day.    \"SHOTGUN\" TECHNIQUE:  This loosens up the front and back of the pelvis.  Do this before the Broomstick exercise.  Use on object such as a rectangular laundry basket.  Lie on your back with your knees bent, feet together and flat on the floor.    Spread your knees approximately 12-24 inches around the outside of an upright laundry basket.  Squeeze your knees together, breathing as you do this.    Concentrate on keeping your buttocks relaxed and on the ground.    A brief discomfort in the front of the pelvis and even a popping sound is normal.  Hold the squeeze for 3-5 seconds.    Relax for 3-5 seconds.    Repeat 2 more times.    Now reverse your knee position to the inside of the upside down laundry basket while still lying with your knees bent up, feet on the ground.  Pull your knees apart, breathing easy as you do this.  Hold the squeeze for 3-5 seconds.    Relax for 3-5 seconds.    Repeat 2 more times.    BROOMSTICK EXERCISE:  Lie on your back with your knees bent.  Slide a broomstick or similar object above one knee, and below the opposite knee.  Firmly hold the stick with your hands will bringing your knees closed to your belly, preferably high enough that your back can rest flat on the ground.  Still holding the stick, scissors your legs against the stick, breathing as you do this.    (Push down to your foot with leg on top of the broomstick, and lift up to your chin with the leg below the broomstick).  Hold the squeeze for 3-5 seconds.    Relax for 3-5 seconds.    Repeat 2 more times.  Switch the position of the broomstick above the other knee, and below the first knee.  Repeat the exercise as above in this new position.     "

## 2023-04-26 ASSESSMENT — ANXIETY QUESTIONNAIRES
1. FEELING NERVOUS, ANXIOUS, OR ON EDGE: NEARLY EVERY DAY
2. NOT BEING ABLE TO STOP OR CONTROL WORRYING: NEARLY EVERY DAY
GAD7 TOTAL SCORE: 21
8. IF YOU CHECKED OFF ANY PROBLEMS, HOW DIFFICULT HAVE THESE MADE IT FOR YOU TO DO YOUR WORK, TAKE CARE OF THINGS AT HOME, OR GET ALONG WITH OTHER PEOPLE?: EXTREMELY DIFFICULT
6. BECOMING EASILY ANNOYED OR IRRITABLE: NEARLY EVERY DAY
3. WORRYING TOO MUCH ABOUT DIFFERENT THINGS: NEARLY EVERY DAY
IF YOU CHECKED OFF ANY PROBLEMS ON THIS QUESTIONNAIRE, HOW DIFFICULT HAVE THESE PROBLEMS MADE IT FOR YOU TO DO YOUR WORK, TAKE CARE OF THINGS AT HOME, OR GET ALONG WITH OTHER PEOPLE: EXTREMELY DIFFICULT
GAD7 TOTAL SCORE: 21
5. BEING SO RESTLESS THAT IT IS HARD TO SIT STILL: NEARLY EVERY DAY
GAD7 TOTAL SCORE: 21
7. FEELING AFRAID AS IF SOMETHING AWFUL MIGHT HAPPEN: NEARLY EVERY DAY
4. TROUBLE RELAXING: NEARLY EVERY DAY
7. FEELING AFRAID AS IF SOMETHING AWFUL MIGHT HAPPEN: NEARLY EVERY DAY

## 2023-04-26 ASSESSMENT — PATIENT HEALTH QUESTIONNAIRE - PHQ9
10. IF YOU CHECKED OFF ANY PROBLEMS, HOW DIFFICULT HAVE THESE PROBLEMS MADE IT FOR YOU TO DO YOUR WORK, TAKE CARE OF THINGS AT HOME, OR GET ALONG WITH OTHER PEOPLE: EXTREMELY DIFFICULT
SUM OF ALL RESPONSES TO PHQ QUESTIONS 1-9: 12
SUM OF ALL RESPONSES TO PHQ QUESTIONS 1-9: 12

## 2023-04-27 ENCOUNTER — MYC MEDICAL ADVICE (OUTPATIENT)
Dept: PSYCHIATRY | Facility: CLINIC | Age: 48
End: 2023-04-27

## 2023-04-27 ENCOUNTER — VIRTUAL VISIT (OUTPATIENT)
Dept: PSYCHIATRY | Facility: CLINIC | Age: 48
End: 2023-04-27
Attending: PSYCHIATRY & NEUROLOGY
Payer: COMMERCIAL

## 2023-04-27 DIAGNOSIS — F41.1 GENERALIZED ANXIETY DISORDER: ICD-10-CM

## 2023-04-27 DIAGNOSIS — R41.840 ATTENTION DEFICIT: ICD-10-CM

## 2023-04-27 DIAGNOSIS — F41.1 GENERALIZED ANXIETY DISORDER: Primary | ICD-10-CM

## 2023-04-27 PROCEDURE — 99214 OFFICE O/P EST MOD 30 MIN: CPT | Mod: VID | Performed by: PSYCHIATRY & NEUROLOGY

## 2023-04-27 PROCEDURE — 90833 PSYTX W PT W E/M 30 MIN: CPT | Mod: VID | Performed by: PSYCHIATRY & NEUROLOGY

## 2023-04-27 RX ORDER — PREDNISONE 20 MG/1
TABLET ORAL
COMMUNITY
Start: 2023-04-17 | End: 2023-05-31

## 2023-04-27 RX ORDER — IBUPROFEN 200 MG
600 TABLET ORAL EVERY 6 HOURS PRN
Status: ON HOLD | COMMUNITY
End: 2023-10-03

## 2023-04-27 RX ORDER — BUSPIRONE HYDROCHLORIDE 15 MG/1
TABLET ORAL
Qty: 60 TABLET | Refills: 1 | Status: SHIPPED | OUTPATIENT
Start: 2023-04-27 | End: 2023-06-08

## 2023-04-27 RX ORDER — DIAZEPAM 5 MG
2.5-5 TABLET ORAL DAILY PRN
Qty: 10 TABLET | Refills: 0 | Status: SHIPPED | OUTPATIENT
Start: 2023-04-27 | End: 2023-05-31

## 2023-04-27 NOTE — NURSING NOTE
Is the patient currently in the state of [place]? YES    Visit mode:VIDEO    If the visit is dropped, the patient can be reconnected by: VIDEO VISIT: Text to cell phone: [phone]    Will anyone else be joining the visit? NO      How would you like to obtain your AVS? MyChart    Are changes needed to the allergy or medication list? NO    Reason for visit: No chief complaint on file.

## 2023-04-27 NOTE — PROGRESS NOTES
Virtual Visit Details  Type of service:  Video Visit   Originating Location (pt. Location): Home  Distant Location (provider location):  Off-site  Platform used for Video Visit: [facility]  MEDICAL PROGRESS NOTE   Lilian uQinones is a 48 year old. Initial consultation on 08/05/20. Referred by Kristen M Kehr for evaluation of depression.      Assessment & Plan    History and interview support the following diagnoses:  Generalized anxiety disorder  PTSD / Complex grief  Depression, unspecified (MDD vs secondary to anxiety / trauma / grief)    Lilian notes a complex situation at this time. She recently moved back to [place]. This has had a lot of triggers / associations for her, which likely indicates that the trauma elements of her grief are unresolved. She should still be engaging in trauma therapy, if she is encountering elements that are interfering with her ability to function ideally. They moved back to [place] to be with their new granddaughters born recently, which is a major positive.   Anxiety is still really high at baseline, and functionally interferes most days. She no longer feels that depression is a significant issues outside of the grief that she now feels is baseline.   Notes significant attentional issues that have persisted since her son's death. These were not present prior to that, although does not that both sons have ADHD, and maybe there were some elements that were less obvious that were problematic earlier in life, hard to say. I ordered ADHD evaluation at this time to help parse apart her various symptoms and add the historical context that could be helpful. I think that she likely does not have ADHD, but it is a possibility. Would be easier to treat and diagnose if anxiety were better controlled, and that would be easier to address if her trauma symptoms were also better addressed.   The fact that she was previously on bupropion  and it helped with attentional symptoms, but caused significant anxiety does not marcial well for the potential use of stimulants. It does support the potential use of antiadreneric options like clonidine or guanfacine going forward. I may discuss Intuniv as an option with her at next visit.   For now, she preferred to try buspirone, as her daughter is on this and has had a positive experience with it.   She went off of sertraline due to concerns about emotional blunting. This does not rule out addressing other antidepressant options in the future. May consider alternatives like escitalopram if needed, given side effects with sertraline.   She would still likely benefit from individual therapy if she opted to do this.     She uses diazepam sparingly to help with anxiety. She is aware that this is not a sustainable solution. It is not bad to use the diazepam, but it is the opposite of an intervention that would help her achieve her goal of being okay. She can continue to use it, but it must not be the only intervention she has available. Her symptoms will not be at goal until she no longer needs the diazepam. Goal would be to be off of it within 6 months.     PSYCHOTROPIC DRUG INTERACTIONS: None   MANAGEMENT:  N/A     Plan     1) PSYCHOTROPIC MEDICATIONS:  - Start buspirone 7.5mg twice daily for 7 days, then increase to 15mg twice daily.   - May take diazepam 5mg daily as needed for severe anxiety    - Taking melatonin 10mg at bedtime as needed for sleep  - Taking diphenhydramine (Zzzquil) 50mg QHS PRN for sleep    2) THERAPY: Psychotherapy is a primary recommendation for the treatment of mood symptoms, even in the context of grief.   Previously engaged in therapy with grief counselor, doing EMDR.     3) NEXT DUE:   Labs- Routine monitoring is not indicated for current psychotropic medication regimen   ECG- Routine monitoring is not indicated for current psychotropic medication regimen   Rating Scales- N/A    4)  REFERRALS: Recommended annual physical with PCP    5) : None    6) DISPOSITION: RTC 2 months or sooner if needed.     Treatment Risk Statement:  The patient understands the risks, benefits, adverse effects and alternatives. Agrees to treatment with the capacity to do so. No medical contraindications to treatment. Agrees to call clinic for any problems. The patient understands to call 911 or go to the nearest ED if life threatening or urgent symptoms occur. Crisis numbers are provided routinely in the After Visit Summary.       PROVIDER: Joss Rod MD    Level of Medical Decision Making:   - At least 1 chronic problem that is not stable  - Engaged in prescription drug management during visit (discussed any medication benefits, side effects, alternatives, etc.)        Pertinent Background   Lilian does not report any history of psychiatric diagnoses prior to the death of her son on 11/12/2019. Since that time, she has contended with complex grief and trauma symptoms which have included elements of depression and anxiety. Her grief has been severe, and has included elements of generalized anxiety, panic, and trauma symptoms. She likely met criteria for PTSD at some point. How she is in an adjustment phase, but still with significant depressive symptoms, unclear how persistent, recurrent this will be.      Interval History    Tx plan update  [place] are back to [place] after a long time on the road. There were a lot of triggers coming back to [place]. She notes that she has had a lot of trouble concentrating and staying on task. She has a lot of anxiety and has trouble maintaining a clear head.   She had a bad cold in Feb and ear plugged up, and has had some hearing issues, with an ocean sound in her left ear, and then went to ENT. They said if she would have come in right away, they probably could have saved her hearing, but now is seeing an ENT with . She is now on prednisone and feels that her head is  clear, she is getting things done, and is able to stay on task and feels amazing. Depression has also been better with all of this.   Did note that once in the past she had some paranoia while on prednisone for a rash.   Before Reese , things were never this difficult. She was very structured and organized.   She will be having an MRI as well for workup of the hearing.   During this summer she had a really bad backache and had spinal MRI, and had 3 epidurals also that didn't work, and they think it's piriformis syndrome related.  She is set to start physical therapy for this, but is skeptical of this because she did already try this. Was on gabapentin briefly which didn't help.   She does not feel like she has depression, just chronic grief that will always be there. Denies any SI.   Has two granddaughters now, this is part of why they came back to [place].   She went off of the antidepressants because her friends noted that she was like a zombie. She missed feeling things.   Does note that both of her sons were diagnosed with ADHD. Notes that her youngest son, Javy worked best.   Does take Valium occasionally, but they make her very tired so she doesn't take them regularly.   Daughter takes buspirone as needed   Anxiety is still pretty severe. Feels kind of shut down at times with work. Most of it is generalized anxiety, but doesn't usually get physical anxiety symptoms other than chest heaviness. Doesn't have panic attacks.        Discussion points from past visits:   She only uses the diazepam when things get very severe, maybe 2-3x per week. She does try to minimize this.   Has not been having issues with tiredness since being on bupropion.   Notes that she still tends to use diazepam with triggers, like when her son's friends email her. Social media has quite a few triggers. This year is particularly hard especially on social media as he would have been graduating.   She isn't sure if the medications are  "working at times. She still has anxiety, and doesn't expect it to go away, understands that grief if an ongoing process, and can last the rest of her life. Her goal would be that the medications would take the edge off, so she is able to work more.   Anxiety continues to be an issue especially in the mornings. She does occasionally take the diazepam (Valium) for this in the mornings when it spikes. Has not been taking it every day, but does note that it helps with that heavy feeling in her chest.     Recent Substance Use  Alcohol- None  Tobacco- None  Caffeine- 1 cups/day of coffee  Opioids- None  Narcan Kit- N/A  Cannabis- Has tried CBD but didn't really help.   Other Illicit Drugs- none    Current Social Hx:  FINANCIAL SUPPORT- Works as  remotely.  works as caterer.   LIVING SITUATION / RELATIONSHIPS- Currently living and working remotely. Sold their house because it was too much of a reminder of their son. Thinking about buying another house now. Has 2 dogs and 2 cats.  Son lives in [place], recently had a child. Youngest son is in [employer] in [place]. Daughter is in the [employer], had a child in 2021.   SOCIAL/ SPIRITUAL SUPPORT- \"Absolutely\", but still feels very lonely since her loss.        Medical Review of Systems    Dizziness/orthostasis- Has in the past, but generally no.   Headaches- No.   GI- No. Has lost 100 lbs since gastric sleeve surgery last year.   Has chronic pain related to bulging disc, gets shots once or twice per year.      Psych Summary Points   08/2020 - Started propranolol 40mg BID  10/2020 - Started bupropion. Decreased propranolol to 20mg due to drug intx and low HR.   11/2020 - Completed ADHD evaluation. Determined that acute symptoms are more likely due to PTSD than to ADHD.   01/2021 - Increased bupropion to 300mg, but anxiety increased, so decreased back down.   02/2021 - Tried buspirone after nephew passed away, felt that it increased anxiety.   04/2021 - Changed " propranolol to PRN and discontinued bupropion  11/2021 - Increased sertraline to 150mg.   12/2021 - Increased sertraline to 200mg.   05/2022 - Tapered off of sertraline.   04/2023 - Started buspirone and      Psychiatric Medication Trials       Drug /  Start Date Dose (mg) Helpful Adverse Effects DC Reason / Date   Citalopram 11/2019 40 probably     Sertraline 10/2021 200 yes Initial anxiety    Bupropion XL 10/2020 300 yes Anxiety / agitation at 300 Helped with concentration and energy   Buspirone 2/2021 5 BID no Increased anxiety    gabapentin       Benadryl       Lorazepam 11/2019 2 daily PRN yes     Diazepam 08/2020 10 yes sedating    Propranolol 08/2020 40 BID probably     melatonin          Past Medical History      CARE TEAM:   PCP- Kristen M. Kehr with [facility]  Therapist- Alejandra Lui at [facility] - Grief Counselor - Will be starting EMDR.     Neurologic Hx [head injury, seizures, etc.]: Concussion from skiing injury in 2012.   Patient Active Problem List   Diagnosis     Overweight     Atypical mole     Right knee pain, unspecified chronicity     HAO (generalized anxiety disorder)     Hyperhydrosis disorder     Major depression, recurrent (H)     Past Medical History:   Diagnosis Date     IUD (intrauterine device) in place 03/20/2019    Mirena      Allergies    Amoxicillin and Penicillins     Medications      Current Outpatient Medications   Medication Sig Dispense Refill     diazepam (VALIUM) 5 MG tablet Take 0.5-1 tablets (2.5-5 mg) by mouth daily as needed for anxiety 10 tablet 0     diphenhydrAMINE HCl, Sleep, (ZZZQUIL PO) Take 50 mg by mouth nightly as needed (for sleep)       levonorgestrel (MIRENA) 20 MCG/24HR IUD 1 each by Intrauterine route once       melatonin 5 MG tablet Take 10 mg by mouth nightly as needed for sleep       predniSONE (DELTASONE) 20 MG tablet TAKE 3 TABLETS BY MOUTH EVERY DAY FOR 10 DAYS        Physical Exam  (Vitals Only)   There were no vitals taken  for this visit.    Pulse Readings from Last 5 Encounters:   04/11/23 91   08/31/21 61   10/16/20 (!) 44   02/04/20 60   12/30/19 75     Wt Readings from Last 5 Encounters:   04/11/23 86.6 kg (191 lb)   12/30/19 92.5 kg (204 lb)   12/04/19 93.9 kg (207 lb)   11/25/19 96.2 kg (212 lb)   11/22/19 94.2 kg (207 lb 9.6 oz)     BP Readings from Last 5 Encounters:   04/11/23 131/86   08/31/21 (!) 142/81   10/16/20 117/65   02/04/20 104/50   12/30/19 104/70      Mental Status Exam   Alertness: alert  and oriented  Appearance: adequately groomed  Behavior/Demeanor: cooperative and calm, with good eye contact   Speech: normal and regular rate and rhythm  Language: intact and no problems  Psychomotor: normal or unremarkable  Mood: Anxiety has still been very high at baseline, but better in the last week while on prednisone.   Affect: full range and appropriate; was congruent to mood; was congruent to content  Thought Process/Associations: unremarkable  Thought Content:  Reports none;  Denies suicidal ideation, violent ideation and delusions  Perception:  Reports none;  Denies auditory hallucinations and visual hallucinations  Insight: intact  Judgment: intact  Cognition: does  appear grossly intact; formal cognitive testing was not done  Gait and Station: not observed     Labs and Data          11/17/2020    12:02 PM 1/27/2021     8:50 AM 4/26/2023     5:05 PM   PHQ-9 SCORE   PHQ-9 Total Score MyChart 12 (Moderate depression) 14 (Moderate depression) 12 (Moderate depression)   PHQ-9 Total Score 12 14 12         11/17/2020    12:02 PM 1/27/2021     8:51 AM 4/26/2023     5:06 PM   HAO-7 SCORE   Total Score 11 (moderate anxiety) 14 (moderate anxiety) 21 (severe anxiety)   Total Score 11 14 21       Recent Labs   Lab Test 12/04/19  1223 06/19/19  1740 03/05/19  1232   CR 0.79 0.72 0.80   GFRESTIMATED >60 >90 >60     Recent Labs   Lab Test 12/04/19  1223 06/19/19  1740   AST 12 15   ALT <9 33   ALKPHOS 50 53     Recent Labs   Lab  Test 03/05/19  1232 03/05/19  0000 09/19/16  0935   TSH 2.12 2.12 2.50     ECG 6/19/19 QTc = 414ms      Psychiatry Individual Psychotherapy Note   Psychotherapy start time - 8:45 AM  Psychotherapy end time - 9:06 AM    Date last reviewed - 10/06/21  Subjective: This supportive psychotherapy session addressed issues related to goals of therapy and current psychosocial stressors.   Interactive complexity indicated? No  Plan: RTC in timeframe noted above  Psychotherapy services during this visit included myself and the patient.     PROVIDER: Lance Rod MD

## 2023-04-27 NOTE — PATIENT INSTRUCTIONS
**For crisis resources, please see the information at the end of this document**   Patient Education    Thank you for coming to the [facility] [place] [facility].     Lab Testing:  If you had lab testing today and your results are reassuring or normal they will be mailed to you or sent through [employer] within 7 days. If the lab tests need quick action we will call you with the results. The phone number we will call with results is # [phone]. If this is not the best number please call our clinic and change the number.     Medication Refills:  If you need any refills please call your pharmacy and they will contact us. Our fax number for refills is [phone].   Three business days of notice are needed for general medication refill requests.   Five business days of notice are needed for controlled substance refill requests.   If you need to change to a different pharmacy, please contact the new pharmacy directly. The new pharmacy will help you get your medications transferred.     Contact Us:  Please call [phone] during business hours (8-5:00 M-F).   If you have medication related questions after clinic hours, or on the weekend, please call [phone].     Financial Assistance [phone]   Medical Records [phone]       MENTAL HEALTH CRISIS RESOURCES:  For a emergency help, please call 911 or go to the nearest Emergency Department.     Emergency Walk-In Options:   EmPATH Unit @ [facility] ([place]): [phone] - Specialized mental health emergency area designed to be calming  [facility] ([place]): [phone]  [facility] ([place]): [phone]  [facility]): [phone]    [facility] Crisis Information:   [place]: [phone]  [place]: [phone]  [place] ([place]) - Adult: [phone]     Child: [phone]  [place] - Adult: [phone]     Child: [phone]  [place]:  [phone]  List of all [facility] resources:   https://[place].gov/dhs/people-we-serve/adults/health-care/mental-health/resources/crisis-contacts.jsp    National Crisis Information:   Crisis Text Line: Text  [place]  to 822181  Suicide & Crisis Lifeline: 988  National Suicide Prevention Lifeline: 8-236-313-TALK (1-852.985.9194)       For online chat options, visit https://suicidepreventionlifeline.org/chat/  Poison Control Center: 8-009-432-0577  Trans Lifeline: 6-418-234-6745 - Hotline for transgender people of all ages  The Veto Project: 7-429-474-1218 - Hotline for LGBT youth     For Non-Emergency Support:   Fast Tracker: Mental Health & Substance Use Disorder Resources -   https://www.[employer].org/

## 2023-05-01 NOTE — PROGRESS NOTES
"Physical Therapy Initial Evaluation  Subjective:  The history is provided by the patient. No  was used.   Therapist Generated HPI Evaluation  Problem details: See Dr Valenzuela's note 04/11/2023 for extensive description of history and was referred to PT.  Of note, pt indicates multiple courses of PT and injections that have only been a bit helpful.  \"I'm only 48 and I feel like I'm falling apart.\"  Was recently taking Prednisone for an ear issue and found it helpful for her back as well.         Type of problem:  Lumbar and sacroiliac.    This is a chronic condition.  Condition occurred with:  Insidious onset.  Where condition occurred: for unknown reasons.  Patient reports pain:  Lumbar spine left.  Pain quality: \"dull constant ache\" and is constant.  Pain radiates to:  Thigh left, lower leg left and foot left. Pain timing: no pattern re: time of day.  Since onset symptoms are unchanged.  Exacerbated by: prolonged walking.  Relieved by: standing.  Imaging testing: see imaging in chart.  Past treatment: see above.   Restrictions due to condition include:  Working in normal job without restrictions.  Barriers include:  None as reported by patient.    Patient Health History  Lilian Quinones being seen for Pain in left buttocks running down left leg.     Problem began: 7/15/2022.   Problem occurred: Not sure   Pain is reported as 5/10 on pain scale.  General health as reported by patient is fair.  Pertinent medical history includes: depression, migraines/headaches, numbness/tingling and weakness.   Red flags:  None as reported by patient.  Medical allergies: none.   Surgeries include:  Other. Other surgery history details: Gastric sleeve and breast reduction.    Current medications:  Other. Other medications details: Anti anxiety daily and just came off 10 days of prednisone for sudden hearing loss.       Primary job tasks include:  Computer work.                                    Objective:  System     "     Lumbar/SI Evaluation  ROM:      Strength: TA MMT 1/5  Lumbar Myotomes:    T12-L3 (Hip Flex):  Left: 5    Right: 5  L2-4 (Quads):  Left:  5    Right:  5  L4 (Ankle DF):  Left:  5    Right:  5  L5 (Great Toe Ext): Left: 5    Right: 5   S1 (Toe Raise):  Left: 5    Right: 5      Lumbar Dermtomes:  normal                Neural Tension/Mobility:    Left side:  SLR and SLR w/DF positive.     Right side:   SLR w/DF or SLR  negative.   Lumbar Palpation:  normal        Lumbar Provocation:      Left negative with:  PROM hip    Right negative with:  PROM hip                                                   Maria L Lumbar Evaluation    Posture:  Sitting: fair  Standing: fair  Lordosis: Reduced  Lateral Shift: no  Correction of Posture: better    Movement Loss:  Flexion (Flex): nil and pain  Extension (EXT): mod  Side Glide R (SG R): nil  Side Glide L (SG L): nil  Test Movements:  FIS: During: peripheralizing  After: peripheralizing  Pretest Movements: L thigh  Repeat FIS: During: peripheralizing  After: peripheralizing    EIS: During: centralizing  After: centralizing    Repeat EIS: During: centralizing  After: centralizing      EIL: During: centralizing  After: centralizing    Repeat EIL: During: centralizing  After: centralizing          Conclusion: derangement  Principle of Treatment:      Extension: REIL                                           ROS    Assessment/Plan:    Patient is a 48 year old female with lumbar complaints.    Patient has the following significant findings with corresponding treatment plan.                Diagnosis 1:  Lumbar radiculopathy  Pain -  hot/cold therapy and directional preference exercise  Decreased ROM/flexibility - manual therapy and therapeutic exercise  Decreased strength - therapeutic exercise and therapeutic activities  Decreased function - therapeutic activities  Impaired posture - neuro re-education    Therapy Evaluation Codes:   1) History comprised of:   Personal factors that  impact the plan of care:      Overall behavior pattern, Past/current experiences and Time since onset of symptoms.    Comorbidity factors that impact the plan of care are:      Depression.     Medications impacting care: Anti-depressant.  2) Examination of Body Systems comprised of:   Body structures and functions that impact the plan of care:      Lumbar spine.   Activity limitations that impact the plan of care are:      Walking.  3) Clinical presentation characteristics are:   Evolving/Changing.  4) Decision-Making    High complexity using standardized patient assessment instrument and/or measureable assessment of functional outcome.  Cumulative Therapy Evaluation is: Moderate complexity.    Previous and current functional limitations:  (See Goal Flow Sheet for this information)    Short term and Long term goals: (See Goal Flow Sheet for this information)     Communication ability:  Patient appears to be able to clearly communicate and understand verbal and written communication and follow directions correctly.  Treatment Explanation - The following has been discussed with the patient:   RX ordered/plan of care  Anticipated outcomes  Possible risks and side effects  This patient would benefit from PT intervention to resume normal activities.   Rehab potential is good.    Frequency:  1 X week, once daily  Duration:  for 6 weeks  Discharge Plan:  Achieve all LTG.  Independent in home treatment program.  Reach maximal therapeutic benefit.    Please refer to the daily flowsheet for treatment today, total treatment time and time spent performing 1:1 timed codes.

## 2023-05-02 ENCOUNTER — THERAPY VISIT (OUTPATIENT)
Dept: PHYSICAL THERAPY | Facility: CLINIC | Age: 48
End: 2023-05-02
Attending: PREVENTIVE MEDICINE
Payer: COMMERCIAL

## 2023-05-02 DIAGNOSIS — M99.04 SACROILIAC JOINT SOMATIC DYSFUNCTION: ICD-10-CM

## 2023-05-02 DIAGNOSIS — M54.16 LUMBAR RADICULOPATHY: ICD-10-CM

## 2023-05-02 DIAGNOSIS — M79.18 MYOFASCIAL PAIN: ICD-10-CM

## 2023-05-02 PROCEDURE — 97110 THERAPEUTIC EXERCISES: CPT | Mod: GP | Performed by: PHYSICAL THERAPIST

## 2023-05-02 PROCEDURE — 97162 PT EVAL MOD COMPLEX 30 MIN: CPT | Mod: GP | Performed by: PHYSICAL THERAPIST

## 2023-05-06 ENCOUNTER — HEALTH MAINTENANCE LETTER (OUTPATIENT)
Age: 48
End: 2023-05-06

## 2023-05-09 ENCOUNTER — THERAPY VISIT (OUTPATIENT)
Dept: PHYSICAL THERAPY | Facility: CLINIC | Age: 48
End: 2023-05-09
Attending: PREVENTIVE MEDICINE
Payer: COMMERCIAL

## 2023-05-09 DIAGNOSIS — M54.16 LUMBAR RADICULOPATHY: Primary | ICD-10-CM

## 2023-05-09 PROCEDURE — 97110 THERAPEUTIC EXERCISES: CPT | Mod: GP | Performed by: PHYSICAL THERAPIST

## 2023-05-09 PROCEDURE — 97112 NEUROMUSCULAR REEDUCATION: CPT | Mod: GP | Performed by: PHYSICAL THERAPIST

## 2023-05-31 RX ORDER — DIAZEPAM 5 MG
2.5-5 TABLET ORAL DAILY PRN
Qty: 10 TABLET | Refills: 0 | Status: SHIPPED | OUTPATIENT
Start: 2023-05-31 | End: 2023-07-25

## 2023-05-31 NOTE — TELEPHONE ENCOUNTER
Last seen: 04/27/2023  RTC: 2 months or sooner if needed.   Cancel: None  No-show: None  Next appt: 7/27/2023     Incoming refill from Patient via [facility]    Medication requested:   Pending Prescriptions:                       Disp   Refills    diazepam (VALIUM) 5 MG tablet             10 tab*0            Sig: Take 0.5-1 tablets (2.5-5 mg) by mouth daily as           needed for anxiety        Last refill per       From chart note:   - May take diazepam 5mg daily as needed for severe anxiety       Medication sent to provider for review.

## 2023-06-07 NOTE — PATIENT INSTRUCTIONS
Please go to the pharmacy to receive your shingles vaccine, Shingrix.  It is a series of 2.  You should receive the first vaccine and then get the second vaccine in at least 2 months.  If more time lapses that is okay.  Do see a reaction similar to tetanus where your arm gets red, stiff sometimes warm to touch.  After the second dose it is very common to feel tired and rundown for 24 hours or so.    Please send your MRI reports     Preventive Health Recommendations  Female Ages 50 - 64    Yearly exam: See your health care provider every year in order to  Review health changes.   Discuss preventive care.    Review your medicines if your doctor has prescribed any.    Get a Pap test every three years (unless you have an abnormal result and your provider advises testing more often).  If you get Pap tests with HPV test, you only need to test every 5 years, unless you have an abnormal result.   You do not need a Pap test if your uterus was removed (hysterectomy) and you have not had cancer.  You should be tested each year for STDs (sexually transmitted diseases) if you're at risk.   Have a mammogram every 1 to 2 years.  Have a colonoscopy at age 50, or have a yearly FIT test (stool test). These exams screen for colon cancer.    Have a cholesterol test every 5 years, or more often if advised.  Have a diabetes test (fasting glucose) every three years. If you are at risk for diabetes, you should have this test more often.   If you are at risk for osteoporosis (brittle bone disease), think about having a bone density scan (DEXA).    Shots: Get a flu shot each year. Get a tetanus shot every 10 years.    Nutrition:   Eat at least 5 servings of fruits and vegetables each day.  Eat whole-grain bread, whole-wheat pasta and brown rice instead of white grains and rice.  Get adequate Calcium and Vitamin D.     Lifestyle  Exercise at least 150 minutes a week (30 minutes a day, 5 days a week). This will help you control your weight and  prevent disease.  Limit alcohol to one drink per day.  No smoking.   Wear sunscreen to prevent skin cancer.   See your dentist every six months for an exam and cleaning.  See your eye doctor every 1 to 2 years.

## 2023-06-07 NOTE — PROGRESS NOTES
SUBJECTIVE:   CC: Lilian is an 48 year old who presents for preventive health visit.        View : No data to display.              Healthy Habits:     Getting at least 3 servings of Calcium per day:  Yes    Bi-annual eye exam:  NO    Dental care twice a year:  Yes    Sleep apnea or symptoms of sleep apnea:  None    Diet:  Regular (no restrictions)    Frequency of exercise:  None    Taking medications regularly:  Yes    Medication side effects:  None    PHQ-2 Total Score: 2    Additional concerns today:  Yes    *Discuss MRI from 05/16/23 (pt with results on phone)      Have you ever done Advance Care Planning? (For example, a Health Directive, POLST, or a discussion with a medical provider or your loved ones about your wishes): No, advance care planning information given to patient to review.  Patient plans to discuss their wishes with loved ones or provider.      Social History     Tobacco Use     Smoking status: Never     Smokeless tobacco: Never   Vaping Use     Vaping status: Not on file   Substance Use Topics     Alcohol use: Yes             6/12/2023     6:56 AM   Alcohol Use   Prescreen: >3 drinks/day or >7 drinks/week? No     Reviewed orders with patient.  Reviewed health maintenance and updated orders accordingly - Yes  Lab work is in process  Labs reviewed in EPIC  Current Outpatient Medications   Medication Sig Dispense Refill     busPIRone (BUSPAR) 15 MG tablet Take 1 tablet (15 mg) by mouth 2 times daily 60 tablet 0     diazepam (VALIUM) 5 MG tablet Take 0.5-1 tablets (2.5-5 mg) by mouth daily as needed for anxiety 10 tablet 0     diphenhydrAMINE HCl, Sleep, (ZZZQUIL PO) Take 50 mg by mouth nightly as needed (for sleep)       ibuprofen (ADVIL/MOTRIN) 200 MG tablet Take 600 mg by mouth every 6 hours as needed for pain       levonorgestrel (MIRENA) 20 MCG/24HR IUD 1 each by Intrauterine route once       melatonin 5 MG tablet Take 10 mg by mouth nightly as needed for sleep       Allergies   Allergen  Reactions     Amoxicillin Rash     Penicillins Rash       Breast Cancer Screening:  Any new diagnosis of family breast, ovarian, or bowel cancer? No    FHS-7:       9/15/2021     8:13 AM 2023     6:56 AM   Breast CA Risk Assessment (FHS-7)   Did any of your first-degree relatives have breast or ovarian cancer? Yes Yes   Did any of your relatives have bilateral breast cancer? No No   Did any man in your family have breast cancer? No    Did any woman in your family have breast and ovarian cancer? Yes    Did any woman in your family have breast cancer before age 50 y? No    Do you have 2 or more relatives with breast and/or ovarian cancer? No    Do you have 2 or more relatives with breast and/or bowel cancer? No      click delete button to remove this line now  Mammogram Screening: Recommended annual mammography  Pertinent mammograms are reviewed under the imaging tab.    History of abnormal Pap smear: NO - age 30-65 PAP every 5 years with negative HPV co-testing recommended      Latest Ref Rng & Units 2019     6:02 PM 2019     6:00 PM 2016     9:30 AM   PAP / HPV   PAP (Historical)  NIL       HPV 16 DNA NEG^Negative  Negative   Negative     HPV 18 DNA NEG^Negative  Negative   Negative     Other HR HPV NEG^Negative  Negative   Negative       Reviewed and updated as needed this visit by clinical staff   Tobacco  Allergies  Meds              Reviewed and updated as needed this visit by Provider                    Here today for physical.  Is not fasting.  Had coffee with creamer.Son  3 years ago. Provider is in [place].  Is very triggering to go to [facility] so is establishing care here in [place].    Had a cold in Feb. decreased hearing in left ear.  Was in [place] at that time.  Had ears cleaned. Had hearing test that showed hearing was down.  Was told hearing would not return.  Saw ENT when returned to [place].  Was given prescription  For predinisone when returned [place]. Hearing  did not come back. Had MRI to make sure that was not tumor. [facility].  Does not have a tumor but has other things on MRI that need addressed.  Tinnitus to left side.  Works as a banker.  Has to wear hubs set.  IUD may be triggering pressure in head.  Had weight loss surgery so that is why IUD was placed.  Uses primarily for pregnancy prevention.  Does get some spotting with it.  Is currently sexually active, partner without vasectomy.    Pain from left hip into left leg. Has been doing physical therapy.  Has had cortisone injections.  Hoping is going to get better.      Last Pap: 2019-normal/neg. Abnormal years had LEEP. All normal since.   Last mammogram: 4/23-Mom with breast cancer.   Last BMD: N/A  Last Colonoscopy: Never, no family history  Last eye exam: Lasik 6 years ago  Last dental exam: every 6 mo  Last tetanus vaccine: 2016  Last influenza vaccine: did not have   Last shingles vaccine:  Last pneumonia vaccine:  Last COVID vaccine: Declines  Last COVID booster: Declines.   Hep C screen: Plans to do here today  HIV screen: Declines low risk  AAA screen (age 65-78 with smoking hx):  IVD (HTN, Hyperlipid, Smoking):  Lung CA screening (50-77, 20 pk smoking hx) Quit within 15 years:        Review of Systems   Constitutional: Negative for chills and fever.   HENT: Positive for hearing loss (left). Negative for congestion, ear pain and sore throat.    Eyes: Negative for pain and visual disturbance.   Respiratory: Negative for cough and shortness of breath.    Cardiovascular: Negative for chest pain, palpitations and peripheral edema.   Gastrointestinal: Negative for abdominal pain, constipation, diarrhea, heartburn, hematochezia and nausea.   Breasts:  Negative for tenderness, breast mass and discharge.   Genitourinary: Negative for dysuria, frequency, genital sores, hematuria, pelvic pain, urgency, vaginal bleeding and vaginal discharge.   Musculoskeletal: Positive for arthralgias, joint swelling and myalgias.  "  Skin: Negative for rash.   Neurological: Positive for headaches. Negative for dizziness, weakness and paresthesias.   Psychiatric/Behavioral: Negative for mood changes. The patient is nervous/anxious.         OBJECTIVE:   /74   Pulse 88   Temp 97.8  F (36.6  C) (Tympanic)   Resp 14   Ht 1.727 m (5' 8\")   Wt 86.6 kg (191 lb)   LMP  (LMP Unknown)   SpO2 97%   BMI 29.04 kg/m    Physical Exam  Constitutional:       Appearance: Normal appearance. She is well-developed.   HENT:      Head: Normocephalic and atraumatic.      Right Ear: Tympanic membrane and external ear normal. No middle ear effusion.      Left Ear: Tympanic membrane and external ear normal.  No middle ear effusion.      Nose: No mucosal edema.      Mouth/Throat:      Pharynx: Uvula midline.   Eyes:      Pupils: Pupils are equal, round, and reactive to light.   Neck:      Thyroid: No thyromegaly.      Vascular: No carotid bruit.   Cardiovascular:      Rate and Rhythm: Normal rate and regular rhythm.      Pulses:           Femoral pulses are 2+ on the right side and 2+ on the left side.     Heart sounds: Normal heart sounds.   Pulmonary:      Effort: Pulmonary effort is normal.      Breath sounds: Normal breath sounds.   Abdominal:      General: Bowel sounds are normal.      Palpations: Abdomen is soft.      Tenderness: There is no abdominal tenderness.   Musculoskeletal:         General: Normal range of motion.      Cervical back: Normal range of motion.   Skin:     General: Skin is warm and dry.      Findings: No rash.   Neurological:      Mental Status: She is alert.   Psychiatric:         Behavior: Behavior normal.         ASSESSMENT/PLAN:     Routine general medical examination at a health care facility  Screening guidelines reviewed.   - Lipid panel reflex to direct LDL Fasting; Future  - Basic metabolic panel; Future  - Basic metabolic panel  - Lipid panel reflex to direct LDL Fasting    Screen for colon cancer  - Colonoscopy Screening "  Referral; Future    Need for hepatitis C screening test  - Hepatitis C Screen Reflex to HCV RNA Quant and Genotype; Future  - Hepatitis C Screen Reflex to HCV RNA Quant and Genotype    Family history of breast cancer  Needs annual screening     Hearing loss of left ear, unspecified hearing loss type  Plans to go to [facility] for hearing aid evaluation.  To send copy of MRI through [facility].  Continue to follow with ENT as recommended.    Mild episode of recurrent major depressive disorder (H)  Previous psychiatry notes reviewed.  Continue to follow closely with psychiatry.  Recommend therapy as well.    Generalized anxiety disorder  Previous psychiatry notes reviewed.  Continue to follow closely with psychiatry.  Recommend therapy as well.      COUNSELING:  Reviewed preventive health counseling, as reflected in patient instructions        She reports that she has never smoked. She has never used smokeless tobacco.      ASIF Whitlock [facility]

## 2023-06-08 DIAGNOSIS — F41.1 GENERALIZED ANXIETY DISORDER: ICD-10-CM

## 2023-06-08 RX ORDER — BUSPIRONE HYDROCHLORIDE 15 MG/1
15 TABLET ORAL 2 TIMES DAILY
Qty: 60 TABLET | Refills: 0 | Status: SHIPPED | OUTPATIENT
Start: 2023-06-08 | End: 2023-07-25 | Stop reason: SINTOL

## 2023-06-08 NOTE — TELEPHONE ENCOUNTER
Medication requested: BUSPIRONE 15MG TABLETS  Last refilled: 4/27/23  Qty: 60/1      Last seen: 4/27/23  RTC: 2 months  Cancel: 0  No-show: 0  Next appt: 7/27/23    Refill decision:   Refilled for 30 days per protocol.

## 2023-06-12 ENCOUNTER — OFFICE VISIT (OUTPATIENT)
Dept: FAMILY MEDICINE | Facility: CLINIC | Age: 48
End: 2023-06-12
Payer: COMMERCIAL

## 2023-06-12 VITALS
SYSTOLIC BLOOD PRESSURE: 122 MMHG | OXYGEN SATURATION: 97 % | WEIGHT: 191 LBS | RESPIRATION RATE: 14 BRPM | HEIGHT: 68 IN | DIASTOLIC BLOOD PRESSURE: 74 MMHG | TEMPERATURE: 97.8 F | BODY MASS INDEX: 28.95 KG/M2 | HEART RATE: 88 BPM

## 2023-06-12 DIAGNOSIS — F33.0 MILD EPISODE OF RECURRENT MAJOR DEPRESSIVE DISORDER (H): ICD-10-CM

## 2023-06-12 DIAGNOSIS — F41.1 GENERALIZED ANXIETY DISORDER: ICD-10-CM

## 2023-06-12 DIAGNOSIS — H91.92 HEARING LOSS OF LEFT EAR, UNSPECIFIED HEARING LOSS TYPE: ICD-10-CM

## 2023-06-12 DIAGNOSIS — Z11.59 NEED FOR HEPATITIS C SCREENING TEST: ICD-10-CM

## 2023-06-12 DIAGNOSIS — Z12.11 SCREEN FOR COLON CANCER: ICD-10-CM

## 2023-06-12 DIAGNOSIS — Z00.00 ROUTINE GENERAL MEDICAL EXAMINATION AT A HEALTH CARE FACILITY: ICD-10-CM

## 2023-06-12 DIAGNOSIS — Z80.3 FAMILY HISTORY OF BREAST CANCER: Primary | ICD-10-CM

## 2023-06-12 LAB
ANION GAP SERPL CALCULATED.3IONS-SCNC: 9 MMOL/L (ref 7–15)
BUN SERPL-MCNC: 12 MG/DL (ref 6–20)
CALCIUM SERPL-MCNC: 8.6 MG/DL (ref 8.6–10)
CHLORIDE SERPL-SCNC: 108 MMOL/L (ref 98–107)
CHOLEST SERPL-MCNC: 222 MG/DL
CREAT SERPL-MCNC: 0.81 MG/DL (ref 0.51–0.95)
DEPRECATED HCO3 PLAS-SCNC: 24 MMOL/L (ref 22–29)
GFR SERPL CREATININE-BSD FRML MDRD: 88 ML/MIN/1.73M2
GLUCOSE SERPL-MCNC: 97 MG/DL (ref 70–99)
HCV AB SERPL QL IA: NONREACTIVE
HDLC SERPL-MCNC: 76 MG/DL
LDLC SERPL CALC-MCNC: 118 MG/DL
NONHDLC SERPL-MCNC: 146 MG/DL
POTASSIUM SERPL-SCNC: 4.1 MMOL/L (ref 3.4–5.3)
SODIUM SERPL-SCNC: 141 MMOL/L (ref 136–145)
TRIGL SERPL-MCNC: 142 MG/DL

## 2023-06-12 PROCEDURE — 80048 BASIC METABOLIC PNL TOTAL CA: CPT | Performed by: NURSE PRACTITIONER

## 2023-06-12 PROCEDURE — 86803 HEPATITIS C AB TEST: CPT | Performed by: NURSE PRACTITIONER

## 2023-06-12 PROCEDURE — 99396 PREV VISIT EST AGE 40-64: CPT | Performed by: NURSE PRACTITIONER

## 2023-06-12 PROCEDURE — 36415 COLL VENOUS BLD VENIPUNCTURE: CPT | Performed by: NURSE PRACTITIONER

## 2023-06-12 PROCEDURE — 99214 OFFICE O/P EST MOD 30 MIN: CPT | Mod: 25 | Performed by: NURSE PRACTITIONER

## 2023-06-12 PROCEDURE — 80061 LIPID PANEL: CPT | Performed by: NURSE PRACTITIONER

## 2023-06-12 ASSESSMENT — ENCOUNTER SYMPTOMS
ABDOMINAL PAIN: 0
SHORTNESS OF BREATH: 0
HEARTBURN: 0
BREAST MASS: 0
CHILLS: 0
HEADACHES: 1
FREQUENCY: 0
DIARRHEA: 0
ARTHRALGIAS: 1
HEMATOCHEZIA: 0
COUGH: 0
CONSTIPATION: 0
FEVER: 0
HEMATURIA: 0
NERVOUS/ANXIOUS: 1
SORE THROAT: 0
PALPITATIONS: 0
PARESTHESIAS: 0
DYSURIA: 0
EYE PAIN: 0
WEAKNESS: 0
JOINT SWELLING: 1
DIZZINESS: 0
MYALGIAS: 1
NAUSEA: 0

## 2023-06-12 ASSESSMENT — PATIENT HEALTH QUESTIONNAIRE - PHQ9
10. IF YOU CHECKED OFF ANY PROBLEMS, HOW DIFFICULT HAVE THESE PROBLEMS MADE IT FOR YOU TO DO YOUR WORK, TAKE CARE OF THINGS AT HOME, OR GET ALONG WITH OTHER PEOPLE: SOMEWHAT DIFFICULT
SUM OF ALL RESPONSES TO PHQ QUESTIONS 1-9: 9
SUM OF ALL RESPONSES TO PHQ QUESTIONS 1-9: 9

## 2023-06-12 ASSESSMENT — PAIN SCALES - GENERAL: PAINLEVEL: NO PAIN (0)

## 2023-06-13 DIAGNOSIS — G93.2 PSEUDOTUMOR CEREBRI: Primary | ICD-10-CM

## 2023-07-10 NOTE — PROGRESS NOTES
DISCHARGE REPORT    Progress reporting period is from 05/02/2023 to 07/10/2023.     The subjective and objective sections below are from the last visit, which was 05/09/2023.    SUBJECTIVE  Subjective: Pt reports HEP has felt very good at the upper back.  Still has some lower back discomfort into the thigh.    Current Pain level:  (not rated numerically).     Initial Pain level: 5/10.   Changes in function:  N/A    OBJECTIVE  Objective: Trial of REIL with pt applied overpressure of lock, blow, sag and pt noted did feel further reduction of thigh symptoms.     ASSESSMENT/PLAN  Updated problem list and treatment plan: Diagnosis 1:  Back pain -- home program  STG/LTGs have been met or progress has been made towards goals:  N/A  Assessment of Progress: The patient has not returned to therapy. Current status is unknown.  Self Management Plans:  Patient has been instructed in a home treatment program.  Lilian continues to require the following intervention to meet STG and LTG's:  PT intervention is no longer required to meet STG/LTG.    Recommendations:  Pt last seen in PT 05/09/2023.  She has since cancelled multiple visits and no appointments remain scheduled.  Discharge to [facility].

## 2023-07-19 ENCOUNTER — OFFICE VISIT (OUTPATIENT)
Dept: NEUROSURGERY | Facility: CLINIC | Age: 48
End: 2023-07-19
Attending: NURSE PRACTITIONER
Payer: COMMERCIAL

## 2023-07-19 VITALS
BODY MASS INDEX: 28.87 KG/M2 | DIASTOLIC BLOOD PRESSURE: 79 MMHG | WEIGHT: 190.48 LBS | HEIGHT: 68 IN | SYSTOLIC BLOOD PRESSURE: 121 MMHG | HEART RATE: 72 BPM

## 2023-07-19 DIAGNOSIS — G93.2 PSEUDOTUMOR CEREBRI: Primary | ICD-10-CM

## 2023-07-19 PROCEDURE — 99214 OFFICE O/P EST MOD 30 MIN: CPT | Performed by: NURSE PRACTITIONER

## 2023-07-19 PROCEDURE — 99204 OFFICE O/P NEW MOD 45 MIN: CPT | Performed by: NURSE PRACTITIONER

## 2023-07-19 NOTE — NURSING NOTE
"[facility] [[id number]]  Chief Complaint   Patient presents with     Consult     Pseudotumor cerebri     Initial /79 (BP Location: Right arm, Patient Position: Sitting, Cuff Size: Adult Regular)   Pulse 72   Ht 5' 7.99\" (172.7 cm)   Wt 190 lb 7.6 oz (86.4 kg)   LMP  (LMP Unknown)   BMI 28.97 kg/m   Estimated body mass index is 28.97 kg/m  as calculated from the following:    Height as of this encounter: 5' 7.99\" (172.7 cm).    Weight as of this encounter: 190 lb 7.6 oz (86.4 kg).  Medication Reconciliation: complete    Does the patient need any medication refills today? No    Does the patient/parent need MyChart or Proxy acces today? No     Marie Parker, EMT            "

## 2023-07-19 NOTE — PATIENT INSTRUCTIONS
You met with Pediatric Neurosurgery at the [facility]    ALEXEI Price Dr., Dr., NP      Pediatric Appointment Scheduling and Call Center:   [phone]    Nurse Practitioner  [phone]    Mailing Address  [address] [address]    Street Address   [address] [place] [id number]    Fax Number  [phone]    For urgent matters that cannot wait until the next business day, occur over a holiday and/or weekend, report directly to your nearest ER or you may call [phone] and ask to page the Pediatric Neurosurgery Resident on call.

## 2023-07-19 NOTE — PROGRESS NOTES
Neurosurgery Clinic    Dear Dr. Olson,   Thank you for referring Lilian Quinones to the neurosurgery clinic at the [facility] [place].   I had the opportunity to meet with Lilian Quinones on July 19, 2023.    As you know, Lilian is a 48 year old female referred for concerns for idiopathic intracranial hypertension. She notes she got really sick in February with a cold and since that time has been getting almost daily headaches, worse by the end of the day. She takes ibuprofen every 4 hours for hip pain that she is working up. She gets dizzy easily, and notes she faints easily; she notes it is worse with standing and looking at objects in a store, it has been going on for years  She notes this year she has fainted twice, she notes prior to that not as often. She notes that when she goes into a store or bright lights in stores she cannot tolerated stores and gets dizzy with the lights  She gets tired quickly. She notes by the end of the day she is tired, dizzy and just wants to go to bed. She notes she got an IUD after weight loss. She notes she has a bulging disc in her neck, she has had a few epidurals in her neck and her lower spine. She notes she cnanot turn in both directions. She denies any vision loss, she notes she wears 'cheaters' but that's it, she has not had an eye dr appointment in a while. She is eating well and not vomiting. She notes following this cold in February, she also has been experiencing L ear tinnitus, she was in [place] at this time. She had her ears cleaned which did not help, and underwent a hearing test which revealed her hearing had decreased on that left. She was seen by ENT when she returned to [place] who obtained a MRI brain, which we do not currently have the results to but have requested. Per patient, there was no sign of tumor, but there were 'other things on MRI and concerns for pseudotumor' . She notes she continues with tinnitus to her left  "side.     Past Medical History:   Diagnosis Date     IUD (intrauterine device) in place 03/20/2019    Mirena       Past Surgical History:   Procedure Laterality Date     BIOPSY      Skin Biopsy     BIOPSY BREAST       BREAST SURGERY      Breast reduction sergury      MAMMOPLASTY REDUCTION  2008     OR LAP, HONORIO RESTRICT PROC, LONGITUDINAL GASTRECTOMY N/A 6/24/2019    Procedure: LAPAROSCOPIC SLEEVE GASTRECTOMY;  Surgeon: Perfecto Enciso MD;  Location: [facility];  Service: General       ALLERGIES:  Amoxicillin and Penicillins    Current Outpatient Medications   Medication Sig Dispense Refill     diazepam (VALIUM) 5 MG tablet Take 0.5-1 tablets (2.5-5 mg) by mouth daily as needed for anxiety 10 tablet 0     diphenhydrAMINE HCl, Sleep, (ZZZQUIL PO) Take 50 mg by mouth nightly as needed (for sleep)       ibuprofen (ADVIL/MOTRIN) 200 MG tablet Take 600 mg by mouth every 6 hours as needed for pain       levonorgestrel (MIRENA) 20 MCG/24HR IUD 1 each by Intrauterine route once       melatonin 5 MG tablet Take 10 mg by mouth nightly as needed for sleep       busPIRone (BUSPAR) 15 MG tablet Take 1 tablet (15 mg) by mouth 2 times daily (Patient not taking: Reported on 7/19/2023) 60 tablet 0       Family History   Problem Relation Age of Onset     Heart Disease Maternal Grandmother      Pancreatic Cancer Paternal Grandmother      Diabetes Paternal Grandmother      Breast Cancer Mother      Diabetes Father      Hypertension Maternal Grandfather      Hyperlipidemia Maternal Grandfather      Other Cancer Paternal Grandfather      Melanoma No family hx of      Depression Sister        Social History     Tobacco Use     Smoking status: Never     Smokeless tobacco: Never   Substance Use Topics     Alcohol use: Yes         PHYSICAL EXAM:   /79 (BP Location: Right arm, Patient Position: Sitting, Cuff Size: Adult Regular)   Pulse 72   Ht 1.727 m (5' 7.99\")   Wt 86.4 kg (190 lb 7.6 oz)   LMP  (LMP Unknown)   BMI " 28.97 kg/m      Alert and oriented to person, place, and time. No acute distress  PERRL, EOMI. Face symmetric. Tongue midline. Minimal hearing on L  No pronator drift.   BUE and BLE 5/5 throughout.   Reflexes 2+ throughout.   Sensation intact and symmetric to light touch throughout.   Normal FNF, normal HTS test. Gait is normal.     IMAGES: requested MRI brain to review imaging and results    ASSESSMENT:  Lilian Quinones, 48 year old female with MRI brain obtained after cold in February and hearing loss, referred to neurosurgery due to concerns for pseudotumor (IIH), daily headaches. Will obtain imaging studies and review    PLAN:  - referral to neuro ophthalmology for assessment of papilledema and concerns for IIH  -will review MRI imaging studies  -would like to see Lilian back after optho follow up to discuss (can be virtual)  - Lilian Quinones and family were counseled to please contact us with any acute worsening of symptoms, or with any questions or concerns.       Nasima Malcolm NP  Department of Neurosurgery  [phone]    This patient was discussed with neurosurgery faculty, who agrees with the above.  Nasima Malcolm NP on 7/19/2023 at 3:47 PM

## 2023-07-19 NOTE — LETTER
7/19/2023      RE: Lilian Quinones  [address]  [place] [address]     Dear Colleague,    Thank you for the opportunity to participate in the care of your patient, Lilian Quinones, at the [facility]. Please see a copy of my visit note below.           Neurosurgery Clinic    Dear Dr. Olson,   Thank you for referring Lilian Quinones to the neurosurgery clinic at the [facility].   I had the opportunity to meet with Lilian Quinones on July 19, 2023.    As you know, Lilian is a 48 year old female referred for concerns for idiopathic intracranial hypertension. She notes she got really sick in February with a cold and since that time has been getting almost daily headaches, worse by the end of the day. She takes ibuprofen every 4 hours for hip pain that she is working up. She gets dizzy easily, and notes she faints easily; she notes it is worse with standing and looking at objects in a store, it has been going on for years  She notes this year she has fainted twice, she notes prior to that not as often. She notes that when she goes into a store or bright lights in stores she cannot tolerated stores and gets dizzy with the lights  She gets tired quickly. She notes by the end of the day she is tired, dizzy and just wants to go to bed. She notes she got an IUD after weight loss. She notes she has a bulging disc in her neck, she has had a few epidurals in her neck and her lower spine. She notes she cnanot turn in both directions. She denies any vision loss, she notes she wears 'cheaters' but that's it, she has not had an eye dr appointment in a while. She is eating well and not vomiting. She notes following this cold in February, she also has been experiencing L ear tinnitus, she was in [place] at this time. She had her ears cleaned which did not help, and  underwent a hearing test which revealed her hearing had decreased on that left. She was seen by ENT when she returned to [place] who obtained a MRI brain, which we do not currently have the results to but have requested. Per patient, there was no sign of tumor, but there were 'other things on MRI and concerns for pseudotumor' . She notes she continues with tinnitus to her left side.     Past Medical History:   Diagnosis Date    IUD (intrauterine device) in place 03/20/2019    Mirena       Past Surgical History:   Procedure Laterality Date    BIOPSY      Skin Biopsy    BIOPSY BREAST      BREAST SURGERY      Breast reduction sergury     MAMMOPLASTY REDUCTION  2008    MD LAP, HONORIO RESTRICT PROC, LONGITUDINAL GASTRECTOMY N/A 6/24/2019    Procedure: LAPAROSCOPIC SLEEVE GASTRECTOMY;  Surgeon: Perfecto Enciso MD;  Location: [facility];  Service: General       ALLERGIES:  Amoxicillin and Penicillins    Current Outpatient Medications   Medication Sig Dispense Refill    diazepam (VALIUM) 5 MG tablet Take 0.5-1 tablets (2.5-5 mg) by mouth daily as needed for anxiety 10 tablet 0    diphenhydrAMINE HCl, Sleep, (ZZZQUIL PO) Take 50 mg by mouth nightly as needed (for sleep)      ibuprofen (ADVIL/MOTRIN) 200 MG tablet Take 600 mg by mouth every 6 hours as needed for pain      levonorgestrel (MIRENA) 20 MCG/24HR IUD 1 each by Intrauterine route once      melatonin 5 MG tablet Take 10 mg by mouth nightly as needed for sleep      busPIRone (BUSPAR) 15 MG tablet Take 1 tablet (15 mg) by mouth 2 times daily (Patient not taking: Reported on 7/19/2023) 60 tablet 0       Family History   Problem Relation Age of Onset    Heart Disease Maternal Grandmother     Pancreatic Cancer Paternal Grandmother     Diabetes Paternal Grandmother     Breast Cancer Mother     Diabetes Father     Hypertension Maternal Grandfather     Hyperlipidemia Maternal Grandfather     Other Cancer Paternal Grandfather     Melanoma No family hx of     Depression  "Sister        Social History     Tobacco Use    Smoking status: Never    Smokeless tobacco: Never   Substance Use Topics    Alcohol use: Yes         PHYSICAL EXAM:   /79 (BP Location: Right arm, Patient Position: Sitting, Cuff Size: Adult Regular)   Pulse 72   Ht 1.727 m (5' 7.99\")   Wt 86.4 kg (190 lb 7.6 oz)   LMP  (LMP Unknown)   BMI 28.97 kg/m      Alert and oriented to person, place, and time. No acute distress  PERRL, EOMI. Face symmetric. Tongue midline. Minimal hearing on L  No pronator drift.   BUE and BLE 5/5 throughout.   Reflexes 2+ throughout.   Sensation intact and symmetric to light touch throughout.   Normal FNF, normal HTS test. Gait is normal.     IMAGES: requested MRI brain to review imaging and results    ASSESSMENT:  Lilian Quinones, 48 year old female with MRI brain obtained after cold in February and hearing loss, referred to neurosurgery due to concerns for pseudotumor (IIH), daily headaches. Will obtain imaging studies and review    PLAN:  - referral to neuro ophthalmology for assessment of papilledema and concerns for IIH  -will review MRI imaging studies  -would like to see Lilian back after optho follow up to discuss (can be virtual)  - Lilian Quinones and family were counseled to please contact us with any acute worsening of symptoms, or with any questions or concerns.       Nasima Malcolm NP  Department of Neurosurgery  [phone]    This patient was discussed with neurosurgery faculty, who agrees with the above.  Nasima Malcolm NP on 7/19/2023 at 3:47 PM          "

## 2023-07-25 ENCOUNTER — MYC MEDICAL ADVICE (OUTPATIENT)
Dept: PSYCHIATRY | Facility: CLINIC | Age: 48
End: 2023-07-25
Payer: COMMERCIAL

## 2023-07-25 ENCOUNTER — VIRTUAL VISIT (OUTPATIENT)
Dept: PSYCHIATRY | Facility: CLINIC | Age: 48
End: 2023-07-25
Attending: PSYCHIATRY & NEUROLOGY
Payer: COMMERCIAL

## 2023-07-25 DIAGNOSIS — F41.1 GENERALIZED ANXIETY DISORDER: Primary | ICD-10-CM

## 2023-07-25 DIAGNOSIS — F43.10 POSTTRAUMATIC STRESS DISORDER: ICD-10-CM

## 2023-07-25 DIAGNOSIS — F32.A DEPRESSION, UNSPECIFIED DEPRESSION TYPE: ICD-10-CM

## 2023-07-25 PROCEDURE — 99215 OFFICE O/P EST HI 40 MIN: CPT | Mod: VID | Performed by: PSYCHIATRY & NEUROLOGY

## 2023-07-25 RX ORDER — DIAZEPAM 5 MG
2.5-5 TABLET ORAL DAILY PRN
Qty: 15 TABLET | Refills: 1 | Status: SHIPPED | OUTPATIENT
Start: 2023-07-25 | End: 2023-09-19

## 2023-07-25 RX ORDER — VILAZODONE HYDROCHLORIDE 10 MG/1
10 TABLET ORAL DAILY
Qty: 30 TABLET | Refills: 1 | Status: SHIPPED | OUTPATIENT
Start: 2023-07-25 | End: 2023-08-30 | Stop reason: SINTOL

## 2023-07-25 ASSESSMENT — ANXIETY QUESTIONNAIRES
6. BECOMING EASILY ANNOYED OR IRRITABLE: NEARLY EVERY DAY
4. TROUBLE RELAXING: SEVERAL DAYS
IF YOU CHECKED OFF ANY PROBLEMS ON THIS QUESTIONNAIRE, HOW DIFFICULT HAVE THESE PROBLEMS MADE IT FOR YOU TO DO YOUR WORK, TAKE CARE OF THINGS AT HOME, OR GET ALONG WITH OTHER PEOPLE: VERY DIFFICULT
5. BEING SO RESTLESS THAT IT IS HARD TO SIT STILL: SEVERAL DAYS
2. NOT BEING ABLE TO STOP OR CONTROL WORRYING: NEARLY EVERY DAY
3. WORRYING TOO MUCH ABOUT DIFFERENT THINGS: NEARLY EVERY DAY
GAD7 TOTAL SCORE: 17
GAD7 TOTAL SCORE: 17
7. FEELING AFRAID AS IF SOMETHING AWFUL MIGHT HAPPEN: NEARLY EVERY DAY
1. FEELING NERVOUS, ANXIOUS, OR ON EDGE: NEARLY EVERY DAY

## 2023-07-25 ASSESSMENT — PATIENT HEALTH QUESTIONNAIRE - PHQ9
10. IF YOU CHECKED OFF ANY PROBLEMS, HOW DIFFICULT HAVE THESE PROBLEMS MADE IT FOR YOU TO DO YOUR WORK, TAKE CARE OF THINGS AT HOME, OR GET ALONG WITH OTHER PEOPLE: VERY DIFFICULT
SUM OF ALL RESPONSES TO PHQ QUESTIONS 1-9: 14
SUM OF ALL RESPONSES TO PHQ QUESTIONS 1-9: 14

## 2023-07-25 NOTE — PROGRESS NOTES
Virtual Visit Details  Type of service:  Video Visit   Originating Location (pt. Location): Home  Distant Location (provider location):  Off-site  Platform used for Video Visit: [facility]  MEDICAL PROGRESS NOTE   Lilian Quinones is a 48 year old. Initial consultation on 08/05/20. Referred by Kristen M Kehr for evaluation of depression.      Assessment & Plan    History and interview support the following diagnoses:  Generalized anxiety disorder  PTSD / Complex grief  Depression, unspecified (MDD vs secondary to anxiety / trauma / grief)    Lilian notes a complex situation at this time. She recently moved back to [place]. This has had a lot of triggers / associations for her, which likely indicates that the trauma elements of her grief are unresolved. She should still be engaging in trauma therapy, if she is encountering elements that are interfering with her ability to function ideally. They moved back to [place] to be with their new granddaughters born recently, which is a major positive.   Anxiety is still really high at baseline, and functionally interferes most days. She no longer feels that depression is a significant issues outside of the grief that she now feels is baseline.   Notes significant attentional issues that have persisted since her son's death. These were not present prior to that, although does not that both sons have ADHD, and maybe there were some elements that were less obvious that were problematic earlier in life, hard to say. I ordered ADHD evaluation at this time to help parse apart her various symptoms and add the historical context that could be helpful. I think that she likely does not have ADHD, but it is a possibility. Would be easier to treat and diagnose if anxiety were better controlled, and that would be easier to address if her trauma symptoms were also better addressed.   The fact that she was previously on bupropion  and it helped with attentional symptoms, but caused significant anxiety does not marcial well for the potential use of stimulants. It does support the potential use of antiadreneric options like clonidine or guanfacine going forward. I may discuss Intuniv as an option with her at next visit.   For now, she preferred to try buspirone, as her daughter is on this and has had a positive experience with it.   She went off of sertraline due to concerns about emotional blunting. This does not rule out addressing other antidepressant options in the future. May consider alternatives like escitalopram if needed, given side effects with sertraline.   She would still likely benefit from individual therapy if she opted to do this.     She uses diazepam sparingly to help with anxiety. She is aware that this is not a sustainable solution. It is not bad to use the diazepam, but it is the opposite of an intervention that would help her achieve her goal of being okay. She can continue to use it, but it must not be the only intervention she has available. Her symptoms will not be at goal until she no longer needs the diazepam. Goal would be to be off of it within 6 months.     PSYCHOTROPIC DRUG INTERACTIONS: None   MANAGEMENT:  N/A     Plan     1) PSYCHOTROPIC MEDICATIONS:  - Start vilazodone 10mg daily. Will check in in 3 weeks and increase dose if tolerated.   - May take diazepam 5mg daily as needed for severe anxiety    - Taking melatonin 10mg at bedtime as needed for sleep  - Taking diphenhydramine (Zzzquil) 50mg QHS PRN for sleep    2) THERAPY: Psychotherapy is a primary recommendation for the treatment of mood symptoms, even in the context of grief.   Previously engaged in therapy with grief counselor, doing EMDR.     3) NEXT DUE:   Labs- Routine monitoring is not indicated for current psychotropic medication regimen   ECG- Routine monitoring is not indicated for current psychotropic medication regimen   Rating Scales- N/A    4)  REFERRALS: Recommended annual physical with PCP    5) : None    6) DISPOSITION: RTC 2 months or sooner if needed.     Treatment Risk Statement:  The patient understands the risks, benefits, adverse effects and alternatives. Agrees to treatment with the capacity to do so. No medical contraindications to treatment. Agrees to call clinic for any problems. The patient understands to call 911 or go to the nearest ED if life threatening or urgent symptoms occur. Crisis numbers are provided routinely in the After Visit Summary.       PROVIDER: Joss Rod MD    45 min spent on the date of the encounter in chart review, patient visit, review of tests, documentation, care coordination, and/or discussion with other providers about the issues documented above. Any psychotherapy time is excluded from this total.      Pertinent Background   Lilian does not report any history of psychiatric diagnoses prior to the death of her son on 11/12/2019. Since that time, she has contended with complex grief and trauma symptoms which have included elements of depression and anxiety. Her grief has been severe, and has included elements of generalized anxiety, panic, and trauma symptoms. She likely met criteria for PTSD at some point. How she is in an adjustment phase, but still with significant depressive symptoms, unclear how persistent, recurrent this will be.      Interval History    Had to stop taking buspirone, noting that she was having brain read and tinnitus when she would take it. She was on it for the whole first month with the titration, but didn't get through the next bottle.   The left sided tinnitus has been very problematic, which started with the bad cold in February. This has started affecting her work because she has to talk to people all day. She has really been struggling with this, and the treatments don't seem to be helping. The doctors do believe that the idiopathic intracranial hypertension is  contributing to this, and she did see someone last week who referred her to neurology about this. This has fortunately not interfered with sleep.   The back pain is also still very problematic.   She does still take diazepam occasionally at the end of a bad workday. She doesn't take it regularly.   It would be ideal to have something that doesn't result in increased tiredness. Diazepam is a little sedating, although not much.   Anxiety continues to be the main issue, worries about catastrophic scenarios, like people dying, and tends to spiral.   Work continues to be very stressful.   Has not been doing any therapy for a while since trying an intensive 2 hour session a while back focused on trauma.        Discussion points from past visits:   Before Reese , things were never this difficult. She was very structured and organized.   She only uses the diazepam when things get very severe, maybe 2-3x per week. She does try to minimize this.   Has not been having issues with tiredness since being on bupropion.   Notes that she still tends to use diazepam with triggers, like when her son's friends email her. Social media has quite a few triggers. This year is particularly hard especially on social media as he would have been graduating.   She isn't sure if the medications are working at times. She still has anxiety, and doesn't expect it to go away, understands that grief if an ongoing process, and can last the rest of her life. Her goal would be that the medications would take the edge off, so she is able to work more.   Anxiety continues to be an issue especially in the mornings. She does occasionally take the diazepam (Valium) for this in the mornings when it spikes. Has not been taking it every day, but does note that it helps with that heavy feeling in her chest.     Recent Substance Use  Alcohol- None  Tobacco- None  Caffeine- 1 cups/day of coffee  Opioids- None  Narcan Kit- N/A  Cannabis- Has tried CBD but  "didn't really help.   Other Illicit Drugs- none    Current Social Hx:  FINANCIAL SUPPORT- Works as  remotely.  works as caterer.   LIVING SITUATION / RELATIONSHIPS- Currently living and working remotely. Sold their house because it was too much of a reminder of their son. Thinking about buying another house now. Has 2 dogs and 2 cats.  Son lives in [place], recently had a child. Youngest son is in [employer] in [place]. Daughter is in the [employer], had a child in 2021.   SOCIAL/ SPIRITUAL SUPPORT- \"Absolutely\", but still feels very lonely since her loss.        Medical Review of Systems    Dizziness/orthostasis- Has in the past, but generally no.   Headaches- No.   GI- No. Has lost 100 lbs since gastric sleeve surgery last year.   Has chronic pain related to bulging disc, gets shots once or twice per year.      Psych Summary Points   08/2020 - Started propranolol 40mg BID  10/2020 - Started bupropion. Decreased propranolol to 20mg due to drug intx and low HR.   11/2020 - Completed ADHD evaluation. Determined that acute symptoms are more likely due to PTSD than to ADHD.   01/2021 - Increased bupropion to 300mg, but anxiety increased, so decreased back down.   02/2021 - Tried buspirone after nephew passed away, felt that it increased anxiety.   04/2021 - Changed propranolol to PRN and discontinued bupropion.   11/2021 - Increased sertraline to 150mg.   12/2021 - Increased sertraline to 200mg.   05/2022 - Tapered off of sertraline.   02/2023 - Had severe head cold, dx'd with intractable tinnitus and idiopathic hypertension afterwards.   04/2023 - Started buspirone. Took for 1.5 months, but it caused worsening of tinnitus and brain read.   07/2023 - Started vilazodone 10mg.      Psychiatric Medication Trials       Drug /  Start Date Dose (mg) Helpful Adverse Effects DC Reason / Date   Citalopram 11/2019 40 probably     Sertraline 10/2021 200 yes Initial anxiety, emotional blunting    Bupropion XL 10/2020 " 300 yes Anxiety / agitation at 300 Helped with concentration and energy   Buspirone 2/21, 4/23 15 BID no Increased anxiety, tinnitus and brain read    Vilazodone 7/23       gabapentin       Benadryl       Lorazepam 11/2019 2 daily PRN yes     Diazepam 08/2020 10 yes sedating    Propranolol 08/2020 40 BID probably     melatonin          Past Medical History      CARE TEAM:   PCP- Shira Olson with [facility]  Therapist- Alejandra Lui at [facility] - Grief Counselor - Will be starting EMDR.     Neurologic Hx [head injury, seizures, etc.]: Concussion from skiing injury in 2012.   Patient Active Problem List   Diagnosis    Overweight    Generalized anxiety disorder    Hyperhydrosis disorder    Major depression, recurrent (H)    Lumbar radiculopathy    Hearing loss of left ear, unspecified hearing loss type    Family history of breast cancer    Pseudotumor cerebri     Past Medical History:   Diagnosis Date    IUD (intrauterine device) in place 03/20/2019    Mirena      Allergies    Amoxicillin and Penicillins     Medications      Current Outpatient Medications   Medication Sig Dispense Refill    busPIRone (BUSPAR) 15 MG tablet Take 1 tablet (15 mg) by mouth 2 times daily (Patient not taking: Reported on 7/19/2023) 60 tablet 0    diazepam (VALIUM) 5 MG tablet Take 0.5-1 tablets (2.5-5 mg) by mouth daily as needed for anxiety 10 tablet 0    diphenhydrAMINE HCl, Sleep, (ZZZQUIL PO) Take 50 mg by mouth nightly as needed (for sleep)      ibuprofen (ADVIL/MOTRIN) 200 MG tablet Take 600 mg by mouth every 6 hours as needed for pain      levonorgestrel (MIRENA) 20 MCG/24HR IUD 1 each by Intrauterine route once      melatonin 5 MG tablet Take 10 mg by mouth nightly as needed for sleep        Physical Exam  (Vitals Only)   LMP  (LMP Unknown)     Pulse Readings from Last 5 Encounters:   07/19/23 72   06/12/23 88   04/11/23 91   08/31/21 61   10/16/20 (!) 44     Wt Readings from Last 5 Encounters:    07/19/23 86.4 kg (190 lb 7.6 oz)   06/12/23 86.6 kg (191 lb)   04/11/23 86.6 kg (191 lb)   12/30/19 92.5 kg (204 lb)   12/04/19 93.9 kg (207 lb)     BP Readings from Last 5 Encounters:   07/19/23 121/79   06/12/23 122/74   04/11/23 131/86   08/31/21 (!) 142/81   10/16/20 117/65      Mental Status Exam   Alertness: alert  and oriented  Appearance: adequately groomed  Behavior/Demeanor: cooperative and calm, with good eye contact   Speech: normal and regular rate and rhythm  Language: intact and no problems  Psychomotor: normal or unremarkable  Mood: Anxiety has still been very high with the medical issues.   Affect: full range and appropriate; was congruent to mood; was congruent to content  Thought Process/Associations: unremarkable  Thought Content:  Reports none;  Denies suicidal ideation, violent ideation and delusions  Perception:  Reports none;  Denies auditory hallucinations and visual hallucinations  Insight: intact  Judgment: intact  Cognition: does  appear grossly intact; formal cognitive testing was not done  Gait and Station: not observed     Labs and Data          4/26/2023     5:05 PM 6/12/2023     6:54 AM 7/25/2023     8:23 AM   PHQ-9 SCORE   PHQ-9 Total Score MyChart 12 (Moderate depression) 9 (Mild depression) 14 (Moderate depression)   PHQ-9 Total Score 12 9 14         1/27/2021     8:51 AM 4/26/2023     5:06 PM 7/25/2023     8:24 AM   HAO-7 SCORE   Total Score 14 (moderate anxiety) 21 (severe anxiety) 17 (severe anxiety)   Total Score 14 21 17       Recent Labs   Lab Test 06/12/23  0747 12/04/19  1223 06/19/19  1740   CR 0.81 0.79 0.72   GFRESTIMATED 88 >60 >90     Recent Labs   Lab Test 12/04/19  1223 06/19/19  1740   AST 12 15   ALT <9 33   ALKPHOS 50 53     Recent Labs   Lab Test 03/05/19  1232 03/05/19  0000 09/19/16  0935   TSH 2.12 2.12 2.50     ECG 6/19/19 QTc = 414ms

## 2023-07-25 NOTE — PROGRESS NOTES
Virtual Visit Details  Type of service:  Video Visit   Originating Location (pt. Location): Home  Distant Location (provider location):  Off-site  Platform used for Video Visit: [facility]  MEDICAL PROGRESS NOTE   Lilian Quinones is a 48 year old. Initial consultation on 08/05/20. Referred by Kristen M Kehr for evaluation of depression.      Assessment & Plan    History and interview support the following diagnoses:  Generalized anxiety disorder  PTSD / Complex grief  Depression, unspecified (MDD vs secondary to anxiety / trauma / grief)    Lilian is continuing to deal with complex medical issues ever since her illness in 02/2023, and subsequent chronic tinnitus and idiopathic intracranial hypertension. Additionally, her recent move back to [place] has brought a lot of triggers / associations for her, which likely indicates that the trauma elements of her grief are unresolved. She should still be engaging in trauma therapy, if she is encountering elements that are interfering with her ability to function ideally. They moved back to [place] to be with their new granddaughters born recently, which is a major positive.   Anxiety is still really high at baseline, and functionally interferes most days. She no longer feels that depression is a significant issues outside of the grief that she now feels is baseline.   Notes significant attentional issues that have persisted since her son's death. These were not present prior to that, although does note that both sons have ADHD, and maybe there were some elements that were less obvious that were problematic earlier in life, hard to say. I ordered ADHD evaluation to help parse apart her various symptoms and add the historical context that could be helpful. I think that she likely does not have ADHD, but it is a possibility. Would be easier to treat and diagnose if anxiety were better controlled, and that would be  "easier to address if her trauma symptoms were also better addressed.   The fact that she was previously on bupropion and it helped with attentional symptoms, but caused significant anxiety does not marcial well for the potential use of stimulants. It does support the potential use of antiadreneric options like clonidine or guanfacine going forward. I may discuss Intuniv as an option with her at next visit.   Buspirone seemed to cause worsening of tinnitus, as well as \"brain read\", so it was discontinued.   She went off of sertraline due to concerns about emotional blunting. This does not rule out addressing other antidepressant options in the future. May consider alternatives like escitalopram if needed, given side effects with sertraline.   She would still likely benefit from individual therapy if she opted to do this.     She uses diazepam sparingly to help with anxiety. She is aware that this is not a sustainable solution. It is not bad to use the diazepam, but it is the opposite of an intervention that would help her achieve her goal of being okay. She can continue to use it, but it must not be the only intervention she has available. Her symptoms will not be at goal until she no longer needs the diazepam. Goal would be to be off of it within 6 months.     PSYCHOTROPIC DRUG INTERACTIONS: None   MANAGEMENT:  N/A     Plan     1) PSYCHOTROPIC MEDICATIONS:  - Start vilazodone 10mg daily. Will check in in 3 weeks and increase dose if tolerated.   - May take diazepam 5mg daily as needed for severe anxiety    - Taking melatonin 10mg at bedtime as needed for sleep  - Taking diphenhydramine (Zzzquil) 50mg QHS PRN for sleep    2) THERAPY: Psychotherapy is a primary recommendation for the treatment of mood symptoms, even in the context of grief.   Previously engaged in therapy with grief counselor, doing EMDR.     3) NEXT DUE:   Labs- Routine monitoring is not indicated for current psychotropic medication regimen   ECG- " Routine monitoring is not indicated for current psychotropic medication regimen   Rating Scales- N/A    4) REFERRALS: 04/2023 - Referred for ADHD evaluation, scheduled 10/2023    5) : None    6) DISPOSITION: RTC 2 months or sooner if needed.     Treatment Risk Statement:  The patient understands the risks, benefits, adverse effects and alternatives. Agrees to treatment with the capacity to do so. No medical contraindications to treatment. Agrees to call clinic for any problems. The patient understands to call 911 or go to the nearest ED if life threatening or urgent symptoms occur. Crisis numbers are provided routinely in the After Visit Summary.       PROVIDER: Joss Rod MD    45 min spent on the date of the encounter in chart review, patient visit, review of tests, documentation, care coordination, and/or discussion with other providers about the issues documented above. Any psychotherapy time is excluded from this total.      Pertinent Background   Lilian does not report any history of psychiatric diagnoses prior to the death of her son on 11/12/2019. Since that time, she has contended with complex grief and trauma symptoms which have included elements of depression and anxiety. Her grief has been severe, and has included elements of generalized anxiety, panic, and trauma symptoms. She likely met criteria for PTSD at some point. How she is in an adjustment phase, but still with significant depressive symptoms, unclear how persistent, recurrent this will be.      Interval History    Had to stop taking buspirone, noting that she was having brain read and tinnitus when she would take it. She was on it for the whole first month with the titration, but didn't get through the next bottle.   The left sided tinnitus has been very problematic, which started with the bad cold in February. This has started affecting her work because she has to talk to people all day. She has really been struggling  with this, and the treatments don't seem to be helping. The doctors do believe that the idiopathic intracranial hypertension is contributing to this, and she did see someone last week who referred her to neurology about this. This has fortunately not interfered with sleep.   The back pain is also still very problematic.   She does still take diazepam occasionally at the end of a bad workday. She doesn't take it regularly.   It would be ideal to have something that doesn't result in increased tiredness. Diazepam is a little sedating, although not much.   Anxiety continues to be the main issue, worries about catastrophic scenarios, like people dying, and tends to spiral.   Work continues to be very stressful.   Has not been doing any therapy for a while since trying an intensive 2 hour session a while back focused on trauma.        Discussion points from past visits:   Before Reese , things were never this difficult. She was very structured and organized.   She only uses the diazepam when things get very severe, maybe 2-3x per week. She does try to minimize this.   Has not been having issues with tiredness since being on bupropion.   Notes that she still tends to use diazepam with triggers, like when her son's friends email her. Social media has quite a few triggers. This year is particularly hard especially on social media as he would have been graduating.   She isn't sure if the medications are working at times. She still has anxiety, and doesn't expect it to go away, understands that grief if an ongoing process, and can last the rest of her life. Her goal would be that the medications would take the edge off, so she is able to work more.   Anxiety continues to be an issue especially in the mornings. She does occasionally take the diazepam (Valium) for this in the mornings when it spikes. Has not been taking it every day, but does note that it helps with that heavy feeling in her chest.     Recent Substance  "Use  Alcohol- None  Tobacco- None  Caffeine- 1 cups/day of coffee  Opioids- None  Narcan Kit- N/A  Cannabis- Has tried CBD but didn't really help.   Other Illicit Drugs- none    Current Social Hx:  FINANCIAL SUPPORT- Works as  remotely.  works as caterer.   LIVING SITUATION / RELATIONSHIPS- Currently living and working remotely. Sold their house because it was too much of a reminder of their son. Thinking about buying another house now. Has 2 dogs and 2 cats.  Son lives in [place], recently had a child. Youngest son is in [employer] in [place]. Daughter is in the [place], had a child in 2021.   SOCIAL/ SPIRITUAL SUPPORT- \"Absolutely\", but still feels very lonely since her loss.        Medical Review of Systems    Dizziness/orthostasis- Has in the past, but generally no.   Headaches- No.   GI- No. Has lost 100 lbs since gastric sleeve surgery last year.   Has chronic pain related to bulging disc, gets shots once or twice per year.      Psych Summary Points   08/2020 - Started propranolol 40mg BID  10/2020 - Started bupropion. Decreased propranolol to 20mg due to drug intx and low HR.   11/2020 - Completed ADHD evaluation. Determined that acute symptoms are more likely due to PTSD than to ADHD.   01/2021 - Increased bupropion to 300mg, but anxiety increased, so decreased back down.   02/2021 - Tried buspirone after nephew passed away, felt that it increased anxiety.   04/2021 - Changed propranolol to PRN and discontinued bupropion.   11/2021 - Increased sertraline to 150mg.   12/2021 - Increased sertraline to 200mg.   05/2022 - Tapered off of sertraline.   02/2023 - Had severe head cold, dx'd with intractable tinnitus and idiopathic hypertension afterwards.   04/2023 - Started buspirone. Took for 1.5 months, but it caused worsening of tinnitus and brain read.   07/2023 - Started vilazodone 10mg.      Psychiatric Medication Trials       Drug /  Start Date Dose (mg) Helpful Adverse Effects DC Reason / Date "   Citalopram 11/2019 40 probably     Sertraline 10/2021 200 yes Initial anxiety, emotional blunting    Bupropion XL 10/2020 300 yes Anxiety / agitation at 300 Helped with concentration and energy   Buspirone 2/21, 4/23 15 BID no Increased anxiety, tinnitus and brain read    Vilazodone 7/23       gabapentin       Benadryl       Lorazepam 11/2019 2 daily PRN yes     Diazepam 08/2020 10 yes sedating    Propranolol 08/2020 40 BID probably     melatonin          Past Medical History      CARE TEAM:   PCP- Shira Olson with [facility]  Therapist- Alejandra Lui at [facility] - Grief Counselor - Will be starting EMDR.     Neurologic Hx [head injury, seizures, etc.]: Concussion from skiing injury in 2012.   Patient Active Problem List   Diagnosis    Overweight    Generalized anxiety disorder    Hyperhydrosis disorder    Major depression, recurrent (H)    Lumbar radiculopathy    Hearing loss of left ear, unspecified hearing loss type    Family history of breast cancer    Pseudotumor cerebri     Past Medical History:   Diagnosis Date    IUD (intrauterine device) in place 03/20/2019    Mirena      Allergies    Amoxicillin and Penicillins     Medications      Current Outpatient Medications   Medication Sig Dispense Refill    busPIRone (BUSPAR) 15 MG tablet Take 1 tablet (15 mg) by mouth 2 times daily (Patient not taking: Reported on 7/19/2023) 60 tablet 0    diazepam (VALIUM) 5 MG tablet Take 0.5-1 tablets (2.5-5 mg) by mouth daily as needed for anxiety 10 tablet 0    diphenhydrAMINE HCl, Sleep, (ZZZQUIL PO) Take 50 mg by mouth nightly as needed (for sleep)      ibuprofen (ADVIL/MOTRIN) 200 MG tablet Take 600 mg by mouth every 6 hours as needed for pain      levonorgestrel (MIRENA) 20 MCG/24HR IUD 1 each by Intrauterine route once      melatonin 5 MG tablet Take 10 mg by mouth nightly as needed for sleep        Physical Exam  (Vitals Only)   LMP  (LMP Unknown)     Pulse Readings from Last 5  Encounters:   07/19/23 72   06/12/23 88   04/11/23 91   08/31/21 61   10/16/20 (!) 44     Wt Readings from Last 5 Encounters:   07/19/23 86.4 kg (190 lb 7.6 oz)   06/12/23 86.6 kg (191 lb)   04/11/23 86.6 kg (191 lb)   12/30/19 92.5 kg (204 lb)   12/04/19 93.9 kg (207 lb)     BP Readings from Last 5 Encounters:   07/19/23 121/79   06/12/23 122/74   04/11/23 131/86   08/31/21 (!) 142/81   10/16/20 117/65      Mental Status Exam   Alertness: alert  and oriented  Appearance: adequately groomed  Behavior/Demeanor: cooperative and calm, with good eye contact   Speech: normal and regular rate and rhythm  Language: intact and no problems  Psychomotor: normal or unremarkable  Mood: Anxiety has still been very high with the medical issues.   Affect: full range and appropriate; was congruent to mood; was congruent to content  Thought Process/Associations: unremarkable  Thought Content:  Reports none;  Denies suicidal ideation, violent ideation and delusions  Perception:  Reports none;  Denies auditory hallucinations and visual hallucinations  Insight: intact  Judgment: intact  Cognition: does  appear grossly intact; formal cognitive testing was not done  Gait and Station: not observed     Labs and Data          4/26/2023     5:05 PM 6/12/2023     6:54 AM 7/25/2023     8:23 AM   PHQ-9 SCORE   PHQ-9 Total Score MyChart 12 (Moderate depression) 9 (Mild depression) 14 (Moderate depression)   PHQ-9 Total Score 12 9 14         1/27/2021     8:51 AM 4/26/2023     5:06 PM 7/25/2023     8:24 AM   HAO-7 SCORE   Total Score 14 (moderate anxiety) 21 (severe anxiety) 17 (severe anxiety)   Total Score 14 21 17       Recent Labs   Lab Test 06/12/23  0747 12/04/19  1223 06/19/19  1740   CR 0.81 0.79 0.72   GFRESTIMATED 88 >60 >90     Recent Labs   Lab Test 12/04/19  1223 06/19/19  1740   AST 12 15   ALT <9 33   ALKPHOS 50 53     Recent Labs   Lab Test 03/05/19  1232 03/05/19  0000 09/19/16  0935   TSH 2.12 2.12 2.50     ECG 6/19/19 QTc =  414ms

## 2023-07-25 NOTE — PATIENT INSTRUCTIONS
**For crisis resources, please see the information at the end of this document**   Patient Education    Thank you for coming to the [facility].     Lab Testing:  If you had lab testing today and your results are reassuring or normal they will be mailed to you or sent through [employer] within 7 days. If the lab tests need quick action we will call you with the results. The phone number we will call with results is # [phone]. If this is not the best number please call our clinic and change the number.     Medication Refills:  If you need any refills please call your pharmacy and they will contact us. Our fax number for refills is [phone].   Three business days of notice are needed for general medication refill requests.   Five business days of notice are needed for controlled substance refill requests.   If you need to change to a different pharmacy, please contact the new pharmacy directly. The new pharmacy will help you get your medications transferred.     Contact Us:  Please call [phone] during business hours (8-5:00 M-F).   If you have medication related questions after clinic hours, or on the weekend, please call [phone].     Financial Assistance [phone]   Medical Records [phone]       MENTAL HEALTH CRISIS RESOURCES:  For a emergency help, please call 911 or go to the nearest Emergency Department.     Emergency Walk-In Options:   Em[facility] Unit @ [facility] ([place]): [phone] - Specialized mental health emergency area designed to be calming  [facility] ([place]): [phone]  [facility] ([place]): [phone]  [facility]): [phone]    [facility] Crisis Information:   [place]: [phone]  [place]: [phone]  [place] ([place]) - Adult: [phone]     Child: [phone]  [place] - Adult: [phone]     Child: [phone]  [place]:  [phone]  List of all [facility] resources:   https://[place].gov/dhs/people-we-serve/adults/health-care/mental-health/resources/crisis-contacts.jsp    National Crisis Information:   Crisis Text Line: Text  [place]  to 703676  Suicide & Crisis Lifeline: 988  National Suicide Prevention Lifeline: 7-295-373-TALK (1-688.426.8524)       For online chat options, visit https://suicidepreventionlifeline.org/chat/  Poison Control Center: 8-038-420-6605  Trans Lifeline: 7-919-214-7787 - Hotline for transgender people of all ages  The Veto Project: 1-760-311-1106 - Hotline for LGBT youth     For Non-Emergency Support:   Fast Tracker: Mental Health & Substance Use Disorder Resources -   https://www.[employer].org/

## 2023-07-25 NOTE — NURSING NOTE
Is the patient currently in the state of [place]? YES    Visit mode:VIDEO    If the visit is dropped, the patient can be reconnected by: VIDEO VISIT: Text to cell phone: [phone]    Will anyone else be joining the visit? NO      How would you like to obtain your AVS? MyChart    Are changes needed to the allergy or medication list? NO    Reason for visit: RECHECK

## 2023-07-26 ENCOUNTER — TELEPHONE (OUTPATIENT)
Dept: NEUROSURGERY | Facility: CLINIC | Age: 48
End: 2023-07-26
Payer: COMMERCIAL

## 2023-07-26 ENCOUNTER — TELEPHONE (OUTPATIENT)
Dept: OPHTHALMOLOGY | Facility: CLINIC | Age: 48
End: 2023-07-26
Payer: COMMERCIAL

## 2023-07-26 NOTE — TELEPHONE ENCOUNTER
[facility] Call Center    Phone Message    May a detailed message be left on voicemail: yes     Reason for Call: Appointment Intake    Referring Provider Name:     Nasima Malcolm NP in [facility] P[facility] NEUROS[facility]GERY     Diagnosis and/or Symptoms: Pseudotumor cerebri     Per protocols= send HP TE. Please advise and reach out to patient.     Action Taken: Other: eye    Travel Screening: Not Applicable

## 2023-07-26 NOTE — TELEPHONE ENCOUNTER
----- Message from Nasima Malcolm NP sent at 7/26/2023  9:34 AM CDT -----  Medardo lacey  Could you please help me get Lilian Quinones's MRI brain from [facility]? I think that is where she had it done    Thanks a bunch

## 2023-07-26 NOTE — TELEPHONE ENCOUNTER
I called Michelle AGUILAR at [phone] to get images and was informed that Images are done with [facility]. I called [facility] and they are pushing images

## 2023-07-27 NOTE — TELEPHONE ENCOUNTER
[facility] stated they do not have images on this patient.  We have a report from [facility] - I called them at [phone] and they requested I fax request to [phone], this was completed.

## 2023-08-02 NOTE — PROGRESS NOTES
Subjective:    Lilian Quinones is a 48 year old female who presents today for follow-up regarding   Left-sided buttock and leg symptoms  Pelvic Joint Dysfunction manifesting as a right posterior innominate rotation and right sacral torsion-indeterminate response to OMT  Suspected left gluteal myofascial pain  Multilevel DDD from L2-S1 with the L5-S1 left lateral disc herniation compressing the left S1 nerve root in the lateral recess  Neurologic exam compatible with a chronic left S1 radiculopathy  Status post  left transforaminal WENCESLAO, at L4-5 and L5-S1 x2  Status post left piriformis injection  Suspected left S1 radiculitis, not severe enough to require surgery.  Pelvic stabilization PT management and protocols.  Suspected multifactorial pain etiology.    PRIOR HISTORY from 4/11/2023:  She was seen for evaluation of low back pain, lumbar bulging disks and suspected piriformis syndrome.       Records from [facility] reviewed:     Initial visit 11/1/2022 was primarily for right upper extremity symptoms and pain in the left hip.  Tenderness in the left SI joint and positive left SLR documented.  Tender to palpation of the left gluteal lateral and near the ischial tuberosity and greater trochanteric bursa     1/18/2023 consultation documents neck pain radiating to upper extremities, pain in back radiating to thighs limiting shopping and leisure.   Tizanidine has been used with some relief and better sleep;  no improvement with trial of tramadol.  Lower extremity neurologic exam that day was normal.  Plan was for a left piriformis injection and if unhelpful consideration of Botox.     Lumbar MRI 11/3/2022: nonimpinging the far left lateral disc bulges L 2-3, and L3-4.  Mild bilateral neural foraminal narrowing with lateral disc bulging at L4-5.  L5-S1 left paracentral disc and foraminal disc protrusion compressing the left S1 nerve root in the subarticular recess.  No other foraminal stenosis and  no central stenosis at any level.    Cervical MRI 12/14/2022 compared to prior MRI 9/11/2019, multilevel cervical spondylosis and cord signal normal.  Large left C6-7 central and lateral recess disc extrusion leading to mild-moderate central spinal stenosis, and moderate to severe left neural foraminal stenosis.  There is no right-sided neuroforaminal stenosis at any level.  Multiple small perineural cysts.     12/7/2022: Left L4-5/L5-S1 TFLESI  1/11/2023 left L5-S1 TFESI  CLARISSA x 2 or 3 in the last 8/31/2021        Diagnosis of left piriformis syndrome and a piriformis muscle injection on 2/8/2023 with anesthetic and steroid that was fluoroscopically guided.  Preinjection pain 7/10, postinjection pain 3/10     History today:  She reports the onset was without an inciting event in July 2022.  She tried some physical therapy and the injections above and nothing is really given her lasting relief.  Because of her bariatric surgery, she is trying to avoid ibuprofen but she has been alternating ibuprofen and an over-the-counter supplement called Kraton which seems to be helping.  She and her  are on a long road trip while emotionally recovering from her son's death this past November.  Now they are back in town because she has a new grandchild.  She does have intermittent symptoms of pain shooting down her left lateral thigh in roughly an L5 distribution.     She has a separate issue with right upper quadrant pain and possible radicular pain.  We are not going to have time to evaluate that today but she will come back at another date    INTERIM HISTORY:    Lilian is here for follow-up visit but her bigger issue today is her neck which is causing essentially nonradiating pain but every once in a while when she is sitting and working on her laptop in a reclining chair she will noticed that the ulnar 2 digits of both hands will go numb and tingly and then she will stand up and shake her arms and move around and  "things get better.  He does remember seeing Dr. Milan Garcia in the past for her neck issues and he had ordered epidural injections but did not recommend surgery at that time.  The injections did help.  The last few months she has had a really bad headache associated with nausea but no vomiting.  The CT scan was done because of left hearing loss and tendinitis but the imaging of her ear was normal.    We did review her office ergonomics at home which are suboptimal.  Mostly she reclines in a chair and works on her laptop.  Both are flex just past 90 degrees when she does that.  Her neck is flexed forward.  Recently she was diagnosed with idiopathic intracranial hypertension.  She is going to see an ophthalmologist first to check her \"eye pressures\" prior to considering a lumbar tap.  I reviewed her recent brain MRI which showed incidental finding of increased cerebrospinal fluid signal within her optic nerve sheath suspicious for papilledema and consistent with idiopathic intracranial hypertension.    She had 2 sessions of physical therapy with therapist not trained in Pelvic Joint Dysfunction, and his notes indicate they are doing Maria L protocols.  Other than feeling good to stretch her back it did not help her leg symptoms.  She does not recall anybody doing any dressing of her pelvis as I had done.      Reviewed past medical, surgical, and family history.               Pertinent for depression, anxiety, overweight, status post breast reduction, status post bariatric surgery (sleeve gastrectomy)-100 pound weight loss, subsequent 25 pound weight gain.  Idiopathic intracranial hypertension recently diagnosed     SOCIAL HX: She is  and has had 4 children 1 of whom  in 2019.  She and her  sold her home and have been on the road for 18 months and they have now moved back because she has a new grandchild.  She works as a bank lender.  Sports hobbies and activity biking, kayaking, travel, " ermias    Objective:    IMAGING:   See old record review above for prior lumbar MRI    Below images and reports reviewed    MR BRAIN AND IAC WITHOUT AND WITH CONTRAST 5/16/2023 ([facility])    There is symmetric prominence of the extra-axial CSF in both optic nerve sheaths (series 116, image 11). Mild mucosal thickening is noted in the left maxillary sinus. The marrow signal and scalp soft tissues are normal.    CONCLUSION:    1. Normal MR appearance of the internal auditory canals and cerebellopontine angle cisterns.    2. 5 mm cerebellar tonsillar ectopia.    3. Nonspecific, partially empty configuration of the sella turcica and prominence of the extra-axial CSF in both optic nerve sheaths. Both findings have been described in the setting of idiopathic intracranial hypertension (pseudotumor cerebri). If warranted clinically, findings may be correlated with funduscopic examination to assess for papilledema.    4. No acute stroke, hemorrhage, mass effect, enhancing lesions, or other acute intracranial process.    EXAMINATION:    CONSTITUTIONAL:  Vital signs as above.  No acute distress.  The patient is well nourished and well groomed.    PSYCHIATRIC:  The patient is awake, alert, oriented to person, place and time.  The patient is answering questions appropriately with clear speech.  Normal affect.  Able to follow commands  MUSCULOSKELETAL:  Gait is non-antalgic.  The patient is able to heel and toe walk without any difficulty.   Squat and rise normal.   .  Back ROM: Forward flexion limited hands to mid shins with shooting pain down the back of her left thigh.  Cervical range of motion full fluid with minimal pain.  Negative Spurling's maneuver.  Negative upper quadrant tenderness or trigger points     NEUROLOGICAL:    Motor: C5-T1 as well as axillary median radial and ulnar nerves normal.  Cross finger test negative.  Sensation to light touch is intact in the bilateral lower extremities.    SLR   positive left for left  posterior thigh symptoms  Negative ulnar nerve subluxation, elbow flexion test, and cross finger test bilateral.  Negative ulnar nerve Tinel's.  Pelvis: Standing flexion Carisa sign and stork test negative.  Landmarks are level and pelvic alignment is neutral.    Assessment     Lilian Quinones  is a 48 year old y.o. female who presents today for follow-up regarding   Left-sided buttock and leg symptoms-unresponsive to correcting Pelvic Joint Dysfunction and also to Maria L PT  Pelvic Joint Dysfunction-resolved with no improvement in symptoms   Possible left gluteal myofascial pain  Multilevel DDD from L2-S1 with the L5-S1 left lateral disc herniation compressing the left S1 nerve root in the lateral recess  Neurologic exam compatible with a chronic left S1 radiculopathy  Status post  left transforaminal WENCESLAO, at L4-5 and L5-S1 x2  Status post left piriformis injection-nontherapeutic  Chronic neck pain status post 2 or 3 CLARISSA  Recently diagnosed increase fluid in her optic nerve sheath suspicious for idiopathic intracranial hypertension-workup underway  Chronic headache  Recent hearing loss and tinnitus left ear      Plan:  I am not convinced she has cervicogenic neck pain or nausea and headaches associated with that especially given that the pseudotumor cerebri workup is still underway.  Furthermore, I am not eager to order a CLARISSA and put a needle in the epidural space until we know what her pressures are, not to mention I do not want to inadvertently change the pressures by having a complication from her CLARISSA.  I will have her come back and see me for the neck pain if it is not getting better once she and her other doctor have looked at the intracranial hypertension issue.  In the meantime I am going to have her visit with Dr. Milan Garcia for the low back and the chronic left S1 radiculopathy to see if she is a candidate for surgical decompression.  This has been going on for over a year.  I will order an updated  lumbar MRI before her visit with Dr. Garcia.    Advised patient to call or return early if symptoms worsen, or having problems controlling bladder and bowel function or worsening leg weakness.     Please note: Voice recognition software was used in this dictation.  It may therefore contain typographical errors.  Terrell Valenzuela MD

## 2023-08-08 ENCOUNTER — OFFICE VISIT (OUTPATIENT)
Dept: NEUROSURGERY | Facility: CLINIC | Age: 48
End: 2023-08-08
Payer: COMMERCIAL

## 2023-08-08 VITALS
DIASTOLIC BLOOD PRESSURE: 73 MMHG | HEART RATE: 73 BPM | SYSTOLIC BLOOD PRESSURE: 115 MMHG | BODY MASS INDEX: 29.81 KG/M2 | WEIGHT: 196 LBS

## 2023-08-08 DIAGNOSIS — R51.9 INTRACTABLE HEADACHE, UNSPECIFIED CHRONICITY PATTERN, UNSPECIFIED HEADACHE TYPE: ICD-10-CM

## 2023-08-08 DIAGNOSIS — M54.2 NECK PAIN: ICD-10-CM

## 2023-08-08 DIAGNOSIS — M54.16 LUMBAR RADICULOPATHY: Primary | ICD-10-CM

## 2023-08-08 PROCEDURE — 99213 OFFICE O/P EST LOW 20 MIN: CPT | Performed by: PREVENTIVE MEDICINE

## 2023-08-08 ASSESSMENT — PAIN SCALES - GENERAL: PAINLEVEL: SEVERE PAIN (6)

## 2023-08-08 NOTE — NURSING NOTE
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  Work status?  Full time.     Sports: no  Activities: biking kayaking              /73   Pulse 73   Wt 88.9 kg (196 lb)   LMP  (LMP Unknown)   BMI 29.81 kg/m        Allergies   Allergen Reactions    Amoxicillin Rash    Penicillins Rash       Current Outpatient Medications   Medication    diazepam (VALIUM) 5 MG tablet    diphenhydrAMINE HCl, Sleep, (ZZZQUIL PO)    ibuprofen (ADVIL/MOTRIN) 200 MG tablet    levonorgestrel (MIRENA) 20 MCG/24HR IUD    melatonin 5 MG tablet    vilazodone (VIIBRYD) 10 MG TABS tablet     No current facility-administered medications for this visit.         Darla Severin-Brown, LPN

## 2023-08-08 NOTE — PATIENT INSTRUCTIONS
Lilian I am sorry you are having so much trouble with your neck and low back.  I am not sure there is a neck problem that I want to address at this time until we know for sure what the situation is with the pressure inside your brain.  Once that doctor clears you come back and see me if the neck is still an issue.  Treatment for that however would depend on what that other doctor does, and or whether you respond to a spinal tap.  For the low back I am going to order a repeat lumbar MRI and after that is done I will have you visit again with Dr. Milan Garcia who saw you in the past for your neck, but this time I want him to see you for your left leg and low back to see if a decompression surgery for your low back would be reasonable for now follow-up with me is as needed.  See the assessment and plan below for further details of our discussions today.  I wish you all the best    Assessment     Lilian Quinones  is a 48 year old y.o. female who presents today for follow-up regarding   Left-sided buttock and leg symptoms  Pelvic Joint Dysfunction-resolved with no improvement in symptoms   Possible left gluteal myofascial pain  Multilevel DDD from L2-S1 with the L5-S1 left lateral disc herniation compressing the left S1 nerve root in the lateral recess  Neurologic exam compatible with a chronic left S1 radiculopathy  Status post  left transforaminal WENCESLAO, at L4-5 and L5-S1 x2  Status post left piriformis injection-nontherapeutic  Chronic neck pain status post 2 or 3 CLARISSA  Recently diagnosed increase fluid in her optic nerve sheath suspicious for idiopathic intracranial hypertension-workup underway      Plan:  I am not convinced she has cervicogenic neck pain or nausea and headaches associated with that especially given that the pseudotumor cerebri workup is still underway.  Furthermore, I am not eager to order a CLARISSA and put a needle in the epidural space until we know what her pressures are, not to mention I do not want to  inadvertently change the pressures by having a complication from her CLARISSA.  I will have her come back and see me for the neck pain if it is not getting better once she and her other doctor have looked at the intracranial hypertension issue.  In the meantime I am going to have her visit with Dr. Milan Garcia for the low back and the chronic left S1 radiculopathy to see if she is a candidate for surgical decompression.  This has been going on for over a year.  I will order an updated lumbar MRI before her visit with Dr. Garcia.

## 2023-08-08 NOTE — LETTER
8/8/2023         RE: Lilian Quinones  [address]  [place] [address]        Dear Colleague,    Thank you for referring your patient, Lilian Quinones, to the [facility] [place]. Please see a copy of my visit note below.      Subjective:    Lilian Quinones is a 48 year old female who presents today for follow-up regarding   Left-sided buttock and leg symptoms  Pelvic Joint Dysfunction manifesting as a right posterior innominate rotation and right sacral torsion-indeterminate response to OMT  Suspected left gluteal myofascial pain  Multilevel DDD from L2-S1 with the L5-S1 left lateral disc herniation compressing the left S1 nerve root in the lateral recess  Neurologic exam compatible with a chronic left S1 radiculopathy  Status post  left transforaminal WENCESLAO, at L4-5 and L5-S1 x2  Status post left piriformis injection  Suspected left S1 radiculitis, not severe enough to require surgery.  Pelvic stabilization PT management and protocols.  Suspected multifactorial pain etiology.    PRIOR HISTORY from 4/11/2023:  She was seen for evaluation of low back pain, lumbar bulging disks and suspected piriformis syndrome.       Records from [facility] reviewed:     Initial visit 11/1/2022 was primarily for right upper extremity symptoms and pain in the left hip.  Tenderness in the left SI joint and positive left SLR documented.  Tender to palpation of the left gluteal lateral and near the ischial tuberosity and greater trochanteric bursa     1/18/2023 consultation documents neck pain radiating to upper extremities, pain in back radiating to thighs limiting shopping and leisure.   Tizanidine has been used with some relief and better sleep;  no improvement with trial of tramadol.  Lower extremity neurologic exam that day was normal.  Plan was for a left piriformis injection and if unhelpful consideration of Botox.     Lumbar MRI 11/3/2022: nonimpinging the  far left lateral disc bulges L 2-3, and L3-4.  Mild bilateral neural foraminal narrowing with lateral disc bulging at L4-5.  L5-S1 left paracentral disc and foraminal disc protrusion compressing the left S1 nerve root in the subarticular recess.  No other foraminal stenosis and no central stenosis at any level.    Cervical MRI 12/14/2022 compared to prior MRI 9/11/2019, multilevel cervical spondylosis and cord signal normal.  Large left C6-7 central and lateral recess disc extrusion leading to mild-moderate central spinal stenosis, and moderate to severe left neural foraminal stenosis.  There is no right-sided neuroforaminal stenosis at any level.  Multiple small perineural cysts.     12/7/2022: Left L4-5/L5-S1 TFLESI  1/11/2023 left L5-S1 TFESI  CLARISSA x 2 or 3 in the last 8/31/2021        Diagnosis of left piriformis syndrome and a piriformis muscle injection on 2/8/2023 with anesthetic and steroid that was fluoroscopically guided.  Preinjection pain 7/10, postinjection pain 3/10     History today:  She reports the onset was without an inciting event in July 2022.  She tried some physical therapy and the injections above and nothing is really given her lasting relief.  Because of her bariatric surgery, she is trying to avoid ibuprofen but she has been alternating ibuprofen and an over-the-counter supplement called Kraton which seems to be helping.  She and her  are on a long road trip while emotionally recovering from her son's death this past November.  Now they are back in town because she has a new grandchild.  She does have intermittent symptoms of pain shooting down her left lateral thigh in roughly an L5 distribution.     She has a separate issue with right upper quadrant pain and possible radicular pain.  We are not going to have time to evaluate that today but she will come back at another date    INTERIM HISTORY:    Lilian is here for follow-up visit but her bigger issue today is her neck which is  "causing essentially nonradiating pain but every once in a while when she is sitting and working on her laptop in a reclining chair she will noticed that the ulnar 2 digits of both hands will go numb and tingly and then she will stand up and shake her arms and move around and things get better.  He does remember seeing Dr. Milan Garcia in the past for her neck issues and he had ordered epidural injections but did not recommend surgery at that time.  The injections did help.  The last few months she has had a really bad headache associated with nausea but no vomiting.  The CT scan was done because of left hearing loss and tendinitis but the imaging of her ear was normal.    We did review her office ergonomics at home which are suboptimal.  Mostly she reclines in a chair and works on her laptop.  Both are flex just past 90 degrees when she does that.  Her neck is flexed forward.  Recently she was diagnosed with idiopathic intracranial hypertension.  She is going to see an ophthalmologist first to check her \"eye pressures\" prior to considering a lumbar tap.  I reviewed her recent brain MRI which showed incidental finding of increased cerebrospinal fluid signal within her optic nerve sheath suspicious for papilledema and consistent with idiopathic intracranial hypertension.    She had 2 sessions of physical therapy with therapist not trained in Pelvic Joint Dysfunction, and his notes indicate they are doing Maria L protocols.  Other than feeling good to stretch her back it did not help her leg symptoms.  She does not recall anybody doing any dressing of her pelvis as I had done.      Reviewed past medical, surgical, and family history.               Pertinent for depression, anxiety, overweight, status post breast reduction, status post bariatric surgery (sleeve gastrectomy)-100 pound weight loss, subsequent 25 pound weight gain.  Idiopathic intracranial hypertension recently diagnosed     SOCIAL HX: She is  and " has had 4 children 1 of whom  in 2019.  She and her  sold her home and have been on the road for 18 months and they have now moved back because she has a new grandchild.  She works as a bank lender.  Sports hobbies and activity biking, kayaking, travel, hiking    Objective:    IMAGING:   See old record review above for prior lumbar MRI    Below images and reports reviewed    MR BRAIN AND IAC WITHOUT AND WITH CONTRAST 2023 ([facility])    There is symmetric prominence of the extra-axial CSF in both optic nerve sheaths (series 116, image 11). Mild mucosal thickening is noted in the left maxillary sinus. The marrow signal and scalp soft tissues are normal.    CONCLUSION:    1. Normal MR appearance of the internal auditory canals and cerebellopontine angle cisterns.    2. 5 mm cerebellar tonsillar ectopia.    3. Nonspecific, partially empty configuration of the sella turcica and prominence of the extra-axial CSF in both optic nerve sheaths. Both findings have been described in the setting of idiopathic intracranial hypertension (pseudotumor cerebri). If warranted clinically, findings may be correlated with funduscopic examination to assess for papilledema.    4. No acute stroke, hemorrhage, mass effect, enhancing lesions, or other acute intracranial process.    EXAMINATION:    CONSTITUTIONAL:  Vital signs as above.  No acute distress.  The patient is well nourished and well groomed.    PSYCHIATRIC:  The patient is awake, alert, oriented to person, place and time.  The patient is answering questions appropriately with clear speech.  Normal affect.  Able to follow commands  MUSCULOSKELETAL:  Gait is non-antalgic.  The patient is able to heel and toe walk without any difficulty.   Squat and rise normal.   .  Back ROM: Forward flexion limited hands to mid shins with shooting pain down the back of her left thigh.  Cervical range of motion full fluid with minimal pain.  Negative Spurling's maneuver.   Negative upper quadrant tenderness or trigger points     NEUROLOGICAL:    Motor: C5-T1 as well as axillary median radial and ulnar nerves normal.  Cross finger test negative.  Sensation to light touch is intact in the bilateral lower extremities.    SLR   positive left for left posterior thigh symptoms  Negative ulnar nerve subluxation, elbow flexion test, and cross finger test bilateral.  Negative ulnar nerve Tinel's.  Pelvis: Standing flexion Carisa sign and stork test negative.  Landmarks are level and pelvic alignment is neutral.    Assessment     Lilian Quinones  is a 48 year old y.o. female who presents today for follow-up regarding   Left-sided buttock and leg symptoms-unresponsive to correcting Pelvic Joint Dysfunction and also to Maria L PT  Pelvic Joint Dysfunction-resolved with no improvement in symptoms   Possible left gluteal myofascial pain  Multilevel DDD from L2-S1 with the L5-S1 left lateral disc herniation compressing the left S1 nerve root in the lateral recess  Neurologic exam compatible with a chronic left S1 radiculopathy  Status post  left transforaminal WENCESLAO, at L4-5 and L5-S1 x2  Status post left piriformis injection-nontherapeutic  Chronic neck pain status post 2 or 3 CLARISSA  Recently diagnosed increase fluid in her optic nerve sheath suspicious for idiopathic intracranial hypertension-workup underway  Chronic headache  Recent hearing loss and tinnitus left ear      Plan:  I am not convinced she has cervicogenic neck pain or nausea and headaches associated with that especially given that the pseudotumor cerebri workup is still underway.  Furthermore, I am not eager to order a CLARISSA and put a needle in the epidural space until we know what her pressures are, not to mention I do not want to inadvertently change the pressures by having a complication from her CLARISSA.  I will have her come back and see me for the neck pain if it is not getting better once she and her other doctor have looked at the  intracranial hypertension issue.  In the meantime I am going to have her visit with Dr. Milan Garcia for the low back and the chronic left S1 radiculopathy to see if she is a candidate for surgical decompression.  This has been going on for over a year.  I will order an updated lumbar MRI before her visit with Dr. Garcia.    Advised patient to call or return early if symptoms worsen, or having problems controlling bladder and bowel function or worsening leg weakness.     Please note: Voice recognition software was used in this dictation.  It may therefore contain typographical errors.  Terrell Valenzuela MD      Again, thank you for allowing me to participate in the care of your patient.        Sincerely,        Terrell Valenzuela MD

## 2023-08-22 ENCOUNTER — TELEPHONE (OUTPATIENT)
Dept: NEUROSURGERY | Facility: CLINIC | Age: 48
End: 2023-08-22
Payer: COMMERCIAL

## 2023-08-22 NOTE — TELEPHONE ENCOUNTER
1st attempt at workque referral. I called and left a voice mail.    Referred by Terrell Valenzuela MD in  NEUROSURGERY  Lumbar radiculopathy [M54.16]  MRI Lumbar Spine 8/30/23 - [facility]   Inj  Surg

## 2023-08-23 ENCOUNTER — OFFICE VISIT (OUTPATIENT)
Dept: OPHTHALMOLOGY | Facility: CLINIC | Age: 48
End: 2023-08-23
Attending: STUDENT IN AN ORGANIZED HEALTH CARE EDUCATION/TRAINING PROGRAM
Payer: COMMERCIAL

## 2023-08-23 DIAGNOSIS — H52.4 PRESBYOPIA: ICD-10-CM

## 2023-08-23 DIAGNOSIS — R93.0 ABNORMAL MRI OF HEAD: Primary | ICD-10-CM

## 2023-08-23 DIAGNOSIS — Z98.890 HX OF LASIK: ICD-10-CM

## 2023-08-23 PROCEDURE — 92083 EXTENDED VISUAL FIELD XM: CPT | Performed by: STUDENT IN AN ORGANIZED HEALTH CARE EDUCATION/TRAINING PROGRAM

## 2023-08-23 PROCEDURE — 92015 DETERMINE REFRACTIVE STATE: CPT

## 2023-08-23 PROCEDURE — 92004 COMPRE OPH EXAM NEW PT 1/>: CPT | Performed by: STUDENT IN AN ORGANIZED HEALTH CARE EDUCATION/TRAINING PROGRAM

## 2023-08-23 PROCEDURE — 99214 OFFICE O/P EST MOD 30 MIN: CPT | Performed by: STUDENT IN AN ORGANIZED HEALTH CARE EDUCATION/TRAINING PROGRAM

## 2023-08-23 PROCEDURE — 92133 CPTRZD OPH DX IMG PST SGM ON: CPT | Performed by: STUDENT IN AN ORGANIZED HEALTH CARE EDUCATION/TRAINING PROGRAM

## 2023-08-23 ASSESSMENT — REFRACTION_MANIFEST
METHOD_AUTOREFRACTION: 1
OS_SPHERE: -0.75
OD_SPHERE: PLANO
OD_CYLINDER: SPHERE
OS_AXIS: 049
OD_CYLINDER: SPHERE
OS_SPHERE: -0.75
OS_ADD: +1.50
OS_CYLINDER: +0.25
OD_SPHERE: PLANO
OS_CYLINDER: SPHERE
OD_ADD: +1.50

## 2023-08-23 ASSESSMENT — CUP TO DISC RATIO
OS_RATIO: 0.25
OD_RATIO: 0.1

## 2023-08-23 ASSESSMENT — TONOMETRY
OS_IOP_MMHG: 9
OD_IOP_MMHG: 9
IOP_METHOD: TONOPEN

## 2023-08-23 ASSESSMENT — CONF VISUAL FIELD
OS_SUPERIOR_TEMPORAL_RESTRICTION: 0
OD_INFERIOR_TEMPORAL_RESTRICTION: 0
METHOD: COUNTING FINGERS
OS_SUPERIOR_NASAL_RESTRICTION: 0
OS_INFERIOR_NASAL_RESTRICTION: 0
OS_NORMAL: 1
OD_SUPERIOR_NASAL_RESTRICTION: 0
OD_NORMAL: 1
OS_INFERIOR_TEMPORAL_RESTRICTION: 0
OD_INFERIOR_NASAL_RESTRICTION: 0
OD_SUPERIOR_TEMPORAL_RESTRICTION: 0

## 2023-08-23 ASSESSMENT — SLIT LAMP EXAM - LIDS
COMMENTS: WNL
COMMENTS: WNL

## 2023-08-23 ASSESSMENT — VISUAL ACUITY
METHOD: SNELLEN - LINEAR
OD_SC: 20/20
OS_SC: 20/20

## 2023-08-23 ASSESSMENT — EXTERNAL EXAM - RIGHT EYE: OD_EXAM: WNL

## 2023-08-23 ASSESSMENT — EXTERNAL EXAM - LEFT EYE: OS_EXAM: WNL

## 2023-08-23 NOTE — LETTER
8/23/2023       RE: Lilian Quinones  [address]  [place] [address]     Dear Colleague,    Thank you for referring your patient, Lilian Quinones, to the [facility] [place] at [facility]. Please see a copy of my visit note below.    HPI       Annual Eye Exam    Associated symptoms include Negative for eye pain, flashes and floaters. Additional comments: New patient exam, pseudotumor cerebri              Comments    Pt states she been referred for papilledema ruleout with possible idiopathic intercranial hypertension. Pt states no pain, flashes, floaters, or sudden vision changes. Pt states the only glasses worn are cheaters, +1.50. Pt had Lasik in 2016, thinks last eye exam was around then.   Per pt no drops currently being used    Yessica Underwood OA 12:53 PM August 23, 2023             Last edited by Alexandra Hanson MD on 8/24/2023  9:43 PM.          Review of systems for the eyes was negative other than the pertinent positives/negatives listed in the HPI.    Ocular Meds: none    Ocular Hx: myopic LASIK OU (2016); presbyopia    FOHx: no family history of glaucoma or blindness    PMHx:     Patient Active Problem List   Diagnosis     Overweight     Generalized anxiety disorder     Hyperhydrosis disorder     Major depression, recurrent (H)     Lumbar radiculopathy     Hearing loss of left ear, unspecified hearing loss type     Family history of breast cancer     Pseudotumor cerebri       Imaging    MR BRAIN AND IAC WITHOUT AND WITH CONTRAST 5/16/2023 ([facility])   There is symmetric prominence of the extra-axial CSF in both optic nerve sheaths (series 116, image 11). Mild mucosal thickening is noted in the left maxillary sinus. The marrow signal and scalp soft tissues are normal.     CONCLUSION:     1. Normal MR appearance of the internal auditory canals and cerebellopontine angle cisterns.     2. 5 mm cerebellar tonsillar ectopia.      3. Nonspecific, partially empty configuration of the sella turcica and prominence of the extra-axial CSF in both optic nerve sheaths. Both findings have been described in the setting of idiopathic intracranial hypertension (pseudotumor cerebri). If warranted clinically, findings may be correlated with funduscopic examination to assess for papilledema.     4. No acute stroke, hemorrhage, mass effect, enhancing lesions, or other acute intracranial process.    Assessment & Plan      Lilian Quinones is a 48 year old female with the following diagnoses:    Abnormal MRI of head  - Patient referred for evaluation by Nasima Malcolm NP for evaluation to rule out papilledema in setting of MRI head findings noted as above  - patient notes no whooshing sounds, no diplopia, no transient visual obscurations; does endorse headaches, but notes they are cervicogenic in nature  - denies use of OCPs, Vitamin A or Vitamin A derivatives, Denies Tetracycline or alternative use  - visual acuity excellent, IOP wnl, no RAPD OU, color plates full OU, CVF full to CF OU, EOM full OU no diplopia  - anterior/posterior eye exam grossly unremarkable  - no optic nerve head edema in both eyes, and there are spontaneous venous pulsations noted in both eyes which suggests low probability of having elevated intracranial pressure  - OCT RNFL and GTOP visual field testing grossly unremarkable  - will defer further work up and management to primary    LASIK OU  - doing well  - observe    Presbyopia  - uses over the counter readers    Counseled return/RD precautions  Letter to patient's referring provider    Patient disposition:   Return in about 1 year (around 8/23/2024) for Annual Visit, or sooner changes.    Attending Physician Attestation:  Complete documentation of historical and exam elements from today's encounter can be found in the full encounter summary report (not reduplicated in this progress note).  I personally obtained the chief  complaint(s) and history of present illness.  I confirmed and edited as necessary the review of systems, past medical/surgical history, family history, social history, and examination findings as documented by others; and I examined the patient myself.  I personally reviewed the relevant tests, images, and reports as documented above.  I formulated and edited as necessary the assessment and plan and discussed the findings and management plan with the patient and family. . - Alexandra Hanson MD        Again, thank you for allowing me to participate in the care of your patient.      Sincerely,    Alexandra Hanson MD

## 2023-08-23 NOTE — PROGRESS NOTES
HPI       Annual Eye Exam    Associated symptoms include Negative for eye pain, flashes and floaters. Additional comments: New patient exam, pseudotumor cerebri              Comments    Pt states she been referred for papilledema ruleout with possible idiopathic intercranial hypertension. Pt states no pain, flashes, floaters, or sudden vision changes. Pt states the only glasses worn are cheaters, +1.50. Pt had Lasik in 2016, thinks last eye exam was around then.   Per pt no drops currently being used    Yessica Kj OA 12:53 PM August 23, 2023             Last edited by Alexandra Hanson MD on 8/24/2023  9:43 PM.          Review of systems for the eyes was negative other than the pertinent positives/negatives listed in the HPI.    Ocular Meds: none    Ocular Hx: myopic LASIK OU (2016); presbyopia    FOHx: no family history of glaucoma or blindness    PMHx:     Patient Active Problem List   Diagnosis    Overweight    Generalized anxiety disorder    Hyperhydrosis disorder    Major depression, recurrent (H)    Lumbar radiculopathy    Hearing loss of left ear, unspecified hearing loss type    Family history of breast cancer    Pseudotumor cerebri       Imaging    MR BRAIN AND IAC WITHOUT AND WITH CONTRAST 5/16/2023 ([facility])   There is symmetric prominence of the extra-axial CSF in both optic nerve sheaths (series 116, image 11). Mild mucosal thickening is noted in the left maxillary sinus. The marrow signal and scalp soft tissues are normal.     CONCLUSION:     1. Normal MR appearance of the internal auditory canals and cerebellopontine angle cisterns.     2. 5 mm cerebellar tonsillar ectopia.     3. Nonspecific, partially empty configuration of the sella turcica and prominence of the extra-axial CSF in both optic nerve sheaths. Both findings have been described in the setting of idiopathic intracranial hypertension (pseudotumor cerebri). If warranted clinically, findings may be correlated with funduscopic examination to  assess for papilledema.     4. No acute stroke, hemorrhage, mass effect, enhancing lesions, or other acute intracranial process.    Assessment & Plan      Lilian Quinones is a 48 year old female with the following diagnoses:    Abnormal MRI of head  - Patient referred for evaluation by Nasima Malcolm NP for evaluation to rule out papilledema in setting of MRI head findings noted as above  - patient notes no whooshing sounds, no diplopia, no transient visual obscurations; does endorse headaches, but notes they are cervicogenic in nature  - denies use of OCPs, Vitamin A or Vitamin A derivatives, Denies Tetracycline or alternative use  - visual acuity excellent, IOP wnl, no RAPD OU, color plates full OU, CVF full to CF OU, EOM full OU no diplopia  - anterior/posterior eye exam grossly unremarkable  - no optic nerve head edema in both eyes, and there are spontaneous venous pulsations noted in both eyes which suggests low probability of having elevated intracranial pressure  - OCT RNFL and GTOP visual field testing grossly unremarkable  - will defer further work up and management to primary    LASIK OU  - doing well  - observe    Presbyopia  - uses over the counter readers    Counseled return/RD precautions  Letter to patient's referring provider    Patient disposition:   Return in about 1 year (around 8/23/2024) for Annual Visit, or sooner changes.    Attending Physician Attestation:  Complete documentation of historical and exam elements from today's encounter can be found in the full encounter summary report (not reduplicated in this progress note).  I personally obtained the chief complaint(s) and history of present illness.  I confirmed and edited as necessary the review of systems, past medical/surgical history, family history, social history, and examination findings as documented by others; and I examined the patient myself.  I personally reviewed the relevant tests, images, and reports as documented above.  I  formulated and edited as necessary the assessment and plan and discussed the findings and management plan with the patient and family. . - Alexandra Hanson MD

## 2023-08-23 NOTE — NURSING NOTE
Chief Complaints and History of Present Illnesses   Patient presents with    Annual Eye Exam     New patient exam, pseudotumor cerebri      Chief Complaint(s) and History of Present Illness(es)       Annual Eye Exam              Associated symptoms: Negative for eye pain, flashes and floaters    Comments: New patient exam, pseudotumor cerebri               Comments    Pt states she been diagnosed with intercranial hypertension. Pt states no pain, flashes, floaters, or sudden vision changes. Pt states the only glasses worn are cheaters, +1.50. Pt had Lasik in 2016, thinks last eye exam was around then.   Per pt no drops currently being used    Yessica Underwood OA 12:53 PM August 23, 2023

## 2023-08-30 ENCOUNTER — ANCILLARY PROCEDURE (OUTPATIENT)
Dept: MRI IMAGING | Facility: CLINIC | Age: 48
End: 2023-08-30
Attending: PREVENTIVE MEDICINE
Payer: COMMERCIAL

## 2023-08-30 ENCOUNTER — TELEPHONE (OUTPATIENT)
Dept: GASTROENTEROLOGY | Facility: CLINIC | Age: 48
End: 2023-08-30

## 2023-08-30 ENCOUNTER — HOSPITAL ENCOUNTER (OUTPATIENT)
Facility: AMBULATORY SURGERY CENTER | Age: 48
End: 2023-08-30
Attending: INTERNAL MEDICINE | Admitting: SPECIALIST
Payer: COMMERCIAL

## 2023-08-30 ENCOUNTER — MYC MEDICAL ADVICE (OUTPATIENT)
Dept: PSYCHIATRY | Facility: CLINIC | Age: 48
End: 2023-08-30

## 2023-08-30 DIAGNOSIS — M54.16 LUMBAR RADICULOPATHY: ICD-10-CM

## 2023-08-30 PROCEDURE — 72148 MRI LUMBAR SPINE W/O DYE: CPT | Mod: GC | Performed by: RADIOLOGY

## 2023-08-30 NOTE — TELEPHONE ENCOUNTER
"Endoscopy Scheduling Screen    Have you had a positive Covid test in the last 14 days?  No    Are you active on MyChart?   Yes    What insurance is in the chart?  BC/BS: Schedule in [facility] unless patient meets exclusion criteria.     Ordering/Referring Provider: Wesley   (If ordering provider performs procedure, schedule with ordering provider unless otherwise instructed. )    BMI: Estimated body mass index is 29.81 kg/m  as calculated from the following:    Height as of 7/19/23: 1.727 m (5' 7.99\").    Weight as of 8/8/23: 88.9 kg (196 lb).     Sedation Ordered  moderate sedation.   If patient BMI > 50 do not schedule in [facility].    Are you taking any prescription medications for pain?   No    Are you taking methadone or Suboxone?  No    Do you have a history of malignant hyperthermia or adverse reaction to anesthesia?  No    (Females) Are you currently pregnant?   No     Have you been diagnosed or told you have pulmonary hypertension?   No    Do you have an LVAD?  No    Have you been told you have moderate to severe sleep apnea?  No    Have you been told you have COPD, asthma, or any other lung disease?  No    Do you have any heart conditions?  No     Have you ever had or are you awaiting a heart or lung transplant?   No    Have you had a stroke or transient ischemic attack (TIA aka \"mini stroke\" in the last 6 months?   No    Have you been diagnosed with or been told you have cirrhosis of the liver?   No    Are you currently on dialysis?   No    Do you need assistance transferring?   No    BMI: Estimated body mass index is 29.81 kg/m  as calculated from the following:    Height as of 7/19/23: 1.727 m (5' 7.99\").    Weight as of 8/8/23: 88.9 kg (196 lb).     Is patients BMI > 40 and scheduling location [facility]?  No    Do you take the medication Phentermine, Ozempic or Wegovy?  No    Do you take the medication Naltrexone?  No    Do you take blood thinners?  No      Prep   Are you currently on dialysis or do you have chronic " kidney disease?  No    Do you have a diagnosis of diabetes?  No    Do you have a diagnosis of cystic fibrosis (CF)?  No    On a regular basis do you go 3 -5 days between bowel movements?  No    BMI > 40?  No    Preferred Pharmacy:    [facility]        Final Scheduling Details   Colonoscopy prep sent?  MiraLAX (No Mag Citrate)    Procedure scheduled  Colonoscopy    Surgeon:  Betsy     Date of procedure:  10/17/23     Schedule PAC:   No    Location  [facility]    Sedation   Moderate Sedation    Patient Reminders:   You will receive a call from a Nurse to review instructions and health history.  This assessment must be completed prior to your procedure.  Failure to complete the Nurse assessment may result in the procedure being cancelled.      On the day of your procedure, please designate an adult(s) who can drive you home stay with you for the next 24 hours. The medicines used in the exam will make you sleepy. You will not be able to drive.      You cannot take public transportation, ride share services, or non-medical taxi service without a responsible caregiver.  Medical transport services are allowed with the requirement that a responsible caregiver will receive you at your destination.  We require that drivers and caregivers are confirmed prior to your procedure.

## 2023-09-05 NOTE — TELEPHONE ENCOUNTER
RECORDS RECEIVED FROM: Referred by Terrell Valenzuela MD in  NEUROSURGERY   REASON FOR VISIT: Consultation regarding chronic left S1 radicular symptoms with Dr. Milan Garcia.  Schedule after her repeat MRI   Lumbar radiculopathy    Date of Appt: 09/11/23   NOTES (FOR ALL VISITS) STATUS DETAILS   OFFICE NOTE from referring provider Internal Referral and notes in chart   OFFICE NOTE from other specialist Internal PT: Yes, 2 or 3 times @ [facility]; Done in last summer   DISCHARGE SUMMARY from hospital N/A    DISCHARGE REPORT from the ER N/A    OPERATIVE REPORT N/A    ROBERTH Virus Labs ([facility] ONLY) N/A    EMG N/A    EEG N/A    MEDICATION LIST N/A    IMAGING  (FOR ALL VISITS)     LUMBAR PUNCTURE N/A    LUIS MIGUEL SCAN (MOVEMENT) N/A    ULTRASOUND (CAROTID BILAT) *VASCULAR* N/A    MRI (HEAD, NECK, SPINE) Internal MRI Lumbar Spine 8/30/23 [facility]    CT (HEAD, NECK, SPINE) N/A

## 2023-09-08 ENCOUNTER — TELEPHONE (OUTPATIENT)
Dept: NEUROSURGERY | Facility: CLINIC | Age: 48
End: 2023-09-08
Payer: COMMERCIAL

## 2023-09-11 ENCOUNTER — OFFICE VISIT (OUTPATIENT)
Dept: NEUROSURGERY | Facility: CLINIC | Age: 48
End: 2023-09-11
Attending: PREVENTIVE MEDICINE
Payer: COMMERCIAL

## 2023-09-11 ENCOUNTER — PRE VISIT (OUTPATIENT)
Dept: NEUROSURGERY | Facility: CLINIC | Age: 48
End: 2023-09-11

## 2023-09-11 VITALS — SYSTOLIC BLOOD PRESSURE: 135 MMHG | HEART RATE: 80 BPM | DIASTOLIC BLOOD PRESSURE: 79 MMHG | OXYGEN SATURATION: 100 %

## 2023-09-11 DIAGNOSIS — M54.16 LUMBAR RADICULOPATHY: Primary | ICD-10-CM

## 2023-09-11 DIAGNOSIS — M54.16 LUMBAR RADICULOPATHY: ICD-10-CM

## 2023-09-11 PROCEDURE — 99214 OFFICE O/P EST MOD 30 MIN: CPT | Performed by: NEUROLOGICAL SURGERY

## 2023-09-11 ASSESSMENT — PAIN SCALES - GENERAL: PAINLEVEL: SEVERE PAIN (7)

## 2023-09-11 NOTE — LETTER
9/11/2023         RE: Lilian Quinones  [address]  [place] [address]        Dear Colleague,    Thank you for referring your patient, Lilian Quinones, to the [facility]. Please see a copy of my visit note below.    I was asked by Dr. Valenzuela to see this patient in consultation    48 year old female with lumbar radiculopathy.  About a year ago, developed severe throbbing left low back pain radiating to the buttocks, posterior thigh, calf, and foot.  Was found to have a left L5-S1 disc herniation.  Subsequently did PT and WENCESLAO.  Has had persistent severe pain in this distribution since then.  New MR Lumbar, personally reviewed, shows persistent left L5-S1 annular tear abutting the nerve root.  Patient is also currently under evaluation for possible idiopathic intracranial hypertension with Dr. Nasima Malcolm.  She also has chronic cervicalgia previously managed with WENCESLAO.    Past Medical History:   Diagnosis Date     IUD (intrauterine device) in place 03/20/2019    Mirena     Past Surgical History:   Procedure Laterality Date     BIOPSY      Skin Biopsy     BIOPSY BREAST       BREAST SURGERY      Breast reduction sergury      LASIK       MAMMOPLASTY REDUCTION  01/01/2008     [place] LAP, HONORIO RESTRICT PROC, LONGITUDINAL GASTRECTOMY N/A 06/24/2019    Procedure: LAPAROSCOPIC SLEEVE GASTRECTOMY;  Surgeon: Perfecto Enciso MD;  Location: [facility];  Service: General     Social History     Socioeconomic History     Marital status:      Spouse name: Not on file     Number of children: Not on file     Years of education: Not on file     Highest education level: Associate degree: academic program   Occupational History     Not on file   Tobacco Use     Smoking status: Never     Smokeless tobacco: Never   Substance and Sexual Activity     Alcohol use: Yes     Drug use: No     Sexual activity: Yes     Partners: Male     Birth control/protection: I.U.D.   Other  Topics Concern     Parent/sibling w/ CABG, MI or angioplasty before 65F 55M? No   Social History Narrative     Not on file     Social Determinants of Health     Financial Resource Strain: Low Risk  (8/5/2020)    Overall Financial Resource Strain (CARDIA)      Difficulty of Paying Living Expenses: Not hard at all   Food Insecurity: No Food Insecurity (8/5/2020)    Hunger Vital Sign      Worried About Running Out of Food in the Last Year: Never true      Ran Out of Food in the Last Year: Never true   Transportation Needs: No Transportation Needs (8/5/2020)    PRAPARE - Transportation      Lack of Transportation (Medical): No      Lack of Transportation (Non-Medical): No   Physical Activity: Insufficiently Active (8/5/2020)    Exercise Vital Sign      Days of Exercise per Week: 2 days      Minutes of Exercise per Session: 60 min   Stress: Not on file   Social Connections: Moderately Isolated (8/5/2020)    Social Connection and Isolation Panel [NHANES]      Frequency of Communication with Friends and Family: More than three times a week      Frequency of Social Gatherings with Friends and Family: Once a week      Attends Ep[employer] Services: Never      Active Member of Clubs or Organizations: No      Attends Club or Organization Meetings: Patient refused      Marital Status:    Intimate Partner Violence: Not on file   Housing Stability: Low Risk  (8/5/2020)    Housing Stability Vital Sign      Unable to Pay for Housing in the Last Year: No      Number of Places Lived in the Last Year: 1      Unstable Housing in the Last Year: No     Family History   Problem Relation Age of Onset     Breast Cancer Mother      Diabetes Father      Heart Disease Maternal Grandmother      Hypertension Maternal Grandfather      Hyperlipidemia Maternal Grandfather      Pancreatic Cancer Paternal Grandmother      Diabetes Paternal Grandmother      Other Cancer Paternal Grandfather      Depression Sister      Melanoma No family hx of       Glaucoma No family hx of      Macular Degeneration No family hx of         ROS: 10 point ROS neg other than the symptoms noted above in the HPI.    Physical Exam  /79   Pulse 80   SpO2 100%   HEENT:  Normocephalic, atraumatic.  PERRLA.  EOM s intact.  Visual fields full to gross exam  Neck:  Supple, non-tender, without lymphadenopathy.  Heart:  No peripheral edema  Lungs:  No SOB  Abdomen:  Non-distended.   Skin:  Warm and dry.  Extremities:  No edema, cyanosis or clubbing.  Psychiatric:  No apparent distress  Musculoskeletal:  Normal bulk and tone    NEUROLOGICAL EXAMINATION:     Mental status:  Alert and Oriented x 3, speech is fluent.  Cranial nerves:  II-XII intact.   Motor:    Shoulder Abduction:  Right:  5/5   Left:  5/5  Biceps:                      Right:  5/5   Left:  5/5  Triceps:                     Right:  5/5   Left:  5/5  Wrist Extensors:       Right:  5/5   Left:  5/5  Wrist Flexors:           Right:  5/5   Left:  5/5  interosseus :            Right:  5/5   Left:  5/5  Hip Flexion:                Right: 5/5  Left:  5/5  Quadriceps:             Right:  5/5  Left:  5/5  Hamstrings:             Right:  5/5  Left:  5/5  Gastroc Soleus:        Right:  5/5  Left:  5/5  Tib/Ant:                      Right:  5/5  Left:  5/5  EHL:                     Right:  5/5  Left:  5/5  Sensation:  Intact  Reflexes:  Negative Babinski.  Negative Clonus.  Negative Ladd's.  Coordination:  Smooth finger to nose testing.   Negative pronator drift.  Smooth tandem walking.    A/P:  48 year old female with lumbar radiculopathy    I had a discussion with the patient, reviewing the history, symptoms, and imaging  Persistent lumbar radiculopathy in spite of non-surgical management  Will plan for MIS microdiscectomy  Risks and benefits discussed  If cleared from a perspective of possible elevated intracranial pressure, we will order her a cervical WENCESLAO as well          Again, thank you for allowing me to participate in the  care of your patient.        Sincerely,        Milan Garcia MD

## 2023-09-11 NOTE — NURSING NOTE
Reviewed pre- and post-operative instructions as outlined in the After Visit Summary/Patient Instructions with patient.   Surgery folder was given to patient    Patient Education Topic: Procedure with Dr. Garcia     Learner(s): Patient    Knowledge Level: Basic    Readiness to Learn: Ready    Method:  Verbal explanation    Outcome: Able to verbalize instructions    Barriers to Learning: No barrier    Covid Testing: n/a    NDI/TU: Confirmation of completion within last 6 months    Pain Clinic: N/A    STD/FMLA: Patient works from home. No papers needed per patient.     Patient had the opportunity for questions to be answered. Advised Patient to call clinic with any questions/concerns. Gave patient antibacterial soap for pre-surgery skin preparation.

## 2023-09-11 NOTE — PROGRESS NOTES
I was asked by Dr. Valenzuela to see this patient in consultation    48 year old female with lumbar radiculopathy.  About a year ago, developed severe throbbing left low back pain radiating to the buttocks, posterior thigh, calf, and foot.  Was found to have a left L5-S1 disc herniation.  Subsequently did PT and WENCESLAO.  Has had persistent severe pain in this distribution since then.  New MR Lumbar, personally reviewed, shows persistent left L5-S1 annular tear abutting the nerve root.  Patient is also currently under evaluation for possible idiopathic intracranial hypertension with Dr. Nasima Malcolm.  She also has chronic cervicalgia previously managed with WENCESLAO.    Past Medical History:   Diagnosis Date    IUD (intrauterine device) in place 03/20/2019    Mirena     Past Surgical History:   Procedure Laterality Date    BIOPSY      Skin Biopsy    BIOPSY BREAST      BREAST SURGERY      Breast reduction sergury     LASIK      MAMMOPLASTY REDUCTION  01/01/2008    [place] LAP, HONORIO RESTRICT PROC, LONGITUDINAL GASTRECTOMY N/A 06/24/2019    Procedure: LAPAROSCOPIC SLEEVE GASTRECTOMY;  Surgeon: Perfecto Enciso MD;  Location: [facility];  Service: General     Social History     Socioeconomic History    Marital status:      Spouse name: Not on file    Number of children: Not on file    Years of education: Not on file    Highest education level: Associate degree: academic program   Occupational History    Not on file   Tobacco Use    Smoking status: Never    Smokeless tobacco: Never   Substance and Sexual Activity    Alcohol use: Yes    Drug use: No    Sexual activity: Yes     Partners: Male     Birth control/protection: I.U.D.   Other Topics Concern    Parent/sibling w/ CABG, MI or angioplasty before 65F 55M? No   Social History Narrative    Not on file     Social Determinants of Health     Financial Resource Strain: Low Risk  (8/5/2020)    Overall Financial Resource Strain (CARDIA)     Difficulty of Paying Living  Expenses: Not hard at all   Food Insecurity: No Food Insecurity (8/5/2020)    Hunger Vital Sign     Worried About Running Out of Food in the Last Year: Never true     Ran Out of Food in the Last Year: Never true   Transportation Needs: No Transportation Needs (8/5/2020)    PRAPARE - Transportation     Lack of Transportation (Medical): No     Lack of Transportation (Non-Medical): No   Physical Activity: Insufficiently Active (8/5/2020)    Exercise Vital Sign     Days of Exercise per Week: 2 days     Minutes of Exercise per Session: 60 min   Stress: Not on file   Social Connections: Moderately Isolated (8/5/2020)    Social Connection and Isolation Panel [NHANES]     Frequency of Communication with Friends and Family: More than three times a week     Frequency of Social Gatherings with Friends and Family: Once a week     Attends Catholic Services: Never     Active Member of Clubs or Organizations: No     Attends Club or Organization Meetings: Patient refused     Marital Status:    Intimate Partner Violence: Not on file   Housing Stability: Low Risk  (8/5/2020)    Housing Stability Vital Sign     Unable to Pay for Housing in the Last Year: No     Number of Places Lived in the Last Year: 1     Unstable Housing in the Last Year: No     Family History   Problem Relation Age of Onset    Breast Cancer Mother     Diabetes Father     Heart Disease Maternal Grandmother     Hypertension Maternal Grandfather     Hyperlipidemia Maternal Grandfather     Pancreatic Cancer Paternal Grandmother     Diabetes Paternal Grandmother     Other Cancer Paternal Grandfather     Depression Sister     Melanoma No family hx of     Glaucoma No family hx of     Macular Degeneration No family hx of         ROS: 10 point ROS neg other than the symptoms noted above in the HPI.    Physical Exam  /79   Pulse 80   SpO2 100%   HEENT:  Normocephalic, atraumatic.  PERRLA.  EOM s intact.  Visual fields full to gross exam  Neck:  Supple,  non-tender, without lymphadenopathy.  Heart:  No peripheral edema  Lungs:  No SOB  Abdomen:  Non-distended.   Skin:  Warm and dry.  Extremities:  No edema, cyanosis or clubbing.  Psychiatric:  No apparent distress  Musculoskeletal:  Normal bulk and tone    NEUROLOGICAL EXAMINATION:     Mental status:  Alert and Oriented x 3, speech is fluent.  Cranial nerves:  II-XII intact.   Motor:    Shoulder Abduction:  Right:  5/5   Left:  5/5  Biceps:                      Right:  5/5   Left:  5/5  Triceps:                     Right:  5/5   Left:  5/5  Wrist Extensors:       Right:  5/5   Left:  5/5  Wrist Flexors:           Right:  5/5   Left:  5/5  interosseus :            Right:  5/5   Left:  5/5  Hip Flexion:                Right: 5/5  Left:  5/5  Quadriceps:             Right:  5/5  Left:  5/5  Hamstrings:             Right:  5/5  Left:  5/5  Gastroc Soleus:        Right:  5/5  Left:  5/5  Tib/Ant:                      Right:  5/5  Left:  5/5  EHL:                     Right:  5/5  Left:  5/5  Sensation:  Intact  Reflexes:  Negative Babinski.  Negative Clonus.  Negative Ladd's.  Coordination:  Smooth finger to nose testing.   Negative pronator drift.  Smooth tandem walking.    A/P:  48 year old female with lumbar radiculopathy    I had a discussion with the patient, reviewing the history, symptoms, and imaging  Persistent lumbar radiculopathy in spite of non-surgical management  Will plan for MIS microdiscectomy  Risks and benefits discussed  If cleared from a perspective of possible elevated intracranial pressure, we will order her a cervical WENCESLAO as well

## 2023-09-11 NOTE — PATIENT INSTRUCTIONS
Patient Instructions    Surgery scheduled at [facility] for left L5-S1 Microdiscectomy with Dr. Garcia    Pre-Operative    Surgical risks: blood clots in the leg or lung, problems urinating, nerve damage, drainage from the incision, infection, stiffness    You will need a Pre-operative physical with primary care physician within 30 days prior to your surgical date.     This is a same day procedure with discharge home day of surgery.    Smoking Cessation  You are advised to quit smoking immediately through recovery to help with healing and reduce risk of complications.     Shower procedure  You will need to shower the night before and morning of surgery using the antimicrobial soap (chlorhexidine) given to you. Please refer to showering instruction sheet in surgery education folder.    Eating/Drinking  Stop all solid foods 8 hours before surgery.  Keep drinking clear liquids until 4 hours before surgery  Clear liquids include water, clear juice, black coffee, or clear tea without milk, Gatorade, clear soda.     Medications  Discontinue Aspirin & NSAIDs (Advil/Ibuprofen, Indocin, Naproxen,Nuprin,Relafen/Nabumetone, Diclofenac,Meloxicam, Aleve, Celebrex) 7 days prior to surgical date. After surgery, do not begin taking these medications until given clearance as it may cause bleeding and interfere with healing.  It is ok to take Tylenol (Acetaminophen) for pain within the 7 days prior to surgery. Example: you could take 1000 mg 3 times per day. Do not exceed 3,000 mg per day.   If you are on chronic pain medication (oxycodone, Percocet, hydrocodone, Vicodin, Norco, Dilaudid, morphine, MS Contin, naltrexone, Suboxone, etc) or have a pain contract we will reach out to your pain clinic to gather your most recent records and recommendations for pain management post-op.  Please ask your provider who manages your chronic pain if they require you to schedule an office visit prior to surgery. Continue  obtaining your pain medications from your current provider until surgery. Our team will manage your acute post-op pain in the hospital and during the recovery period. Your pain team will continue to manage your chronic pain.   Any other medications prescribed, please discuss with your primary care provider at your pre-operative physical       Post-Operative    Incision Care  Look at your incision site every day. You  may need a mirror or family member to help you.   Watch for signs of infection  Redness, swelling, warmth, drainage (Green or yellow drainage (pus) from your incision or increased bloody drainage), and fever of 101 degrees or higher  [facility] [phone]  Remove dressing as instructed upon discharge  Do not apply lotions or ointments to incision  It is okay to shower, just pat the incision dry   No submerging incision in water such as pools, hot tubs, or baths for at least 8 weeks and until the incision is healed    Pain Management  Dealing with pain  As your body heals, you might feel a stabbing, burning, or aching pain. You may also have some numbness.  Everyone feels pain differently, we may ask you to rate your pain using a pain scale. This will let us know how much pain you feel.   Keep in mind that medicine won't take away all of your pain. It helps to try other ways to relax and ease pain.   Things to help with pain  After surgery, we will give you medicine for your pain. These medications work well, but they can make you drowsy, itchy, or sick to your stomach. If we give you narcotics for pain, try to take the pills with food.   For mild to moderate pain, you can take medication such as Tylenol. These can be used with narcotics, but make sure that your narcotic does not contain Tylenol.   Do NOT drive while taking narcotic pain medication  Do NOT drink alcohol while using any pain medication  You can utilize ice as needed (no longer than 20 minutes at one time)  Refills of pain medication:  please call the neurosurgery clinic to request 2-3 days before you run out  Aspirin & NSAIDS (ex. Ibuprofen, aleve, naproxen): Don't take NSAIDs until 2 weeks after surgery to reduce risk of bleeding and interference with bone healing     Bowel Care  Many people have constipation (hard stools) after surgery. The narcotic pain medication we often prescribed can contribute to constipation. To help prevent constipation: Drink plenty of fluid (8-10 glasses/day); Eat more fiber, such as whole grain bread, bran cereal, and fruits and vegetables; Stay active by walking; Over the counter stool softener may also help.      Activity Restrictions  For the first 6 weeks, no lifting > 10 pounds, limited bending, twisting, or overhead reaching.  Take stairs in moderation   Walking is the best way to start exercise after surgery. Take short frequent walks. You may gradually increase the distance as tolerated. If you feel pain, decrease your activity, but DO NOT stop walking. Walking will help you gain strength, prevent muscle soreness and spasms, and prevent blood clots.  Avoid bed rest and prolonged sitting for longer than 30 minutes (change positions frequently while awake)  No contact sports or high impact activities such as; running/jogging, snowmobile or 4 rodriguez riding or any other recreational vehicles until after given clearance at one of your follow up visits    Contact clinic right away or go to the Emergency Department if you develop:   Infection (incisional redness, swelling, warmth, drainage, or fever (temp > 101 F))  New injury  Bladder or bowel changes or loss of control    Signs of blood clot:  Swelling and/or warmth in one or both legs  Pain or tenderness in your leg, ankle, foot, or arm   Red or discolored skin     Go to the Emergency Department   If sudden onset of severe headache, weakness, confusion, change in level of consciousness, pain, or loss of movement.  Chest pain  Trouble breathing     Post-Op Follow  Up Appointments  2 week incision check & staple/suture removal with a Neurosurgery Nurse  6 week and 3 month post-op visit with Physican Assistant or Nurse Practitioner     Resources  If you are currently employed, you will need to be off work for 2-4 weeks for recovery and healing.  Please fax any FMLA/short term disability paperwork to [phone] prior to surgery  You may call our clinic when you'd like to return to work and we can provide a work letter  To allow staff adequate time to complete paperwork, please send as soon as possible     [facility]  [facility] - [address] [address]  Telephone:  [phone]       Fax:  [phone]

## 2023-09-12 ENCOUNTER — MYC MEDICAL ADVICE (OUTPATIENT)
Dept: NEUROSURGERY | Facility: CLINIC | Age: 48
End: 2023-09-12
Payer: COMMERCIAL

## 2023-09-12 DIAGNOSIS — M54.12 CERVICAL RADICULOPATHY: Primary | ICD-10-CM

## 2023-09-12 DIAGNOSIS — M54.2 NECK PAIN: ICD-10-CM

## 2023-09-12 NOTE — TELEPHONE ENCOUNTER
Patient saw Dr. Garcia yesterday who recommended MIS microdiscectomy. OV note states if patient is cleared from a perspective of possible elevated intracranial pressure, we will order her a cervical WENCESLAO as well.    Patient scheduled for lumbar surgery on 10/3.    Patient previously had C7-T1 interlaminar WENCESLAO with Dr. Levi. Encounter routed to Dr. Garcia to confirm level and determine when ok for patient to proceed.

## 2023-09-12 NOTE — TELEPHONE ENCOUNTER
Per Dr. Jose moon to order injection. Patient can complete the injection at anytime.     Order placed. Patient updated via [employer].

## 2023-09-18 ENCOUNTER — MYC MEDICAL ADVICE (OUTPATIENT)
Dept: PSYCHIATRY | Facility: CLINIC | Age: 48
End: 2023-09-18
Payer: COMMERCIAL

## 2023-09-18 DIAGNOSIS — F41.1 GENERALIZED ANXIETY DISORDER: ICD-10-CM

## 2023-09-19 RX ORDER — DIAZEPAM 5 MG
2.5-5 TABLET ORAL DAILY PRN
Qty: 15 TABLET | Refills: 1 | Status: SHIPPED | OUTPATIENT
Start: 2023-09-19 | End: 2024-02-06

## 2023-09-20 NOTE — H&P (VIEW-ONLY)
[facility] [place]  [address]  [place] [address]  Phone: [phone]  Primary Provider: Shira Sylvester  Pre-op Performing Provider: SHIRA SYLVESTER      PREOPERATIVE EVALUATION:  Today's date: 9/25/2023    Lilian is a 48 year old female who presents for a preoperative evaluation.      9/25/2023     9:10 AM   Additional Questions   Roomed by Ally   Accompanied by Self         9/25/2023     9:10 AM   Patient Reported Additional Medications   Patient reports taking the following new medications NA       Surgical Information:  Surgery/Procedure: Minimally invasive Left Lumbar 5 to Sacral 1 microdiscectomy   Surgery Location: [facility]  Surgeon: Milan Garcia MD  Surgery Date: 10/03/23  Time of Surgery: 7:30 AM  Where patient plans to recover: At home with family  Fax number for surgical facility: Note does not need to be faxed, will be available electronically in Epic.    *Discuss valium use prior to procedure    Assessment & Plan     The proposed surgical procedure is considered INTERMEDIATE risk.    Preop general physical exam  Fully optimized for procedure.  Previous labs reviewed.  Will obtain CBC here today.  Continue to follow with neurosurgery.  - CBC with platelets; Future    Screen for colon cancer  Encouraged to reschedule colonoscopy until early December.    Lumbar radiculopathy  Fully optimized for procedure.  Continue to follow with neurosurgery    Declines influenza vaccine.        - No identified additional risk factors other than previously addressed    Antiplatelet or Anticoagulation Medication Instructions:   - Patient is on no antiplatelet or anticoagulation medications.    Additional Medication Instructions:  Encouraged to not take Valium prior to surgery.  Encouraged to avoid taking melatonin and/or Benadryl p.m. before procedure.    RECOMMENDATION:  APPROVAL GIVEN to proceed with proposed procedure, without further  diagnostic evaluation.    Review of the result(s) of each unique test - Labs, imaging  Ordering of each unique test  Prescription drug management      Subjective       HPI related to upcoming procedure: Is going to be having lumbar microdisectomy. Has been hurting for the last year.           9/22/2023    10:15 AM   Preop Questions   1. Have you ever had a heart attack or stroke? No   2. Have you ever had surgery on your heart or blood vessels, such as a stent placement, a coronary artery bypass, or surgery on an artery in your head, neck, heart, or legs? No   3. Do you have chest pain with activity? No   4. Do you have a history of  heart failure? No   5. Do you currently have a cold, bronchitis or symptoms of other infection? No   6. Do you have a cough, shortness of breath, or wheezing? No   7. Do you or anyone in your family have previous history of blood clots? YES - Mom with 2 strokes    8. Do you or does anyone in your family have a serious bleeding problem such as prolonged bleeding following surgeries or cuts? No   9. Have you ever had problems with anemia or been told to take iron pills? No   10. Have you had any abnormal blood loss such as black, tarry or bloody stools, or abnormal vaginal bleeding? No   11. Have you ever had a blood transfusion? No   12. Are you willing to have a blood transfusion if it is medically needed before, during, or after your surgery? Yes   13. Have you or any of your relatives ever had problems with anesthesia? No   14. Do you have sleep apnea, excessive snoring or daytime drowsiness? No   15. Do you have any artifical heart valves or other implanted medical devices like a pacemaker, defibrillator, or continuous glucose monitor? No   16. Do you have artificial joints? No   17. Are you allergic to latex? No   18. Is there any chance that you may be pregnant? No     Health Care Directive:  Patient does not have a Health Care Directive or Living Will: Patient states has Advance  Directive and will bring in a copy to clinic.    Preoperative Review of :   reviewed - controlled substances reflected in medication list.      Review of Systems   Constitutional:  Negative for fatigue.   HENT:  Negative for ear pain, rhinorrhea and sore throat.    Eyes:  Negative for visual disturbance.   Respiratory:  Negative for cough, chest tightness, shortness of breath and wheezing.    Cardiovascular:  Negative for chest pain and palpitations.   Gastrointestinal:  Negative for abdominal distention, abdominal pain, constipation, diarrhea, nausea and vomiting.   Endocrine: Negative for cold intolerance and heat intolerance.   Musculoskeletal:  Positive for back pain. Negative for arthralgias and myalgias.   Skin:  Negative for rash.   Neurological:  Negative for dizziness, light-headedness, numbness and headaches.   Psychiatric/Behavioral:  Negative for dysphoric mood and sleep disturbance. The patient is not nervous/anxious.        Patient Active Problem List    Diagnosis Date Noted    Pseudotumor cerebri 06/13/2023     Priority: Medium    Hearing loss of left ear, unspecified hearing loss type 06/12/2023     Priority: Medium    Family history of breast cancer 06/12/2023     Priority: Medium    Lumbar radiculopathy 05/02/2023     Priority: Medium    Major depression, recurrent (H) 10/06/2021     Priority: Medium    Generalized anxiety disorder 08/22/2016     Priority: Medium    Hyperhydrosis disorder 08/22/2016     Priority: Medium    Overweight 07/14/2016     Priority: Medium      Past Medical History:   Diagnosis Date    IUD (intrauterine device) in place 03/20/2019    Mirena     Past Surgical History:   Procedure Laterality Date    BIOPSY      Skin Biopsy    BIOPSY BREAST      BREAST SURGERY      Breast reduction sergury     LASIK      MAMMOPLASTY REDUCTION  01/01/2008    [place] LAP, HONORIO RESTRICT PROC, LONGITUDINAL GASTRECTOMY N/A 06/24/2019    Procedure: LAPAROSCOPIC SLEEVE GASTRECTOMY;  Surgeon:  Perfecto Enciso MD;  Location: [facility] OR;  Service: General     Current Outpatient Medications   Medication Sig Dispense Refill    diazepam (VALIUM) 5 MG tablet Take 0.5-1 tablets (2.5-5 mg) by mouth daily as needed for anxiety 15 tablet 1    diphenhydrAMINE HCl, Sleep, (ZZZQUIL PO) Take 50 mg by mouth nightly as needed (for sleep)      ibuprofen (ADVIL/MOTRIN) 200 MG tablet Take 600 mg by mouth every 6 hours as needed for pain      levonorgestrel (MIRENA) 20 MCG/24HR IUD 1 each by Intrauterine route once      melatonin 5 MG tablet Take 10 mg by mouth nightly as needed for sleep         Allergies   Allergen Reactions    Amoxicillin Rash    Penicillins Rash        Social History     Tobacco Use    Smoking status: Never    Smokeless tobacco: Never   Substance Use Topics    Alcohol use: Yes       History   Drug Use No         Objective     There were no vitals taken for this visit.    Physical Exam  Constitutional:       Appearance: Normal appearance. She is well-developed.   HENT:      Head: Normocephalic and atraumatic.      Right Ear: Tympanic membrane and external ear normal. No middle ear effusion.      Left Ear: Tympanic membrane and external ear normal.  No middle ear effusion.      Nose: No mucosal edema.   Neck:      Thyroid: No thyromegaly.      Vascular: No carotid bruit.   Cardiovascular:      Rate and Rhythm: Normal rate and regular rhythm.      Heart sounds: Normal heart sounds.   Pulmonary:      Effort: Pulmonary effort is normal.      Breath sounds: Normal breath sounds.   Abdominal:      General: Bowel sounds are normal.      Palpations: Abdomen is soft.   Skin:     General: Skin is warm and dry.   Neurological:      Mental Status: She is alert.   Psychiatric:         Behavior: Behavior normal.           Recent Labs   Lab Test 06/12/23  0747      POTASSIUM 4.1   CR 0.81        Diagnostics:  Recent Labs   Lab Test 06/12/23  0747 12/04/19  1223    140   POTASSIUM 4.1 4.3    CHLORIDE 108* 106   CO2 24 26   ANIONGAP 9 8   GLC 97 91   BUN 12.0 8   CR 0.81 0.79   BRITTNEY 8.6 9.3     Lab Results   Component Value Date    WBC 5.0 09/25/2023    WBC 8.7 06/19/2019     Lab Results   Component Value Date    RBC 4.27 09/25/2023    RBC 5.05 06/19/2019     Lab Results   Component Value Date    HGB 12.3 09/25/2023    HGB 15.0 06/19/2019     Lab Results   Component Value Date    HCT 38.1 09/25/2023    HCT 44.3 06/19/2019     No components found for: MCT  Lab Results   Component Value Date    MCV 89 09/25/2023    MCV 88 06/19/2019     Lab Results   Component Value Date    MCH 28.8 09/25/2023    MCH 29.7 06/19/2019     Lab Results   Component Value Date    MCHC 32.3 09/25/2023    MCHC 33.9 06/19/2019     Lab Results   Component Value Date    RDW 12.1 09/25/2023    RDW 12.5 06/19/2019     Lab Results   Component Value Date     09/25/2023     06/19/2019        No EKG required, no history of coronary heart disease, significant arrhythmia, peripheral arterial disease or other structural heart disease.    Revised Cardiac Risk Index (RCRI):  The patient has the following serious cardiovascular risks for perioperative complications:   - No serious cardiac risks = 0 points     RCRI Interpretation: 0 points: Class I (very low risk - 0.4% complication rate)         Signed Electronically by: ASIF Whitlock CNP  Copy of this evaluation report is provided to requesting physician.

## 2023-09-20 NOTE — PATIENT INSTRUCTIONS
Preparing for Your Surgery  Getting started  A nurse will call you to review your health history and instructions. They will give you an arrival time based on your scheduled surgery time. Please be ready to share:  Your doctor's clinic name and phone number  Your medical, surgical, and anesthesia history  A list of allergies and sensitivities  A list of medicines, including herbal treatments and over-the-counter drugs  Whether the patient has a legal guardian (ask how to send us the papers in advance)  Please tell us if you're pregnant--or if there's any chance you might be pregnant. Some surgeries may injure a fetus (unborn baby), so they require a pregnancy test. Surgeries that are safe for a fetus don't always need a test, and you can choose whether to have one.   If you have a child who's having surgery, please ask for a copy of Preparing for Your Child's Surgery.    Preparing for surgery  Within 10 to 30 days of surgery: Have a pre-op exam (sometimes called an H&P, or History and Physical). This can be done at a clinic or pre-operative center.  If you're having a , you may not need this exam. Talk to your care team.  At your pre-op exam, talk to your care team about all medicines you take. If you need to stop any medicines before surgery, ask when to start taking them again.  We do this for your safety. Many medicines can make you bleed too much during surgery. Some change how well surgery (anesthesia) drugs work.  Call your insurance company to let them know you're having surgery. (If you don't have insurance, call [phone].)  Call your clinic if there's any change in your health. This includes signs of a cold or flu (sore throat, runny nose, cough, rash, fever). It also includes a scrape or scratch near the surgery site.  If you have questions on the day of surgery, call your hospital or surgery center.  Eating and drinking guidelines  For your safety: Unless your surgeon tells you otherwise,  follow the guidelines below.  Eat and drink as usual until 8 hours before you arrive for surgery. After that, no food or milk.  Drink clear liquids until 2 hours before you arrive. These are liquids you can see through, like water, Gatorade, and Propel Water. They also include plain black coffee and tea (no cream or milk), candy, and breath mints. You can spit out gum when you arrive.  If you drink alcohol: Stop drinking it the night before surgery.  If your care team tells you to take medicine on the morning of surgery, it's okay to take it with a sip of water.  Preventing infection  Shower or bathe the night before and morning of your surgery. Follow the instructions your clinic gave you. (If no instructions, use regular soap.)  Don't shave or clip hair near your surgery site. We'll remove the hair if needed.  Don't smoke or vape the morning of surgery. You may chew nicotine gum up to 2 hours before surgery. A nicotine patch is okay.  Note: Some surgeries require you to completely quit smoking and nicotine. Check with your surgeon.  Your care team will make every effort to keep you safe from infection. We will:  Clean our hands often with soap and water (or an alcohol-based hand rub).  Clean the skin at your surgery site with a special soap that kills germs.  Give you a special gown to keep you warm. (Cold raises the risk of infection.)  Wear special hair covers, masks, gowns and gloves during surgery.  Give antibiotic medicine, if prescribed. Not all surgeries need antibiotics.  What to bring on the day of surgery  Photo ID and insurance card  Copy of your health care directive, if you have one  Glasses and hearing aids (bring cases)  You can't wear contacts during surgery  Inhaler and eye drops, if you use them (tell us about these when you arrive)  CPAP machine or breathing device, if you use them  A few personal items, if spending the night  If you have . . .  A pacemaker, ICD (cardiac defibrillator) or other  implant: Bring the ID card.  An implanted stimulator: Bring the remote control.  A legal guardian: Bring a copy of the certified (court-stamped) guardianship papers.  Please remove any jewelry, including body piercings. Leave jewelry and other valuables at home.  If you're going home the day of surgery  You must have a responsible adult drive you home. They should stay with you overnight as well.  If you don't have someone to stay with you, and you aren't safe to go home alone, we may keep you overnight. Insurance often won't pay for this.  After surgery  If it's hard to control your pain or you need more pain medicine, please call your surgeon's office.  Questions?   If you have any questions for your care team, list them here: _________________________________________________________________________________________________________________________________________________________________________ ____________________________________ ____________________________________ ____________________________________  For informational purposes only. Not to replace the advice of your health care provider. Copyright   2003, 2019 [employer]. All rights reserved. Clinically reviewed by Karine Abbott MD. [employer] 733786 - REV 12/22.    How to Take Your Medication Before Surgery  No valium prior to your surger  Try not to take then melatonin or benadryl the night before

## 2023-09-20 NOTE — PROGRESS NOTES
[facility] [place]  [address] [address]  [place] [address]  Phone: [phone]  Primary Provider: Shira Sylvester  Pre-op Performing Provider: SHIRA SYLVESTER      PREOPERATIVE EVALUATION:  Today's date: 9/25/2023    Lilian is a 48 year old female who presents for a preoperative evaluation.      9/25/2023     9:10 AM   Additional Questions   Roomed by Ally   Accompanied by Self         9/25/2023     9:10 AM   Patient Reported Additional Medications   Patient reports taking the following new medications NA       Surgical Information:  Surgery/Procedure: Minimally invasive Left Lumbar 5 to Sacral 1 microdiscectomy   Surgery Location: [facility]  Surgeon: Milan Garcia MD  Surgery Date: 10/03/23  Time of Surgery: 7:30 AM  Where patient plans to recover: At home with family  Fax number for surgical facility: Note does not need to be faxed, will be available electronically in Epic.    *Discuss valium use prior to procedure    Assessment & Plan     The proposed surgical procedure is considered INTERMEDIATE risk.    Preop general physical exam  Fully optimized for procedure.  Previous labs reviewed.  Will obtain CBC here today.  Continue to follow with neurosurgery.  - CBC with platelets; Future    Screen for colon cancer  Encouraged to reschedule colonoscopy until early December.    Lumbar radiculopathy  Fully optimized for procedure.  Continue to follow with neurosurgery    Declines influenza vaccine.        - No identified additional risk factors other than previously addressed    Antiplatelet or Anticoagulation Medication Instructions:   - Patient is on no antiplatelet or anticoagulation medications.    Additional Medication Instructions:  Encouraged to not take Valium prior to surgery.  Encouraged to avoid taking melatonin and/or Benadryl p.m. before procedure.    RECOMMENDATION:  APPROVAL GIVEN to proceed with proposed procedure, without further  diagnostic evaluation.    Review of the result(s) of each unique test - Labs, imaging  Ordering of each unique test  Prescription drug management      Subjective       HPI related to upcoming procedure: Is going to be having lumbar microdisectomy. Has been hurting for the last year.           9/22/2023    10:15 AM   Preop Questions   1. Have you ever had a heart attack or stroke? No   2. Have you ever had surgery on your heart or blood vessels, such as a stent placement, a coronary artery bypass, or surgery on an artery in your head, neck, heart, or legs? No   3. Do you have chest pain with activity? No   4. Do you have a history of  heart failure? No   5. Do you currently have a cold, bronchitis or symptoms of other infection? No   6. Do you have a cough, shortness of breath, or wheezing? No   7. Do you or anyone in your family have previous history of blood clots? YES - Mom with 2 strokes    8. Do you or does anyone in your family have a serious bleeding problem such as prolonged bleeding following surgeries or cuts? No   9. Have you ever had problems with anemia or been told to take iron pills? No   10. Have you had any abnormal blood loss such as black, tarry or bloody stools, or abnormal vaginal bleeding? No   11. Have you ever had a blood transfusion? No   12. Are you willing to have a blood transfusion if it is medically needed before, during, or after your surgery? Yes   13. Have you or any of your relatives ever had problems with anesthesia? No   14. Do you have sleep apnea, excessive snoring or daytime drowsiness? No   15. Do you have any artifical heart valves or other implanted medical devices like a pacemaker, defibrillator, or continuous glucose monitor? No   16. Do you have artificial joints? No   17. Are you allergic to latex? No   18. Is there any chance that you may be pregnant? No     Health Care Directive:  Patient does not have a Health Care Directive or Living Will: Patient states has Advance  Directive and will bring in a copy to clinic.    Preoperative Review of :   reviewed - controlled substances reflected in medication list.      Review of Systems   Constitutional:  Negative for fatigue.   HENT:  Negative for ear pain, rhinorrhea and sore throat.    Eyes:  Negative for visual disturbance.   Respiratory:  Negative for cough, chest tightness, shortness of breath and wheezing.    Cardiovascular:  Negative for chest pain and palpitations.   Gastrointestinal:  Negative for abdominal distention, abdominal pain, constipation, diarrhea, nausea and vomiting.   Endocrine: Negative for cold intolerance and heat intolerance.   Musculoskeletal:  Positive for back pain. Negative for arthralgias and myalgias.   Skin:  Negative for rash.   Neurological:  Negative for dizziness, light-headedness, numbness and headaches.   Psychiatric/Behavioral:  Negative for dysphoric mood and sleep disturbance. The patient is not nervous/anxious.        Patient Active Problem List    Diagnosis Date Noted    Pseudotumor cerebri 06/13/2023     Priority: Medium    Hearing loss of left ear, unspecified hearing loss type 06/12/2023     Priority: Medium    Family history of breast cancer 06/12/2023     Priority: Medium    Lumbar radiculopathy 05/02/2023     Priority: Medium    Major depression, recurrent (H) 10/06/2021     Priority: Medium    Generalized anxiety disorder 08/22/2016     Priority: Medium    Hyperhydrosis disorder 08/22/2016     Priority: Medium    Overweight 07/14/2016     Priority: Medium      Past Medical History:   Diagnosis Date    IUD (intrauterine device) in place 03/20/2019    Mirena     Past Surgical History:   Procedure Laterality Date    BIOPSY      Skin Biopsy    BIOPSY BREAST      BREAST SURGERY      Breast reduction sergury     LASIK      MAMMOPLASTY REDUCTION  01/01/2008    DC LAP, HONORIO RESTRICT PROC, LONGITUDINAL GASTRECTOMY N/A 06/24/2019    Procedure: LAPAROSCOPIC SLEEVE GASTRECTOMY;  Surgeon:  Perfecto Enciso MD;  Location: [facility] OR;  Service: General     Current Outpatient Medications   Medication Sig Dispense Refill    diazepam (VALIUM) 5 MG tablet Take 0.5-1 tablets (2.5-5 mg) by mouth daily as needed for anxiety 15 tablet 1    diphenhydrAMINE HCl, Sleep, (ZZZQUIL PO) Take 50 mg by mouth nightly as needed (for sleep)      ibuprofen (ADVIL/MOTRIN) 200 MG tablet Take 600 mg by mouth every 6 hours as needed for pain      levonorgestrel (MIRENA) 20 MCG/24HR IUD 1 each by Intrauterine route once      melatonin 5 MG tablet Take 10 mg by mouth nightly as needed for sleep         Allergies   Allergen Reactions    Amoxicillin Rash    Penicillins Rash        Social History     Tobacco Use    Smoking status: Never    Smokeless tobacco: Never   Substance Use Topics    Alcohol use: Yes       History   Drug Use No         Objective     There were no vitals taken for this visit.    Physical Exam  Constitutional:       Appearance: Normal appearance. She is well-developed.   HENT:      Head: Normocephalic and atraumatic.      Right Ear: Tympanic membrane and external ear normal. No middle ear effusion.      Left Ear: Tympanic membrane and external ear normal.  No middle ear effusion.      Nose: No mucosal edema.   Neck:      Thyroid: No thyromegaly.      Vascular: No carotid bruit.   Cardiovascular:      Rate and Rhythm: Normal rate and regular rhythm.      Heart sounds: Normal heart sounds.   Pulmonary:      Effort: Pulmonary effort is normal.      Breath sounds: Normal breath sounds.   Abdominal:      General: Bowel sounds are normal.      Palpations: Abdomen is soft.   Skin:     General: Skin is warm and dry.   Neurological:      Mental Status: She is alert.   Psychiatric:         Behavior: Behavior normal.           Recent Labs   Lab Test 06/12/23  0747      POTASSIUM 4.1   CR 0.81        Diagnostics:  Recent Labs   Lab Test 06/12/23  0747 12/04/19  1223    140   POTASSIUM 4.1 4.3    CHLORIDE 108* 106   CO2 24 26   ANIONGAP 9 8   GLC 97 91   BUN 12.0 8   CR 0.81 0.79   BRITTNEY 8.6 9.3     Lab Results   Component Value Date    WBC 5.0 09/25/2023    WBC 8.7 06/19/2019     Lab Results   Component Value Date    RBC 4.27 09/25/2023    RBC 5.05 06/19/2019     Lab Results   Component Value Date    HGB 12.3 09/25/2023    HGB 15.0 06/19/2019     Lab Results   Component Value Date    HCT 38.1 09/25/2023    HCT 44.3 06/19/2019     No components found for: MCT  Lab Results   Component Value Date    MCV 89 09/25/2023    MCV 88 06/19/2019     Lab Results   Component Value Date    MCH 28.8 09/25/2023    MCH 29.7 06/19/2019     Lab Results   Component Value Date    MCHC 32.3 09/25/2023    MCHC 33.9 06/19/2019     Lab Results   Component Value Date    RDW 12.1 09/25/2023    RDW 12.5 06/19/2019     Lab Results   Component Value Date     09/25/2023     06/19/2019        No EKG required, no history of coronary heart disease, significant arrhythmia, peripheral arterial disease or other structural heart disease.    Revised Cardiac Risk Index (RCRI):  The patient has the following serious cardiovascular risks for perioperative complications:   - No serious cardiac risks = 0 points     RCRI Interpretation: 0 points: Class I (very low risk - 0.4% complication rate)         Signed Electronically by: ASIF Whitlock CNP  Copy of this evaluation report is provided to requesting physician.

## 2023-09-25 ENCOUNTER — OFFICE VISIT (OUTPATIENT)
Dept: FAMILY MEDICINE | Facility: CLINIC | Age: 48
End: 2023-09-25
Payer: COMMERCIAL

## 2023-09-25 VITALS
HEART RATE: 75 BPM | HEIGHT: 68 IN | TEMPERATURE: 99.4 F | BODY MASS INDEX: 28.79 KG/M2 | SYSTOLIC BLOOD PRESSURE: 128 MMHG | DIASTOLIC BLOOD PRESSURE: 78 MMHG | RESPIRATION RATE: 15 BRPM | OXYGEN SATURATION: 98 % | WEIGHT: 190 LBS

## 2023-09-25 DIAGNOSIS — M54.16 LUMBAR RADICULOPATHY: ICD-10-CM

## 2023-09-25 DIAGNOSIS — Z01.818 PREOP GENERAL PHYSICAL EXAM: Primary | ICD-10-CM

## 2023-09-25 DIAGNOSIS — Z12.11 SCREEN FOR COLON CANCER: ICD-10-CM

## 2023-09-25 LAB
ERYTHROCYTE [DISTWIDTH] IN BLOOD BY AUTOMATED COUNT: 12.1 % (ref 10–15)
HCT VFR BLD AUTO: 38.1 % (ref 35–47)
HGB BLD-MCNC: 12.3 G/DL (ref 11.7–15.7)
MCH RBC QN AUTO: 28.8 PG (ref 26.5–33)
MCHC RBC AUTO-ENTMCNC: 32.3 G/DL (ref 31.5–36.5)
MCV RBC AUTO: 89 FL (ref 78–100)
PLATELET # BLD AUTO: 249 10E3/UL (ref 150–450)
RBC # BLD AUTO: 4.27 10E6/UL (ref 3.8–5.2)
WBC # BLD AUTO: 5 10E3/UL (ref 4–11)

## 2023-09-25 PROCEDURE — 85027 COMPLETE CBC AUTOMATED: CPT | Performed by: NURSE PRACTITIONER

## 2023-09-25 PROCEDURE — 36415 COLL VENOUS BLD VENIPUNCTURE: CPT | Performed by: NURSE PRACTITIONER

## 2023-09-25 PROCEDURE — 99214 OFFICE O/P EST MOD 30 MIN: CPT | Performed by: NURSE PRACTITIONER

## 2023-09-25 ASSESSMENT — ENCOUNTER SYMPTOMS
BACK PAIN: 1
CHEST TIGHTNESS: 0
MYALGIAS: 0
SLEEP DISTURBANCE: 0
LIGHT-HEADEDNESS: 0
RHINORRHEA: 0
ABDOMINAL PAIN: 0
DIARRHEA: 0
NUMBNESS: 0
COUGH: 0
CONSTIPATION: 0
SORE THROAT: 0
DYSPHORIC MOOD: 0
ABDOMINAL DISTENTION: 0
WHEEZING: 0
NAUSEA: 0
DIZZINESS: 0
FATIGUE: 0
SHORTNESS OF BREATH: 0
ARTHRALGIAS: 0
VOMITING: 0
HEADACHES: 0
PALPITATIONS: 0
NERVOUS/ANXIOUS: 0

## 2023-09-25 ASSESSMENT — PATIENT HEALTH QUESTIONNAIRE - PHQ9
SUM OF ALL RESPONSES TO PHQ QUESTIONS 1-9: 3
10. IF YOU CHECKED OFF ANY PROBLEMS, HOW DIFFICULT HAVE THESE PROBLEMS MADE IT FOR YOU TO DO YOUR WORK, TAKE CARE OF THINGS AT HOME, OR GET ALONG WITH OTHER PEOPLE: SOMEWHAT DIFFICULT
SUM OF ALL RESPONSES TO PHQ QUESTIONS 1-9: 3

## 2023-09-25 ASSESSMENT — PAIN SCALES - GENERAL: PAINLEVEL: SEVERE PAIN (6)

## 2023-10-03 ENCOUNTER — ANESTHESIA EVENT (OUTPATIENT)
Dept: SURGERY | Facility: CLINIC | Age: 48
End: 2023-10-03
Payer: COMMERCIAL

## 2023-10-03 ENCOUNTER — HOSPITAL ENCOUNTER (OUTPATIENT)
Facility: CLINIC | Age: 48
Discharge: HOME | End: 2023-10-03
Attending: NEUROLOGICAL SURGERY | Admitting: NEUROLOGICAL SURGERY
Payer: COMMERCIAL

## 2023-10-03 ENCOUNTER — APPOINTMENT (OUTPATIENT)
Dept: GENERAL RADIOLOGY | Facility: CLINIC | Age: 48
End: 2023-10-03
Attending: NEUROLOGICAL SURGERY
Payer: COMMERCIAL

## 2023-10-03 ENCOUNTER — TELEPHONE (OUTPATIENT)
Dept: GASTROENTEROLOGY | Facility: CLINIC | Age: 48
End: 2023-10-03

## 2023-10-03 ENCOUNTER — ANESTHESIA (OUTPATIENT)
Dept: SURGERY | Facility: CLINIC | Age: 48
End: 2023-10-03
Payer: COMMERCIAL

## 2023-10-03 VITALS
RESPIRATION RATE: 16 BRPM | DIASTOLIC BLOOD PRESSURE: 88 MMHG | BODY MASS INDEX: 28.4 KG/M2 | HEART RATE: 75 BPM | SYSTOLIC BLOOD PRESSURE: 126 MMHG | WEIGHT: 187.4 LBS | OXYGEN SATURATION: 100 % | HEIGHT: 68 IN | TEMPERATURE: 97.3 F

## 2023-10-03 DIAGNOSIS — Z98.890 S/P LUMBAR MICRODISCECTOMY: Primary | ICD-10-CM

## 2023-10-03 DIAGNOSIS — Z12.11 SCREEN FOR COLON CANCER: Primary | ICD-10-CM

## 2023-10-03 LAB — HCG UR QL: NEGATIVE

## 2023-10-03 PROCEDURE — 258N000003 HC RX IP 258 OP 636: Performed by: NURSE ANESTHETIST, CERTIFIED REGISTERED

## 2023-10-03 PROCEDURE — 250N000011 HC RX IP 250 OP 636: Performed by: NEUROLOGICAL SURGERY

## 2023-10-03 PROCEDURE — 250N000009 HC RX 250: Performed by: NURSE ANESTHETIST, CERTIFIED REGISTERED

## 2023-10-03 PROCEDURE — 250N000013 HC RX MED GY IP 250 OP 250 PS 637: Performed by: NURSE PRACTITIONER

## 2023-10-03 PROCEDURE — 360N000084 HC SURGERY LEVEL 4 W/ FLUORO, PER MIN: Performed by: NEUROLOGICAL SURGERY

## 2023-10-03 PROCEDURE — 710N000012 HC RECOVERY PHASE 2, PER MINUTE: Performed by: NEUROLOGICAL SURGERY

## 2023-10-03 PROCEDURE — 999N000179 XR SURGERY CARM FLUORO LESS THAN 5 MIN W STILLS

## 2023-10-03 PROCEDURE — 250N000011 HC RX IP 250 OP 636: Performed by: NURSE ANESTHETIST, CERTIFIED REGISTERED

## 2023-10-03 PROCEDURE — 250N000009 HC RX 250: Performed by: NEUROLOGICAL SURGERY

## 2023-10-03 PROCEDURE — 710N000009 HC RECOVERY PHASE 1, LEVEL 1, PER MIN: Performed by: NEUROLOGICAL SURGERY

## 2023-10-03 PROCEDURE — 63047 LAM FACETEC & FORAMOT LUMBAR: CPT | Mod: AS | Performed by: NURSE PRACTITIONER

## 2023-10-03 PROCEDURE — 370N000017 HC ANESTHESIA TECHNICAL FEE, PER MIN: Performed by: NEUROLOGICAL SURGERY

## 2023-10-03 PROCEDURE — 81025 URINE PREGNANCY TEST: CPT | Performed by: NEUROLOGICAL SURGERY

## 2023-10-03 PROCEDURE — 250N000011 HC RX IP 250 OP 636: Performed by: ANESTHESIOLOGY

## 2023-10-03 PROCEDURE — 250N000013 HC RX MED GY IP 250 OP 250 PS 637: Performed by: ANESTHESIOLOGY

## 2023-10-03 PROCEDURE — 272N000001 HC OR GENERAL SUPPLY STERILE: Performed by: NEUROLOGICAL SURGERY

## 2023-10-03 PROCEDURE — 250N000025 HC SEVOFLURANE, PER MIN: Performed by: NEUROLOGICAL SURGERY

## 2023-10-03 PROCEDURE — 250N000011 HC RX IP 250 OP 636: Performed by: NURSE PRACTITIONER

## 2023-10-03 PROCEDURE — 63047 LAM FACETEC & FORAMOT LUMBAR: CPT | Performed by: NEUROLOGICAL SURGERY

## 2023-10-03 PROCEDURE — 999N000141 HC STATISTIC PRE-PROCEDURE NURSING ASSESSMENT: Performed by: NEUROLOGICAL SURGERY

## 2023-10-03 RX ORDER — FENTANYL CITRATE 50 UG/ML
50 INJECTION, SOLUTION INTRAMUSCULAR; INTRAVENOUS EVERY 5 MIN PRN
Status: DISCONTINUED | OUTPATIENT
Start: 2023-10-03 | End: 2023-10-03 | Stop reason: HOSPADM

## 2023-10-03 RX ORDER — BUPIVACAINE HYDROCHLORIDE AND EPINEPHRINE 5; 5 MG/ML; UG/ML
INJECTION, SOLUTION PERINEURAL PRN
Status: DISCONTINUED | OUTPATIENT
Start: 2023-10-03 | End: 2023-10-03 | Stop reason: HOSPADM

## 2023-10-03 RX ORDER — ONDANSETRON 2 MG/ML
INJECTION INTRAMUSCULAR; INTRAVENOUS PRN
Status: DISCONTINUED | OUTPATIENT
Start: 2023-10-03 | End: 2023-10-03

## 2023-10-03 RX ORDER — HYDROMORPHONE HCL IN WATER/PF 6 MG/30 ML
0.2 PATIENT CONTROLLED ANALGESIA SYRINGE INTRAVENOUS EVERY 5 MIN PRN
Status: DISCONTINUED | OUTPATIENT
Start: 2023-10-03 | End: 2023-10-03 | Stop reason: HOSPADM

## 2023-10-03 RX ORDER — PROPOFOL 10 MG/ML
INJECTION, EMULSION INTRAVENOUS PRN
Status: DISCONTINUED | OUTPATIENT
Start: 2023-10-03 | End: 2023-10-03

## 2023-10-03 RX ORDER — OXYCODONE HYDROCHLORIDE 5 MG/1
5-10 TABLET ORAL EVERY 6 HOURS PRN
Qty: 40 TABLET | Refills: 0 | Status: SHIPPED | OUTPATIENT
Start: 2023-10-03 | End: 2023-10-09

## 2023-10-03 RX ORDER — CLINDAMYCIN PHOSPHATE 900 MG/50ML
900 INJECTION, SOLUTION INTRAVENOUS SEE ADMIN INSTRUCTIONS
Status: DISCONTINUED | OUTPATIENT
Start: 2023-10-03 | End: 2023-10-03 | Stop reason: HOSPADM

## 2023-10-03 RX ORDER — METHOCARBAMOL 750 MG/1
750 TABLET, FILM COATED ORAL 4 TIMES DAILY PRN
Qty: 40 TABLET | Refills: 0 | Status: SHIPPED | OUTPATIENT
Start: 2023-10-03 | End: 2023-10-09

## 2023-10-03 RX ORDER — SODIUM CHLORIDE, SODIUM LACTATE, POTASSIUM CHLORIDE, CALCIUM CHLORIDE 600; 310; 30; 20 MG/100ML; MG/100ML; MG/100ML; MG/100ML
INJECTION, SOLUTION INTRAVENOUS CONTINUOUS PRN
Status: DISCONTINUED | OUTPATIENT
Start: 2023-10-03 | End: 2023-10-03

## 2023-10-03 RX ORDER — ONDANSETRON 2 MG/ML
4 INJECTION INTRAMUSCULAR; INTRAVENOUS EVERY 30 MIN PRN
Status: DISCONTINUED | OUTPATIENT
Start: 2023-10-03 | End: 2023-10-03 | Stop reason: HOSPADM

## 2023-10-03 RX ORDER — LIDOCAINE HYDROCHLORIDE 20 MG/ML
INJECTION, SOLUTION INFILTRATION; PERINEURAL PRN
Status: DISCONTINUED | OUTPATIENT
Start: 2023-10-03 | End: 2023-10-03

## 2023-10-03 RX ORDER — OXYCODONE HYDROCHLORIDE 5 MG/1
5 TABLET ORAL
Status: COMPLETED | OUTPATIENT
Start: 2023-10-03 | End: 2023-10-03

## 2023-10-03 RX ORDER — SODIUM CHLORIDE, SODIUM LACTATE, POTASSIUM CHLORIDE, CALCIUM CHLORIDE 600; 310; 30; 20 MG/100ML; MG/100ML; MG/100ML; MG/100ML
INJECTION, SOLUTION INTRAVENOUS CONTINUOUS
Status: DISCONTINUED | OUTPATIENT
Start: 2023-10-03 | End: 2023-10-03 | Stop reason: HOSPADM

## 2023-10-03 RX ORDER — GABAPENTIN 300 MG/1
300 CAPSULE ORAL
Status: COMPLETED | OUTPATIENT
Start: 2023-10-03 | End: 2023-10-03

## 2023-10-03 RX ORDER — HYDROMORPHONE HCL IN WATER/PF 6 MG/30 ML
0.4 PATIENT CONTROLLED ANALGESIA SYRINGE INTRAVENOUS EVERY 5 MIN PRN
Status: DISCONTINUED | OUTPATIENT
Start: 2023-10-03 | End: 2023-10-03 | Stop reason: HOSPADM

## 2023-10-03 RX ORDER — METHYLPREDNISOLONE ACETATE 40 MG/ML
INJECTION, SUSPENSION INTRA-ARTICULAR; INTRALESIONAL; INTRAMUSCULAR; SOFT TISSUE PRN
Status: DISCONTINUED | OUTPATIENT
Start: 2023-10-03 | End: 2023-10-03 | Stop reason: HOSPADM

## 2023-10-03 RX ORDER — FENTANYL CITRATE 50 UG/ML
INJECTION, SOLUTION INTRAMUSCULAR; INTRAVENOUS PRN
Status: DISCONTINUED | OUTPATIENT
Start: 2023-10-03 | End: 2023-10-03

## 2023-10-03 RX ORDER — OXYCODONE HYDROCHLORIDE 5 MG/1
10 TABLET ORAL
Status: DISCONTINUED | OUTPATIENT
Start: 2023-10-03 | End: 2023-10-03 | Stop reason: HOSPADM

## 2023-10-03 RX ORDER — ONDANSETRON 4 MG/1
4 TABLET, ORALLY DISINTEGRATING ORAL EVERY 30 MIN PRN
Status: DISCONTINUED | OUTPATIENT
Start: 2023-10-03 | End: 2023-10-03 | Stop reason: HOSPADM

## 2023-10-03 RX ORDER — AMOXICILLIN 250 MG
1 CAPSULE ORAL 2 TIMES DAILY PRN
Qty: 20 TABLET | Refills: 0 | Status: SHIPPED | OUTPATIENT
Start: 2023-10-03 | End: 2024-02-06

## 2023-10-03 RX ORDER — GLYCOPYRROLATE 0.2 MG/ML
INJECTION, SOLUTION INTRAMUSCULAR; INTRAVENOUS PRN
Status: DISCONTINUED | OUTPATIENT
Start: 2023-10-03 | End: 2023-10-03

## 2023-10-03 RX ORDER — DEXAMETHASONE SODIUM PHOSPHATE 4 MG/ML
INJECTION, SOLUTION INTRA-ARTICULAR; INTRALESIONAL; INTRAMUSCULAR; INTRAVENOUS; SOFT TISSUE PRN
Status: DISCONTINUED | OUTPATIENT
Start: 2023-10-03 | End: 2023-10-03

## 2023-10-03 RX ORDER — CLINDAMYCIN PHOSPHATE 900 MG/50ML
900 INJECTION, SOLUTION INTRAVENOUS
Status: DISCONTINUED | OUTPATIENT
Start: 2023-10-03 | End: 2023-10-03 | Stop reason: HOSPADM

## 2023-10-03 RX ORDER — PROPOFOL 10 MG/ML
INJECTION, EMULSION INTRAVENOUS CONTINUOUS PRN
Status: DISCONTINUED | OUTPATIENT
Start: 2023-10-03 | End: 2023-10-03

## 2023-10-03 RX ORDER — FENTANYL CITRATE 50 UG/ML
25 INJECTION, SOLUTION INTRAMUSCULAR; INTRAVENOUS EVERY 5 MIN PRN
Status: DISCONTINUED | OUTPATIENT
Start: 2023-10-03 | End: 2023-10-03 | Stop reason: HOSPADM

## 2023-10-03 RX ADMIN — PHENYLEPHRINE HYDROCHLORIDE 100 MCG: 10 INJECTION INTRAVENOUS at 07:42

## 2023-10-03 RX ADMIN — DEXAMETHASONE SODIUM PHOSPHATE 4 MG: 4 INJECTION, SOLUTION INTRA-ARTICULAR; INTRALESIONAL; INTRAMUSCULAR; INTRAVENOUS; SOFT TISSUE at 07:53

## 2023-10-03 RX ADMIN — SUGAMMADEX 200 MG: 100 INJECTION, SOLUTION INTRAVENOUS at 08:48

## 2023-10-03 RX ADMIN — MIDAZOLAM 2 MG: 1 INJECTION INTRAMUSCULAR; INTRAVENOUS at 07:27

## 2023-10-03 RX ADMIN — ROCURONIUM BROMIDE 50 MG: 50 INJECTION, SOLUTION INTRAVENOUS at 07:35

## 2023-10-03 RX ADMIN — ONDANSETRON 4 MG: 2 INJECTION INTRAMUSCULAR; INTRAVENOUS at 08:36

## 2023-10-03 RX ADMIN — ROCURONIUM BROMIDE 10 MG: 50 INJECTION, SOLUTION INTRAVENOUS at 08:07

## 2023-10-03 RX ADMIN — OXYCODONE HYDROCHLORIDE 5 MG: 5 TABLET ORAL at 10:11

## 2023-10-03 RX ADMIN — SODIUM CHLORIDE, POTASSIUM CHLORIDE, SODIUM LACTATE AND CALCIUM CHLORIDE: 600; 310; 30; 20 INJECTION, SOLUTION INTRAVENOUS at 08:46

## 2023-10-03 RX ADMIN — FENTANYL CITRATE 50 MCG: 50 INJECTION, SOLUTION INTRAMUSCULAR; INTRAVENOUS at 09:25

## 2023-10-03 RX ADMIN — GLYCOPYRROLATE 0.1 MG: 0.2 INJECTION, SOLUTION INTRAMUSCULAR; INTRAVENOUS at 07:46

## 2023-10-03 RX ADMIN — PROPOFOL 25 MCG/KG/MIN: 10 INJECTION, EMULSION INTRAVENOUS at 07:53

## 2023-10-03 RX ADMIN — LIDOCAINE HYDROCHLORIDE 80 MG: 20 INJECTION, SOLUTION INFILTRATION; PERINEURAL at 07:35

## 2023-10-03 RX ADMIN — FENTANYL CITRATE 50 MCG: 50 INJECTION, SOLUTION INTRAMUSCULAR; INTRAVENOUS at 08:07

## 2023-10-03 RX ADMIN — FENTANYL CITRATE 100 MCG: 50 INJECTION, SOLUTION INTRAMUSCULAR; INTRAVENOUS at 07:35

## 2023-10-03 RX ADMIN — GLYCOPYRROLATE 0.1 MG: 0.2 INJECTION, SOLUTION INTRAMUSCULAR; INTRAVENOUS at 07:42

## 2023-10-03 RX ADMIN — GABAPENTIN 300 MG: 100 CAPSULE ORAL at 06:39

## 2023-10-03 RX ADMIN — CLINDAMYCIN PHOSPHATE 900 MG: 900 INJECTION, SOLUTION INTRAVENOUS at 06:30

## 2023-10-03 RX ADMIN — FENTANYL CITRATE 50 MCG: 50 INJECTION, SOLUTION INTRAMUSCULAR; INTRAVENOUS at 10:03

## 2023-10-03 RX ADMIN — PROPOFOL 200 MG: 10 INJECTION, EMULSION INTRAVENOUS at 07:35

## 2023-10-03 RX ADMIN — SODIUM CHLORIDE, POTASSIUM CHLORIDE, SODIUM LACTATE AND CALCIUM CHLORIDE: 600; 310; 30; 20 INJECTION, SOLUTION INTRAVENOUS at 07:27

## 2023-10-03 RX ADMIN — PHENYLEPHRINE HYDROCHLORIDE 100 MCG: 10 INJECTION INTRAVENOUS at 07:38

## 2023-10-03 ASSESSMENT — ACTIVITIES OF DAILY LIVING (ADL)
ADLS_ACUITY_SCORE: 20

## 2023-10-03 ASSESSMENT — LIFESTYLE VARIABLES: TOBACCO_USE: 0

## 2023-10-03 ASSESSMENT — ENCOUNTER SYMPTOMS: SEIZURES: 0

## 2023-10-03 NOTE — BRIEF OP NOTE
[facility]    Brief Operative Note    Pre-operative diagnosis: Lumbar radiculopathy [M54.16]  Post-operative diagnosis Same as pre-operative diagnosis    Procedure: Minimally invasive Left Lumbar 5 to Sacral 1 microdiscectomy, Left - Spine    Surgeon: Surgeon(s) and Role:     * Milan Garcia MD - Primary     * Therese Vargas NP - Assisting  Anesthesia: General   Estimated Blood Loss: 25ml     Drains: None  Findings: see op note     Therese Vargas, CNP  [facility]  [address] [address]  Tel [phone]  Pager [phone]

## 2023-10-03 NOTE — TELEPHONE ENCOUNTER
Pre visit planning completed. Noted that patient had surgery this morning.  Will do first call on 10.06.2023      Procedure details:    Patient scheduled for Colonoscopy  on 10.17.2023.     Arrival time: 0900. Procedure time 0945    Pre op exam needed? N/A    Facility location: [facility]; [address], [place] [address]    Sedation type: Conscious sedation     Indication for procedure: Screen for colon cancer     Chart review:     Electronic implanted devices? No    Diabetic? No    Diabetic medication HOLDING recommendations: (if applicable)  Oral diabetic medications: N/A  Diabetic injectables: N/A  Insulin: N/A      Medication review:    Anticoagulants? No    NSAIDS? No NSAID medications per patient's medication list.  RN will verify with pre-assessment call.    Other medication HOLDING recommendations:  N/A      Prep for procedure:     Bowel prep recommendation: Extended prep Golytely  -Discuss with patient d/t patient just had surgery on 10.03.2023 and was prescribed pain meds  Due to:  chronic pain medication noted in chart.     Prep instructions need to be sent via [facility] Bowel prep script needs to be sent to    [id number] - [place], [place] - [address] AT [facility] [address]          Fay Lanier RN  Endoscopy Procedure Pre Assessment RN  [phone] option 4

## 2023-10-03 NOTE — DISCHARGE INSTRUCTIONS
Same Day Surgery Discharge Instructions for  Sedation and General Anesthesia     It's not unusual to feel dizzy, light-headed or faint for up to 24 hours after surgery or while taking pain medication.  If you have these symptoms: sit for a few minutes before standing and have someone assist you when you get up to walk or use the bathroom.    You should rest and relax for the next 24 hours. We recommend you make arrangements to have an adult stay with you for at least 24 hours after your discharge.  Avoid hazardous and strenuous activity.    DO NOT DRIVE any vehicle or operate mechanical equipment for 24 hours following the end of your surgery.  Even though you may feel normal, your reactions may be affected by the medication you have received.    Do not drink alcoholic beverages for 24 hours following surgery.     Slowly progress to your regular diet as you feel able. It's not unusual to feel nauseated and/or vomit after receiving anesthesia.  If you develop these symptoms, drink clear liquids (apple juice, ginger ale, broth, 7-up, etc. ) until you feel better.  If your nausea and vomiting persists for 24 hours, please notify your surgeon.      All narcotic pain medications, along with inactivity and anesthesia, can cause constipation. Drinking plenty of liquids and increasing fiber intake will help.    For any questions of a medical nature, call your surgeon.    Do not make important decisions for 24 hours.    If you had general anesthesia, you may have a sore throat for a couple of days related to the breathing tube used during surgery.  You may use Cepacol lozenges to help with this discomfort.  If it worsens or if you develop a fever, contact your surgeon.     If you feel your pain is not well managed with the pain medications prescribed by your surgeon, please contact your surgeon's office to let them know so they can address your concerns.      **If you have questions or concerns about your procedure,   call  Dr. Garcia at [phone]**

## 2023-10-03 NOTE — ANESTHESIA CARE TRANSFER NOTE
Patient: Lilian Quinones    Procedure: Procedure(s):  Minimally invasive Left Lumbar 5 to Sacral 1 microdiscectomy       Diagnosis: Lumbar radiculopathy [M54.16]  Diagnosis Additional Information: No value filed.    Anesthesia Type:   General     Note:    Oropharynx: oropharynx clear of all foreign objects and spontaneously breathing  Level of Consciousness: awake  Oxygen Supplementation: face mask  Level of Supplemental Oxygen (L/min / FiO2): 10  Independent Airway: airway patency satisfactory and stable  Dentition: dentition unchanged  Vital Signs Stable: post-procedure vital signs reviewed and stable  Report to RN Given: handoff report given  Patient transferred to: PACU  Comments: Pt awake and able to verbalize needs. ALL monitors on and audible. Spontaneous respiration without difficulty. o2 sats 100%. Pt denies pain. Report given to PACU RN.  Handoff Report: Identifed the Patient, Identified the Reponsible Provider, Reviewed the pertinent medical history, Discussed the surgical course, Reviewed Intra-OP anesthesia mangement and issues during anesthesia, Set expectations for post-procedure period and Allowed opportunity for questions and acknowledgement of understanding      Vitals:  Vitals Value Taken Time   /63 10/03/23 0903   Temp 36.3  C (97.3  F) 10/03/23 0903   Pulse 79 10/03/23 0908   Resp 15 10/03/23 0908   SpO2 100 % 10/03/23 0908   Vitals shown include unvalidated device data.    Electronically Signed By: ASIF Ellis CRNA  October 3, 2023  9:09 AM

## 2023-10-03 NOTE — INTERVAL H&P NOTE
"I have reviewed the surgical (or preoperative) H&P that is linked to this encounter, and examined the patient. There are no significant changes    Clinical Conditions Present on Arrival:  Clinically Significant Risk Factors Present on Admission                  # Overweight: Estimated body mass index is 28.49 kg/m  as calculated from the following:    Height as of this encounter: 1.727 m (5' 8\").    Weight as of this encounter: 85 kg (187 lb 6.4 oz).       "

## 2023-10-03 NOTE — ANESTHESIA POSTPROCEDURE EVALUATION
Patient: Lilian Quinones    Procedure: Procedure(s):  Minimally invasive Left Lumbar 5 to Sacral 1 microdiscectomy       Anesthesia Type:  General    Note:     Postop Pain Control: Uneventful            Sign Out: Well controlled pain   PONV: No   Neuro/Psych: Uneventful            Sign Out: Acceptable/Baseline neuro status   Airway/Respiratory: Uneventful            Sign Out: Acceptable/Baseline resp. status   CV/Hemodynamics: Uneventful            Sign Out: Acceptable CV status; No obvious hypovolemia; No obvious fluid overload   Other NRE: NONE   DID A NON-ROUTINE EVENT OCCUR? No           Last vitals:  Vitals Value Taken Time   /70 10/03/23 1025   Temp 36.3  C (97.3  F) 10/03/23 1015   Pulse 75 10/03/23 1025   Resp 17 10/03/23 1025   SpO2 98 % 10/03/23 1025   Vitals shown include unvalidated device data.    Electronically Signed By: Adrian Mueller MD  October 3, 2023  12:51 PM

## 2023-10-03 NOTE — OP NOTE
Date of surgery: October 3, 2023    Surgeon: Milan Garcia MD  Assistant: Therese Vargas NP (Note: The assistant was present for and assisted with the entire surgery, and his/her role as an assistant was crucial for aid in positioning, exposure, suctioning, retraction, and closure)    Preoperative diagnosis: Lumbar radiculopathy  Postoperative diagnosis: Lumbar radiculopathy    Procedure:  1.  Left L5-S1 MIS microdiscectomy  2.  Use of [employer]onic MetrWisair minimally invasive system  3.  Use of intraoperative microscope and fluoroscopy    EBL: 25 mL    Indications: 48 year old female who had left L5-S1 disc herniation a year ago with severe leg pain, was initially managed conservatively.  Persistent pain in spite of non-operative management with failure to improve.  Follow up MRI showed persistent annular tear abutting the nerve root.  Risks, benefits, indications, and alternatives were discussed with the patient and family in detail, and they wished to proceed.    Description of surgery: The patient was positioned prone.  Sterile prepping and draping procedures were performed.  Antibiotics were administered and timeout was performed.  A paramedian lumbar incision was performed.  The minimally invasive dilating system was used to dock on the hemilamina.  The microscope was brought into the field, and under microscopy, laminotomies and medial facetectomy were performed with the high speed drill.  The kerrison rongeur was used to remove the ligamentum flavum, and the thecal sac and nerve root were exposed.  The nerve root was retracted medially, and the local disc and annular tear were coagulated with the bipolar.  Hemostasis was achieved and antibiotic irrigation was performed.  The fascia was closed with 0-Vicryl sutures, and the dermal layer was closed with 2-0 vicryl sutures.  The skin was closed with a running subcuticular stitch.  There were no intraprocedural complications.

## 2023-10-03 NOTE — ANESTHESIA PREPROCEDURE EVALUATION
Anesthesia Pre-Procedure Evaluation    Patient: Lilian Quinones   MRN: [id number] : 1975        Procedure : Procedure(s):  Minimally invasive Left Lumbar 5 to Sacral 1 microdiscectomy          Past Medical History:   Diagnosis Date    Arthritis     IUD (intrauterine device) in place 2019    Mirena      Past Surgical History:   Procedure Laterality Date    ABDOMEN SURGERY  2019    Weight loss surgery    BIOPSY      Skin Biopsy    BIOPSY BREAST      BREAST SURGERY      Breast reduction sergury     LASIK      MAMMOPLASTY REDUCTION  2008    [place] LAP, HONORIO RESTRICT PROC, LONGITUDINAL GASTRECTOMY N/A 2019    Procedure: LAPAROSCOPIC SLEEVE GASTRECTOMY;  Surgeon: Perfecto Enciso MD;  Location: [facility] [place];  Service: General      Allergies   Allergen Reactions    Amoxicillin Rash    Penicillins Rash      Social History     Tobacco Use    Smoking status: Never    Smokeless tobacco: Never   Substance Use Topics    Alcohol use: Yes      Wt Readings from Last 1 Encounters:   10/03/23 85 kg (187 lb 6.4 oz)        Anesthesia Evaluation   Pt has had prior anesthetic.     No history of anesthetic complications       ROS/MED HX  ENT/Pulmonary:    (-) tobacco use and sleep apnea   Neurologic: Comment: Pseudotumor cerebri   (-) no seizures and no CVA   Cardiovascular:    (-) hypertension   METS/Exercise Tolerance:     Hematologic:       Musculoskeletal:   (+)  arthritis,             GI/Hepatic:    (-) GERD and liver disease   Renal/Genitourinary:    (-) renal disease   Endo:    (-) Type II DM and thyroid disease   Psychiatric/Substance Use:     (+) psychiatric history anxiety and depression       Infectious Disease:       Malignancy:       Other:            Physical Exam    Airway        Mallampati: I   TM distance: > 3 FB   Neck ROM: full   Mouth opening: > 3 cm    Respiratory Devices and Support         Dental       (+) Minor Abnormalities - some fillings, tiny chips      Cardiovascular    cardiovascular exam normal          Pulmonary   pulmonary exam normal                OUTSIDE LABS:  CBC:   Lab Results   Component Value Date    WBC 5.0 09/25/2023    WBC 5.6 12/04/2019    HGB 12.3 09/25/2023    HGB 14.7 12/04/2019    HCT 38.1 09/25/2023    HCT 44.3 12/04/2019     09/25/2023     12/04/2019     BMP:   Lab Results   Component Value Date     06/12/2023     12/04/2019    POTASSIUM 4.1 06/12/2023    POTASSIUM 4.3 12/04/2019    CHLORIDE 108 (H) 06/12/2023    CHLORIDE 106 12/04/2019    CO2 24 06/12/2023    CO2 26 12/04/2019    BUN 12.0 06/12/2023    BUN 8 12/04/2019    CR 0.81 06/12/2023    CR 0.79 12/04/2019    GLC 97 06/12/2023    GLC 91 12/04/2019     COAGS:   Lab Results   Component Value Date    PTT 31 06/19/2019    INR 1.00 06/19/2019     POC:   Lab Results   Component Value Date    HCG Negative 10/03/2023     HEPATIC:   Lab Results   Component Value Date    ALBUMIN 4.0 12/04/2019    PROTTOTAL 6.5 12/04/2019    ALT <9 12/04/2019    AST 12 12/04/2019    ALKPHOS 50 12/04/2019    BILITOTAL 0.5 12/04/2019     OTHER:   Lab Results   Component Value Date    A1C 5.3 03/05/2019    BRITTNEY 8.6 06/12/2023    TSH 2.12 03/05/2019       Anesthesia Plan    ASA Status:  2    NPO Status:  NPO Appropriate    Anesthesia Type: General.     - Airway: ETT   Induction: Intravenous.   Maintenance: Balanced.   Techniques and Equipment:     - Airway: Video-Laryngoscope       - Drips/Meds: Ketamine, Dexmed. bolus     Consents    Anesthesia Plan(s) and associated risks, benefits, and realistic alternatives discussed. Questions answered and patient/representative(s) expressed understanding.     - Discussed:     - Discussed with:  Patient, Spouse            Postoperative Care    Pain management: Multi-modal analgesia.   PONV prophylaxis: Ondansetron (or other 5HT-3), Dexamethasone or Solumedrol, Background Propofol Infusion     Comments:                Adrian Mueller MD

## 2023-10-03 NOTE — ANESTHESIA PROCEDURE NOTES
Airway       Patient location during procedure: OR       Procedure Start/Stop Times: 10/3/2023 7:36 AM  Staff -        Anesthesiologist:  Adrian Mueller MD       CRNA: Liana Simmons APRN CRNA       Performed By: CRNAIndications and Patient Condition       Indications for airway management: huong-procedural       Induction type:intravenous       Mask difficulty assessment: 1 - vent by mask    Final Airway Details       Final airway type: endotracheal airway       Successful airway: ETT - single  Endotracheal Airway Details        ETT size (mm): 7.0       Cuffed: yes       Successful intubation technique: video laryngoscopy       VL Blade Size: Glidescope 3       Grade View of Cords: 1       Adjucts: stylet       Position: Right       Measured from: gums/teeth       Secured at (cm): 22       Bite block used: Soft    Post intubation assessment        Placement verified by: capnometry, equal breath sounds and chest rise        Number of attempts at approach: 1       Number of other approaches attempted: 0       Secured with: silk tape       Ease of procedure: easy       Dentition: Intact and Unchanged    Medication(s) Administered   Medication Administration Time: 10/3/2023 7:36 AM

## 2023-10-04 NOTE — PLAN OF CARE
PT:  Patient admitted for Minimally invasive Left Lumbar 5 to Sacral 1 microdiscectomy. Was not seen by PT during this visit per chart review. Does not appear patient had PT orders post surgically.

## 2023-10-05 ENCOUNTER — MYC MEDICAL ADVICE (OUTPATIENT)
Dept: NEUROSURGERY | Facility: CLINIC | Age: 48
End: 2023-10-05
Payer: COMMERCIAL

## 2023-10-05 ENCOUNTER — TELEPHONE (OUTPATIENT)
Dept: NEUROSURGERY | Facility: CLINIC | Age: 48
End: 2023-10-05
Payer: COMMERCIAL

## 2023-10-05 NOTE — TELEPHONE ENCOUNTER
Called patient to further discuss her MyC message. See MyC correspondence. Reviewed typical time frame to be off work for her procedure and post op expectations, extending time off to recover using STD/FMLA if needed. She expressed concerns about missing work as her pay will be affected (she's commission based). She's aware that she will need to discontinue any narcotics during working hours. She feels her pain is well managed being on them and is a little apprehensive on how she would feel being off them during work hours.     She said to hold off on writing the letter for right now as she is going to call and talk to her employer. She will call or send myc message back to writer on how she would like to proceed either later this afternoon or tomorrow.

## 2023-10-05 NOTE — LETTER
October 6, 2023      Lilian Quinones  [address]  [place] [address]        To Whom It May Concern:    Lilian Quinones is under our care. She recently underwent a surgical procedure on 10/3/23. Please excuse her from work or any work functions on 10/9/23. She may return to work on 10/10/23 with the following:     -Limited to 4 hour workdays starting 10/10/23-10/13/23  -Limited to light duty - lifting no greater than 10 pounds and limited bending,twisting, and overhead reaching.  -She will be reassessed at her 6 week follow-up visit on 11/14/23      Sincerely,    Melissa Clemens NP  (Electronically Signed 10/06/23 2:03 PM)

## 2023-10-06 NOTE — TELEPHONE ENCOUNTER
Patient sent Myc. See correspondence. She is requesting off work letter for Monday 10/9 and to work half days the remainder of next week. Letter drafted and sent to patient. Advised her to review and let writer know if any adjustments need to be made.

## 2023-10-06 NOTE — TELEPHONE ENCOUNTER
Patient has agreed to a phone visit for 2 week post surgical appointment.  Patient will not need any suture removal, and message was left with patient.

## 2023-10-06 NOTE — TELEPHONE ENCOUNTER
Attempted to contact patient in order to complete pre assessment questions.     No answer. Left message to return call to [phone] option 4      Fay Lanier RN  Endoscopy Procedure Pre Assessment RN

## 2023-10-09 ENCOUNTER — MYC REFILL (OUTPATIENT)
Dept: FAMILY MEDICINE | Facility: CLINIC | Age: 48
End: 2023-10-09
Payer: COMMERCIAL

## 2023-10-09 ENCOUNTER — MYC MEDICAL ADVICE (OUTPATIENT)
Dept: NEUROSURGERY | Facility: CLINIC | Age: 48
End: 2023-10-09
Payer: COMMERCIAL

## 2023-10-09 DIAGNOSIS — Z98.890 S/P LUMBAR MICRODISCECTOMY: ICD-10-CM

## 2023-10-09 RX ORDER — METHOCARBAMOL 750 MG/1
750 TABLET, FILM COATED ORAL 4 TIMES DAILY PRN
Qty: 40 TABLET | Refills: 0 | Status: SHIPPED | OUTPATIENT
Start: 2023-10-09 | End: 2023-10-23

## 2023-10-09 RX ORDER — OXYCODONE HYDROCHLORIDE 5 MG/1
5-10 TABLET ORAL EVERY 6 HOURS PRN
Qty: 40 TABLET | Refills: 0 | Status: SHIPPED | OUTPATIENT
Start: 2023-10-09 | End: 2023-10-13

## 2023-10-09 RX ORDER — BISACODYL 5 MG/1
TABLET, DELAYED RELEASE ORAL
Qty: 4 TABLET | Refills: 0 | Status: SHIPPED | OUTPATIENT
Start: 2023-10-09 | End: 2024-02-06

## 2023-10-09 NOTE — TELEPHONE ENCOUNTER
Patient requesting refill of oxycodone and robaxin.     DOS: 10/3/23   Surgeon:Dr. Garcia  Procedure: Minimally invasive Left Lumbar 5 to Sacral 1 microdiscectomy, Left   Weeks Post op: 6 days   Last refilled: oxycodone 10/3 #40, robaxin 10/3 #40    Pain assessment completed via [employer]. Please see encounter for further details. Refill request will be forwarded to care team.

## 2023-10-09 NOTE — TELEPHONE ENCOUNTER
Pre assessment completed for upcoming procedure.   (Please see previous telephone encounter notes for complete details)      Procedure details:    Arrival time and facility location reviewed.    Pre op exam needed? N/A    Designated  policy reviewed. Instructed to have someone stay 6 hours post procedure.     COVID policy reviewed.      Medication review:    Medications reviewed. Please see supporting documentation below. Holding recommendations discussed (if applicable).   N/A      Prep for procedure:     Procedure prep instructions reviewed.        Additional information needed?  Sent over prescription for extended golytely and instructions to Mychart      Patient  verbalized understanding and had no questions or concerns at this time.      Fay Lanier RN  Endoscopy Procedure Pre Assessment RN  [phone] option 4

## 2023-10-12 ENCOUNTER — RADIOLOGY INJECTION OFFICE VISIT (OUTPATIENT)
Dept: PALLIATIVE MEDICINE | Facility: CLINIC | Age: 48
End: 2023-10-12
Payer: COMMERCIAL

## 2023-10-12 ENCOUNTER — TELEPHONE (OUTPATIENT)
Dept: GASTROENTEROLOGY | Facility: CLINIC | Age: 48
End: 2023-10-12

## 2023-10-12 VITALS — SYSTOLIC BLOOD PRESSURE: 130 MMHG | OXYGEN SATURATION: 97 % | HEART RATE: 71 BPM | DIASTOLIC BLOOD PRESSURE: 65 MMHG

## 2023-10-12 DIAGNOSIS — M54.12 CERVICAL RADICULOPATHY: ICD-10-CM

## 2023-10-12 PROCEDURE — 62321 NJX INTERLAMINAR CRV/THRC: CPT | Performed by: PHYSICAL MEDICINE & REHABILITATION

## 2023-10-12 RX ORDER — DEXAMETHASONE SODIUM PHOSPHATE 10 MG/ML
10 INJECTION INTRAMUSCULAR; INTRAVENOUS ONCE
Status: COMPLETED | OUTPATIENT
Start: 2023-10-12 | End: 2023-10-12

## 2023-10-12 RX ADMIN — DEXAMETHASONE SODIUM PHOSPHATE 10 MG: 10 INJECTION INTRAMUSCULAR; INTRAVENOUS at 11:44

## 2023-10-12 NOTE — NURSING NOTE
Discharge Information    IV Discontiued Time:  NA    Amount of Fluid Infused:  NA    Discharge Criteria = When patient returns to baseline or as per MD order    Consciousness:  Pt is fully awake    Circulation:  BP +/- 20% of pre-procedure level    Respiration:  Patient is able to breathe deeply    O2 Sat:  Patient is able to maintain O2 Sat >92% on room air    Activity:  Moves 4 extremities on command    Ambulation:  Patient is able to stand and walk or stand and pivot into wheelchair    Dressing:  Clean/dry or No Dressing    Notes:   Discharge instructions and AVS given to patient    Patient meets criteria for discharge?  YES    Admitted to PCU?  No    Responsible adult present to accompany patient home?  Yes    Signature/Title:    Mara Wayne RN  RN Care Coordinator  [facility]

## 2023-10-12 NOTE — PATIENT INSTRUCTIONS
[facility] Procedure Discharge Instructions    Today you saw:  Dr. Loreta Richey      Your procedure:  Epidural steroid injection       Medications used:  Lidocaine (anesthetic)   Dexamethasone (steroid)   Omnipaque (contrast)  Saline       Be cautious as numbness and/or weakness may occur up to 6-8 hours after the procedure due to effect of the local anesthetic  Do not drive for 6 hours. The effect of the local anesthetic could slow your reflexes.   Avoid strenuous activity for the first 24 hours. You may resume your regular activities after that.   You may shower, however avoid swimming, tub baths or hot tubs for 24 hours following your procedure  You may have a mild to moderate increase in pain for several days following the injection.    You may use ice packs for 10-15 minutes, 3 to 4 times a day at the injection site for comfort  Do not use heat to painful areas for 6 to 8 hours. This will give the local anesthetic time to wear off and prevent you from accidentally burning your skin.  Unless you have been directed to avoid the use of anti-inflammatory medications (NSAIDS-ibuprofen, Aleve, Motrin), you may use these medications or Tylenol for pain control if needed.   Possible side effects of steroids that you may experience include flushing, elevated blood pressure, increased appetite, mild headaches and restlessness.  All of these symptoms will get better with time.  It may take up to 14 days for the steroid medication to start working although you may feel the effect as early as a few days after the procedure.   Follow up with your referring provider (Dr Garcia) in 2-3 weeks    If you experience any of the following, call the pain center line during work hours at [phone] or on-call physician after hours at [phone]:  -Fever over 100 degree F  -Swelling, bleeding, redness, drainage, warmth at the injection site  -Progressive weakness or numbness in your legs or arms  -Loss of bowel or  bladder function  -Unusual headache that is not relieved by Tylenol or your regular headache medication  -Unusual new onset of pain that is not improving

## 2023-10-12 NOTE — NURSING NOTE
Pre-procedure Intake    If YES to any questions or NO to having a   Please complete laminated checklist and leave on the computer keyboard for Provider, verbally inform provider if able.    For SCS Trial, RFA's or any sedation procedure: Have you been fasting? NA  If yes, for how long?         Are you taking any any blood thinners such as Coumadin, Warfarin, Jantoven, Pradaxa Xarelto, Eliquis, Edoxaban, Enoxaparin, Lovenox, Heparin, Arixtra, Fondaparinux, or Fragmin? OR Antiplatelet medication such as Plavix, Brilinta, or Effient? N/A  If yes, when did you take your last dose?        Do you take aspirin?   NO  If cervical procedure, have you held aspirin for 6 days?       Do you have any allergies to contrast dye, iodine, steroid and/or numbing medications?  NO     Are you currently taking antibiotics or have an active infection?  NO     Have you had a fever/elevated temperature within the past week? NO     Are you currently taking oral steroids? NO     Do you have a ? Yes     Are you pregnant or breastfeeding? No      Have you received any vaccines in the past 2 weeks? NO       Notify provider and RNs if systolic BP >170, diastolic BP >100, P >100 or O2 sats < 90%

## 2023-10-12 NOTE — PROGRESS NOTES
[facility] - Procedure Note    Date of Visit: 10/12/2023    Procedure performed: C7-T1 interlaminar epidural steroid injection with fluoroscopic guidance  Diagnosis: Cervical spondylosis; Cervical radiculitis/radiculopathy  : Loreta Richey MD  Anesthesia: None    Indications: Lilian Quinones is a 48 year old female who is seen at the request of Dr. Garcia for cervical epidural steroid injection. The patient describes left sided neck pain. The patient has been exhibiting symptoms consistent with cervical intraspinal inflammation. Symptoms have been persistent, disabling, and intermittently severe. The patient reports minimal improvement with conservative treatment.    Cervical MRI was done on 12/14/2022 which showed     Allergies:      Allergies   Allergen Reactions    Amoxicillin Rash    Penicillins Rash        Vitals:  /65   Pulse 71   LMP  (LMP Unknown)   SpO2 97%     Review of Systems: The patient denies recent fever, chills, illness, use of antibiotics or anticoagulants. All other 10-point review of systems negative.     Procedure: The procedure and risks were explained, and informed written consent was obtained from the patient. Risks include but are not limited to: infection, bleeding, increased pain, and damage to soft tissue, nerve, muscle, and vasculature structures. After getting informed consent, patient was brought into the procedure suite and was placed in a prone position on the procedure table. A Pause for the Cause was performed. Patient was prepped and draped in sterile fashion.     The C7-T1 interspace was identified with use of fluoroscopy in AP view. A 25-gauge, 1.5 inch needle was used to anesthetize the skin and subcutaneous tissue entry site with a total of 2 ml of 1% lidocaine. Under fluoroscopic visualization, a 22-gauge, 3.5 inch Tuohy epidural needle was slowly advanced towards the epidural space a few millimeters left of midline. The latter  part of the needle advancement was guided with fluoroscopy in the lateral view. The epidural space was identified using loss of resistance technique. After negative aspiration for heme and cerebrospinal fluid, a total of 1 mL of Omnipaque-300 contrast was injected to confirm needle placement. 9 mL of contrast was wasted. Epidurogram confirmed spread within the posterior epidural space. A solution containing 1ml of 10mg/ml of Dexamethasone and 2 ml of preservative free saline was injected. The needle was removed.  Images were saved to PACS.    The patient tolerated the procedure well, and there was no evidence of procedural complications. No new sensory or motor deficits were noted following the procedure. The patient was stable and able to ambulate on discharge home. Post-procedure instructions were provided.     Pre-procedure pain score: 5/10 in the neck, 0/10 in the arm  Post-procedure pain score: 5/10 in the neck, 0/10 in the arm    Assessment/Plan: Lilian Quinones is a 48 year old female s/p cervical interlaminar epidural steroid injection today for cervical spondylosis and radiculitis/radiculopathy.     1. Following today's procedure, the patient was advised to contact the [facility] for any of the following:   Fever, chills, or night sweats   New onset of pain, numbness, or weakness   Any questions/concerns regarding the procedure  If unable to contact the Pain Center, the patient was instructed to go to a local Emergency Room for any complications.   2. Follow-up with the referring provider in 2 weeks for post-procedure evaluation.    Loreta Richey MD  [facility]

## 2023-10-12 NOTE — TELEPHONE ENCOUNTER
Caller: Lilian Quinones    Reason for Reschedule/Cancellation (please be detailed, any staff messages or encounters to note?): pt needs to reschedule      Prior to reschedule please review:  Ordering Provider: Wesley   Sedation per order: moderate  Does patient have any ASC Exclusions, please identify?: NO      Notes on Cancelled Procedure:  Procedure: Lower Endoscopy [Colonoscopy]   Date: 10/17/23  Location: [facility]; [address], [place] [address]  Surgeon: mirtha      Rescheduled: Yes  Procedure: Lower Endoscopy [Colonoscopy]   Date: 12/22/23  Location: [facility]; [address], [place] [address]  Surgeon: haris  Sedation Level Scheduled  moderate,  Reason for Sedation Level per order  Prep/Instructions updated and sent: Yes/mychart       Send In - basket message to [facility] if EUS  procedure is canceled or rescheduled: [ N/A, YES or NO] n/a

## 2023-10-13 ENCOUNTER — MYC MEDICAL ADVICE (OUTPATIENT)
Dept: NEUROSURGERY | Facility: CLINIC | Age: 48
End: 2023-10-13
Payer: COMMERCIAL

## 2023-10-13 ENCOUNTER — TELEPHONE (OUTPATIENT)
Dept: NEUROSURGERY | Facility: CLINIC | Age: 48
End: 2023-10-13
Payer: COMMERCIAL

## 2023-10-13 ENCOUNTER — MYC REFILL (OUTPATIENT)
Dept: FAMILY MEDICINE | Facility: CLINIC | Age: 48
End: 2023-10-13
Payer: COMMERCIAL

## 2023-10-13 DIAGNOSIS — Z98.890 S/P LUMBAR MICRODISCECTOMY: ICD-10-CM

## 2023-10-13 RX ORDER — OXYCODONE HYDROCHLORIDE 5 MG/1
5-10 TABLET ORAL EVERY 6 HOURS PRN
Qty: 40 TABLET | Refills: 0 | Status: SHIPPED | OUTPATIENT
Start: 2023-10-14 | End: 2023-10-19

## 2023-10-13 NOTE — TELEPHONE ENCOUNTER
Prior Authorization Retail Medication Request    Medication/Dose: robaxin 750mg QID PRN  ICD code (if different than what is on RX):    Previously Tried and Failed:  N/A  Rationale:  post-op recovery and healing     Insurance Name:  [facility]  Insurance ID:  [id number]       Pharmacy Information (if different than what is on RX)  Name:  [facility] [place], [place]   Phone:  [phone]

## 2023-10-13 NOTE — TELEPHONE ENCOUNTER
Patient requesting refill of oxycodone.     DOS: 10/3/23   Surgeon:Dr. Garcia  Procedure: Minimally invasive Left Lumbar 5 to Sacral 1 microdiscectomy, Left   Weeks Post op: 1 week 3 days  Last refilled: 10/9 #40, dated for 10/14 pickup     Pain assessment completed via [employer]. Please see encounter for further details. Refill request will be forwarded to care team.

## 2023-10-13 NOTE — TELEPHONE ENCOUNTER
Prior Authorization Approval    Medication: METHOCARBAMOL 750 MG PO TABS  Authorization Effective Date: 10/13/2023  Authorization Expiration Date: 10/12/2024  Approved Dose/Quantity: 40/10  Reference #: [id number]   Insurance Company: [employer] [phone] Fax [phone]  Expected CoPay: $    CoPay Card Available:      Financial Assistance Needed:   Which Pharmacy is filling the prescription: [id number] - [place], [place] - [address] [place] [address]  Pharmacy Notified: Yes  Patient Notified: Yes

## 2023-10-16 ENCOUNTER — MYC MEDICAL ADVICE (OUTPATIENT)
Dept: NEUROSURGERY | Facility: CLINIC | Age: 48
End: 2023-10-16
Payer: COMMERCIAL

## 2023-10-16 NOTE — LETTER
October 16, 2023      Lilian Quinones  [address]  [place] [address]        To Whom It May Concern:    Lilian Quinones is under our care. She recently underwent a surgical procedure on 10/3/23.  She may return to work with the following:      -Limited to 4 hour workdays starting 10/16/23-10/20/23  -Limited to light duty - lifting no greater than 10 pounds and limited bending,twisting, and overhead reaching.  -She will be reassessed at her 6 week follow-up visit on 11/14/23      Sincerely,    Milan Garcia MD  (Electronically Signed 10/16/23 11:07 AM)

## 2023-10-16 NOTE — TELEPHONE ENCOUNTER
Patient sent [facility] requesting letter to work half days from 10/16-10/20 this week. Letter sent to patient via [facility]. She is s/p Minimally invasive Left Lumbar 5 to Sacral 1 microdiscectomy on 10/3/23 with Dr. Garcia.

## 2023-10-19 ENCOUNTER — VIRTUAL VISIT (OUTPATIENT)
Dept: NEUROSURGERY | Facility: CLINIC | Age: 48
End: 2023-10-19
Payer: COMMERCIAL

## 2023-10-19 DIAGNOSIS — Z98.890 S/P LUMBAR MICRODISCECTOMY: ICD-10-CM

## 2023-10-19 DIAGNOSIS — Z98.890 S/P LUMBAR MICRODISCECTOMY: Primary | ICD-10-CM

## 2023-10-19 PROCEDURE — 99207 PR NO CHARGE NURSE ONLY: CPT

## 2023-10-19 RX ORDER — CALCIUM CITRATE/VITAMIN D3 200MG-6.25
2 TABLET ORAL DAILY
COMMUNITY

## 2023-10-19 RX ORDER — CALCIUM POLYCARBOPHIL 625 MG
1 TABLET ORAL DAILY
COMMUNITY
End: 2024-05-06

## 2023-10-19 RX ORDER — OXYCODONE HYDROCHLORIDE 5 MG/1
5-10 TABLET ORAL EVERY 4 HOURS PRN
Qty: 40 TABLET | Refills: 0 | Status: SHIPPED | OUTPATIENT
Start: 2023-10-19 | End: 2023-10-23

## 2023-10-19 RX ORDER — METHYLPREDNISOLONE 4 MG
TABLET, DOSE PACK ORAL
Qty: 21 TABLET | Refills: 0 | Status: SHIPPED | OUTPATIENT
Start: 2023-10-19 | End: 2023-11-14

## 2023-10-19 NOTE — PATIENT INSTRUCTIONS
Instructions for Patient    Incision   Keep your incision clean and dry at all times.   It is okay to shower, just pat the incision dry   No submerging incision in water such as pools, hot tubs, or baths for at least 8 weeks and until the incision is healed  Do not apply lotions or ointments to incision    Activity  No lifting greater than 10 pounds. Limited bending, twisting, or overhead reaching.  Walking is the best way to start exercise after surgery. Take short frequent walks. You may gradually increase the distance as tolerated. If you feel pain, decrease your activity, but DO NOT stop walking. Walking will help you gain strength, prevent muscle soreness and spasms, and prevent blood clots.   Avoid bed rest and prolonged sitting for longer than 30 minutes (change positions frequently while awake)  No contact sports or high impact activities such as; running/jogging, snowmobile or 4 rodriguez riding or any other recreational vehicles until after given clearance at one of your follow up visits    Medications   Refills of pain medication:   Please call the neurosurgery clinic to request 1-2 days before you run out  Weaning from narcotic pain medications  When it is time, start weaning by extending the time between doses.   For example, if you're taking 2 tabs every 4 hours, spread it out to 2 tabs over 4.5, 5, 6 hours. At that point you can certainly cut down to 1 tab, then wean to an as needed basis until completely done with them.Refills: call our clinic 2-3 business days before you are out of medication. A nurse will call you back to obtain a pain assessment.   Don't take more than 3000mg of Acetaminophen in 24 hours. OK to take with or in between Oxycodone doses.  Encouraged icing for at least 3-4 times throughout the day for 20-30 minutes at a time. Avoid heat to the incision area.   Taking stool softeners regularly can reduce constipation commonly caused by narcotic pain medications.    Contact clinic or  Emergency Room if you develop:   Infection (redness, swelling, warmth, drainage, fever over 101 F)  New injury  Bladder or bowel changes or loss of control    Signs of blood clot:  Swelling and/or warmth in one or both legs  Pain or tenderness in your leg, ankle, foot, or arm   Red or discolored skin     Go to the Emergency Room   If sudden onset of severe headache, weakness, confusion, change in level of consciousness, pain, or loss of movement.  Chest pain  Trouble breathing     Post-operative appointments  6 week and 3 months post-op    [facility]  [facility]  [address]  [place] [address]  Telephone:  [phone]   Fax:  [phone]

## 2023-10-19 NOTE — CONFIDENTIAL NOTE
"Post-op Nurse Visit: Telephone Visit     Patient seen today per the order of  Milan Garcia MD .   DOS: 10/3/23  Procedure: Left L5-S1 MIS microdiscectomy     Pain/Neuro Assessment  Rates pain 6-7/10 to bilateral buttocks and left thigh. Describes \"zingers\" in left thigh. This happens when she changes from a seated position with feet on floor and stands up.   Numbness/tingling: yes, tingling in left foot lateral side. No numbness.   Strength: Denies weakness     Pain Relief Measures:  Oxycodone: 10 mg every 6 hours. Wears off at hr 5 and reports intense pain. Will discuss with care team switching to q 4 hrs for better pain control. Pain will need refill. Run out by tomorrow, 6 tabs left.     Tylenol: advised patient to take with or in between oxycodone doses.  Robaxin: 750 mg QID . Has 20 tabs left. No refill needed at this time.   Ice: yes   -patient unable to take nsaids d/t weight loss surgery and was told to avoid them.   Incision   Patient denies redness, swelling, drainage, warmth or fever.  Patient states no concerns with incision and is healing well. Steri strips have fallen off. Per patient incision is CDI. patient will send photo of incision via [employer].     Activity  Following restrictions   Falls: none  Patient is walking frequently without difficulty.   Denies redness, swelling, or warmth to bilateral calves.   Wearing brace? N/a    GI/  Difficulty swallowing? No  Patient's appetite is normal  Bowel/bladder problems? Yes, constipation. Taking fiber and stool softener  Taking stool softeners? Yes     Refills/x-rays/return to work  Refills given at this appointment? Yes  Sent for x-rays after this appointment? No  Ordered future x-rays? No  Scheduling team notified? N/a  Return to work discussed at this appointment? Yes, she is a  and is able to work remotely. She would like a new letter to continue her half days through 10/23-10/27.  Sent letter to patient via [employer].     -Message sent to " care team to adjust oxycodone to every 4 hrs vs 6 hrs and to see if MDP would be appropriate to address her radicular pain/symptoms.    All of patient's questions addressed today. Patient was instructed to call with any additional questions/concerns.           [facility]  [place] [address] [place] [id number]  Telephone:  [phone]   Fax:  [phone]

## 2023-10-19 NOTE — LETTER
October 19, 2023      Lilian Quinones  [address]  [place] [address]        To Whom It May Concern:     Lilian Quinones is under our care. She recently underwent a surgical procedure on 10/3/23.  She may return to work with the following:      -Limited to 4 hour workdays starting 10/23/23-10/27/23  -Limited to light duty - lifting no greater than 10 pounds and limited bending,twisting, and overhead reaching.  -She will be reassessed at her 6 week follow-up visit on 11/14/23        Sincerely,     Milan Garcia MD  (Electronically Signed 10/19/23 10:53 AM)

## 2023-10-19 NOTE — TELEPHONE ENCOUNTER
Patient is requesting refill of Oxycodone. Has 6 tabs left.    DOS: 10/3/23  Procedure: Left L5-S1 MIS microdiscectomy    Surgeon: Dr. Garcia   Weeks Post Op: 2 wks 2 days     Current symptom(s):    Refer to pain assessment in 2 wk RN telephone visit 10/19/23.   Current pain management:   Refer to pain assessment in 2 wk RN telephone visit 10/19/23.     Last fill: 10/14 #40  Next visit: 11/14    Medication pended for your approval, if appropriate. Pharmacy verified. [facility]     Any patient questions or concerns:   Oxycodone 10 mg q 6 hrs not managing pain well. Message sent to care team for refill approval of oxycodone to be changed to every 4 hrs instead and if MDP would be appropriate to help with current symptoms.     Informed patient request will be forwarded to care team.

## 2023-10-23 ENCOUNTER — MYC REFILL (OUTPATIENT)
Dept: FAMILY MEDICINE | Facility: CLINIC | Age: 48
End: 2023-10-23
Payer: COMMERCIAL

## 2023-10-23 DIAGNOSIS — Z98.890 S/P LUMBAR MICRODISCECTOMY: ICD-10-CM

## 2023-10-23 RX ORDER — OXYCODONE HYDROCHLORIDE 5 MG/1
5-10 TABLET ORAL EVERY 4 HOURS PRN
Qty: 40 TABLET | Refills: 0 | Status: SHIPPED | OUTPATIENT
Start: 2023-10-23 | End: 2023-10-26

## 2023-10-23 RX ORDER — METHOCARBAMOL 750 MG/1
750 TABLET, FILM COATED ORAL 4 TIMES DAILY PRN
Qty: 40 TABLET | Refills: 1 | Status: SHIPPED | OUTPATIENT
Start: 2023-10-23 | End: 2023-11-14

## 2023-10-23 NOTE — TELEPHONE ENCOUNTER
Patient requesting refill of robaxin and oxycodone.     DOS: 10/3/23   Surgeon:Dr. Garcia  Procedure: Minimally invasive Left Lumbar 5 to Sacral 1 microdiscectomy, Left   Weeks Post op: 2 wks 6 days  Last refilled: oxycodone 10/19 #40, robaxin 10/9 #40    Pain assessment completed via [employer]. Please see encounter for further details. Refill request will be forwarded to care team.

## 2023-10-26 ENCOUNTER — MYC REFILL (OUTPATIENT)
Dept: FAMILY MEDICINE | Facility: CLINIC | Age: 48
End: 2023-10-26
Payer: COMMERCIAL

## 2023-10-26 DIAGNOSIS — Z98.890 S/P LUMBAR MICRODISCECTOMY: ICD-10-CM

## 2023-10-26 RX ORDER — OXYCODONE HYDROCHLORIDE 5 MG/1
5-10 TABLET ORAL EVERY 4 HOURS PRN
Qty: 40 TABLET | Refills: 0 | Status: SHIPPED | OUTPATIENT
Start: 2023-10-26 | End: 2023-10-31

## 2023-10-26 NOTE — TELEPHONE ENCOUNTER
Patient requesting refill of oxycodone.     DOS: 10/3/23   Surgeon: Dr. Garcia  Procedure: Minimally invasive Left Lumbar 5 to Sacral 1 microdiscectomy   Weeks Post op: 3 weeks 2 days  Last refilled: 10/23 #40    Pain assessment completed via [email or web address]. Please see encounter for further details. Refill request will be forwarded to care team.

## 2023-10-31 ENCOUNTER — MYC MEDICAL ADVICE (OUTPATIENT)
Dept: NEUROSURGERY | Facility: CLINIC | Age: 48
End: 2023-10-31
Payer: COMMERCIAL

## 2023-10-31 DIAGNOSIS — Z98.890 S/P LUMBAR MICRODISCECTOMY: ICD-10-CM

## 2023-10-31 RX ORDER — OXYCODONE HYDROCHLORIDE 5 MG/1
5-10 TABLET ORAL EVERY 4 HOURS PRN
Qty: 40 TABLET | Refills: 0 | Status: SHIPPED | OUTPATIENT
Start: 2023-10-31 | End: 2023-10-31

## 2023-11-03 ASSESSMENT — ANXIETY QUESTIONNAIRES
4. TROUBLE RELAXING: NEARLY EVERY DAY
GAD7 TOTAL SCORE: 21
IF YOU CHECKED OFF ANY PROBLEMS ON THIS QUESTIONNAIRE, HOW DIFFICULT HAVE THESE PROBLEMS MADE IT FOR YOU TO DO YOUR WORK, TAKE CARE OF THINGS AT HOME, OR GET ALONG WITH OTHER PEOPLE: VERY DIFFICULT
2. NOT BEING ABLE TO STOP OR CONTROL WORRYING: NEARLY EVERY DAY
6. BECOMING EASILY ANNOYED OR IRRITABLE: NEARLY EVERY DAY
GAD7 TOTAL SCORE: 21
5. BEING SO RESTLESS THAT IT IS HARD TO SIT STILL: NEARLY EVERY DAY
3. WORRYING TOO MUCH ABOUT DIFFERENT THINGS: NEARLY EVERY DAY
7. FEELING AFRAID AS IF SOMETHING AWFUL MIGHT HAPPEN: NEARLY EVERY DAY
1. FEELING NERVOUS, ANXIOUS, OR ON EDGE: NEARLY EVERY DAY

## 2023-11-06 ENCOUNTER — MYC REFILL (OUTPATIENT)
Dept: NEUROSURGERY | Facility: CLINIC | Age: 48
End: 2023-11-06
Payer: COMMERCIAL

## 2023-11-06 DIAGNOSIS — Z98.890 S/P LUMBAR MICRODISCECTOMY: ICD-10-CM

## 2023-11-06 RX ORDER — OXYCODONE HYDROCHLORIDE 5 MG/1
5-10 TABLET ORAL EVERY 4 HOURS PRN
Qty: 50 TABLET | Refills: 0 | Status: SHIPPED | OUTPATIENT
Start: 2023-11-06 | End: 2023-11-10

## 2023-11-06 RX ORDER — METHOCARBAMOL 500 MG/1
500-1000 TABLET, FILM COATED ORAL 4 TIMES DAILY PRN
Qty: 40 TABLET | Refills: 0 | Status: SHIPPED | OUTPATIENT
Start: 2023-11-06 | End: 2023-11-10

## 2023-11-06 ASSESSMENT — PATIENT HEALTH QUESTIONNAIRE - PHQ9
10. IF YOU CHECKED OFF ANY PROBLEMS, HOW DIFFICULT HAVE THESE PROBLEMS MADE IT FOR YOU TO DO YOUR WORK, TAKE CARE OF THINGS AT HOME, OR GET ALONG WITH OTHER PEOPLE: VERY DIFFICULT
SUM OF ALL RESPONSES TO PHQ QUESTIONS 1-9: 7
SUM OF ALL RESPONSES TO PHQ QUESTIONS 1-9: 7

## 2023-11-06 NOTE — TELEPHONE ENCOUNTER
Patient requesting refill of Oxycodone and Robaxin.       Surgeon: Dr. Garcia  DOS: 10/3/23  Procedure: Left L5-S1 MIS microdiscectomy   Weeks Post op: 4 weeks 6 days  Last refilled: 11/1/23    Pain assessment completed via [employer]. Please see encounter for further details. Refill request will be forwarded to care team.

## 2023-11-07 ENCOUNTER — VIRTUAL VISIT (OUTPATIENT)
Dept: PSYCHIATRY | Facility: CLINIC | Age: 48
End: 2023-11-07
Attending: PSYCHIATRY & NEUROLOGY
Payer: COMMERCIAL

## 2023-11-07 DIAGNOSIS — F43.10 POSTTRAUMATIC STRESS DISORDER: ICD-10-CM

## 2023-11-07 DIAGNOSIS — F43.21 GRIEF REACTION: ICD-10-CM

## 2023-11-07 DIAGNOSIS — F41.1 GENERALIZED ANXIETY DISORDER: Primary | ICD-10-CM

## 2023-11-07 PROCEDURE — 99214 OFFICE O/P EST MOD 30 MIN: CPT | Mod: VID | Performed by: PSYCHIATRY & NEUROLOGY

## 2023-11-07 RX ORDER — PROPRANOLOL HYDROCHLORIDE 40 MG/1
20-40 TABLET ORAL 2 TIMES DAILY PRN
Qty: 60 TABLET | Refills: 2 | Status: SHIPPED | OUTPATIENT
Start: 2023-11-07 | End: 2024-02-06

## 2023-11-07 ASSESSMENT — PAIN SCALES - GENERAL: PAINLEVEL: SEVERE PAIN (6)

## 2023-11-07 NOTE — PROGRESS NOTES
Virtual Visit Details  Type of service:  Video Visit   Originating Location (pt. Location): Home  Distant Location (provider location):  On-site  Platform used for Video Visit: [facility]  MEDICAL PROGRESS NOTE   Lilian Quinones is a 48 year old. Initial consultation on 08/05/20. Referred by Kristen M Kehr for evaluation of depression.      Assessment & Plan    History and interview support the following diagnoses:  Generalized anxiety disorder  PTSD / Complex grief  Depression, unspecified (MDD vs secondary to anxiety / trauma / grief)    Lilian is continuing to deal with complex medical issues ever since her illness in 02/2023, and subsequent chronic tinnitus and idiopathic intracranial hypertension. Additionally, her recent move back to [place] has brought a lot of triggers / associations for her, which likely indicates that the trauma elements of her grief are unresolved. She should still be engaging in trauma therapy, if she is encountering elements that are interfering with her ability to function ideally. They moved back to [place] to be with their new granddaughters born recently, which is a major positive.   Today we focused on the anxiety, noting that it is still the biggest functional issue, and is largely driven by her job at this point. Discussed that propranolol was one of the least problematic medications she was on in the past, and I recommended trying it again at this time, which she was agreeable with.     Notes significant attentional issues that have persisted since her son's death. These were not present prior to that, although does note that both sons have ADHD, and maybe there were some elements that were less obvious that were problematic earlier in life, hard to say. I ordered ADHD evaluation to help parse apart her various symptoms and add the historical context that could be helpful. I think that she likely does not have ADHD,  "but it is a possibility. Would be easier to treat and diagnose if anxiety were better controlled, and that would be easier to address if her trauma symptoms were also better addressed.   The fact that she was previously on bupropion and it helped with attentional symptoms, but caused significant anxiety does not marcial well for the potential use of stimulants. It does support the potential use of antiadreneric options like clonidine or guanfacine going forward.   Buspirone seemed to cause worsening of tinnitus, as well as \"brain read\", so it was discontinued.   She went off of sertraline due to concerns about emotional blunting. This does not rule out addressing other antidepressant options in the future. May consider alternatives like escitalopram if needed, given side effects with sertraline.     She uses diazepam sparingly to help with anxiety. She is aware that this is not a sustainable solution. It is not bad to use the diazepam, but it is the opposite of an intervention that would help her achieve her goal of being okay. She can continue to use it, but it must not be the only intervention she has available. Her symptoms will not be at goal until she no longer needs the diazepam. Goal would be to be off of it within 6 months.     PSYCHOTROPIC DRUG INTERACTIONS: None   MANAGEMENT:  N/A     Plan     1) PSYCHOTROPIC MEDICATIONS:  - Take propranolol 20-40mg twice daily as needed for anxiety.    - Plan to try taking one dose scheduled each morning  - May take diazepam 5mg daily as needed for severe anxiety    - Taking melatonin 10mg at bedtime as needed for sleep  - Taking diphenhydramine (Zzzquil) 50mg QHS PRN for sleep    2) THERAPY: Psychotherapy is a primary recommendation for the treatment of mood symptoms, even in the context of grief.   Previously engaged in therapy with grief counselor, did EMDR.     3) NEXT DUE:   Labs- Routine monitoring is not indicated for current psychotropic medication regimen   ECG- " Routine monitoring is not indicated for current psychotropic medication regimen   Rating Scales- N/A    4) REFERRALS: 04/2023 - Referred for ADHD evaluation, scheduled 10/2023    5) : None    6) DISPOSITION: RTC 2 months or sooner if needed.     Treatment Risk Statement:  The patient understands the risks, benefits, adverse effects and alternatives. Agrees to treatment with the capacity to do so. No medical contraindications to treatment. Agrees to call clinic for any problems. The patient understands to call 911 or go to the nearest ED if life threatening or urgent symptoms occur. Crisis numbers are provided routinely in the After Visit Summary.       PROVIDER: Joss Rod MD       Pertinent Background   Lilian does not report any history of psychiatric diagnoses prior to the death of her son on 11/12/2019. Since that time, she has contended with complex grief and trauma symptoms which have included elements of depression and anxiety. Her grief has been severe, and has included elements of generalized anxiety, panic, and trauma symptoms. She likely met criteria for PTSD at some point. How she is in an adjustment phase, but still with significant depressive symptoms, unclear how persistent, recurrent this will be.      Interval History    Had spinal surgery last month, and has been a difficult recovery, but doing better this week, so feeling hopeful about this.   Tinnitus has been about the same as before. Sometimes it get louder than normal, not always completely the same, but it is constantly there. Not more problematic at night. It's actually better at night, like white noise almost. It's hardest in a meeting at work, for example, as it is very distracting.   She is only taking Valium as needed, and stopped it after surgery because of the interaction with oxycodone. Anxiety is pretty bad with this, and doesn't really feel that any of the meds have helped. She has tried things like meditation.  It seems like everybody these days just has a short fuse. She has to deal with this a lot in her job.   Grief is always a factor in the background, but the job is the primary driving force for stress and anxiety right now. Being a  is really hard. Wishes she could do a different.       Discussion points from past visits:   Anxiety continues to be the main issue, worries about catastrophic scenarios, like people dying, and tends to spiral.   Before Reese , things were never this difficult. She was very structured and organized.   She only uses the diazepam when things get very severe, maybe 2-3x per week. She does try to minimize this.   Has not been having issues with tiredness since being on bupropion.   Notes that she still tends to use diazepam with triggers, like when her son's friends email her. Social media has quite a few triggers. This year is particularly hard especially on social media as he would have been graduating.   She isn't sure if the medications are working at times. She still has anxiety, and doesn't expect it to go away, understands that grief if an ongoing process, and can last the rest of her life. Her goal would be that the medications would take the edge off, so she is able to work more.   Anxiety continues to be an issue especially in the mornings. She does occasionally take the diazepam (Valium) for this in the mornings when it spikes. Has not been taking it every day, but does note that it helps with that heavy feeling in her chest.     Recent Substance Use  Alcohol- None  Tobacco- None  Caffeine- 1 cups/day of coffee  Opioids- None  Narcan Kit- N/A  Cannabis- Has tried CBD but didn't really help.   Other Illicit Drugs- none    Current Social Hx:  FINANCIAL SUPPORT- Works as  /  remotely.  works as caterer.   LIVING SITUATION / RELATIONSHIPS- Currently living and working remotely. Sold their house because it was too much of a reminder of  "their son. Thinking about buying another house now. Has 2 dogs and 2 cats.  Son lives in [place], recently had a child. Youngest son is in [employer] in [place]. Daughter is in the [employer], had a child in 2021.   SOCIAL/ SPIRITUAL SUPPORT- \"Absolutely\", but still feels very lonely since her loss.        Medical Review of Systems    Dizziness/orthostasis- Has in the past, but generally no.   Headaches- No.   GI- No. Has lost 100 lbs since gastric sleeve surgery last year.   Has chronic pain related to bulging disc, gets shots once or twice per year.      Psych Summary Points   08/2020 - Started propranolol 40mg BID  10/2020 - Started bupropion. Decreased propranolol to 20mg due to drug intx and low HR.   11/2020 - Completed ADHD evaluation. Determined that acute symptoms are more likely due to PTSD than to ADHD.   01/2021 - Increased bupropion to 300mg, but anxiety increased, so decreased back down.   02/2021 - Tried buspirone after nephew passed away, felt that it increased anxiety.   04/2021 - Changed propranolol to PRN and discontinued bupropion.   11/2021 - Increased sertraline to 150mg.   12/2021 - Increased sertraline to 200mg.   05/2022 - Tapered off of sertraline.   02/2023 - Had severe head cold, dx'd with intractable tinnitus and idiopathic hypertension afterwards.   04/2023 - Started buspirone. Took for 1.5 months, but it caused worsening of tinnitus and brain read.   07/2023 - Started vilazodone 10mg. Discontinued due to nausea.   11/2023 - Started propranolol again due to persistent anxiety and workplace stress.      Psychiatric Medication Trials       Drug /  Start Date Dose (mg) Helpful Adverse Effects DC Reason / Date   Citalopram 11/2019 40 probably     Sertraline 10/2021 200 yes Initial anxiety, emotional blunting    Bupropion XL 10/2020 300 yes Anxiety / agitation at 300 Helped with concentration and energy   Buspirone 2/21, 4/23 15 BID no Increased anxiety, tinnitus and brain read    Vilazodone 7/23 10  " nausea    gabapentin       Benadryl       Lorazepam 11/2019 2 daily PRN yes     Diazepam 08/2020 10 yes sedating    Propranolol 08/2020 40 BID probably     melatonin          Past Medical History      CARE TEAM:   PCP- Shira Olson with [facility]  Therapist- Alejandra Lui at [facility] - Grief Counselor - Will be starting EMDR.     Neurologic Hx [head injury, seizures, etc.]: Concussion from skiing injury in 2012.   Patient Active Problem List   Diagnosis    Overweight    Generalized anxiety disorder    Hyperhydrosis disorder    Major depression, recurrent (H24)    Lumbar radiculopathy    Hearing loss of left ear, unspecified hearing loss type    Family history of breast cancer    Pseudotumor cerebri     Past Medical History:   Diagnosis Date    Arthritis 2021    IUD (intrauterine device) in place 03/20/2019    Mirena      Allergies    Amoxicillin and Penicillins     Medications      Current Outpatient Medications   Medication Sig Dispense Refill    Bioflavonoid Products (VITAMIN C) CHEW Take 2 chew tab by mouth daily      bisacodyl (DULCOLAX) 5 MG EC tablet 2 days prior to procedure, take 2 tablets at 4 pm. 1 day prior to procedure, take 2 tablets at 4 pm. For additional instructions refer to your colonoscopy prep instructions. 4 tablet 0    Calcium Polycarbophil (FIBER) 625 MG tablet Take 1 tablet by mouth daily      diazepam (VALIUM) 5 MG tablet Take 0.5-1 tablets (2.5-5 mg) by mouth daily as needed for anxiety (Patient not taking: Reported on 10/19/2023) 15 tablet 1    diphenhydrAMINE HCl, Sleep, (ZZZQUIL PO) Take 50 mg by mouth nightly as needed (for sleep)      levonorgestrel (MIRENA) 20 MCG/24HR IUD 1 each by Intrauterine route once      melatonin 5 MG tablet Take 10 mg by mouth nightly as needed for sleep      methocarbamol (ROBAXIN) 500 MG tablet Take 1-2 tablets (500-1,000 mg) by mouth 4 times daily as needed for muscle spasms 40 tablet 0    methocarbamol (ROBAXIN) 750 MG tablet  Take 1 tablet (750 mg) by mouth 4 times daily as needed for muscle spasms 40 tablet 1    methylPREDNISolone (MEDROL DOSEPAK) 4 MG tablet therapy pack Follow Package Directions 21 tablet 0    Multiple Vitamins-Minerals (MULTIVITAMIN GUMMIES WOMENS) CHEW Take 2 chew tab by mouth daily      oxyCODONE (ROXICODONE) 5 MG tablet Take 1-2 tablets (5-10 mg) by mouth every 4 hours as needed for pain 50 tablet 0    polyethylene glycol (GOLYTELY) 236 g suspension 2 days prior at 5pm, mix and drink half of a jug of Golytely. Drink an 8 oz. glass of Golytely every 15 minutes until half of the jug is gone. Place remainder of Golytely in the refrigerator. 1 day prior at 5 pm, drink the 2nd half of a jug of Golytely bowel prep. 6 hours before your check-in time, drink an 8 oz. glass of Golytely every 15 minutes until half of the 2nd jug of Golytely is gone. Discard remainder of second jug. (Patient not taking: Reported on 10/19/2023) 8000 mL 0    senna-docusate (SENOKOT-S/PERICOLACE) 8.6-50 MG tablet Take 1 tablet by mouth 2 times daily as needed for constipation 20 tablet 0      Physical Exam  (Vitals Only)   LMP  (LMP Unknown)     Pulse Readings from Last 5 Encounters:   10/12/23 71   10/03/23 75   09/25/23 75   09/11/23 80   08/08/23 73     Wt Readings from Last 5 Encounters:   10/03/23 85 kg (187 lb 6.4 oz)   09/25/23 86.2 kg (190 lb)   08/08/23 88.9 kg (196 lb)   07/19/23 86.4 kg (190 lb 7.6 oz)   06/12/23 86.6 kg (191 lb)     BP Readings from Last 5 Encounters:   10/12/23 130/65   10/03/23 126/88   09/25/23 128/78   09/11/23 135/79   08/08/23 115/73      Mental Status Exam   Alertness: alert  and oriented  Appearance: adequately groomed  Behavior/Demeanor: cooperative and calm, with good eye contact   Speech: normal and regular rate and rhythm  Language: intact and no problems  Psychomotor: normal or unremarkable  Mood: Anxiety has been high  Affect: full range and appropriate; was congruent to mood; was congruent to  content  Thought Process/Associations: unremarkable  Thought Content:  Reports none;  Denies suicidal ideation, violent ideation and delusions  Perception:  Reports none;  Denies auditory hallucinations and visual hallucinations  Insight: intact  Judgment: intact  Cognition: does  appear grossly intact; formal cognitive testing was not done  Gait and Station: not observed     Labs and Data          7/25/2023     8:23 AM 9/25/2023     9:08 AM 11/6/2023     9:43 AM   PHQ-9 SCORE   PHQ-9 Total Score MyChart 14 (Moderate depression) 3 (Minimal depression) 7 (Mild depression)   PHQ-9 Total Score 14 3 7         4/26/2023     5:06 PM 7/25/2023     8:24 AM 11/3/2023     4:06 PM   HAO-7 SCORE   Total Score 21 (severe anxiety) 17 (severe anxiety) 21 (severe anxiety)   Total Score 21 17 21       Recent Labs   Lab Test 06/12/23  0747 12/04/19  1223 06/19/19  1740   CR 0.81 0.79 0.72   GFRESTIMATED 88 >60 >90     Recent Labs   Lab Test 12/04/19  1223 06/19/19  1740   AST 12 15   ALT <9 33   ALKPHOS 50 53     Recent Labs   Lab Test 03/05/19  1232 03/05/19  0000 09/19/16  0935   TSH 2.12 2.12 2.50     ECG 6/19/19 QTc = 414ms

## 2023-11-07 NOTE — NURSING NOTE
Is the patient currently in the state of [place]? YES    Visit mode:VIDEO    If the visit is dropped, the patient can be reconnected by: VIDEO VISIT: Text to cell phone:   Telephone Information:   Mobile [phone]       Will anyone else be joining the visit? NO  (If patient encounters technical issues they should call [phone]    How would you like to obtain your AVS? MyChart    Are changes needed to the allergy or medication list? No    Reason for visit: RECHECK    Debbi ESPINOZA

## 2023-11-14 ENCOUNTER — OFFICE VISIT (OUTPATIENT)
Dept: NEUROSURGERY | Facility: CLINIC | Age: 48
End: 2023-11-14
Payer: COMMERCIAL

## 2023-11-14 VITALS
HEIGHT: 68 IN | SYSTOLIC BLOOD PRESSURE: 121 MMHG | BODY MASS INDEX: 28.78 KG/M2 | WEIGHT: 189.9 LBS | DIASTOLIC BLOOD PRESSURE: 83 MMHG | HEART RATE: 76 BPM

## 2023-11-14 DIAGNOSIS — Z98.890 S/P LUMBAR MICRODISCECTOMY: Primary | ICD-10-CM

## 2023-11-14 PROCEDURE — 99024 POSTOP FOLLOW-UP VISIT: CPT | Performed by: NURSE PRACTITIONER

## 2023-11-14 ASSESSMENT — PAIN SCALES - GENERAL: PAINLEVEL: MODERATE PAIN (5)

## 2023-11-14 NOTE — LETTER
"    11/14/2023         RE: Lilian Quinones  [address]  [place] [address]        Dear Colleague,    Thank you for referring your patient, Lilian Quinones, to [facility]. Please see a copy of my visit note below.    [facility]  Neurosurgery Follow Up:    HPI: 6 weeks s/p left L5-S1 microdiscectomy with Dr. Garcia on 10/3/2023.  Had increase in pain immediately after surgery which has since has gradually improved with MDP. She continues to have some sore/achy in her left posterior thigh to the foot. No paresthesias or overt weakness. No bowel/bladder complaints. Incision CDI.     Medical, surgical, family, and social history unchanged since prior exam.  Exam:  Constitutional:  Alert, well nourished, NAD.  HEENT: Normocephalic, atraumatic.   Pulm:  Without shortness of breath   CV:  No pitting edema of BLE.      Vital Signs:  /83   Pulse 76   Ht 5' 8\" (1.727 m)   Wt 189 lb 14.4 oz (86.1 kg)   LMP  (LMP Unknown)   BMI 28.87 kg/m      Neurological:  Awake  Alert  Oriented x 3  Motor exam:        IP Q DF PF EHL  R   5  5   5   5    5  L   5  5   5   5    5     Able to spontaneously move L/E bilaterally  Sensation intact throughout all L/E dermatomes     Incisions:  Healing nicely  Imaging: No imaging to review.    A/P: s/p lumbar microdiscectomy  May gradually increase activity as tolerated. Follow up in 6 weeks as scheduled. She verbalized understanding and agreement.  Patient Instructions   -May gradually increase activity as tolerated.  -Follow up in 6 weeks as scheduled.  -Please contact our clinic with questions or concerns at [phone].    Melissa Clemens CNP  [facility]  [address] [place] [address]  [address]  [place] [address]  Tel [phone]  Fax [phone]      Again, thank you for allowing me to participate in the care of your patient.        Sincerely,        Melissa Clemens NP  "

## 2023-11-14 NOTE — PATIENT INSTRUCTIONS
-May gradually increase activity as tolerated.  -Follow up in 6 weeks as scheduled.  -Please contact our clinic with questions or concerns at [phone].

## 2023-11-14 NOTE — PROGRESS NOTES
"[facility]  Neurosurgery Follow Up:    HPI: 6 weeks s/p left L5-S1 microdiscectomy with Dr. Garcia on 10/3/2023.  Had increase in pain immediately after surgery which has since has gradually improved with MDP. She continues to have some sore/achy in her left posterior thigh to the foot. No paresthesias or overt weakness. No bowel/bladder complaints. Incision CDI.     Medical, surgical, family, and social history unchanged since prior exam.  Exam:  Constitutional:  Alert, well nourished, NAD.  HEENT: Normocephalic, atraumatic.   Pulm:  Without shortness of breath   CV:  No pitting edema of BLE.      Vital Signs:  /83   Pulse 76   Ht 5' 8\" (1.727 m)   Wt 189 lb 14.4 oz (86.1 kg)   LMP  (LMP Unknown)   BMI 28.87 kg/m      Neurological:  Awake  Alert  Oriented x 3  Motor exam:        IP Q DF PF EHL  R   5  5   5   5    5  L   5  5   5   5    5     Able to spontaneously move L/E bilaterally  Sensation intact throughout all L/E dermatomes     Incisions:  Healing nicely  Imaging: No imaging to review.    A/P: s/p lumbar microdiscectomy  May gradually increase activity as tolerated. Follow up in 6 weeks as scheduled. She verbalized understanding and agreement.  Patient Instructions   -May gradually increase activity as tolerated.  -Follow up in 6 weeks as scheduled.  -Please contact our clinic with questions or concerns at [phone].    Melissa Clemens, JESSICA  [facility]  [address] [place] [address]  [address] [place] [address]  Tel [phone]  Fax [phone]    "

## 2023-11-14 NOTE — NURSING NOTE
"Lilian Quinones is a 48 year old female who presents for:  Chief Complaint   Patient presents with    Neurologic Problem     MIS Left L5-S1 microdiscectomy. DOS 10/3/23. Patient notes that this week seems to be a turning point that the pain is not so bad. She continues to have pain that will radiate down her left leg. She will take the pain medication every 6-7 hours.         Vitals:    Vitals:    11/14/23 1037   BP: 121/83   Pulse: 76   Weight: 189 lb 14.4 oz (86.1 kg)   Height: 5' 8\" (1.727 m)       BMI:  Estimated body mass index is 28.87 kg/m  as calculated from the following:    Height as of this encounter: 5' 8\" (1.727 m).    Weight as of this encounter: 189 lb 14.4 oz (86.1 kg).    Pain Score:  Moderate Pain (5)        Ewa Vega CMA      "

## 2023-11-16 ENCOUNTER — MYC REFILL (OUTPATIENT)
Dept: NEUROSURGERY | Facility: CLINIC | Age: 48
End: 2023-11-16
Payer: COMMERCIAL

## 2023-11-16 DIAGNOSIS — Z98.890 S/P LUMBAR MICRODISCECTOMY: ICD-10-CM

## 2023-11-16 RX ORDER — OXYCODONE HYDROCHLORIDE 5 MG/1
5-10 TABLET ORAL EVERY 4 HOURS PRN
Qty: 40 TABLET | Refills: 0 | Status: SHIPPED | OUTPATIENT
Start: 2023-11-16 | End: 2023-11-21

## 2023-11-16 RX ORDER — METHOCARBAMOL 500 MG/1
500-1000 TABLET, FILM COATED ORAL 4 TIMES DAILY PRN
Qty: 40 TABLET | Refills: 1 | Status: CANCELLED | OUTPATIENT
Start: 2023-11-16

## 2023-11-16 NOTE — TELEPHONE ENCOUNTER
Patient requesting refill of oxycodone and methocarbamol.     DOS: 10/3/23   Surgeon: Dr. Garcia  Procedure: Minimally invasive Left Lumbar 5 to Sacral 1 microdiscectomy   Weeks Post op: 6 weeks 2 days  Last refilled: Oxycodone 11/11 #40. Methocarbamol 11/14 #40.    Too early to fill methocarbamol as it was just sent to pharmacy on 11/14 with one refill allowed.     Pain assessment completed via [employer]. Please see encounter for further details. Refill request will be forwarded to care team.

## 2023-11-21 ENCOUNTER — MYC REFILL (OUTPATIENT)
Dept: NEUROSURGERY | Facility: CLINIC | Age: 48
End: 2023-11-21
Payer: COMMERCIAL

## 2023-11-21 DIAGNOSIS — Z98.890 S/P LUMBAR MICRODISCECTOMY: ICD-10-CM

## 2023-11-21 RX ORDER — METHOCARBAMOL 500 MG/1
500-1000 TABLET, FILM COATED ORAL 4 TIMES DAILY PRN
Qty: 40 TABLET | Refills: 1 | Status: CANCELLED | OUTPATIENT
Start: 2023-11-21

## 2023-11-22 NOTE — TELEPHONE ENCOUNTER
Patient requesting refill of oxycodone and methocarbamol.      DOS: 10/3/23   Surgeon: Dr. Garcia  Procedure: Minimally invasive Left Lumbar 5 to Sacral 1 microdiscectomy   Weeks Post op: 7 weeks 1 days  Last refilled: Oxycodone 11/16 #40. Methocarbamol 11/14 #40.        Pain assessment completed via [employer]. Please see encounter for further details. Refill request will be forwarded to care team.

## 2023-11-24 RX ORDER — OXYCODONE HYDROCHLORIDE 5 MG/1
5-10 TABLET ORAL EVERY 6 HOURS PRN
Qty: 40 TABLET | Refills: 0 | Status: SHIPPED | OUTPATIENT
Start: 2023-11-24 | End: 2023-11-29

## 2023-11-24 RX ORDER — METHOCARBAMOL 500 MG/1
500-1000 TABLET, FILM COATED ORAL 4 TIMES DAILY PRN
Qty: 40 TABLET | Refills: 1 | Status: SHIPPED | OUTPATIENT
Start: 2023-11-24 | End: 2024-05-06

## 2023-11-29 ENCOUNTER — MYC REFILL (OUTPATIENT)
Dept: NEUROSURGERY | Facility: CLINIC | Age: 48
End: 2023-11-29
Payer: COMMERCIAL

## 2023-11-29 DIAGNOSIS — Z98.890 S/P LUMBAR MICRODISCECTOMY: ICD-10-CM

## 2023-11-29 RX ORDER — OXYCODONE HYDROCHLORIDE 5 MG/1
5-10 TABLET ORAL EVERY 6 HOURS PRN
Qty: 40 TABLET | Refills: 0 | Status: SHIPPED | OUTPATIENT
Start: 2023-11-29 | End: 2023-12-05

## 2023-11-29 NOTE — TELEPHONE ENCOUNTER
Patient requesting refill of oxycodone.     DOS: 10/3/23  Surgeon: Dr. Garcia  Procedure: left L5-S1 microdiscectomy  Weeks Post op: 8 weeks 1 day  Last refilled: 11/24/23 #40    Patient concerned that she continues to have pain going down her leg at 8 weeks post-op, see other MyChart encounter. Patient wondering if nerve block would be indicated for her at this time.     Pain assessment completed via MyChart. Please see encounter for further details. Refill request will be forwarded to care team.

## 2023-12-01 NOTE — TELEPHONE ENCOUNTER
Per Melissa Clemens NP patient can start PT and/or MDP. If symptoms persist after therapy, would consider updated MRI.     Order placed for PT. MDP offered via [address]. Will order if patient would like to try medication.    Patient updated via [address].

## 2023-12-04 RX ORDER — METHYLPREDNISOLONE 4 MG
TABLET, DOSE PACK ORAL
Qty: 21 TABLET | Refills: 0 | Status: SHIPPED | OUTPATIENT
Start: 2023-12-04 | End: 2024-02-06

## 2023-12-05 ENCOUNTER — MYC REFILL (OUTPATIENT)
Dept: NEUROSURGERY | Facility: CLINIC | Age: 48
End: 2023-12-05
Payer: COMMERCIAL

## 2023-12-05 DIAGNOSIS — Z98.890 S/P LUMBAR MICRODISCECTOMY: ICD-10-CM

## 2023-12-05 RX ORDER — OXYCODONE HYDROCHLORIDE 5 MG/1
5-10 TABLET ORAL EVERY 6 HOURS PRN
Qty: 40 TABLET | Refills: 0 | Status: SHIPPED | OUTPATIENT
Start: 2023-12-06 | End: 2023-12-13

## 2023-12-05 NOTE — TELEPHONE ENCOUNTER
Patient requesting refill of oxycodone.     DOS: 10/3/23  Surgeon: Dr. Garcia  Procedure: left L5-S1 microdiscectomy  Weeks Post op: 9 weeks  Last refilled: oxy 11/29 #40, dated for 12/6 pickup    Pain assessment completed via [employer]. Please see encounter for further details. Refill request will be forwarded to care team.

## 2023-12-07 RX ORDER — BISACODYL 5 MG/1
TABLET, DELAYED RELEASE ORAL
Qty: 4 TABLET | Refills: 0 | Status: SHIPPED | OUTPATIENT
Start: 2023-12-07 | End: 2024-02-06

## 2023-12-07 NOTE — TELEPHONE ENCOUNTER
Pre assessment completed for upcoming procedure.      Procedure details:    Patient scheduled for Colonoscopy  on 12.22.2023.     Arrival time: 1230. Procedure time 1315    Pre op exam needed? N/A    Facility location: [facility]; [address], [place] [address]    Sedation type: Conscious sedation     Indication for procedure: screening colonoscopy    COVID policy reviewed.    Designated  policy reviewed. Instructed to have someone stay 6 hours post procedure.       Chart review:     Electronic implanted devices? No    Recent diagnosis of diverticulitis within the last 6 weeks?  No    Diabetic? No    Diabetic medication HOLDING recommendations: (if applicable)  Oral diabetic medications: N/A  Diabetic injectables: N/A  Insulin: N/A    Medication review:    Anticoagulants? No    NSAIDS? No    Other medication HOLDING recommendations:  N/A      Prep for procedure:     Bowel prep recommendation: Extended Golytely   Due to: chronic pain medication noted in chart.     Prep instructions sent via [employer] Bowel prep - patient already has the bowel prep kit from previously scheduled procedure.     Reviewed procedure prep instructions.     Patient verbalized understanding and had no questions or concerns at this time.        Coty Hernandez RN  Endoscopy Procedure Pre Assessment RN  [phone] option 4

## 2023-12-13 ENCOUNTER — MYC REFILL (OUTPATIENT)
Dept: NEUROSURGERY | Facility: CLINIC | Age: 48
End: 2023-12-13
Payer: COMMERCIAL

## 2023-12-13 DIAGNOSIS — Z98.890 S/P LUMBAR MICRODISCECTOMY: ICD-10-CM

## 2023-12-13 RX ORDER — OXYCODONE HYDROCHLORIDE 5 MG/1
5-10 TABLET ORAL EVERY 8 HOURS PRN
Qty: 40 TABLET | Refills: 0 | Status: SHIPPED | OUTPATIENT
Start: 2023-12-13 | End: 2023-12-19

## 2023-12-13 NOTE — TELEPHONE ENCOUNTER
Patient requesting refill of oxycodone.     DOS: 10/3/23  Surgeon: Dr. Garcia  Procedure: left L5-S1 microdiscectomy  Weeks Post op: 10 weeks 1 day   Last refilled: 12/6 #40    Pain assessment completed via [employer]. Please see encounter for further details. Refill request will be forwarded to care team.

## 2023-12-18 RX ORDER — ONDANSETRON 2 MG/ML
4 INJECTION INTRAMUSCULAR; INTRAVENOUS EVERY 6 HOURS PRN
Status: CANCELLED | OUTPATIENT
Start: 2023-12-18

## 2023-12-18 RX ORDER — ONDANSETRON 2 MG/ML
4 INJECTION INTRAMUSCULAR; INTRAVENOUS
Status: CANCELLED | OUTPATIENT
Start: 2023-12-18

## 2023-12-18 RX ORDER — NALOXONE HYDROCHLORIDE 0.4 MG/ML
0.4 INJECTION, SOLUTION INTRAMUSCULAR; INTRAVENOUS; SUBCUTANEOUS
Status: CANCELLED | OUTPATIENT
Start: 2023-12-18

## 2023-12-18 RX ORDER — PROCHLORPERAZINE MALEATE 10 MG
10 TABLET ORAL EVERY 6 HOURS PRN
Status: CANCELLED | OUTPATIENT
Start: 2023-12-18

## 2023-12-18 RX ORDER — NALOXONE HYDROCHLORIDE 0.4 MG/ML
0.2 INJECTION, SOLUTION INTRAMUSCULAR; INTRAVENOUS; SUBCUTANEOUS
Status: CANCELLED | OUTPATIENT
Start: 2023-12-18

## 2023-12-18 RX ORDER — LIDOCAINE 40 MG/G
CREAM TOPICAL
Status: CANCELLED | OUTPATIENT
Start: 2023-12-18

## 2023-12-18 RX ORDER — FLUMAZENIL 0.1 MG/ML
0.2 INJECTION, SOLUTION INTRAVENOUS
Status: CANCELLED | OUTPATIENT
Start: 2023-12-18 | End: 2023-12-18

## 2023-12-18 RX ORDER — ONDANSETRON 4 MG/1
4 TABLET, ORALLY DISINTEGRATING ORAL EVERY 6 HOURS PRN
Status: CANCELLED | OUTPATIENT
Start: 2023-12-18

## 2023-12-19 ENCOUNTER — MYC REFILL (OUTPATIENT)
Dept: NEUROSURGERY | Facility: CLINIC | Age: 48
End: 2023-12-19
Payer: COMMERCIAL

## 2023-12-19 DIAGNOSIS — Z98.890 S/P LUMBAR MICRODISCECTOMY: ICD-10-CM

## 2023-12-20 RX ORDER — OXYCODONE HYDROCHLORIDE 5 MG/1
5-10 TABLET ORAL EVERY 8 HOURS PRN
Qty: 40 TABLET | Refills: 0 | Status: SHIPPED | OUTPATIENT
Start: 2023-12-20 | End: 2023-12-27

## 2023-12-20 NOTE — TELEPHONE ENCOUNTER
Patient requesting refill of Oxycodone.     DOS: 10/3/23  Surgeon: Dr. Garcia  Procedure: left L5-S1 microdiscectomy  Weeks Post op: 11 weeks 1 day   Last refilled: 12/13 #40    Pt updated again that we will NOT refill past 12 weeks post op and she needs to make plans with her PCP if she is unable to wean further.     Pain assessment completed via MyChart. Please see encounter for further details. Refill request will be forwarded to care team.    Number Of Specimens Per Diagnosis: 2

## 2023-12-21 ENCOUNTER — TELEPHONE (OUTPATIENT)
Dept: GASTROENTEROLOGY | Facility: CLINIC | Age: 48
End: 2023-12-21
Payer: COMMERCIAL

## 2023-12-21 NOTE — TELEPHONE ENCOUNTER
Caller: Lilian    Reason for Reschedule/Cancellation (please be detailed, any staff messages or encounters to note?): Pt is sick and will call back at a later time to reschedule       Prior to reschedule please review:  Ordering Provider: BRETT SYLVESTER   Sedation per order: Moderate  Does patient have any ASC Exclusions, please identify?: No      Notes on Cancelled Procedure:  Procedure: Lower Endoscopy [Colonoscopy]   Date: 12/22/2023  Location: [facility]; [address] [place] [address] [address], [place] [address]  Surgeon: MARIELLA      Rescheduled: No

## 2023-12-27 ENCOUNTER — TELEPHONE (OUTPATIENT)
Dept: NEUROSURGERY | Facility: CLINIC | Age: 48
End: 2023-12-27
Payer: COMMERCIAL

## 2023-12-27 DIAGNOSIS — Z98.890 S/P LUMBAR MICRODISCECTOMY: ICD-10-CM

## 2023-12-27 RX ORDER — OXYCODONE HYDROCHLORIDE 5 MG/1
5-10 TABLET ORAL EVERY 8 HOURS PRN
Qty: 40 TABLET | Refills: 0 | Status: SHIPPED | OUTPATIENT
Start: 2023-12-27 | End: 2024-01-03

## 2023-12-27 NOTE — TELEPHONE ENCOUNTER
[facility] Call Center    Phone Message    May a detailed message be left on voicemail: yes     Reason for Call: Other: Lilian called she wants to discuss her plan for pain management because she will be out of pain medication in 2 days.Please call to discuss     Action Taken: Other: [facility]    Travel Screening: Not Applicable

## 2023-12-27 NOTE — TELEPHONE ENCOUNTER
Attempted to reach out to patient, no answer. Left voice message for them to call clinic back to further discuss.

## 2023-12-27 NOTE — TELEPHONE ENCOUNTER
Patient calling for a refill of Oxycodone.     DOS: 10/3/23  Procedure: left L5-S1 microdiscectomy   Surgeon: Dr Jose Rodriguez Post Op: 12    Current symptom(s): at worst 8/10 stabbing pain in left glute to left thigh. Exacerbated with activity. No new symptoms, no weakness. No n/t.   Does not feel there has been any improvement since surgery. If anything worse now than before. No change to pain pattern. Sharp now, was dull before surgery.     Current pain management: 2 tabs of Oxycodone in AM, 2 tabs before bed.   Robaxin doesn't work.  Encouraged Tylenol. Heat, ice     Reviewed Red Flags.       Last fill: 12/20/23  Next visit: 1/9/24    Medication pended for your approval, if appropriate. Pharmacy verified.     Any patient questions or concerns:     Informed patient request will be forwarded to care team.

## 2023-12-27 NOTE — TELEPHONE ENCOUNTER
[facility] Call Center    Phone Message    May a detailed message be left on voicemail: yes     Reason for Call: Other: pt called stating that care team had called her, she is returning a phone for care team in regards to medication oxycodone. Pain is worse than before the surgery. Can care team call her back?      Action Taken: Other: [facility] [[id number]]    Travel Screening: Not Applicable

## 2023-12-28 ENCOUNTER — OFFICE VISIT (OUTPATIENT)
Dept: NEUROSURGERY | Facility: CLINIC | Age: 48
End: 2023-12-28
Payer: COMMERCIAL

## 2023-12-28 VITALS — OXYGEN SATURATION: 98 % | HEART RATE: 77 BPM | SYSTOLIC BLOOD PRESSURE: 123 MMHG | DIASTOLIC BLOOD PRESSURE: 81 MMHG

## 2023-12-28 DIAGNOSIS — Z98.890 S/P LUMBAR MICRODISCECTOMY: Primary | ICD-10-CM

## 2023-12-28 PROCEDURE — 99024 POSTOP FOLLOW-UP VISIT: CPT | Performed by: NURSE PRACTITIONER

## 2023-12-28 ASSESSMENT — PAIN SCALES - GENERAL: PAINLEVEL: SEVERE PAIN (7)

## 2023-12-28 NOTE — LETTER
12/28/2023         RE: Lilian Quinones  [address]  [place] [address]        Dear Colleague,    Thank you for referring your patient, Lilian Quinones, to the [facility]. Please see a copy of my visit note below.    [facility] Follow-Up:     HPI: 3 months s/p MIS left L5-S1 microdiscectomy with Dr. Garcia. Patient reports improvement in pre op pain in low back, left calf, and left foot. Her main concern is worsening, sharp pain in left buttocks and left posterior thigh. Muscle spasms resolved after recent MDP. She stopped taking robaxin about 2 weeks ago. She continues to take oxycodone 2 tabs in AM and PM, as well as tylenol throughout the day. She denies any numbness, falls, or bowel/bladder changes. She reports some left leg weakness when changing positions from sitting to standing. Patient has been following post op activity restrictions.     Current pain: 7/10    Exam:  Constitutional:  Alert, well nourished, NAD.  HEENT: Normocephalic, atraumatic.   Pulm:  Without shortness of breath   CV:  No pitting edema of BLE.      /81   Pulse 77   SpO2 98%     Neurological:  Awake  Alert  Oriented x 3  Motor exam:  Hip Flexor:                Right: 5/5  Left:  5/5  Quadriceps:              Right:  5/5  Left:  5/5  Hamstrings:              Right:  5/5  Left:  5/5  Gastroc Soleus:        Right:  5/5  Left:  5/5  Tib/Ant:                      Right:  5/5  Left:  5/5  EHL:                          Right:  5/5  Left:  5/5      Able to spontaneously move BLE  Sensation intact throughout BLE  Normal gait     Incision: Well healed     Assessment/Plan:   3 months s/p MIS left L5-S1 microdiscectomy with Dr. Garcia. Patient reports sharp pain in left buttocks and left posterior thigh, worse compared to pre op. She has tried various pain control measures but symptoms persist.     - Lumbar spine MRI ordered for  further evaluation  - Continue with  pain control measures as needed   - No lifting more than 20 lbs   - Will call patient with MRI results and next steps   - Call our clinic for any incisional concerns, increased pain, new symptoms, or any other post operative questions or concerns    Patient voiced understanding and agreement.      Therese Vargas CNP  [facility]  [address] [place] [address]  Tel [phone]  Pager [phone]      Again, thank you for allowing me to participate in the care of your patient.        Sincerely,        Therese Vargas, NP

## 2023-12-28 NOTE — PATIENT INSTRUCTIONS
Order for lumbar spine MRI. I will call with the results and next steps.     Okay to continue with pain medications  until MRI is completed.      Please call our clinic if symptoms persist, change, or worsen at any time.    [facility]  Tel [phone]

## 2023-12-28 NOTE — TELEPHONE ENCOUNTER
Contacted pt and offered Yoni appt to see Therese at 11 to which patient agreed to and was then scheduled for.

## 2023-12-28 NOTE — PROGRESS NOTES
[facility] Follow-Up:     HPI: 3 months s/p MIS left L5-S1 microdiscectomy with Dr. Garcia. Patient reports improvement in pre op pain in low back, left calf, and left foot. Her main concern is worsening, sharp pain in left buttocks and left posterior thigh. Muscle spasms resolved after recent MDP. She stopped taking robaxin about 2 weeks ago. She continues to take oxycodone 2 tabs in AM and PM, as well as tylenol throughout the day. She denies any numbness, falls, or bowel/bladder changes. She reports some left leg weakness when changing positions from sitting to standing. Patient has been following post op activity restrictions.     Current pain: 7/10    Exam:  Constitutional:  Alert, well nourished, NAD.  HEENT: Normocephalic, atraumatic.   Pulm:  Without shortness of breath   CV:  No pitting edema of BLE.      /81   Pulse 77   SpO2 98%     Neurological:  Awake  Alert  Oriented x 3  Motor exam:  Hip Flexor:                Right: 5/5  Left:  5/5  Quadriceps:              Right:  5/5  Left:  5/5  Hamstrings:              Right:  5/5  Left:  5/5  Gastroc Soleus:        Right:  5/5  Left:  5/5  Tib/Ant:                      Right:  5/5  Left:  5/5  EHL:                          Right:  5/5  Left:  5/5      Able to spontaneously move BLE  Sensation intact throughout BLE  Normal gait     Incision: Well healed     Assessment/Plan:   3 months s/p MIS left L5-S1 microdiscectomy with Dr. Garcia. Patient reports sharp pain in left buttocks and left posterior thigh, worse compared to pre op. She has tried various pain control measures but symptoms persist.     - Lumbar spine MRI ordered for  further evaluation  - Continue with pain control measures as needed   - No lifting more than 20 lbs   - Will call patient with MRI results and next steps   - Call our clinic for any incisional concerns, increased pain, new symptoms, or any other post operative questions or concerns    Patient voiced understanding  and agreement.      Therese Vargas CNP  [address] [address]  Tel [phone]  Pager [phone]

## 2023-12-28 NOTE — NURSING NOTE
"Lilian Quinones is a 48 year old female who presents for:  Chief Complaint   Patient presents with    RECHECK     Sharp pain between left buttock and left knee - standing is really difficult - pain level 7 and dull ache that inhibits sleep. Lots of muscle fatigue this last week        Initial Vitals:  /81   Pulse 77   SpO2 98%  Estimated body mass index is 28.87 kg/m  as calculated from the following:    Height as of 11/14/23: 5' 8\" (1.727 m).    Weight as of 11/14/23: 189 lb 14.4 oz (86.1 kg).. There is no height or weight on file to calculate BSA. BP completed using cuff size: regular  Severe Pain (7)    Nursing Comments:     Tony Underwood    "

## 2023-12-29 ENCOUNTER — ANCILLARY PROCEDURE (OUTPATIENT)
Dept: MRI IMAGING | Facility: CLINIC | Age: 48
End: 2023-12-29
Attending: NURSE PRACTITIONER
Payer: COMMERCIAL

## 2023-12-29 DIAGNOSIS — Z98.890 S/P LUMBAR MICRODISCECTOMY: ICD-10-CM

## 2023-12-29 PROCEDURE — A9585 GADOBUTROL INJECTION: HCPCS | Performed by: RADIOLOGY

## 2023-12-29 PROCEDURE — 72158 MRI LUMBAR SPINE W/O & W/DYE: CPT | Mod: GC | Performed by: RADIOLOGY

## 2023-12-29 RX ORDER — GADOBUTROL 604.72 MG/ML
10 INJECTION INTRAVENOUS ONCE
Status: COMPLETED | OUTPATIENT
Start: 2023-12-29 | End: 2023-12-29

## 2023-12-29 RX ADMIN — GADOBUTROL 8.5 ML: 604.72 INJECTION INTRAVENOUS at 11:49

## 2024-01-02 ASSESSMENT — PATIENT HEALTH QUESTIONNAIRE - PHQ9: SUM OF ALL RESPONSES TO PHQ QUESTIONS 1-9: 15

## 2024-01-03 ENCOUNTER — OFFICE VISIT (OUTPATIENT)
Dept: NEUROSURGERY | Facility: CLINIC | Age: 49
End: 2024-01-03
Payer: COMMERCIAL

## 2024-01-03 VITALS — HEART RATE: 68 BPM | OXYGEN SATURATION: 100 % | DIASTOLIC BLOOD PRESSURE: 87 MMHG | SYSTOLIC BLOOD PRESSURE: 123 MMHG

## 2024-01-03 DIAGNOSIS — Z98.890 S/P LUMBAR MICRODISCECTOMY: ICD-10-CM

## 2024-01-03 DIAGNOSIS — M54.12 CERVICAL RADICULOPATHY: Primary | ICD-10-CM

## 2024-01-03 PROCEDURE — 99214 OFFICE O/P EST MOD 30 MIN: CPT | Performed by: NURSE PRACTITIONER

## 2024-01-03 RX ORDER — OXYCODONE HYDROCHLORIDE 5 MG/1
5-10 TABLET ORAL EVERY 8 HOURS PRN
Qty: 40 TABLET | Refills: 0 | Status: SHIPPED | OUTPATIENT
Start: 2024-01-03 | End: 2024-01-09

## 2024-01-03 ASSESSMENT — PATIENT HEALTH QUESTIONNAIRE - PHQ9
SUM OF ALL RESPONSES TO PHQ QUESTIONS 1-9: 15
SUM OF ALL RESPONSES TO PHQ QUESTIONS 1-9: 15
10. IF YOU CHECKED OFF ANY PROBLEMS, HOW DIFFICULT HAVE THESE PROBLEMS MADE IT FOR YOU TO DO YOUR WORK, TAKE CARE OF THINGS AT HOME, OR GET ALONG WITH OTHER PEOPLE: EXTREMELY DIFFICULT

## 2024-01-03 ASSESSMENT — PAIN SCALES - GENERAL: PAINLEVEL: SEVERE PAIN (7)

## 2024-01-03 NOTE — PATIENT INSTRUCTIONS
Order for cervical spine MRI. You can stop at the  to schedule, or call [phone]. I will call with the results and next steps.     Referral to [facility]. They will call you to schedule.     Oxycodone refill sent to pharmacy.     Please call our clinic if symptoms persist, change, or worsen at any time.    [facility]  [address] [address]  Tel [phone]

## 2024-01-03 NOTE — LETTER
1/3/2024         RE: Lilian Quinones  [address]  [place] [address]        Dear Colleague,    Thank you for referring your patient, iLlian Quinones, to the [facility]UROLOG[facility]. Please see a copy of my visit note below.    [facility] Neurosurgery Clinic Visit      CC: right shoulder pain, lower neck pain, numbness/tingling, low back pain, left leg pain     Primary Care Provider: Shira Olson    Reason For Visit:   Follow-up     HPI: Lilian Quinones is a 48 year old female who presents for follow-up. She is  3 months  s/p MIS left  L5-S1 microdiscectomy on 10/3/23 with Dr. Garcia. She was recently seen in clinic on 12/28/23 for post op follow-up. At that visit, she reported improvement in pre op pain in low back, left calf, and left foot. Her main concern was worsening, sharp pain in left buttocks and left posterior thigh. Lumbar spine MRI was completed on 12/29/23 and reveals post op changes without significant  residual stenosis, and L4-5 mild to moderate bilateral foraminal stenosis left greater than right. Today, patient reports continued pain in left  buttocks and left posterior thigh. She also reports chronic paresthesias in left  lateral leg and foot x 2 years. She describes chronic constipation x 2 years and difficulty  fully emptying bladder x 3 months. Denies any weakness, falls, incontinence, or saddle anesthesia. Prior to surgery, she followed with Dr. Valenzuela for pelvic joint dysfunction and tried injections including lumbar WENCESLAO and left piriformis, but this did not provide pain relief.     Patient also reports history of chronic cervicalgia. Symptoms were managed with  WENCESLAO, most recently C7-T1 WENCESLAO on 10/12/23. Today, patient describes lower neck pain that radiates to right shoulder and  shoulder blade. Right sided symptoms have  been present for about 6 months. She  reports chronic numbness in left 4th and 5th digits for 2 years. Denies any  weakness or dexterity issues. Most recent cervical spine MRI from December 2022 reveals moderate left foraminal stenosis at C6-7.     Patient continues to take  oxycodone for pain management. She cannot take NSAIDS due to history of gastric bypass surgery.     Past Medical History:   Diagnosis Date     Arthritis 2021     IUD (intrauterine device) in place 03/20/2019    Mirena       Past Medical History reviewed with patient during visit.    Past Surgical History:   Procedure Laterality Date     ABDOMEN SURGERY  6/2019    Weight loss surgery     BIOPSY      Skin Biopsy     BIOPSY BREAST       BREAST SURGERY      Breast reduction sergury      DISCECTOMY LUMBAR POSTERIOR MICROSCOPIC ONE LEVEL Left 10/3/2023    Procedure: Minimally invasive Left Lumbar 5 to Sacral 1 microdiscectomy;  Surgeon: Milan Garcia MD;  Location:  OR     LASIK       MAMMOPLASTY REDUCTION  01/01/2008     AR LAP, HONORIO RESTRICT PROC, LONGITUDINAL GASTRECTOMY N/A 06/24/2019    Procedure: LAPAROSCOPIC SLEEVE GASTRECTOMY;  Surgeon: Perfecto Enciso MD;  Location: [facility];  Service: General     Past Surgical History reviewed with patient during visit.    Current Outpatient Medications   Medication     Bioflavonoid Products (VITAMIN C) CHEW     Calcium Polycarbophil (FIBER) 625 MG tablet     diphenhydrAMINE HCl, Sleep, (ZZZQUIL PO)     levonorgestrel (MIRENA) 20 MCG/24HR IUD     melatonin 5 MG tablet     Multiple Vitamins-Minerals (MULTIVITAMIN GUMMIES WOMENS) CHEW     oxyCODONE (ROXICODONE) 5 MG tablet     propranolol (INDERAL) 40 MG tablet     bisacodyl (DULCOLAX) 5 MG EC tablet     bisacodyl (DULCOLAX) 5 MG EC tablet     diazepam (VALIUM) 5 MG tablet     methocarbamol (ROBAXIN) 500 MG tablet     methylPREDNISolone (MEDROL DOSEPAK) 4 MG tablet therapy pack     polyethylene glycol (GOLYTELY) 236 g suspension     senna-docusate (SENOKOT-S/PERICOLACE) 8.6-50 MG tablet     No current facility-administered medications for this visit.        Allergies   Allergen Reactions     Amoxicillin Rash     Penicillins Rash       Social History     Socioeconomic History     Marital status:      Spouse name: None     Number of children: None     Years of education: None     Highest education level: Associate degree: academic program   Tobacco Use     Smoking status: Never     Smokeless tobacco: Never   Vaping Use     Vaping Use: Never used   Substance and Sexual Activity     Alcohol use: Yes     Drug use: No     Sexual activity: Yes     Partners: Male     Birth control/protection: I.U.D.   Other Topics Concern     Parent/sibling w/ CABG, MI or angioplasty before 65F 55M? No     Social Determinants of Health     Financial Resource Strain: Low Risk  (9/22/2023)    Financial Resource Strain      Within the past 12 months, have you or your family members you live with been unable to get utilities (heat, electricity) when it was really needed?: No   Food Insecurity: Low Risk  (9/22/2023)    Food Insecurity      Within the past 12 months, did you worry that your food would run out before you got money to buy more?: No      Within the past 12 months, did the food you bought just not last and you didn t have money to get more?: No   Transportation Needs: Low Risk  (9/22/2023)    Transportation Needs      Within the past 12 months, has lack of transportation kept you from medical appointments, getting your medicines, non-medical meetings or appointments, work, or from getting things that you need?: No   Physical Activity: Insufficiently Active (8/5/2020)    Exercise Vital Sign      Days of Exercise per Week: 2 days      Minutes of Exercise per Session: 60 min   Social Connections: Moderately Isolated (8/5/2020)    Social Connection and Isolation Panel [NHANES]      Frequency of Communication with Friends and Family: More than three times a week      Frequency of Social Gatherings with Friends and Family: Once a week      Attends Rastafari Services: Never       Active Member of Clubs or Organizations: No      Attends Club or Organization Meetings: Patient declined      Marital Status:    Interpersonal Safety: Low Risk  (2023)    Interpersonal Safety      Do you feel physically and emotionally safe where you currently live?: Yes      Within the past 12 months, have you been hit, slapped, kicked or otherwise physically hurt by someone?: No      Within the past 12 months, have you been humiliated or emotionally abused in other ways by your partner or ex-partner?: No   Housing Stability: Low Risk  (2023)    Housing Stability      Do you have housing? : Yes      Are you worried about losing your housing?: No       Family History   Problem Relation Age of Onset     Breast Cancer Mother      Cerebrovascular Disease Mother      Diabetes Father      Heart Disease Maternal Grandmother      Hypertension Maternal Grandfather      Hyperlipidemia Maternal Grandfather      Pancreatic Cancer Paternal Grandmother      Diabetes Paternal Grandmother      Other Cancer Paternal Grandfather      Depression Sister      Depression Son          by suicide 2019     Anxiety Disorder Son      Melanoma No family hx of      Glaucoma No family hx of      Macular Degeneration No family hx of        ROS: 10 point ROS neg other than the symptoms noted above in the HPI.    Vital Signs: /87   Pulse 68   SpO2 100%     Neurological Examination:  Awake  Alert  Oriented x 3  Speech clear    Motor exam:  Shoulder Abduction  Right:  5/5   Left:  5/5  Biceps                      Right:  5/5   Left:  5/5  Triceps                     Right:  5/5   Left:  5/5  Wrist Extensors        Right:  5/5   Left:  5/5  Wrist Flexors            Right:  5/5   Left:  5/5  Intrinsics                   Right:  5/5   Left:  5/5    Iliopsoas  (hip flexion)               Right: 5/5  Left:  5/5  Quadriceps  (knee extension)       Right:  5/5  Left:  5/5  Hamstrings  (knee flexion)            Right:  5/5   Left:  5/5  Gastroc Soleus  (PF)                          Right:  5/5  Left:  5/5  Tibialis Ant  (DF)                          Right:  5/5  Left:  5/5  EHL                          Right:  5/5  Left:  5/5         Sensation normal to BUE and BLE    Reflexes are 2+ in the patellar and Achilles. There is no clonus.     Reflexes are 2+ in the brachial radialis and triceps. Negative Roselia sign bilaterally.    Musculoskeletal:  Gait: Able to stand from a seated position. Normal non-antalgic, non-myelopathic gait.   Cervical examination reveals good range of motion.  No tenderness of the spine or paraspinous muscles.  Negative SI joint tenderness bilaterally.  Negative straight leg raise bilaterally.    Incision: Well healed     Imaging:   Cervical spine MRI 12/14/22  reveals moderate left foraminal stenosis at C6-7.     Lumbar spine MRI 12/29/23 reveals post op changes at L5-S1 without significant spinal canal or foraminal stenosis, and mild to moderate bilateral foraminal stenosis at L4-5, left greater than right.     Assessment/Plan:   3 months s/p MIS left L5-S1 microdiscectomy  - Continued pain in left buttocks and left posterior thigh, and chronic LLE paresthesias. Lumbar spine MRI from 12/29/23 reveals post op changes without significant residual stenosis, and L4-5 mild to moderate bilateral foraminal stenosis, left greater than right. Prior to surgery, patient followed with Dr. Valenzuela for pelvic joint dysfunction and tried injections including lumbar WENCESLAO and left piriformis, but this did not provide pain relief. Will review imaging with Dr. Garcia and call patient with further recommendations. Oxycodone refilled today.   - ADDENDUM: Discussed with Dr. Garcia. Recommend referral to Dr. Bennett for further evaluation and possible injections. Called and spoke to patient. She voiced agreement with plan.     Chronic lower neck pain radiating to right shoulder and shoulder blade   - Most recent cervical spine MRI from  December 2022 reveals moderate left foraminal stenosis at C6-7. Recommend an updated cervical spine MRI  for further  evaluation. Will call patient with results and next steps.     Chronic pain management  - Oxycodone refilled today  - Placed referral to [facility] for comprehensive evaluation.     Advised patient to call our clinic with any questions or concerns.   Patient voiced understanding and agreement.      Therese Vargas CNP  [facility] [place] [address]  Tel [phone]  Pager [phone]        Again, thank you for allowing me to participate in the care of your patient.        Sincerely,        Therese aVrgas, ALEXEI

## 2024-01-03 NOTE — PROGRESS NOTES
[facility] Neurosurgery Clinic Visit      CC: right shoulder pain, lower neck pain, numbness/tingling, low back pain, left leg pain     Primary Care Provider: Shira Olson    Reason For Visit:   Follow-up     HPI: Lilian Quinones is a 48 year old female who presents for follow-up. She is  3 months  s/p MIS left  L5-S1 microdiscectomy on 10/3/23 with Dr. Garcia. She was recently seen in clinic on 12/28/23 for post op follow-up. At that visit, she reported improvement in pre op pain in low back, left calf, and left foot. Her main concern was worsening, sharp pain in left buttocks and left posterior thigh. Lumbar spine MRI was completed on 12/29/23 and reveals post op changes without significant  residual stenosis, and L4-5 mild to moderate bilateral foraminal stenosis left greater than right. Today, patient reports continued pain in left  buttocks and left posterior thigh. She also reports chronic paresthesias in left  lateral leg and foot x 2 years. She describes chronic constipation x 2 years and difficulty  fully emptying bladder x 3 months. Denies any weakness, falls, incontinence, or saddle anesthesia. Prior to surgery, she followed with Dr. Valenzuela for pelvic joint dysfunction and tried injections including lumbar WENCESLAO and left piriformis, but this did not provide pain relief.     Patient also reports history of chronic cervicalgia. Symptoms were managed with  WENCESLAO, most recently C7-T1 WENCESLAO on 10/12/23. Today, patient describes lower neck pain that radiates to right shoulder and  shoulder blade. Right sided symptoms have  been present for about 6 months. She  reports chronic numbness in left 4th and 5th digits for 2 years. Denies any weakness or dexterity issues. Most recent cervical spine MRI from December 2022 reveals moderate left foraminal stenosis at C6-7.     Patient continues to take  oxycodone for pain management. She cannot take NSAIDS due to history of gastric bypass surgery.     Past Medical  History:   Diagnosis Date    Arthritis 2021    IUD (intrauterine device) in place 03/20/2019    Mirena       Past Medical History reviewed with patient during visit.    Past Surgical History:   Procedure Laterality Date    ABDOMEN SURGERY  6/2019    Weight loss surgery    BIOPSY      Skin Biopsy    BIOPSY BREAST      BREAST SURGERY      Breast reduction sergury     DISCECTOMY LUMBAR POSTERIOR MICROSCOPIC ONE LEVEL Left 10/3/2023    Procedure: Minimally invasive Left Lumbar 5 to Sacral 1 microdiscectomy;  Surgeon: Milan Garcia MD;  Location:  OR    LASIK      MAMMOPLASTY REDUCTION  01/01/2008    AK LAP, HONORIO RESTRICT PROC, LONGITUDINAL GASTRECTOMY N/A 06/24/2019    Procedure: LAPAROSCOPIC SLEEVE GASTRECTOMY;  Surgeon: Perfecto Enciso MD;  Location: [facility];  Service: General     Past Surgical History reviewed with patient during visit.    Current Outpatient Medications   Medication    Bioflavonoid Products (VITAMIN C) CHEW    Calcium Polycarbophil (FIBER) 625 MG tablet    diphenhydrAMINE HCl, Sleep, (ZZZQUIL PO)    levonorgestrel (MIRENA) 20 MCG/24HR IUD    melatonin 5 MG tablet    Multiple Vitamins-Minerals (MULTIVITAMIN GUMMIES WOMENS) CHEW    oxyCODONE (ROXICODONE) 5 MG tablet    propranolol (INDERAL) 40 MG tablet    bisacodyl (DULCOLAX) 5 MG EC tablet    bisacodyl (DULCOLAX) 5 MG EC tablet    diazepam (VALIUM) 5 MG tablet    methocarbamol (ROBAXIN) 500 MG tablet    methylPREDNISolone (MEDROL DOSEPAK) 4 MG tablet therapy pack    polyethylene glycol (GOLYTELY) 236 g suspension    senna-docusate (SENOKOT-S/PERICOLACE) 8.6-50 MG tablet     No current facility-administered medications for this visit.       Allergies   Allergen Reactions    Amoxicillin Rash    Penicillins Rash       Social History     Socioeconomic History    Marital status:      Spouse name: None    Number of children: None    Years of education: None    Highest education level: Associate degree: academic program    Tobacco Use    Smoking status: Never    Smokeless tobacco: Never   Vaping Use    Vaping Use: Never used   Substance and Sexual Activity    Alcohol use: Yes    Drug use: No    Sexual activity: Yes     Partners: Male     Birth control/protection: I.U.D.   Other Topics Concern    Parent/sibling w/ CABG, MI or angioplasty before 65F 55M? No     Social Determinants of Health     Financial Resource Strain: Low Risk  (9/22/2023)    Financial Resource Strain     Within the past 12 months, have you or your family members you live with been unable to get utilities (heat, electricity) when it was really needed?: No   Food Insecurity: Low Risk  (9/22/2023)    Food Insecurity     Within the past 12 months, did you worry that your food would run out before you got money to buy more?: No     Within the past 12 months, did the food you bought just not last and you didn t have money to get more?: No   Transportation Needs: Low Risk  (9/22/2023)    Transportation Needs     Within the past 12 months, has lack of transportation kept you from medical appointments, getting your medicines, non-medical meetings or appointments, work, or from getting things that you need?: No   Physical Activity: Insufficiently Active (8/5/2020)    Exercise Vital Sign     Days of Exercise per Week: 2 days     Minutes of Exercise per Session: 60 min   Social Connections: Moderately Isolated (8/5/2020)    Social Connection and Isolation Panel [NHANES]     Frequency of Communication with Friends and Family: More than three times a week     Frequency of Social Gatherings with Friends and Family: Once a week     Attends Temple Services: Never     Active Member of Clubs or Organizations: No     Attends Club or Organization Meetings: Patient declined     Marital Status:    Interpersonal Safety: Low Risk  (9/25/2023)    Interpersonal Safety     Do you feel physically and emotionally safe where you currently live?: Yes     Within the past 12 months, have  you been hit, slapped, kicked or otherwise physically hurt by someone?: No     Within the past 12 months, have you been humiliated or emotionally abused in other ways by your partner or ex-partner?: No   Housing Stability: Low Risk  (2023)    Housing Stability     Do you have housing? : Yes     Are you worried about losing your housing?: No       Family History   Problem Relation Age of Onset    Breast Cancer Mother     Cerebrovascular Disease Mother     Diabetes Father     Heart Disease Maternal Grandmother     Hypertension Maternal Grandfather     Hyperlipidemia Maternal Grandfather     Pancreatic Cancer Paternal Grandmother     Diabetes Paternal Grandmother     Other Cancer Paternal Grandfather     Depression Sister     Depression Son          by suicide 2019    Anxiety Disorder Son     Melanoma No family hx of     Glaucoma No family hx of     Macular Degeneration No family hx of        ROS: 10 point ROS neg other than the symptoms noted above in the HPI.    Vital Signs: /87   Pulse 68   SpO2 100%     Neurological Examination:  Awake  Alert  Oriented x 3  Speech clear    Motor exam:  Shoulder Abduction  Right:  5/5   Left:  5/5  Biceps                      Right:  5/5   Left:  5/5  Triceps                     Right:  5/5   Left:  5/5  Wrist Extensors        Right:  5/5   Left:  5/5  Wrist Flexors            Right:  5   Left:  5/5  Intrinsics                   Right:  5/   Left:  5/5    Iliopsoas  (hip flexion)               Right: 5/5  Left:  5/5  Quadriceps  (knee extension)       Right:  5/5  Left:  5/5  Hamstrings  (knee flexion)            Right:  5/5  Left:  5/5  Gastroc Soleus  (PF)                          Right:  5/5  Left:  5/5  Tibialis Ant  (DF)                          Right:  5/5  Left:  5/5  EHL                          Right:  5/5  Left:  5/5         Sensation normal to BUE and BLE    Reflexes are 2+ in the patellar and Achilles. There is no clonus.     Reflexes are 2+ in  the brachial radialis and triceps. Negative Roselia sign bilaterally.    Musculoskeletal:  Gait: Able to stand from a seated position. Normal non-antalgic, non-myelopathic gait.   Cervical examination reveals good range of motion.  No tenderness of the spine or paraspinous muscles.  Negative SI joint tenderness bilaterally.  Negative straight leg raise bilaterally.    Incision: Well healed     Imaging:   Cervical spine MRI 12/14/22  reveals moderate left foraminal stenosis at C6-7.     Lumbar spine MRI 12/29/23 reveals post op changes at L5-S1 without significant spinal canal or foraminal stenosis, and mild to moderate bilateral foraminal stenosis at L4-5, left greater than right.     Assessment/Plan:   3 months s/p MIS left L5-S1 microdiscectomy  - Continued pain in left buttocks and left posterior thigh, and chronic LLE paresthesias. Lumbar spine MRI from 12/29/23 reveals post op changes without significant residual stenosis, and L4-5 mild to moderate bilateral foraminal stenosis, left greater than right. Prior to surgery, patient followed with Dr. Valenzuela for pelvic joint dysfunction and tried injections including lumbar WENCESLAO and left piriformis, but this did not provide pain relief. Will review imaging with Dr. Garcia and call patient with further recommendations. Oxycodone refilled today.   - ADDENDUM: Discussed with Dr. Garcia. Recommend referral to Dr. Bennett for further evaluation and possible injections. Called and spoke to patient. She voiced agreement with plan.     Chronic lower neck pain radiating to right shoulder and shoulder blade   - Most recent cervical spine MRI from December 2022 reveals moderate left foraminal stenosis at C6-7. Recommend an updated cervical spine MRI  for further  evaluation. Will call patient with results and next steps.     Chronic pain management  - Oxycodone refilled today  - Placed referral to [facility] for comprehensive evaluation.     Advised  patient to call our clinic with any questions or concerns.   Patient voiced understanding and agreement.      Therese Vargas CNP  [facility] Neurosurgery  [address] [address]  Tel [phone]  Pager [phone]

## 2024-01-09 ENCOUNTER — MYC MEDICAL ADVICE (OUTPATIENT)
Dept: NEUROSURGERY | Facility: CLINIC | Age: 49
End: 2024-01-09

## 2024-01-09 ENCOUNTER — MYC REFILL (OUTPATIENT)
Dept: NEUROSURGERY | Facility: CLINIC | Age: 49
End: 2024-01-09

## 2024-01-09 DIAGNOSIS — Z98.890 S/P LUMBAR MICRODISCECTOMY: Primary | ICD-10-CM

## 2024-01-09 DIAGNOSIS — Z98.890 S/P LUMBAR MICRODISCECTOMY: ICD-10-CM

## 2024-01-09 DIAGNOSIS — M54.12 CERVICAL RADICULOPATHY: ICD-10-CM

## 2024-01-09 RX ORDER — OXYCODONE HYDROCHLORIDE 5 MG/1
5-10 TABLET ORAL EVERY 8 HOURS PRN
Qty: 40 TABLET | Refills: 0 | Status: SHIPPED | OUTPATIENT
Start: 2024-01-09 | End: 2024-01-17

## 2024-01-09 NOTE — TELEPHONE ENCOUNTER
Per [facility] team, patient should contact PCP for pain medication as she is past 12 weeks post-op. Neurosurgery can provide a refill if needed to get patient to PCP appointment, but provider team would like an appt scheduled prior to filling.    Patient updated via [employer].

## 2024-01-09 NOTE — TELEPHONE ENCOUNTER
Patient requesting refill of Oxycodone.     DOS: 10/3/23  Surgeon: Dr. Garcia  Procedure: left L5-S1 microdiscectomy  Weeks Post op: 14 weeks  Last refilled: 1/3 #40    Patient referred to  Pain Management for comprehensive eval and to Dr. Bennett for further eval and possible injections.     Pain assessment completed via [employer]. Please see encounter for further details. Refill request will be forwarded to care team.

## 2024-01-10 ENCOUNTER — TELEPHONE (OUTPATIENT)
Dept: FAMILY MEDICINE | Facility: CLINIC | Age: 49
End: 2024-01-10
Payer: COMMERCIAL

## 2024-01-10 NOTE — TELEPHONE ENCOUNTER
Shira Olson would like apt on 1/29/24 to be rescheduled to an in person appointment slot as the type of visit is not appropriate to be done virtually. Left message for pt to ball back and reschedule.

## 2024-01-10 NOTE — TELEPHONE ENCOUNTER
Per Therese Weston, CNP,     Yes, please schedule with both Dr. Bennett and Pain Management. We can refill pain medication until patient is established with Dr. Bennett and Pain Management. I advised patient to follow-up with PCP as well.     Pain management order placed. Updated patient via [employer].

## 2024-01-15 ENCOUNTER — ANCILLARY PROCEDURE (OUTPATIENT)
Dept: MRI IMAGING | Facility: CLINIC | Age: 49
End: 2024-01-15
Attending: NURSE PRACTITIONER
Payer: COMMERCIAL

## 2024-01-15 ENCOUNTER — TELEPHONE (OUTPATIENT)
Dept: NEUROSURGERY | Facility: CLINIC | Age: 49
End: 2024-01-15

## 2024-01-15 DIAGNOSIS — M54.12 CERVICAL RADICULOPATHY: ICD-10-CM

## 2024-01-15 PROCEDURE — 72141 MRI NECK SPINE W/O DYE: CPT | Mod: TC | Performed by: PREVENTIVE MEDICINE

## 2024-01-15 NOTE — TELEPHONE ENCOUNTER
Reviewed imaging results as stated below.     MR CERVICAL SPINE WITHOUT CONTRAST  1/15/2024 8:40 AM                                                     IMPRESSION:  1.  Multilevel cervical spondylosis, as above. Notable changes since  2019 comparison include significant resolution in left C6-C7 foraminal  zone disc protrusion. There has been development of mild C6-C7  opposing endplate fibrovascular degenerative change. Comparison MRI  images performed in 2022 were not available for review at the time of  this dictation.  2.  No evidence of acute bone or soft tissue injury of the cervical  spine.     Called patient, no answer, LVM. Advised to call back to discuss results and next steps.        Therese Vargas CNP  [facility] Neurosurgery  [address] [address]  Tel [phone]  Pager [phone]

## 2024-01-17 ENCOUNTER — MYC REFILL (OUTPATIENT)
Dept: NEUROSURGERY | Facility: CLINIC | Age: 49
End: 2024-01-17
Payer: COMMERCIAL

## 2024-01-17 DIAGNOSIS — Z98.890 S/P LUMBAR MICRODISCECTOMY: ICD-10-CM

## 2024-01-18 RX ORDER — OXYCODONE HYDROCHLORIDE 5 MG/1
5-10 TABLET ORAL EVERY 8 HOURS PRN
Qty: 30 TABLET | Refills: 0 | Status: SHIPPED | OUTPATIENT
Start: 2024-01-18 | End: 2024-01-22

## 2024-01-18 ASSESSMENT — ANXIETY QUESTIONNAIRES
GAD7 TOTAL SCORE: 18
5. BEING SO RESTLESS THAT IT IS HARD TO SIT STILL: SEVERAL DAYS
3. WORRYING TOO MUCH ABOUT DIFFERENT THINGS: NEARLY EVERY DAY
GAD7 TOTAL SCORE: 18
1. FEELING NERVOUS, ANXIOUS, OR ON EDGE: NEARLY EVERY DAY
8. IF YOU CHECKED OFF ANY PROBLEMS, HOW DIFFICULT HAVE THESE MADE IT FOR YOU TO DO YOUR WORK, TAKE CARE OF THINGS AT HOME, OR GET ALONG WITH OTHER PEOPLE?: VERY DIFFICULT
IF YOU CHECKED OFF ANY PROBLEMS ON THIS QUESTIONNAIRE, HOW DIFFICULT HAVE THESE PROBLEMS MADE IT FOR YOU TO DO YOUR WORK, TAKE CARE OF THINGS AT HOME, OR GET ALONG WITH OTHER PEOPLE: VERY DIFFICULT
6. BECOMING EASILY ANNOYED OR IRRITABLE: MORE THAN HALF THE DAYS
7. FEELING AFRAID AS IF SOMETHING AWFUL MIGHT HAPPEN: NEARLY EVERY DAY
4. TROUBLE RELAXING: NEARLY EVERY DAY
2. NOT BEING ABLE TO STOP OR CONTROL WORRYING: NEARLY EVERY DAY
7. FEELING AFRAID AS IF SOMETHING AWFUL MIGHT HAPPEN: NEARLY EVERY DAY

## 2024-01-18 ASSESSMENT — PAIN SCALES - PAIN ENJOYMENT GENERAL ACTIVITY SCALE (PEG)
PEG_TOTALSCORE: 8.33
AVG_PAIN_PASTWEEK: 7
INTERFERED_ENJOYMENT_LIFE: 9
PEG_TOTALSCORE: 8.33
INTERFERED_GENERAL_ACTIVITY: 9
AVG_PAIN_PASTWEEK: 7
INTERFERED_ENJOYMENT_LIFE: 9
INTERFERED_GENERAL_ACTIVITY: 9

## 2024-01-18 NOTE — TELEPHONE ENCOUNTER
Patient requesting refill of oxycodone.     DOS: 10/3/23   Surgeon: Dr. Garcia  Procedure: MIS left L5-S1 microdiscectomy   Weeks Post op: 15 weeks 1 day  Last refilled: 1/9 #40    Per [facility] on 1/9, we can refill pain medication until patient is established with Dr. Bennett and Pain Management.     Patient is scheduled for an appointment with pain management on 1/24 and PCP on 1/29. Phone number provided for patient to schedule with Dr. Bennett.    Pain assessment completed via [employer]. Please see encounter for further details. Refill request will be forwarded to care team.

## 2024-01-18 NOTE — TELEPHONE ENCOUNTER
Per [facility] team, we can continue to provide refills until she is established with Dr. Bennett and/or Pain Clinic, but would still encourage the patient to continue to taper off the oxycodone.    Pain appointment scheduled 1/24  PCP appointment scheduled 1/29  Dr. Bennett appointment scheduled 2/27.    [employer] message sent to patient regarding weaning.

## 2024-01-24 ENCOUNTER — MYC MEDICAL ADVICE (OUTPATIENT)
Dept: PALLIATIVE MEDICINE | Facility: CLINIC | Age: 49
End: 2024-01-24

## 2024-01-24 ENCOUNTER — OFFICE VISIT (OUTPATIENT)
Dept: PALLIATIVE MEDICINE | Facility: CLINIC | Age: 49
End: 2024-01-24
Attending: NURSE PRACTITIONER
Payer: COMMERCIAL

## 2024-01-24 VITALS — HEART RATE: 84 BPM | DIASTOLIC BLOOD PRESSURE: 70 MMHG | SYSTOLIC BLOOD PRESSURE: 112 MMHG

## 2024-01-24 DIAGNOSIS — Z98.890 S/P LUMBAR MICRODISCECTOMY: ICD-10-CM

## 2024-01-24 DIAGNOSIS — M54.12 CERVICAL RADICULOPATHY: Primary | ICD-10-CM

## 2024-01-24 DIAGNOSIS — M54.2 NECK PAIN: ICD-10-CM

## 2024-01-24 DIAGNOSIS — M54.10 RADICULAR PAIN OF LEFT LOWER EXTREMITY: ICD-10-CM

## 2024-01-24 DIAGNOSIS — M54.10 RADICULAR PAIN OF UPPER EXTREMITY: ICD-10-CM

## 2024-01-24 PROCEDURE — 99205 OFFICE O/P NEW HI 60 MIN: CPT

## 2024-01-24 PROCEDURE — 99417 PROLNG OP E/M EACH 15 MIN: CPT

## 2024-01-24 RX ORDER — GABAPENTIN 300 MG/1
CAPSULE ORAL
Qty: 180 CAPSULE | Refills: 1 | Status: SHIPPED | OUTPATIENT
Start: 2024-01-24 | End: 2024-03-06

## 2024-01-24 ASSESSMENT — PAIN SCALES - GENERAL: PAINLEVEL: SEVERE PAIN (7)

## 2024-01-24 NOTE — Clinical Note
Medardo Saleh,  I saw Lilian today in the pain clinic and am reaching out to collaborate on her care.   She reports continued use of oxycodone 10 mg TID since surgery, notes diminishing benefit. I advised that opioids are not ideal for long term use for chronic, non-cancer associated pain and recommend tapering down/off as able. Of note, I did not assume management today, advised her I would be reaching out to you.   What is the plan for oxycodone long term? I did not see much detail in LOV note.   Thanks, Celena Hilliard, DNP, APRN, AGNP-C [facility]

## 2024-01-24 NOTE — PATIENT INSTRUCTIONS
Pain Physical Therapy:  YES   I am referring for targeting stretching, strengthening, and home exercise plan to support functional goals/ADLs.  If you have not been contacted to schedule within 2 business days, please call [phone] to make an appointment. Recommend scheduling with Kevin Sims PT.     Pain Psychologist to address relaxation, behavioral change, coping style, and other factors important to improvement.  NO - not at this time.     Diagnostic Studies:  Reviewed recent cervical and lumbar MRIs - multilevel degenerative changes noted, no red flag findings.     Medication Management:   Start gabapentin 300 mg at bedtime and titrate to goal dose 600 mg three times daily, per instructions on prescription bottle. Monitor for changes in pain level/intensity. May consider dosage increase versus alternative, pending outcome.   Monitor for sedation - may cause dizziness or drowsiness. Be careful driving/moving around until you know effects of medication. Avoid concurrent alcohol use.   Duloxetine - will consider at follow up, pending outcome with gabapentin.   Medical cannabis - she inquired about this today, recommend exploring other potential treatment options initially. Also, given frequent traveling out of state, I am not sure certification for [place] program ideal, as she cannot take medical cannabis products across state lines.   Oxycodone 5 mg - currently takes 2 tabs (10 mg) three times daily as needed. Managed by neurosurgery. Advised that I am not taking over prescribing today, will message neurosurgery team regarding plan for continuation/taper. I recommend tapering dosage as tolerated, do not recommend long term use or dose escalation.     Potential procedures:   C7-T1 epidural steroid injection - recommended today to target neck pain with radicular shoulder/arm pain. Orders placed to schedule with Dr. Levi/next available physician partner.     Other Orders/Referrals:   Message sent to neurosurgery  regarding medication management/recommendations.     Follow up with ASIF Regalado CNP in around 6 weeks.       ----------------------------------------------------------------  Clinic Number:  [phone]   Call with any questions about your care and for scheduling assistance.   Calls are returned Monday through Friday between 8 AM and 4:30 PM. We usually get back to you within 2 business days depending on the issue/request.    If we are prescribing your medications:  For opioid medication refills, call the clinic or send a [employer] message 7 days in advance.  Please include:  Name of requested medication  Name of the pharmacy.  For non-opioid medications, call your pharmacy directly to request a refill. Please allow 3-4 days to be processed.   Per [place] State Law:  All controlled substance prescriptions must be filled within 30 days of being written.    For those controlled substances allowing refills, pickup must occur within 30 days of last fill.      We believe regular attendance is key to your success in our program!    Any time you are unable to keep your appointment we ask that you call us at least 24 hours in advance to cancel.This will allow us to offer the appointment time to another patient.   Multiple missed appointments may lead to dismissal from the clinic.

## 2024-01-24 NOTE — PROGRESS NOTES
[facility]     Date of visit: 1/24/2024    Assessment:  Lilian Quinones is a 48 year old female with a past medical history significant for lumbar radiculopathy, pseudotumor, hx depression/HAO, hyperhidrosis disorder, s/p L5-S1 MILD 10/3/23 with Dr. Garcia, s/p gastric bypass 2019, who presents with complaints of LLE pain, neck and BUE pain.     Neck/BUE - Onset of pain a few years ago. Reports neck pain with BUE radicular symptoms. Historically pain primarily left sided, though now bilateral. Describes pain as aching and throbbing, radiates into bilateral shoulders/upper arms, numb/tingling that extends into lower left arm and into 3rd/4th/5th digits of left hand. Limited conservative therapies, no prior injections. Recent cervical MRI demonstrated multilevel degenerative changes with findings that seem consistent with some of her pain symptoms. BUE strength and sensory intact on exam. Etiology likely multifactorial, including underlying degenerative changes of cervical spine, suspect cervical radiculopathy, with overlying myofascial component.   LLE - She is s/p L5-S1 MILD 10/3/23 with Dr. Garcia. Reports worsening LLE pain initially after surgery, some degree of improvement since then but overall pain continues to persist. Discogenic pathology at L4-5 noted on recent lumbar MRI that could explain some of her pain symptoms. BLE strength and sensory intact on exam. Etiology likely multifactorial, including underlying degenerative and postsurgerical changes, suspect lumbar radiculopathy.     Assigned to [place] nursing team.     Visit diagnoses:   1. Cervical radiculopathy    2. S/P lumbar microdiscectomy    3. Radicular pain of left lower extremity    4. Neck pain    5. Radicular pain of upper extremity        Plan:  The following recommendations were given to the patient. Diagnosis, treatment options, risks, benefits, and alternatives were discussed, and all questions were answered. The patient  expressed understanding of the plan for management.     I am recommending a multidisciplinary treatment plan to help this patient better manage her pain.  This includes:     Pain Physical Therapy:  YES   I am referring for targeting stretching, strengthening, and home exercise plan to support functional goals/ADLs.  If you have not been contacted to schedule within 2 business days, please call [phone] to make an appointment. Recommend scheduling with Kevin Sims PT.     Pain Psychologist to address relaxation, behavioral change, coping style, and other factors important to improvement.  NO - not at this time.     Diagnostic Studies:  Reviewed recent cervical and lumbar MRIs - multilevel degenerative changes noted, no red flag findings.     Medication Management:   Start gabapentin 300 mg at bedtime and titrate to goal dose 600 mg three times daily, per instructions on prescription bottle. Monitor for changes in pain level/intensity. May consider dosage increase versus alternative, pending outcome.   Monitor for sedation - may cause dizziness or drowsiness. Be careful driving/moving around until you know effects of medication. Avoid concurrent alcohol use.   Duloxetine - will consider at follow up, pending outcome with gabapentin.   Medical cannabis - she inquired about this today, recommend exploring other potential treatment options initially. Also, given frequent traveling out of state, I am not sure certification for [place] program ideal, as she cannot take medical cannabis products across state lines.   Oxycodone 5 mg - currently takes 2 tabs (10 mg) three times daily as needed. Managed by neurosurgery. Advised that I am not taking over prescribing today, will message neurosurgery team regarding plan for continuation/taper. I recommend tapering dosage as tolerated, do not recommend long term use or dose escalation.     Potential procedures:   C7-T1 epidural steroid injection - recommended today to target neck  pain with radicular shoulder/arm pain. Orders placed to schedule with Dr. Levi/next available physician partner.     Other Orders/Referrals:   Message sent to neurosurgery regarding medication management/recommendations.     Follow up with ASIF Regalado CNP in around 6 weeks.       Review of Electronic Chart: Today I have also reviewed available medical information in the patient's medical record at [facility] ([facility]) and Care Everywhere (if available), including relevant provider notes, laboratory work, and imaging.     Celena Hilliard DNP, ASIF, AGNP-C  [facility]         -------------------------------------------------------------------    Subjective     Reason for consultation:    Lilian Quinones is a 48 year old female who is seen in consultation today at the request of Therese Vargas, ALEXEI (neurosurgery) for evaluation of her pain issues and recommendations for management, with specific emphasis on  S/P lumbar microdiscectomy [Z98.890]  - Primary      Cervical radiculopathy [M54.12]        Reason for Referral:Comprehensive Pain Evaluation Are there any red flags that may impact the assessment or management of the patient?No Red Flags Provider, please review opioid agreement in the process instructions above. Do you agree to these terms?Yes  Please see the [facility] [place] health questionnaire which the patient completed and reviewed with me in detail (if available).     Review of [place] Prescription Monitoring Program (): No concern for abuse or misuse of controlled medications based on this report. Reviewed - ongoing oxycodone with neurosurgery    Review of Electronic Chart: Today I have also reviewed available medical information in the patient's medical record at [facility] ([facility]), including relevant provider notes, laboratory work, and imaging.     Pain medications are being prescribed by neurosurgery - oxycodone.     Chief Complaint:    Chief  "Complaint   Patient presents with    Pain         HPI:     Lilian Quinones is a 48 year old female presents with a chief complaint of neck/BUE pain (radicular symptoms extends into lower LUE), LLE pain (s/p left L5-S1 MILD).     The pain has been present for - onset of left sided neck pain several years ago and has been getting injections to tx with [facility], onset of LLE pain in 2022.    The pain is Severe Pain (7) in severity.    The pain is described as: neck/BUE - left sided pain first and now bilateral neck pain, describes as aching and throbbing, radiates into bilateral shoulders/upper arms, numb/tingling that extends into lower left arm and into 3rd, 4th, and 5th digits; LLE pain - worsened after surgery, dull and throbbing pain extends into buttock down back of leg, stops at knee but then numbness/tingling of lateral foot/toes, intermittent sharp qualities (states when oxycodone \"wears off\").   The pain is alleviated by oxycodone (diminishing benefit, still prescribed by NS), rest and body positioning.    It is exacerbated by activity, fluctuating intensity dependent on amount of activity.    Modalities that have been utilized in the past which were helpful include CLARISSA (last injection 10/12/23 was helpful for left sided neck pain), Kratom, NSAIDs (tries not to use as much due to hx gastric bypass).     Things that were not helpful, but tried ,include left L5-S1 MILD 10/2023 (LLE pain worse), ice, topical analgesics, muscle relaxants.    The patient has never tried pain PT, neuropathics (since surgery).  The patient otherwise denies bowel or bladder incontinence, parasthesias, weakness, saddle anesthesia, unintentional weight loss, or fever/chills/sweats.   Lilian Qiunones has been seen at a pain clinic in the past. She had injections with pain clinic, had C7-T1 WENCESLAO with Dr. Richey 10/12/23, helped with left sided neck/LUE pain. She reports having lumbar injections in [place] last year.     -She works in " banking, has to be careful with CNS effects from medications. She states that right now oxycodone is helping her just get by.   -She reports taking Kratom before surgery for pain, does not want to get back on it, but notes this was helpful for pain and was not what to do.   -She is potentially interested in medical cannabis.   -She states her son committed suicide 4 years ago (would be 17 now). She states he was at [employer], there were 6 suicides in his school. She states there was a choking challenge going on at the time, and they had found him doing that.   -She and  have been traveling to help with grief, [place] the road for a period of time after son , sold home and bought 5th wheel. Worked remotely. They came back to [place] for a while, but they plan on getting back out on the road soon. She has 28 year old daughter, in the army and getting a [employer] PhD, also has grandchildren now (almost 3 and 1 year old).             Pain Questionnaire    What number best describes your pain right now: 7  (0 = No pain to 10 = Worst pain imaginable)    How would you describe the pain? dull, aching, throbbing    Which of the following worsen your pain? standing, walking, exercise    Which of the following improve or reduce your pain? lying down, medication    What number best describes your average pain for the past week: 6  (0 = No pain to 10 = Worst pain imaginable)    What number best describes your LOWEST pain in past 24 hours: 3  (0 = No pain to 10 = Worst pain imaginable)    What number best describes your WORST pain in past 24 hours: 7  (0 = No pain to 10 = Worst pain imaginable)    When is your pain worst? Constant    What non-medicine treatments have you already had for your pain? physical therapy, chiropractic care, TENS (electrical stimulator), spine injections (shots), surgery, exercise    Have you tried treating your pain with medication? Yes    If yes, please answer the below questions -      What topical medications have you tried in the past but are no longer taking? Other gels, creams, patches or ointments    What anti-convulsants (seizure medicines) have you tried in the past but are no longer taking? None    What anti-depressant medication have you tried in the past but are no longer taking? Bupropion (Wellbutrin), Citalopram (Celexa, Lexapro), Sertraline (Zoloft)    What sleep aid medications have you tried in the past but are no longer taking? None    What opioid medications have you tried in the past but are no longer taking? None    What NSAID medications have you tried in the past but are no longer taking? Ibuprofen (Advil, Motrin)    What muscle relaxer medications have you tried in the past but are no longer taking? None    What anti-migraine medications have you tried in the past but are no longer taking? Solu-Medrol (Medrol) dose pack      Are you currently taking medications for your pain? Yes    If yes, please answer below -     During the past month, list all the medications that you have used for pain. Please list drug name, dose, and frequency taking: Oxycodone 2 tabs 3 times per day - muscle relaxer 2 tabs 3 times per day, tylenol        Current Pain Treatments:    Medications:    Oxycodone 5mg - 2 tabs every 8 hours, diminishing benefit, managed by neurosurgery   Gabapentin - start 300 mg at bedtime and titrate to goal dose 600 mg TID    2. Other therapies:    Pain PT   Cervical WENCESLAO - C7-T1        Current Outpatient Medications   Medication    Bioflavonoid Products (VITAMIN C) CHEW    Calcium Polycarbophil (FIBER) 625 MG tablet    diphenhydrAMINE HCl, Sleep, (ZZZQUIL PO)    gabapentin (NEURONTIN) 300 MG capsule    levonorgestrel (MIRENA) 20 MCG/24HR IUD    melatonin 5 MG tablet    Multiple Vitamins-Minerals (MULTIVITAMIN GUMMIES WOMENS) CHEW    oxyCODONE (ROXICODONE) 5 MG tablet    propranolol (INDERAL) 40 MG tablet    bisacodyl (DULCOLAX) 5 MG EC tablet    bisacodyl (DULCOLAX) 5  MG EC tablet    diazepam (VALIUM) 5 MG tablet    methocarbamol (ROBAXIN) 500 MG tablet    methylPREDNISolone (MEDROL DOSEPAK) 4 MG tablet therapy pack    polyethylene glycol (GOLYTELY) 236 g suspension    senna-docusate (SENOKOT-S/PERICOLACE) 8.6-50 MG tablet     No current facility-administered medications for this visit.     Allergies   Allergen Reactions    Amoxicillin Rash    Penicillins Rash      Past Medical History:   Diagnosis Date    Arthritis     IUD (intrauterine device) in place 2019    Mirena     Past Surgical History:   Procedure Laterality Date    ABDOMEN SURGERY  2019    Weight loss surgery    BIOPSY      Skin Biopsy    BIOPSY BREAST      BREAST SURGERY      Breast reduction sergury     DISCECTOMY LUMBAR POSTERIOR MICROSCOPIC ONE LEVEL Left 10/3/2023    Procedure: Minimally invasive Left Lumbar 5 to Sacral 1 microdiscectomy;  Surgeon: Milan Garcia MD;  Location:  OR    LASIK      MAMMOPLASTY REDUCTION  2008    MD LAP, HONORIO RESTRICT PROC, LONGITUDINAL GASTRECTOMY N/A 2019    Procedure: LAPAROSCOPIC SLEEVE GASTRECTOMY;  Surgeon: Perfecto Enciso MD;  Location: [facility];  Service: General     Family History   Problem Relation Age of Onset    Breast Cancer Mother     Cerebrovascular Disease Mother     Diabetes Father     Heart Disease Maternal Grandmother     Hypertension Maternal Grandfather     Hyperlipidemia Maternal Grandfather     Pancreatic Cancer Paternal Grandmother     Diabetes Paternal Grandmother     Other Cancer Paternal Grandfather     Depression Sister     Depression Son          by suicide 2019    Anxiety Disorder Son     Melanoma No family hx of     Glaucoma No family hx of     Macular Degeneration No family hx of      Social History     Socioeconomic History    Marital status:     Highest education level: Associate degree: academic program   Tobacco Use    Smoking status: Never    Smokeless tobacco: Never   Vaping Use     Vaping Use: Never used   Substance and Sexual Activity    Alcohol use: Yes    Drug use: No    Sexual activity: Yes     Partners: Male     Birth control/protection: I.U.D.   Other Topics Concern    Parent/sibling w/ CABG, MI or angioplasty before 65F 55M? No     Social Determinants of Health     Financial Resource Strain: Low Risk  (9/22/2023)    Financial Resource Strain     Within the past 12 months, have you or your family members you live with been unable to get utilities (heat, electricity) when it was really needed?: No   Food Insecurity: Low Risk  (9/22/2023)    Food Insecurity     Within the past 12 months, did you worry that your food would run out before you got money to buy more?: No     Within the past 12 months, did the food you bought just not last and you didn t have money to get more?: No   Transportation Needs: Low Risk  (9/22/2023)    Transportation Needs     Within the past 12 months, has lack of transportation kept you from medical appointments, getting your medicines, non-medical meetings or appointments, work, or from getting things that you need?: No   Physical Activity: Insufficiently Active (8/5/2020)    Exercise Vital Sign     Days of Exercise per Week: 2 days     Minutes of Exercise per Session: 60 min   Social Connections: Moderately Isolated (8/5/2020)    Social Connection and Isolation Panel [NHANES]     Frequency of Communication with Friends and Family: More than three times a week     Frequency of Social Gatherings with Friends and Family: Once a week     Attends Congregation Services: Never     Active Member of Clubs or Organizations: No     Attends Club or Organization Meetings: Patient declined     Marital Status:    Interpersonal Safety: Low Risk  (9/25/2023)    Interpersonal Safety     Do you feel physically and emotionally safe where you currently live?: Yes     Within the past 12 months, have you been hit, slapped, kicked or otherwise physically hurt by someone?: No      Within the past 12 months, have you been humiliated or emotionally abused in other ways by your partner or ex-partner?: No   Housing Stability: Low Risk  (9/22/2023)    Housing Stability     Do you have housing? : Yes     Are you worried about losing your housing?: No      ROS: 10 point ROS neg other than the symptoms noted above in the HPI.      Objective      Diagnostic Testing - Imaging/Labs:    Labs:    CBC 9/25/23 - WNL  BMP 6/12/23 - GFR 88    Imaging:   MR CERVICAL SPINE WITHOUT CONTRAST  1/15/2024 8:40 AM      HISTORY: Cervical radiculopathy.        COMPARISON: 9/16/2019, MRI report dated 12/14/2022 demonstrating C6-C7  moderate left neural foraminal stenosis. The aforementioned images  were not available for review at the time of this dictation. Brain MRI  5/16/2023.     TECHNIQUE: Multiplanar, multisequence MR images of the cervical spine  were obtained without intravenous contrast.     CONTRAST: None.     FINDINGS: Relative straightening of the vertebral body alignment.  Stable cerebellar tonsillar ectopia. No signal changes to suggest  acute fracture or traumatic vertebral body subluxation. No suspicious  marrow lesion. No prevertebral edema. Normal cord signal. Empty sella  better appreciated on dedicated brain MRI imaging. No acute finding in  the visualized extraspinal structures.       The findings on a level by level basis are as follows:     Craniocervical junction, C1-C2: Within normal limits.     C2-C3: Preserved disc height and signal for patient age. Stable  symmetric disc bulge. Normal facets for patient age. No significant  spinal canal or neural foraminal stenosis.      C3-C4: Preserved disc height and signal for patient age. Stable  symmetric disc bulge. Normal facets for patient age. No significant  spinal canal or neural foraminal stenosis.     C4-C5: Stable mild loss of disc height with relatively preserved  intradiscal T2 signal. Stable symmetric disc bulge. Normal facets for  patient  age. No significant spinal canal or neural foraminal stenosis.     C5-C6: Stable mild loss of disc height with relatively preserved  intradiscal T2 signal. Stable symmetric disc bulge. Normal facets for  patient age. No significant spinal canal or neural foraminal stenosis.     C6-C7: Stable mild loss of disc height, intradiscal T2 signal.  Development of opposing endplate fibrovascular degenerative signal.  Partially resolved left foraminal disc protrusion which now causes  mild left neural foraminal stenosis, previously severe. Persistent  partial flattening of the cord likely secondary to asymmetric left  disc bulge. No significant effacement of the CSF spaces. No  significant right neural foraminal stenosis.     C7-T1: Preserved disc height and signal for patient age. Normal facets  for patient age. No significant spinal canal or neural foraminal  stenosis.                                                                      IMPRESSION:  1.  Multilevel cervical spondylosis, as above. Notable changes since  2019 comparison include significant resolution in left C6-C7 foraminal  zone disc protrusion. There has been development of mild C6-C7  opposing endplate fibrovascular degenerative change. Comparison MRI  images performed in 2022 were not available for review at the time of  this dictation.  2.  No evidence of acute bone or soft tissue injury of the cervical  spine.      KATHY PACHECO,      MR LUMBAR SPINE W/O & W CONTRAST 12/29/2023 11:40 AM     Provided History: s/p lumbar microdiscectomy, left leg pain; S/P  lumbar microdiscectomy.     Comparison: Lumbar spine MRI 8/30/2023     ICD-10: S/P lumbar microdiscectomy     Technique: Sagittal T1-weighted and T2-weighted and axial T2-weighted  images of the lumbar spine were obtained without intravenous contrast.  Post intravenous contrast using gadolinium axial and sagittal  T1-weighted images were obtained with fat saturation.     Contrast: 8.5 ml ariadna      Findings: Regarding numbering convention, there are 5 lumbar-type  vertebrae. The tip of the conus medullaris is at L2.  Regarding  alignment, the lumbar vertebral column is normally aligned.  There is  multilevel mild disc height narrowing.  Regarding bone marrow signal  intensity, no abnormality is visualized on STIR images. Postsurgical  soft tissue edema and enhancement at the L5-S1 level secondary to  microdiscectomy. There is also enhancement about the left L5-S1 facet  joint, which may just be postsurgical given no significant fluid  within the joint itself. Mild left sided epidural scarring at L5-S1  Right S2 Tarlov cysts.     On a level by level basis:     L1-2: No significant spinal canal or foraminal narrowing.     L2-3: Disc bulge and superimposed tiny left foraminal disc protrusion  with annular fissuring. Facet arthropathy. No spinal canal or neural  foraminal stenosis.     L3-4: Mild disc bulging and facet arthropathy. No significant spinal  canal or neural foraminal stenosis.     L4-5: Disc bulge and and superimposed central disc protrusion with  annular fissuring. Facet arthropathy. There is mild to moderate  bilateral neural foraminal stenosis, left greater than right. No  spinal canal stenosis.     L5-S1: Microdiscectomy changes. Mild disc bulging and facet  arthropathy. No significant spinal canal or neural foraminal stenosis.                                                                      Impression:   1. Postsurgical changes of a microdiscectomy at the L5-S1. There is  postsurgical soft tissue enhancement without organized fluid  collection. Mild epidural scarring. No significant residual spinal  canal or neural foraminal stenosis.     2. Multilevel lumbar spondylosis without high-grade spinal canal or  neural foraminal stenosis.           I have personally reviewed the examination and initial interpretation  and I agree with the findings.     TAZ KRAMER MD       Physical  Exam  HENT:      Head: Normocephalic.   Pulmonary:      Effort: Pulmonary effort is normal.   Musculoskeletal:      Comments: ROM grossly WNL. See below.    Neurological:      Mental Status: She is alert.      Comments: See below.    Psychiatric:         Mood and Affect: Mood normal.         Musculoskeletal exam:  Gait intact.   Normal bulk and tone. Unremarkable spinal curvature.     Neurologic exam:  CN:  Cranial nerves 2-12 are grossly intact  Motor:  5/5 UE and LE strength            Reflexes:     Biceps:     R:  2/4 L: 2/4   Brachioradialis   R:  2/4 L: 2/4   Patella:  R:  2/4 L: 2/4   Achilles:  R:  2/4 L: 2/4    Sensory:   Light touch: normal bilateral upper and lower extremities    No allodynia, dysesthesia, or hyperalgesia.        BILLING TIME DOCUMENTATION:   The total TIME spent on this patient on the date of the encounter/appointment was 75 minutes.      TOTAL TIME includes:   Time spent preparing to see the patient (reviewing records and tests)   Time spent face to face (or over the phone) with the patient   Time spent ordering tests, medications, procedures and referrals   Time spent Referring and communicating with other healthcare professionals   Time spent documenting clinical information in Epic

## 2024-01-25 ENCOUNTER — TELEPHONE (OUTPATIENT)
Dept: PALLIATIVE MEDICINE | Facility: CLINIC | Age: 49
End: 2024-01-25

## 2024-01-25 ENCOUNTER — THERAPY VISIT (OUTPATIENT)
Dept: PHYSICAL THERAPY | Facility: CLINIC | Age: 49
End: 2024-01-25
Payer: COMMERCIAL

## 2024-01-25 DIAGNOSIS — M54.2 NECK PAIN: ICD-10-CM

## 2024-01-25 DIAGNOSIS — M54.10 RADICULAR PAIN OF UPPER EXTREMITY: ICD-10-CM

## 2024-01-25 DIAGNOSIS — M54.12 CERVICAL RADICULOPATHY: Primary | ICD-10-CM

## 2024-01-25 DIAGNOSIS — Z98.890 S/P LUMBAR MICRODISCECTOMY: ICD-10-CM

## 2024-01-25 DIAGNOSIS — M54.10 RADICULAR PAIN OF LEFT LOWER EXTREMITY: ICD-10-CM

## 2024-01-25 PROCEDURE — 97112 NEUROMUSCULAR REEDUCATION: CPT | Mod: GP | Performed by: PHYSICAL THERAPIST

## 2024-01-25 PROCEDURE — 97110 THERAPEUTIC EXERCISES: CPT | Mod: GP | Performed by: PHYSICAL THERAPIST

## 2024-01-25 PROCEDURE — 97161 PT EVAL LOW COMPLEX 20 MIN: CPT | Mod: GP | Performed by: PHYSICAL THERAPIST

## 2024-01-25 NOTE — PROGRESS NOTES
"PHYSICAL THERAPY EVALUATION  Type of Visit: Evaluation    See electronic medical record for Abuse and Falls Screening details.    Subjective       Presenting condition or subjective complaint: Pain radiating down my left leg and pain in my right shoulder  Date of onset: 01/24/24    Relevant medical history: Arthritis; Depression; Dizziness; Hearing problems; Migraines or headaches; Pain at night or rest; Significant weakness   Dates & types of surgery: Weight loss 06/2019 microdiscectomy 10/2023 breast reduction 10/08    Prior diagnostic imaging/testing results: MRI     Prior therapy history for the same diagnosis, illness or injury: Yes Physical therapy for left leg pain    Living Environment  Social support: With a significant other or spouse   Type of home: Other   Stairs to enter the home: Yes 4 Is there a railing: Yes   Ramp: No   Stairs inside the home: Yes 2 Is there a railing: Yes   Help at home: None  Equipment owned:       Employment: Yes Banking  Hobbies/Interests: Hiking/biking/exploring/travel    Patient goals for therapy: Stand and walk and sit with little or no pain    Pain assessment: Pain present, current 5/10, range 3-7/10     Objective   EVALUATION  POSTURE: Sitting Posture: Rounded shoulders, Forward head, Lordosis decreased, note hyperactive UT, pecs B  GAIT:   Gait Deviations: Stride length decreased  Decr hip ext, toe off B  BALANCE/PROPRIOCEPTION: Single Leg Stance Eyes Open (seconds): R 5\".  For L 5\" with R hip hike and locked knee  ROM:  lumbar flex 75%, ext 50%. Neck SB 50%, rotation 75%, flex 75%, ext 75%  STRENGTH:  able to perform heel walk, toe walk.  Weak B scapular musculature.    Assessment & Plan   CLINICAL IMPRESSIONS  Medical Diagnosis: cervical radiculopathy, s/p lumbar microdiscectomy    Treatment Diagnosis: cervical radiculopathy, s/p lumbar microdiscectomy   Impression/Assessment: Patient is a 48 year old female with neck pain, cervical radiculopathy, LBP, s/p lumbar " microdiscectomy 10/3/23 complaints.  The following significant findings have been identified: Pain, Decreased ROM/flexibility, Decreased strength, Impaired gait, Impaired muscle performance, Decreased activity tolerance, and Impaired posture. These impairments interfere with their ability to perform self care tasks, work tasks, recreational activities, household chores, and community mobility as compared to previous level of function.     Clinical Decision Making (Complexity):  Clinical Presentation: Stable/Uncomplicated  Clinical Presentation Rationale: based on medical and personal factors listed in PT evaluation  Clinical Decision Making (Complexity): Low complexity    PLAN OF CARE  Treatment Interventions:  Interventions: Neuromuscular Re-education, Therapeutic Activity, Therapeutic Exercise, Self-Care/Home Management    Long Term Goals     PT Goal 1  Goal Identifier: standing  Goal Description: able to stand 25 minutes  Rationale: to maximize safety and independence with performance of ADLs and functional tasks;to maximize safety and independence within the home;to maximize safety and independence within the community;to maximize safety and independence with self cares  Target Date: 05/25/24      Frequency of Treatment: 1x wk x 4 wks, 2x month x 3 months  Duration of Treatment: 4 months    Education Assessment:        Risks and benefits of evaluation/treatment have been explained.   Patient/Family/caregiver agrees with Plan of Care.     Evaluation Time:     PT Eval, Low Complexity Minutes (08739): 35     Signing Clinician: Thanh Sims, PT

## 2024-01-25 NOTE — TELEPHONE ENCOUNTER
"Screening Questions for Radiology Injections:    Injection to be done at which interventional clinic site? [facility]    If choosing [facility] for location, please inform patient:  \"[facility] is a Hospital based clinic. Before your visit, you should check with your insurance about how it covers the charges for facility services in a hospital-based clinic.     Procedure ordered by     Procedure ordered? Repeat Cervical 7-Thoracic 1 epidural steroid injection   Transforaminal Cervical WENCESLAO - [place] [facility] - No [facility] providers perform this procedure    What insurance would patient like us to bill for this procedure? BC   IF SCHEDULING IN [place] PAIN OR SPINE PLEASE SCHEDULE AT LEAST 7-10 BUSINESS DAYS OUT SO A PA CAN BE OBTAINED  Worker's comp or MVA (motor vehicle accident) -Any injection DO NOT SCHEDULE and route to Carol Henry.    [employer] insurance - For SI joint injections, DO NOT SCHEDULE and route to Sonali Hensley.     ALL BCBS, [employer] and [employer] - DO NOT SCHEDULE and route to Sonali Hensley  MEDICA- facet joint injections, route to [facility]    Is patient scheduled at [facility]?    If YES, route every encounter to [facility] CARE NAVIGATION POOL [[phone]]    Is an  needed? No     Patient has a  home? (Review Grid) YES: informed     Any chance of pregnancy? Not Applicable   If YES, do NOT schedule and route to RN pool  - Dr. Bennett route to Brielle Rios and PM&R Nurse  [[id number]]      Is patient actively being treated for cancer or immunocompromised? No  If YES, do NOT schedule and route to RN pool/ Dr. Bennett's Team    Does the patient have a bleeding or clotting disorder? No   If YES, okay to schedule AND route to RN nurse / Dr. Bennett's Team   (For any patients with platelet count <100, RN must forward to provider)    Is patient taking any Blood Thinners OR Antiplatelet medication?  No   If " hold needed, do NOT schedule, route to RN pool/ Dr. Bennett's Team  Examples:   Blood Thinners: (Coumadin, Warfarin, Jantoven, Pradaxa, Xarelto, Eliquis, Edoxaban, Enoxaparin, Lovenox, Heparin, Arixtra, Fondaparinux or Fragmin)  Antiplatelet Medications: (Plavix, Brilinta or Effient)     Is patient taking any aspirin products (includes Excedrin and Fiorinal)? No   If more than 325mg/day, OK to schedule; Instruct Pt to decrease to less than 325 mg for 7 days AND route to RN pool/ Dr. Bennett's Team   For CERVICAL procedures, hold all aspirin products for 6 days.   Tell Pt that if aspirin product is not held for 6 days, the procedure WILL BE cancelled.     Any allergies to contrast dye, iodine, shellfish, or numbing and steroid medications? No  If YES, schedule and add allergy information to appointment notes AND route to the RN pool/ Dr. Bennett's Team  If WENCESLAO and Contrast Dye / Iodine Allergy? DO NOT SCHEDULE, route to RN pool/ Dr. Bennett's Team  Allergies: Amoxicillin and Penicillins     Does patient have an active infection or treated for one within the past week? No  Is patient currently taking any antibiotics or steroid medications?  No   For patients on chronic, preventative, or prophylactic antibiotics, procedures may be scheduled.   For patients on antibiotics for active or recent infection, schedule 4 days after completed.  For patients on steroid medications, schedule 4 days after completed.     Has the patient had a flu shot or any other vaccinations within the past 7 days? No  If yes, explain that for the vaccine to work best they need to:     wait 1 week before and 1 week after getting any Vaccine  wait 1 week before and 2 weeks after getting any Covid Vaccine   If patient has concerns about the timing, send to RN pool/ Dr. Bennett's Team    Does patient have an MRI/CT?  YES: 2024 Include Date and Check Procedure Scheduling Grid to see if required.  Was the MRI/CT done within the last 3 years?  Yes   If no  route to TERRELL Marie/ Dr. Bennett's Team  If yes, where was the MRI/CT done? [facility]    Refer to PACS Transmissions list for approved external locations and route to RN Pool High Priority/ Dr. Szymanskis Team  If MRI was not done at approved external location do NOT schedule and route to RN pool/ Dr. Szymanskis Team    If patient has an imaging disc, the injection MAY be scheduled but patient must bring disc to appt or appt will be cancelled.    Is patient able to transfer to a procedure table with minimal or no assistance? Yes   If no, do NOT schedule and route to RN Pool/ Dr. Szymanskis Team    Procedure Specific Instructions:  If celiac plexus block, informed patient NPO for 6 hours and that it is okay to take medications with sips of water, especially blood pressure medications Not Applicable       If this is for a cervical procedure, informed patient that aspirin needs to be held for 6 days.   Not Applicable    Sedation, If Sedation is ordered for any procedure, patient must be NPO for 6 hours prior to procedure Not Applicable    If IV needed:  Do not schedule procedures requiring IV placement in the first appointment of the day or first appointment after lunch. Do NOT schedule at 0745, 0815 or 1245.   Instructed patient to arrive 30 minutes early for IV start if required. (Check Procedure Scheduling Grid)  Not Applicable    Reminders:  If you are started on any steroids or antibiotics between now and your appointment, you must contact us because the procedure may need to be cancelled.  Yes    As a reminder, receiving steroids can decrease your body's ability to fight infection.   Would you still like to move forward with scheduling the injection?  Yes    IV Sedation is not provided for procedures. If oral anti-anxiety medication is needed, the patient should request this from their referring provider.    Instruct patient to arrive as directed prior to the scheduled appointment time:  If IV needed 30 minutes before appointment  time     For patients 85 or older we recommend having an adult stay w/ them for the remainder of the day.     If the patient is Diabetic, remind them to bring their glucometer.      Does the patient have any questions?  NO  Jeny Castelan  [facility]

## 2024-01-31 ENCOUNTER — THERAPY VISIT (OUTPATIENT)
Dept: PHYSICAL THERAPY | Facility: CLINIC | Age: 49
End: 2024-01-31
Payer: COMMERCIAL

## 2024-01-31 DIAGNOSIS — M54.16 LUMBAR RADICULOPATHY: Primary | ICD-10-CM

## 2024-01-31 PROCEDURE — 97112 NEUROMUSCULAR REEDUCATION: CPT | Mod: GP | Performed by: PHYSICAL THERAPIST

## 2024-01-31 PROCEDURE — 97110 THERAPEUTIC EXERCISES: CPT | Mod: GP | Performed by: PHYSICAL THERAPIST

## 2024-02-01 ASSESSMENT — ANXIETY QUESTIONNAIRES
GAD7 TOTAL SCORE: 19
GAD7 TOTAL SCORE: 19
5. BEING SO RESTLESS THAT IT IS HARD TO SIT STILL: MORE THAN HALF THE DAYS
6. BECOMING EASILY ANNOYED OR IRRITABLE: NEARLY EVERY DAY
7. FEELING AFRAID AS IF SOMETHING AWFUL MIGHT HAPPEN: NEARLY EVERY DAY
3. WORRYING TOO MUCH ABOUT DIFFERENT THINGS: NEARLY EVERY DAY
4. TROUBLE RELAXING: MORE THAN HALF THE DAYS
7. FEELING AFRAID AS IF SOMETHING AWFUL MIGHT HAPPEN: NEARLY EVERY DAY
2. NOT BEING ABLE TO STOP OR CONTROL WORRYING: NEARLY EVERY DAY
GAD7 TOTAL SCORE: 19
IF YOU CHECKED OFF ANY PROBLEMS ON THIS QUESTIONNAIRE, HOW DIFFICULT HAVE THESE PROBLEMS MADE IT FOR YOU TO DO YOUR WORK, TAKE CARE OF THINGS AT HOME, OR GET ALONG WITH OTHER PEOPLE: SOMEWHAT DIFFICULT
1. FEELING NERVOUS, ANXIOUS, OR ON EDGE: NEARLY EVERY DAY
8. IF YOU CHECKED OFF ANY PROBLEMS, HOW DIFFICULT HAVE THESE MADE IT FOR YOU TO DO YOUR WORK, TAKE CARE OF THINGS AT HOME, OR GET ALONG WITH OTHER PEOPLE?: SOMEWHAT DIFFICULT

## 2024-02-05 ENCOUNTER — OFFICE VISIT (OUTPATIENT)
Dept: FAMILY MEDICINE | Facility: CLINIC | Age: 49
End: 2024-02-05
Payer: COMMERCIAL

## 2024-02-05 ENCOUNTER — ORDERS ONLY (AUTO-RELEASED) (OUTPATIENT)
Dept: FAMILY MEDICINE | Facility: CLINIC | Age: 49
End: 2024-02-05

## 2024-02-05 VITALS
HEART RATE: 79 BPM | TEMPERATURE: 98 F | HEIGHT: 68 IN | WEIGHT: 188 LBS | DIASTOLIC BLOOD PRESSURE: 70 MMHG | RESPIRATION RATE: 16 BRPM | SYSTOLIC BLOOD PRESSURE: 118 MMHG | OXYGEN SATURATION: 100 % | BODY MASS INDEX: 28.49 KG/M2

## 2024-02-05 DIAGNOSIS — M54.16 LUMBAR RADICULOPATHY: ICD-10-CM

## 2024-02-05 DIAGNOSIS — Z12.11 SCREEN FOR COLON CANCER: ICD-10-CM

## 2024-02-05 DIAGNOSIS — F41.1 GENERALIZED ANXIETY DISORDER: ICD-10-CM

## 2024-02-05 DIAGNOSIS — H91.92 HEARING LOSS OF LEFT EAR, UNSPECIFIED HEARING LOSS TYPE: ICD-10-CM

## 2024-02-05 DIAGNOSIS — F43.21 GRIEF: ICD-10-CM

## 2024-02-05 DIAGNOSIS — H93.12 TINNITUS, LEFT: ICD-10-CM

## 2024-02-05 DIAGNOSIS — Z12.11 SCREEN FOR COLON CANCER: Primary | ICD-10-CM

## 2024-02-05 DIAGNOSIS — G93.2 PSEUDOTUMOR CEREBRI: ICD-10-CM

## 2024-02-05 PROCEDURE — 99215 OFFICE O/P EST HI 40 MIN: CPT | Performed by: NURSE PRACTITIONER

## 2024-02-05 ASSESSMENT — ENCOUNTER SYMPTOMS
VOMITING: 0
WHEEZING: 0
CONSTIPATION: 0
DIARRHEA: 0
ABDOMINAL PAIN: 0
SHORTNESS OF BREATH: 0
MYALGIAS: 0
NAUSEA: 0
ABDOMINAL DISTENTION: 0
LIGHT-HEADEDNESS: 0
COUGH: 0
ARTHRALGIAS: 0
FATIGUE: 0
PALPITATIONS: 0
SLEEP DISTURBANCE: 0
NERVOUS/ANXIOUS: 0
DIZZINESS: 0
RHINORRHEA: 0
BACK PAIN: 1
SORE THROAT: 0
NUMBNESS: 0
CHEST TIGHTNESS: 0
HEADACHES: 0
DYSPHORIC MOOD: 0

## 2024-02-05 ASSESSMENT — PATIENT HEALTH QUESTIONNAIRE - PHQ9
SUM OF ALL RESPONSES TO PHQ QUESTIONS 1-9: 15
10. IF YOU CHECKED OFF ANY PROBLEMS, HOW DIFFICULT HAVE THESE PROBLEMS MADE IT FOR YOU TO DO YOUR WORK, TAKE CARE OF THINGS AT HOME, OR GET ALONG WITH OTHER PEOPLE: EXTREMELY DIFFICULT
SUM OF ALL RESPONSES TO PHQ QUESTIONS 1-9: 15

## 2024-02-05 ASSESSMENT — PAIN SCALES - GENERAL: PAINLEVEL: SEVERE PAIN (6)

## 2024-02-05 NOTE — PATIENT INSTRUCTIONS
Please go to the pharmacy to receive your shingles vaccine, Shingrix.  It is a series of 2.  You should receive the first vaccine and then get the second vaccine in at least 2 months.  If more time lapses that is okay.  Do see a reaction similar to tetanus where your arm gets red, stiff sometimes warm to touch.  After the second dose it is very common to feel tired and rundown for 24 hours or so.

## 2024-02-05 NOTE — PROGRESS NOTES
"  Assessment & Plan     Screen for colon cancer  - PALMA([employer]); Future    Lumbar radiculopathy  Neurosurgery and pain management notes reviewed.  Continue to follow closely with both specialties.  Continue pain physical therapy.  - Adult Mental Health  Referral; Future    Generalized anxiety disorder  - Adult Mental Health  Referral; Future    Grief  - Adult Mental Health  Referral; Future    Pseudotumor cerebri  Previous imaging reviewed    Hearing loss of left ear, unspecified hearing loss type  Will send outside notes through [facility]    Tinnitus, left  Unchanged.    All questions answered to the best my ability.    Review of the result(s) of each unique test - Labs, imaging  Ordering of each unique test  Prescription drug management  44 minutes spent by me on the date of the encounter doing chart review, history and exam, documentation and further activities per the note      BMI  Estimated body mass index is 28.49 kg/m  as calculated from the following:    Height as of this encounter: 1.73 m (5' 8.11\").    Weight as of this encounter: 85.3 kg (188 lb).         Bunny Quintana is a 48 year old, presenting for the following health issues:  Establish Care and Musculoskeletal Problem        2/5/2024     1:21 PM   Additional Questions   Roomed by Nadine   Accompanied by Self         2/5/2024     1:21 PM   Patient Reported Additional Medications   Patient reports taking the following new medications na       Patient with history of Chronic Low Back Pain, Left Upper Neck and Left Shoulder arrived to Establish Care.     History of Present Illness       Reason for visit:  Establish primary care for all my back issues, tinnitus and IIH    She eats 2-3 servings of fruits and vegetables daily.She consumes 0 sweetened beverage(s) daily.She exercises with enough effort to increase her heart rate 9 or less minutes per day.  She exercises with enough effort to increase her heart rate " 3 or less days per week.   She is taking medications regularly.     Chronic/Recurring Back Pain Follow Up    Where is your back pain located? (Select all that apply) Lower back Left Sided- Radiates in left buttocks, Left Shoulder and Neck  How would you describe your back pain?  Achey with sharp jabs   Where does your back pain spread? the left buttock, Left Shoulder and Neck  Since your last clinic visit for back pain, how has your pain changed? gradually worsening  Does your back pain interfere with your job? No- pt works from home   Since your last visit, have you tried any new treatment? Yes -  surgery in October 2023 and recently had inject in Shoulder       Review of Systems   Constitutional:  Negative for fatigue.   HENT:  Positive for hearing loss (left) and tinnitus (left). Negative for ear pain, rhinorrhea and sore throat.    Eyes:  Negative for visual disturbance.   Respiratory:  Negative for cough, chest tightness, shortness of breath and wheezing.    Cardiovascular:  Negative for chest pain, palpitations and leg swelling.   Gastrointestinal:  Negative for abdominal distention, abdominal pain, constipation, diarrhea, nausea and vomiting.   Endocrine: Negative for cold intolerance and heat intolerance.   Musculoskeletal:  Positive for back pain. Negative for arthralgias and myalgias.   Skin:  Negative for rash.   Neurological:  Negative for dizziness, light-headedness, numbness and headaches.   Psychiatric/Behavioral:  Negative for dysphoric mood and sleep disturbance. The patient is not nervous/anxious.         Here today to establish care.  Has had previous L5-S1 microdiscectomy.  Has been dealing with pain in back. Has been on oxycodeone since had surgery. Pain is worse.  Has been trying to gradually wean down on oxycodone.  Noted some tarlepsis. Has MRI that sent to [place]. Is gradually weaning off of oxycodone. Is gradually increasing gabapetin.  Does not really like the way gabapentin makes her feel,  "feels robotic when is walking. Does feel like is helping some to decrease the edge off of the pain. Is able to walk but is not able to sit for long amounts of time. Oxy from surgon, feliz from pain management.  Has been going to pain physical therapy.  Does feel like it has been beneficial.    History of pseudotumor cerebri and also tendinitis, left.  The tendinitis has caused significant disruption in life.  Is constant and is there all the time.  Does have times that feels like it is louder.  Has had MRI to rule out tumor.  Has been evaluated by ENT at outside location.    Son passed away 4 years ago.  Did attend grief counseling at that time.    Last Pap: 2019-normal. Has had abnormal in the past. Had LEEP in the past  Last mammogram: 4/23. Mom with breast cancer.   Last BMD: N/A  Last Colonoscopy: Never, no family history   Last eye exam: yearly  Last dental exam: every 6 mo  Last tetanus vaccine: 2016  Last influenza vaccine: declines   Last shingles vaccine: Plans [facility]  Last pneumonia vaccine:  Last RSV vaccine: N/A  Last COVID booster: declines   Hep C screen: done 2023  HIV screen:  declines low risk  AAA screen (age 65-78 with smoking hx): N/A  IVD (HTN, Hyperlipid, Smoking): N/A  Lung CA screening (50-77, 20 pk smoking hx) Quit within 15 years: N/A        Objective    /70 (BP Location: Left arm, Patient Position: Chair, Cuff Size: Adult Large)   Pulse 79   Temp 98  F (36.7  C) (Tympanic)   Resp 16   Ht 1.73 m (5' 8.11\")   Wt 85.3 kg (188 lb)   SpO2 100%   BMI 28.49 kg/m    Body mass index is 28.49 kg/m .  Physical Exam  Constitutional:       Appearance: Normal appearance. She is well-developed.   HENT:      Head: Normocephalic and atraumatic.      Right Ear: Tympanic membrane and external ear normal. No middle ear effusion.      Left Ear: Tympanic membrane and external ear normal.  No middle ear effusion.      Nose: No mucosal edema.   Neck:      Thyroid: No thyromegaly.      " Vascular: No carotid bruit.   Cardiovascular:      Rate and Rhythm: Normal rate and regular rhythm.      Heart sounds: Normal heart sounds.   Pulmonary:      Effort: Pulmonary effort is normal.      Breath sounds: Normal breath sounds.   Abdominal:      General: Bowel sounds are normal.      Palpations: Abdomen is soft.   Skin:     General: Skin is warm and dry.   Neurological:      Mental Status: She is alert.   Psychiatric:         Behavior: Behavior normal.                Signed Electronically by: ASIF Whitlock CNP

## 2024-02-06 ENCOUNTER — VIRTUAL VISIT (OUTPATIENT)
Dept: PSYCHIATRY | Facility: CLINIC | Age: 49
End: 2024-02-06
Attending: PSYCHIATRY & NEUROLOGY
Payer: COMMERCIAL

## 2024-02-06 DIAGNOSIS — F41.1 GENERALIZED ANXIETY DISORDER: Primary | ICD-10-CM

## 2024-02-06 DIAGNOSIS — F32.A DEPRESSION, UNSPECIFIED DEPRESSION TYPE: ICD-10-CM

## 2024-02-06 DIAGNOSIS — H93.12 TINNITUS, LEFT: ICD-10-CM

## 2024-02-06 DIAGNOSIS — F43.10 POSTTRAUMATIC STRESS DISORDER: ICD-10-CM

## 2024-02-06 PROCEDURE — 99214 OFFICE O/P EST MOD 30 MIN: CPT | Mod: 95 | Performed by: PSYCHIATRY & NEUROLOGY

## 2024-02-06 RX ORDER — DIAZEPAM 5 MG
2.5-5 TABLET ORAL DAILY PRN
Qty: 15 TABLET | Refills: 2 | Status: SHIPPED | OUTPATIENT
Start: 2024-02-06 | End: 2024-06-25

## 2024-02-06 RX ORDER — PROPRANOLOL HYDROCHLORIDE 40 MG/1
20-40 TABLET ORAL 2 TIMES DAILY PRN
Qty: 60 TABLET | Refills: 2 | Status: SHIPPED | OUTPATIENT
Start: 2024-02-06 | End: 2024-05-16

## 2024-02-06 ASSESSMENT — PAIN SCALES - GENERAL: PAINLEVEL: SEVERE PAIN (7)

## 2024-02-06 NOTE — PROGRESS NOTES
Virtual Visit Details  Type of service:  Video Visit   Originating Location (pt. Location): Home  Distant Location (provider location):  Off-site  Platform used for Video Visit: [facility]  MEDICAL PROGRESS NOTE     Lilian Quinones is a 48 year old. Initial consultation on 08/05/20. Referred by Kristen M Kehr for evaluation of depression.      Assessment & Plan    History and interview support the following diagnoses:  Generalized anxiety disorder  PTSD / Complex grief  Depression, unspecified (MDD vs secondary to anxiety / trauma / grief)  Tinnitus, left    Lilian notes that the propranolol has been helpful, and has been using it. She still feels that she would like diazepam available for anxiety situations. She is aware not to use it while taking opioids. Of note, she is now being started on gabapentin for pain. I let her know that this is also used for anxiety, and hopefully it will help with her anxiety as well.   The primary issue she has been dealing with is the chronic back pain. Unfortunately her back surgery failed and her chronic pain has been a lot worse since the surgery. She is now working on getting a more comprehensive workup  Tinnitus (unilateral left, started with URI 02/2023) also continues to be problematic, and has not shown improvement, now present for the last year. She continues to be positive, and we discussed that management of stress and anxiety is important in the course of tinnitus as well.   She did recently establish with a primary care provider. Moving forward, if there is no additional psychiatric intervention to make, I will recommend having Lilian transition her prescribing to primary care.     Move back to [place] in 2023 brought a lot of triggers / associations for her, which likely indicates that the trauma elements of her grief are unresolved. She should still be engaging in trauma therapy, if she is encountering  elements that are interfering with her ability to function ideally. They moved back to [place] to be with their new granddaughters born recently, which is a major positive.   Notes significant attentional issues that have persisted since her son's death. These were not present prior to that, although does note that both sons have ADHD, and maybe there were some elements that were less obvious that were problematic earlier in life, hard to say. I ordered ADHD evaluation to help parse apart her various symptoms and add the historical context that could be helpful. I think that she likely does not have ADHD, but it is a possibility. Would be easier to treat and diagnose if anxiety were better controlled, and that would be easier to address if her trauma symptoms were also better addressed.   The fact that she was previously on bupropion and it helped with attentional symptoms, but caused significant anxiety does not marcial well for the potential use of stimulants. It does support the potential use of antiadreneric options like clonidine or guanfacine going forward.   She went off of sertraline due to concerns about emotional blunting. This does not rule out the use of other antidepressant options in the future. May consider alternatives like escitalopram if needed, given side effects with sertraline.     She uses diazepam sparingly to help with anxiety. She is aware that this is not a sustainable solution. It is not bad to use the diazepam, but it is the opposite of an intervention that would help her achieve her goal of being okay. She can continue to use it, but it must not be the only intervention she has available. Her symptoms will not be at goal until she no longer needs the diazepam. Goal would be to be off of it within 6 months.     PSYCHOTROPIC DRUG INTERACTIONS: None   MANAGEMENT:  N/A     Plan     1) PSYCHOTROPIC MEDICATIONS:  - Take propranolol 20-40mg twice daily as needed for anxiety.    - Plan to try taking one  dose scheduled each morning  - May take diazepam 5mg daily as needed for severe anxiety    - Taking melatonin 10mg at bedtime as needed for sleep  - Taking diphenhydramine (Zzzquil) 50mg QHS PRN for sleep    2) THERAPY: Psychotherapy is a primary recommendation for the treatment of mood symptoms, even in the context of grief.   Previously engaged in therapy with grief counselor, did EMDR.     3) NEXT DUE:   Labs- Routine monitoring is not indicated for current psychotropic medication regimen   ECG- Routine monitoring is not indicated for current psychotropic medication regimen   Rating Scales- N/A    4) REFERRALS: 04/2023 - Referred for ADHD evaluation, scheduled 10/2023    5) : None    6) DISPOSITION: RTC 3 months or sooner if needed.     Treatment Risk Statement:  The patient understands the risks, benefits, adverse effects and alternatives. Agrees to treatment with the capacity to do so. No medical contraindications to treatment. Agrees to call clinic for any problems. The patient understands to call 911 or go to the nearest ED if life threatening or urgent symptoms occur. Crisis numbers are provided routinely in the After Visit Summary.       PROVIDER: Joss Rod MD    Level of Medical Decision Making:   - At least 1 chronic problem that is not stable  - Engaged in prescription drug management during visit (discussed any medication benefits, side effects, alternatives, etc.)          Pertinent Background   Lilian does not report any history of psychiatric diagnoses prior to the death of her son on 11/12/2019. Since that time, she has contended with complex grief and trauma symptoms which have included elements of depression and anxiety. Her grief has been severe, and has included elements of generalized anxiety, panic, and trauma symptoms. She likely met criteria for PTSD at some point. How she is in an adjustment phase, but still with significant depressive symptoms, unclear how persistent,  recurrent this will be.      Interval History    Her back surgery failed and the pain is now worse from before, so they are having her start in pain mgmt and PT. She established with a primary care provider yesterday to help her navigate things.   Tinnitus continues to be problematic. Her doctor has it too, and told her that there is nothing to really be done about it. It can be really difficult while trying to work, and while talking to people. It started a year ago now. Did not identify a cause for it.   Does take the propranolol and does feel that this has been well tolerated. No side effects, and does take this in the mornings. Has felt that it has made a difference with stressful customers and situations like that.   She does want to have the diazepam continue to be available as it is very helpful in difficult situations. She has been off of it while taking oxycodone, but is now tapering off of oxy and starting on gabapentin.     Discussion points from past visits:   Anxiety continues to be the main issue, worries about catastrophic scenarios, like people dying, and tends to spiral.   Before Reese , things were never this difficult. She was very structured and organized.   She only uses the diazepam when things get very severe, maybe 2-3x per week. She does try to minimize this.   Has not been having issues with tiredness since being on bupropion.   Notes that she still tends to use diazepam with triggers, like when her son's friends email her. Social media has quite a few triggers. This year is particularly hard especially on social media as he would have been graduating.   She isn't sure if the medications are working at times. She still has anxiety, and doesn't expect it to go away, understands that grief if an ongoing process, and can last the rest of her life. Her goal would be that the medications would take the edge off, so she is able to work more.   Anxiety continues to be an issue especially in  "the mornings. She does occasionally take the diazepam (Valium) for this in the mornings when it spikes. Has not been taking it every day, but does note that it helps with that heavy feeling in her chest.     Recent Substance Use  Alcohol- None  Tobacco- None  Caffeine- 1 cups/day of coffee  Opioids- None  Narcan Kit- N/A  Cannabis- Has tried CBD but didn't really help.   Other Illicit Drugs- none    Current Social Hx:  FINANCIAL SUPPORT- Works as  /  remotely.  works as caterer.   LIVING SITUATION / RELATIONSHIPS- Currently living and working remotely. Sold their house because it was too much of a reminder of their son. Thinking about buying another house now. Has 2 dogs and 2 cats.  Son lives in [place], recently had a child. Youngest son is in [employer] in [place]. Daughter is in the [employer], had a child in 2021.   SOCIAL/ SPIRITUAL SUPPORT- \"Absolutely\", but still feels very lonely since her loss.        Medical Review of Systems    Dizziness/orthostasis- Has in the past, but generally no.   Headaches- No.   GI- No. Has lost 100 lbs since gastric sleeve surgery last year.   Has chronic pain related to bulging disc, gets shots once or twice per year.      Psych Summary Points   08/2020 - Started propranolol 40mg BID  10/2020 - Started bupropion. Decreased propranolol to 20mg due to drug intx and low HR.   11/2020 - Completed ADHD evaluation. Determined that acute symptoms are more likely due to PTSD than to ADHD.   01/2021 - Increased bupropion to 300mg, but anxiety increased, so decreased back down.   02/2021 - Tried buspirone after nephew passed away, felt that it increased anxiety.   04/2021 - Changed propranolol to PRN and discontinued bupropion.   11/2021 - Increased sertraline to 150mg.   12/2021 - Increased sertraline to 200mg.   05/2022 - Tapered off of sertraline.   02/2023 - Had severe head cold, dx'd with intractable tinnitus and idiopathic hypertension afterwards.   04/2023 - " Started buspirone. Took for 1.5 months, but it caused worsening of tinnitus and brain read.   07/2023 - Started vilazodone 10mg. Discontinued due to nausea.   11/2023 - Started propranolol again due to persistent anxiety and workplace stress.      Psychiatric Medication Trials       Drug /  Start Date Dose (mg) Helpful Adverse Effects DC Reason / Date   Citalopram 11/2019 40 probably     Sertraline 10/2021 200 yes Initial anxiety, emotional blunting    Bupropion XL 10/2020 300 yes Anxiety / agitation at 300 Helped with concentration and energy   Buspirone 2/21, 4/23 15 BID no Increased anxiety, tinnitus and brain read    Vilazodone 7/23 10  nausea    gabapentin       Benadryl       Lorazepam 11/2019 2 daily PRN yes     Diazepam 08/2020 10 yes sedating    Propranolol 08/2020 40 BID probably     melatonin          Past Medical History      CARE TEAM:   PCP- Shira Olson with [facility]  Therapist- Alejandra Lui at [facility] - Grief Counselor - Will be starting EMDR.     Neurologic Hx [head injury, seizures, etc.]: Concussion from skiing injury in 2012.   Patient Active Problem List   Diagnosis    Overweight    Generalized anxiety disorder    Hyperhydrosis disorder    Major depression, recurrent (H24)    Lumbar radiculopathy    Hearing loss of left ear, unspecified hearing loss type    Family history of breast cancer    Pseudotumor cerebri     Past Medical History:   Diagnosis Date    Arthritis 2021    IUD (intrauterine device) in place 03/20/2019    Mirena      Allergies    Amoxicillin and Penicillins     Medications      Current Outpatient Medications   Medication Sig Dispense Refill    diazepam (VALIUM) 5 MG tablet Take 0.5-1 tablets (2.5-5 mg) by mouth daily as needed for anxiety 15 tablet 2    propranolol (INDERAL) 40 MG tablet Take 0.5-1 tablets (20-40 mg) by mouth 2 times daily as needed (for anxiety) 60 tablet 2    Bioflavonoid Products (VITAMIN C) CHEW Take 2 chew tab by mouth  daily      Calcium Polycarbophil (FIBER) 625 MG tablet Take 1 tablet by mouth daily      diphenhydrAMINE HCl, Sleep, (ZZZQUIL PO) Take 50 mg by mouth nightly as needed (for sleep)      gabapentin (NEURONTIN) 300 MG capsule Start 300 mg at bedtime x 3 days, then increase by 300 mg every 3 days until goal dose of 600 mg TID is achieved. 180 capsule 1    levonorgestrel (MIRENA) 20 MCG/24HR IUD 1 each by Intrauterine route once      melatonin 5 MG tablet Take 10 mg by mouth nightly as needed for sleep      methocarbamol (ROBAXIN) 500 MG tablet Take 1-2 tablets (500-1,000 mg) by mouth 4 times daily as needed for muscle spasms (Patient not taking: Reported on 12/28/2023) 40 tablet 1    Multiple Vitamins-Minerals (MULTIVITAMIN GUMMIES WOMENS) CHEW Take 2 chew tab by mouth daily      oxyCODONE (ROXICODONE) 5 MG tablet Take 1-2 tablets (5-10 mg) by mouth every 8 hours as needed for moderate to severe pain MAX 2 pills per day 20 tablet 0      Physical Exam  (Vitals Only)   There were no vitals taken for this visit.    Pulse Readings from Last 5 Encounters:   02/05/24 79   01/24/24 84   01/03/24 68   12/28/23 77   11/14/23 76     Wt Readings from Last 5 Encounters:   02/05/24 85.3 kg (188 lb)   11/14/23 86.1 kg (189 lb 14.4 oz)   10/03/23 85 kg (187 lb 6.4 oz)   09/25/23 86.2 kg (190 lb)   08/08/23 88.9 kg (196 lb)     BP Readings from Last 5 Encounters:   02/05/24 118/70   01/24/24 112/70   01/03/24 123/87   12/28/23 123/81   11/14/23 121/83      Mental Status Exam   Alertness: alert  and oriented  Appearance: adequately groomed  Behavior/Demeanor: cooperative and calm, with good eye contact   Speech: normal and regular rate and rhythm  Language: intact and no problems  Psychomotor: normal or unremarkable  Mood: Anxiety has been high  Affect: full range and appropriate; was congruent to mood; was congruent to content  Thought Process/Associations: unremarkable  Thought Content:  Reports none;  Denies suicidal ideation,  violent ideation and delusions  Perception:  Reports none;  Denies auditory hallucinations and visual hallucinations  Insight: intact  Judgment: intact  Cognition: does  appear grossly intact; formal cognitive testing was not done  Gait and Station: not observed     Labs and Data          1/2/2024     4:08 PM 1/3/2024     2:24 PM 2/5/2024     1:08 PM   PHQ-9 SCORE   PHQ-9 Total Score MyChart  15 (Moderately severe depression) 15 (Moderately severe depression)   PHQ-9 Total Score 15 15 15         11/3/2023     4:06 PM 1/18/2024     6:02 AM 2/1/2024     9:14 AM   HAO-7 SCORE   Total Score 21 (severe anxiety) 18 (severe anxiety) 19 (severe anxiety)   Total Score 21 18 19       Recent Labs   Lab Test 06/12/23  0747 12/04/19  1223 06/19/19  1740   CR 0.81 0.79 0.72   GFRESTIMATED 88 >60 >90     Recent Labs   Lab Test 12/04/19  1223 06/19/19  1740   AST 12 15   ALT <9 33   ALKPHOS 50 53     Recent Labs   Lab Test 03/05/19  1232 03/05/19  0000 09/19/16  0935   TSH 2.12 2.12 2.50     ECG 6/19/19 QTc = 414ms

## 2024-02-06 NOTE — PATIENT INSTRUCTIONS
**For crisis resources, please see the information at the end of this document**   Patient Education    Thank you for coming to the [facility] [place] [facility].     Lab Testing:  If you had lab testing today and your results are reassuring or normal they will be mailed to you or sent through [facility] within 7 days. If the lab tests need quick action we will call you with the results. The phone number we will call with results is # [phone]. If this is not the best number please call our clinic and change the number.     Medication Refills:  If you need any refills please call your pharmacy and they will contact us. Our fax number for refills is [phone].   Three business days of notice are needed for general medication refill requests.   Five business days of notice are needed for controlled substance refill requests.   If you need to change to a different pharmacy, please contact the new pharmacy directly. The new pharmacy will help you get your medications transferred.     Contact Us:  Please call [phone] during business hours (8-5:00 M-F).   If you have medication related questions after clinic hours, or on the weekend, please call [phone].     Financial Assistance [phone]   Medical Records [phone]       MENTAL HEALTH CRISIS RESOURCES:  For a emergency help, please call 911 or go to the nearest Emergency Department.     Emergency Walk-In Options:   EmPATH Unit @ [facility] ([place]): [phone] - Specialized mental health emergency area designed to be calming  [facility] ([place]): [phone]  [facility] ([place]): [phone]  [facility]): [phone]    [facility] Crisis Information:   [place]: [phone]  [place]: [phone]  [place] ([place]) - Adult: [phone]     Child: [phone]  [place] - Adult: [phone]     Child: [phone]  [place]:  [phone]  List of all [facility] resources:   https://[place].gov/dhs/people-we-serve/adults/health-care/mental-health/resources/crisis-contacts.jsp    National Crisis Information:   Crisis Text Line: Text  [place]  to 618553  Suicide & Crisis Lifeline: 988  National Suicide Prevention Lifeline: 4-203-158-TALK (1-636.146.2145)       For online chat options, visit https://suicidepreventionlifeline.org/chat/  Poison Control Center: 5-471-115-1641  Trans Lifeline: 8-419-324-1942 - Hotline for transgender people of all ages  The [employer]eto Project: 4-530-784-3032 - Hotline for LGBT youth     For Non-Emergency Support:   Fast Tracker: Mental Health & Substance Use Disorder Resources -   https://www.[place].org/

## 2024-02-06 NOTE — NURSING NOTE
Is the patient currently in the state of [place]? YES    Visit mode:VIDEO    If the visit is dropped, the patient can be reconnected by: VIDEO VISIT: Text to cell phone:   Telephone Information:   Mobile [phone]       Will anyone else be joining the visit? NO  (If patient encounters technical issues they should call [phone]    How would you like to obtain your AVS? MyChart    Are changes needed to the allergy or medication list? Yes -SEVERAL-please see all meds flagged for removal.    Reason for visit: JANINE ESPINOZA

## 2024-02-08 ENCOUNTER — THERAPY VISIT (OUTPATIENT)
Dept: PHYSICAL THERAPY | Facility: CLINIC | Age: 49
End: 2024-02-08
Payer: COMMERCIAL

## 2024-02-08 DIAGNOSIS — M54.16 LUMBAR RADICULOPATHY: Primary | ICD-10-CM

## 2024-02-08 PROCEDURE — 97112 NEUROMUSCULAR REEDUCATION: CPT | Mod: GP | Performed by: PHYSICAL THERAPIST

## 2024-02-08 PROCEDURE — 97110 THERAPEUTIC EXERCISES: CPT | Mod: GP | Performed by: PHYSICAL THERAPIST

## 2024-02-13 ENCOUNTER — MYC REFILL (OUTPATIENT)
Dept: NEUROSURGERY | Facility: CLINIC | Age: 49
End: 2024-02-13
Payer: COMMERCIAL

## 2024-02-13 DIAGNOSIS — Z98.890 S/P LUMBAR MICRODISCECTOMY: ICD-10-CM

## 2024-02-14 ENCOUNTER — MYC MEDICAL ADVICE (OUTPATIENT)
Dept: PALLIATIVE MEDICINE | Facility: CLINIC | Age: 49
End: 2024-02-14

## 2024-02-14 ENCOUNTER — VIRTUAL VISIT (OUTPATIENT)
Dept: PSYCHOLOGY | Facility: CLINIC | Age: 49
End: 2024-02-14
Attending: NURSE PRACTITIONER
Payer: COMMERCIAL

## 2024-02-14 DIAGNOSIS — F41.1 GENERALIZED ANXIETY DISORDER: Primary | ICD-10-CM

## 2024-02-14 DIAGNOSIS — F33.1 MAJOR DEPRESSIVE DISORDER, RECURRENT EPISODE, MODERATE (H): ICD-10-CM

## 2024-02-14 PROCEDURE — 90791 PSYCH DIAGNOSTIC EVALUATION: CPT | Mod: 95

## 2024-02-14 RX ORDER — OXYCODONE HYDROCHLORIDE 5 MG/1
5-10 TABLET ORAL EVERY 8 HOURS PRN
Qty: 20 TABLET | Refills: 0 | Status: SHIPPED | OUTPATIENT
Start: 2024-02-14 | End: 2024-05-06

## 2024-02-14 ASSESSMENT — COLUMBIA-SUICIDE SEVERITY RATING SCALE - C-SSRS
TOTAL  NUMBER OF INTERRUPTED ATTEMPTS LIFETIME: NO
REASONS FOR IDEATION LIFETIME: COMPLETELY TO END OR STOP THE PAIN (YOU COULDN'T GO ON LIVING WITH THE PAIN OR HOW YOU WERE FEELING)
1. IN THE PAST MONTH, HAVE YOU WISHED YOU WERE DEAD OR WISHED YOU COULD GO TO SLEEP AND NOT WAKE UP?: YES
6. HAVE YOU EVER DONE ANYTHING, STARTED TO DO ANYTHING, OR PREPARED TO DO ANYTHING TO END YOUR LIFE?: NO
TOTAL  NUMBER OF ABORTED OR SELF INTERRUPTED ATTEMPTS LIFETIME: NO
REASONS FOR IDEATION PAST MONTH: COMPLETELY TO END OR STOP THE PAIN (YOU COULDN'T GO ON LIVING WITH THE PAIN OR HOW YOU WERE FEELING)
2. HAVE YOU ACTUALLY HAD ANY THOUGHTS OF KILLING YOURSELF?: NO
1. HAVE YOU WISHED YOU WERE DEAD OR WISHED YOU COULD GO TO SLEEP AND NOT WAKE UP?: YES
ATTEMPT LIFETIME: NO

## 2024-02-14 ASSESSMENT — PATIENT HEALTH QUESTIONNAIRE - PHQ9
10. IF YOU CHECKED OFF ANY PROBLEMS, HOW DIFFICULT HAVE THESE PROBLEMS MADE IT FOR YOU TO DO YOUR WORK, TAKE CARE OF THINGS AT HOME, OR GET ALONG WITH OTHER PEOPLE: EXTREMELY DIFFICULT
SUM OF ALL RESPONSES TO PHQ QUESTIONS 1-9: 14
SUM OF ALL RESPONSES TO PHQ QUESTIONS 1-9: 14

## 2024-02-14 NOTE — PROGRESS NOTES
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 access to their mental health medical record.    9.   Interactive Complexity: No    10. Safety Plan:  When the patient identifies the following:  Suicidal Ideation Without method, intent, plan, or behavior (Yes to C-SSRS Suicidal Ideation #1 or #2 and No to #3,4,5)    The following is recommended:   Complete/Review/Update Safety Plan    Safety Plan:  Adult Short Safety Plan:   Name: Lilian Quinones  YOB: 1975  Date: February 15, 2024   My primary care provider: Shira Olson  Other care team support:  Therapist    My Triggers:  Medical Health chronic pain and remembrance of son who passed     Additional People, Places, and Things that I can access for support: , therapist, friends, family, other support systems in town         What is important to me and makes life worth living: my kids, my , my family and my support network .         GREEN    Good Control  1. I feel good  2. No suicidal thoughts   3. Can work, sleep and play      Action Steps  1. Self-care: balanced meals, exercising, sleep practices, etc.  2. Take your medications as prescribed.  3. Continue meetings with therapist and prescriber.  4.  Do the  healthy things that I enjoy.  5. Continue to reach out to support system and engage in activities that bring me miki.           YELLOW  Getting Worse  I have ANY of these:  1. I do not feel good  2. Difficulty Concentrating  3. Sleep is changing  4. Increase/Change in my thoughts to hurt self and/or others, but I can still manage and not act on it.   5. Not taking care of self.  6. Passive SI - desiring to not be on earth anymore             Action Steps (in addition to the above):  1. Inform your therapist and psychiatric prescriber/PCP.  2. Keep taking your medications as prescribed.    3. Turn to people you can ask for help.  4. Use internal coping strategies -see below.  5. Create safe environment: notify friends/family of increase in symptoms             RED  Get Help  If I have ANY of these:  1. Current and uncontrollable thoughts and/or behaviors to hurt self and/or others.      Actions to manage my safety  1. Contact your emergency person :   2. Call or Text [phone]  3. Call my crisis team- [facility] [phone] [facility] Crisis [facility]  3. Or Call 911 or go to the emergency room right away          My Internal Coping Strategies include the following:  belly breathing, go to my safe space  , change my body temperature by either an ice pack or warm shower, and use my coping skills    [End for Brief Safety Plan]     Safety Concerns  How To Identify Situations That Make Your Mental Health Worse:  Triggers are things that make your mental health worse.  Look at the list below to help you find your triggers and what you can do about them.     1. Identify Early Warning Signs:    Sometimes symptoms return, even when people do their best to stay well. Symptoms can develop over a short period of time with little or no warning, but most of the time they emerge gradually over several weeks.  Early warning signs are changes that people experience when a relapse is starting. Some early warning signs are  common and others are not as common.   Common Early Warning Signs:    Feeling tense or nervous, Trouble sleeping -either too much or too little sleep, Feeling depressed or low, Feeling irritable, and Feeling like not being around other people     2. Identify action steps to take when warning signs are noticed:    Taking Action- It is important to take action if you are experiencing early warning signs of a relapse.  The faster you act, the more likely it is that you can avoid a full relapse.  It is helpful to identify several specific ways to cope with symptoms.      The following is my list of symptoms and coping strategies that I can use when they are present:    Symptom Coping Strategies   Anxiety -Talk with someone in your support system and let him or her know how you are feeling.  -Use relaxation techniques such as deep breathing or imagery.  -Use positive affirmations to counteract negative self-talk such as  I am learning to let go of worry.    Depression - Schedule your day; include activities you have to do and activities you enjoy doing.  - Get some exercise - walk, run, bike, or swim.  - Give yourself credit for even the smallest things you get done.   Sleep Difficulties   - Go to sleep at the same time every day.  - Do something relaxing before bed, such as drinking herbal tea or listening to music.  - Avoid having discussions about upsetting topics before going to bed.   Delusions   - Distract yourself from the disturbing thought by doing something that requires your attention such as a puzzle.  - Check out your beliefs by talking to someone you trust.    Hallucinations   - Use headphones to listen to music.  - Tell voices to  stop  or say to yourself,  I am safe.   - Ignore the hallucinations as much as possible; focus on other things.   Concentration Difficulties - Minimize distractions so there is only one thing for you to focus on at a time.    - Ask the person you are having a conversation with to  slow down or repeat things you are unsure of.             Provider Name/ Credentials:  Clare Mcarthur, Psychotherapist Trainee, [facility]  February 14, 2024    This note has been reviewed and I agree with the plan of care. This note is co-signed by Felicita Roth MA, [facility] Supervisor, on: 2/15/2024

## 2024-02-14 NOTE — TELEPHONE ENCOUNTER
Per patient myChart message:  Lilian DAWSON Pain Nurse (supporting ASIF Regalado CNP)2 hours ago (10:45 AM)   I am at the full dosage - 2 capsules every 8 hours   And   Lilian DAWSON Pain Nurse (supporting ASIF Regalado CNP)1 hour ago (11:40 AM)   Tiny benefit but makes me feel so wonky.  What is the timeline that I should feel benefit? When it is determined that Gabapentin isn t going to work?   __________________    Script (pt is at goal dose):   Disp Refills Start End PATRICIO   gabapentin (NEURONTIN) 300 MG capsule 180 capsule 1 1/24/2024 -- No   Sig: Start 300 mg at bedtime x 3 days, then increase by 300 mg every 3 days until goal dose of 600 mg TID is achieved.         Routed to provider.     Jordyn RN-BSN  [facility]

## 2024-02-14 NOTE — TELEPHONE ENCOUNTER
Per patient myChart message:  Lilian DAWSON Pain Nurse (supporting ASIF Regalado CNP)11 minutes ago (10:01 AM)   Good morning- how long should it take to get pain relief? The Gabapentin is not helping- maybe a tiny bit but not getting enough relief.   What are my options? I m scared that this is my life now and it s horrible- I am wondering what am I doing wrong in the health system to get help because it s making it hard to work and I need income      ________________________      2/27/24: med spine eval with Dr. Hughes leg pain    Per Celena Hilliard, OLIVIA, APRN, AGNP-C's 1/24/24 eval plan:              Pain Physical Therapy:  YES  Recommend scheduling with Kevin Sims PT. Currently participating   -Pain Psychologist to address relaxation, behavioral change, coping style, and other factors important to improvement.  NO - not at this time.   -Diagnostic Studies:  Reviewed recent cervical and lumbar MRIs - multilevel degenerative changes noted, no red flag findings.    -Medication Management:   Start gabapentin 300 mg at bedtime and titrate to goal dose 600 mg three times daily, per instructions on prescription bottle. Monitor for changes in pain level/intensity. May consider dosage increase versus alternative, pending outcome.   Monitor for sedation - may cause dizziness or drowsiness. Be careful driving/moving around until you know effects of medication. Avoid concurrent alcohol use.   Duloxetine - will consider at follow up, pending outcome with gabapentin.   Medical cannabis - she inquired about this today, recommend exploring other potential treatment options initially. Also, given frequent traveling out of state, I am not sure certification for [place] program ideal, as she cannot take medical cannabis products across state lines.   Oxycodone 5 mg - currently takes 2 tabs (10 mg) three times daily as needed. Managed by neurosurgery. Advised that I am not taking over prescribing today, will message  neurosurgery team regarding plan for continuation/taper. I recommend tapering dosage as tolerated, do not recommend long term use or dose escalation. Currently has a call out to neurosurgery for a refill request   -Potential procedures:   C7-T1 epidural steroid injection -2/22/24 with Dr. Levi    -Other Orders/Referrals:   Message sent to neurosurgery regarding medication management/recommendations.    -Follow up with ASIF Regalado CNP in around 6 weeks. 3/6/24  _________________________________    Mychart sent to pt:  You  Lilian Whalen now (10:43 AM)     Medardo Quintana,  How exactly are you taking the gabapentin right now?  Any side effects?  This is a titrating medication and you may not notice benefit until you get to higher dosing.  Do you have any benefit at all at this time?     You do have an upcoming appointment for the cervical epidural steroid injection coming up which is good.       Good to see you have a call out to neurosurgery for your oxycodone refill.  They will review to see if it is indicated and will be getting back to you.     Chronic pain is a process and, unfortunately, can take time to determine what benefits you. You are doing all of the right things and participating in what is recommended.       I will wait for you response back to the gabapentin questions and then will have Celena Hilliard DNP, APRN, AGNP-C review.      TERRELL Cobb-BSN  [facility]   [phone]

## 2024-02-14 NOTE — TELEPHONE ENCOUNTER
Patient requesting refill of Oxycodone.     DOS: 10/3/23  Surgeon: Dr. Garcia  Procedure: Minimally invasive Left Lumbar 5 to Sacral 1 microdiscectomy    Weeks Post op: 19 weeks 1 day  Last refilled: 1/30 #20    Pain assessment completed via [employer]. Please see encounter for further details. Refill request will be forwarded to care team.

## 2024-02-15 ENCOUNTER — THERAPY VISIT (OUTPATIENT)
Dept: PHYSICAL THERAPY | Facility: CLINIC | Age: 49
End: 2024-02-15
Payer: COMMERCIAL

## 2024-02-15 DIAGNOSIS — M54.16 LUMBAR RADICULOPATHY: Primary | ICD-10-CM

## 2024-02-15 PROCEDURE — 97112 NEUROMUSCULAR REEDUCATION: CPT | Mod: GP | Performed by: PHYSICAL THERAPIST

## 2024-02-15 PROCEDURE — 97110 THERAPEUTIC EXERCISES: CPT | Mod: GP | Performed by: PHYSICAL THERAPIST

## 2024-02-15 PROCEDURE — 97530 THERAPEUTIC ACTIVITIES: CPT | Mod: GP | Performed by: PHYSICAL THERAPIST

## 2024-02-15 NOTE — TELEPHONE ENCOUNTER
Therese Vargas CNP is requesting we contact the pain team and ask them if they can assist with recommendations for a taper plan? She stated the oxycodone refill provided today can  be used for a taper.     Please see MyChart messages, patient expressing frustration with continued pain and treatment options.     Message routed to pain team for review and recommendations.

## 2024-02-15 NOTE — TELEPHONE ENCOUNTER
Chart reviewed - It may take a few weeks to appreciate full therapeutic benefit once at goal dose of gabapentin. Also, she has cervical WENCESLAO on 2/22/24 with Dr. Levi. We will evaluate benefit with gabapentin and injection at follow up.     Celena Hilliard DNP, APRN, AGNP-C  [facility] Pain Management

## 2024-02-15 NOTE — TELEPHONE ENCOUNTER
Meta sent to pt:  Hernando  Lilian GRACIE Whalen now (1:32 PM)   Medardo Quintana,     The max dose of gabapentin varies between patients depending on their kidney function.     Celena Hilliard DNP, ASIF, KATLYNC reviewed and here is her response:     Celena Hilliard APRN CNP    Creation Time: 2/15/2024 12:27 PM      Chart reviewed - It may take a few weeks to appreciate full therapeutic benefit once at goal dose of gabapentin. Also, she has cervical WENCESLAO on 2/22/24 with Dr. Levi. We will evaluate benefit with gabapentin and injection at follow up.      Celena Hilliard DNP, ASIF, AGNP-C  [facility] Pain Management

## 2024-02-15 NOTE — TELEPHONE ENCOUNTER
Routing to pain provider to review for any recommendation on taper plan. Per notes Pt is 19 weeks s/p Left Lumbar 5 to Sacral 1 microdiscectomy, was prescribed #20 oxycodone 5mg on 02/14/24 by NS. Take 1-2 tablets (5-10 mg) by mouth every 8 hours as needed for moderate to severe pain MAX 2 pills per day     Lupe SHARMA, RN Care Coordinator  [facility]  Pain Management

## 2024-02-16 NOTE — TELEPHONE ENCOUNTER
Therese Archer, CNP stated, Agree with the recommendations from pain clinic. Appreciate assistance from their team.     Updated patient via [employer] on recommendations from providers.     This should be patients final refill of oxycodone.

## 2024-02-16 NOTE — TELEPHONE ENCOUNTER
Chart reviewed - Per , prior to prescription sent in 2/14 for oxycodone 5 mg #20 tabs, it appears prescription prior to that was for #20 tabs on 1/30. Based on this information, she is taking 1.5 tabs/day on average. At this point, she should be advised to reduce immediately to 1 tab/day x 1 week then discontinue and/or once finished with this prescription. Given low dose, I would not anticipate opioid withdrawal symptoms. Should this occur, symptoms would likely be mild and resolve within 3-4 days, also could consider supportive therapies such as PRN ondansetron and clonidine.     I can appreciate her frustrations with ongoing pain concerns; however, long-term use of opioids for chronic, non-cancer associated pain is not ideal/recommended. She has an injection on 2/22 with Dr. Levi and upcoming evaluation with Dr. Bennett on 2/27. I recommend emphasis on non-opioid therapies moving forward.     Celena Hilliard, DNP, APRN, AGNP-C  [facility] Pain Management

## 2024-02-21 ENCOUNTER — VIRTUAL VISIT (OUTPATIENT)
Dept: PSYCHOLOGY | Facility: CLINIC | Age: 49
End: 2024-02-21
Payer: COMMERCIAL

## 2024-02-21 DIAGNOSIS — F41.1 GENERALIZED ANXIETY DISORDER: ICD-10-CM

## 2024-02-21 DIAGNOSIS — F33.1 MAJOR DEPRESSIVE DISORDER, RECURRENT EPISODE, MODERATE (H): Primary | ICD-10-CM

## 2024-02-21 PROCEDURE — 90834 PSYTX W PT 45 MINUTES: CPT | Mod: 95

## 2024-02-21 NOTE — TELEPHONE ENCOUNTER
Chart reviewed - agree with nursing documentation/recommendations. Per documentation from initial visit, BLE strength and sensory intact on exam.     Celena Hilliard, DNP, APRN, AGNP-C  [facility] Pain Management

## 2024-02-21 NOTE — TELEPHONE ENCOUNTER
Per patient myChart message:  You  Lilian GRACIE Whalen now (8:56 AM)   Medardo Quintana,     You need to contact neurosurgery if you are having weakness in your lower extremities.  Per review, they did your surgery. This is an acute issue that needs to be addressed as possible. The emergency room is another option for you.     Celena Hilliard, OLIVIA, APRN, AGNP-C's plan for your chronic is a step by step process and can take time.     Your message has been sent on to Celena so she is aware.        Kind regards,     Jorydn, RN-BSN  [facility]   [phone]

## 2024-02-21 NOTE — PROGRESS NOTES
[facility]                                     Progress Note    Patient Name: Lilian Quinones  Date: 2/21/24         Service Type: Individual      Session Start Time: 9:00am  Session End Time: 9:51am     Session Length: 51 minutes    Session #: 2    Attendees: Client attended alone    Service Modality:  Video Visit:      Provider verified identity through the following two step process.  Patient provided:  Patient is known previously to provider    Telemedicine Visit: The patient's condition can be safely assessed and treated via synchronous audio and visual telemedicine encounter.      Reason for Telemedicine Visit: Patient has requested telehealth visit and Patient convenience (e.g. access to timely appointments / distance to available provider)    Originating Site (Patient Location): Patient's home    Distant Site (Provider Location): Provider Remote Setting- Home Office    Consent:  The patient/guardian has verbally consented to: the potential risks and benefits of telemedicine (video visit) versus in person care; bill my insurance or make self-payment for services provided; and responsibility for payment of non-covered services.     Patient would like the video invitation sent by:  My Chart    Mode of Communication:  Video Conference via Amwell    Distant Location (Provider):  On-site    As the provider I attest to compliance with applicable laws and regulations related to telemedicine.    DATA  Interactive Complexity: No  Crisis: No        Progress Since Last Session (Related to Symptoms / Goals / Homework):   Symptoms: No change in mental health symptoms since initial appointment    Homework: Achieved / completed to satisfaction      Episode of Care Goals: Satisfactory progress - PREPARATION (Decided to change - considering how); Intervened by negotiating a change plan and determining options / strategies for behavior change, identifying triggers, exploring social supports, and working  "towards setting a date to begin behavior change     Current / Ongoing Stressors and Concerns:   Pt reports ongoing stressors as navigating her grief and anxiety as well as various relationships. Pt reports mood since last session has been \"okay\" as she has had hard conversations with one of her sons and continues to experience anxiety related to work.  Pt and provider discussed the treatment plan, family dynamics that are present now and in the past, her yair as an important part of her life, Reese - her son who passed from suicide, the worth she derives from her job, and introduction to CBT. Pt appears to have gained insight into how her thinking patterns have been influencing her behaviors/mental health. Pt appears to be working toward gaining insight into anxiety patterns and grief patterns in her thinking. Pt denies any SI, SIB, or HI since last session. See below for plan of care until next appointment.     Treatment Objective(s) Addressed in This Session:   identify 3-4 fears / thoughts that contribute to feeling anxious  Increase interest, engagement, and pleasure in doing things  Improve concentration, focus, and mindfulness in daily activities   Feel less fidgety, restless or slow in daily activities / interpersonal interactions  increase understanding of steps in the grief process  practice setting boundaries 1 times in the next 1 weeks  Treatment plan established this appointment     Intervention:   CBT: introduction to methodology, introduction and discussion of thought log  Emotion Focused Therapy: identification of specific emotions experienced over the last week and emotions that influence anxiety, allowing self to feel emotions without judgement  Motivational Interviewing: increasing internal motivation for sustained change, evoking change talk, goal setting, OARS, reflecting and reframing, progress vs perfection    Assessments completed prior to visit:  The following assessments were completed by " patient for this visit:  PHQ9:       7/25/2023     8:23 AM 9/25/2023     9:08 AM 11/6/2023     9:43 AM 1/2/2024     4:08 PM 1/3/2024     2:24 PM 2/5/2024     1:08 PM 2/14/2024     9:27 AM   PHQ-9 SCORE   PHQ-9 Total Score MyChart 14 (Moderate depression) 3 (Minimal depression) 7 (Mild depression)  15 (Moderately severe depression) 15 (Moderately severe depression) 14 (Moderate depression)   PHQ-9 Total Score 14 3 7 15 15 15 14     GAD7:       11/17/2020    12:02 PM 1/27/2021     8:51 AM 4/26/2023     5:06 PM 7/25/2023     8:24 AM 11/3/2023     4:06 PM 1/18/2024     6:02 AM 2/1/2024     9:14 AM   HAO-7 SCORE   Total Score 11 (moderate anxiety) 14 (moderate anxiety) 21 (severe anxiety) 17 (severe anxiety) 21 (severe anxiety) 18 (severe anxiety) 19 (severe anxiety)   Total Score 11 14 21 17 21 18 19     PROMIS 10-Global Health (all questions and answers displayed):       4/26/2023     5:07 PM 9/11/2023    10:00 AM 9/12/2023     9:25 AM 11/14/2023    10:00 AM 12/28/2023    11:00 AM 2/1/2024     9:15 AM 2/14/2024     9:41 AM   PROMIS 10   In general, would you say your health is:       Fair   In general, would you say your quality of life is:       Fair   In general, how would you rate your physical health?       Fair   In general, how would you rate your mental health, including your mood and your ability to think?       Poor   In general, how would you rate your satisfaction with your social activities and relationships?       Fair   In general, please rate how well you carry out your usual social activities and roles       Fair   To what extent are you able to carry out your everyday physical activities such as walking, climbing stairs, carrying groceries, or moving a chair?       A little   In the past 7 days, how often have you been bothered by emotional problems such as feeling anxious, depressed, or irritable?       Always   In the past 7 days, how would you rate your fatigue on average?       Moderate   In the  past 7 days, how would you rate your pain on average, where 0 means no pain, and 10 means worst imaginable pain?       7   In general, would you say your health is:  3  3 3  2   In general, would you say your quality of life is:  3  3 1  2   In general, how would you rate your physical health?  2  2 1  2   In general, how would you rate your mental health, including your mood and your ability to think?  2  2 2  1   In general, how would you rate your satisfaction with your social activities and relationships?  3  4 2  2   In general, please rate how well you carry out your usual social activities and roles. (This includes activities at home, at work and in your community, and responsibilities as a parent, child, spouse, employee, friend, etc.)  3  3 2  2   To what extent are you able to carry out your everyday physical activities such as walking, climbing stairs, carrying groceries, or moving a chair?  3  2 2  2   In the past 7 days, how often have you been bothered by emotional problems such as feeling anxious, depressed, or irritable?  2  5 4  5   In the past 7 days, how would you rate your fatigue on average?  2  3 5  3   In the past 7 days, how would you rate your pain on average, where 0 means no pain, and 10 means worst imaginable pain?  6  6 7  7   Global Mental Health Score  12  10 7  6   Global Physical Health Score  12  10 6  9   PROMIS TOTAL - SUBSCORES  24  20 13  15       Information is confidential and restricted. Go to Review Flowsheets to unlock data.     Dix Suicide Severity Rating Scale (Lifetime/Recent)      11/23/2020     8:50 PM 2/14/2024     1:32 PM   Dix Suicide Severity Rating (Lifetime/Recent)   Q1 Wished to be Dead (Past Month) yes    Q2 Suicidal Thoughts (Past Month) no    Q3 Suicidal Thought Method no    Q4 Suicidal Intent without Specific Plan no    Q5 Suicide Intent with Specific Plan no    Q6 Suicide Behavior (Lifetime) no    Level of Risk per Screen low risk    Q1 Wish to be  Dead (Lifetime)  Y   1. Wish to be Dead (Past 1 Month)  Y   Wish to be Dead Description (Past 1 Month)  I had a son who passed away. Sometimes I have those thoughts when I get down and miss my kids. I have three other kids and could never pass that pain on to them.   Q2 Non-Specific Active Suicidal Thoughts (Lifetime)  N   Most Severe Ideation Rating (Lifetime)  1   Most Severe Ideation Rating (Past 1 Month)  1   Frequency (Lifetime)  2   Frequency (Past 1 Month)  1   Duration (Lifetime)  1   Duration (Past 1 Month)  1   Controllability (Lifetime)  1   Controllability (Past 1 Month)  1   Deterrents (Lifetime)  1   Deterrents (Past 1 Month)  1   Reasons for Ideation (Lifetime)  5   Reasons for Ideation (Past 1 Month)  5   Actual Attempt (Lifetime)  N   Has subject engaged in non-suicidal self-injurious behavior? (Lifetime)  N   Interrupted Attempts (Lifetime)  N   Aborted or Self-Interrupted Attempt (Lifetime)  N   Preparatory Acts or Behavior (Lifetime)  N   Calculated C-SSRS Risk Score (Lifetime/Recent)  Low Risk         ASSESSMENT: Current Emotional / Mental Status (status of significant symptoms):   Risk status (Self / Other harm or suicidal ideation)   Patient denies current fears or concerns for personal safety.   Patient denies current or recent suicidal ideation or behaviors.   Patient denies current or recent homicidal ideation or behaviors.   Patient denies current or recent self injurious behavior or ideation.   Patient denies other safety concerns.   Patient reports there has been no change in risk factors since their last session.     Patient reports there has been no change in protective factors since their last session.     A safety and risk management plan has been developed including: Patient consented to co-developed safety plan on 2/14/24.  Safety and risk management plan was reviewed.   Patient agreed to use safety plan should any safety concerns arise.  A copy was made available to the  patient.     Appearance:   Appropriate    Eye Contact:   Good    Psychomotor Behavior: Normal    Attitude:   Cooperative  Interested Pleasant   Orientation:   All   Speech    Rate / Production: Normal     Volume:  Normal    Mood:    Anxious    Affect:    Appropriate    Thought Content:  Clear    Thought Form:  Coherent  Logical    Insight:    Good      Medication Review:   No changes to current psychiatric medication(s)     Medication Compliance:   Yes     Changes in Health Issues:   None reported     Chemical Use Review:   Substance Use: Chemical use reviewed, no active concerns identified      Tobacco Use: No current tobacco use.      Diagnosis:  Major depressive disorder, recurrent episode, moderate   Generalized anxiety disorder    Collateral Reports Completed:   Not Applicable    PLAN: (Patient Tasks / Therapist Tasks / Other)  Pt is to think about joining Tru with her . Pt is to come to next appointment ready to discuss more.   Pt is to begin journaling again however she feels comfortable or most natural.  Pt is to fill out thought log to the best of her ability and come to next appointment ready to discuss further.  Pt is to practice self care - physically and movement, mentally - journaling and reading.      Clare Mcarthur, Psychotherapist Trainee, [facility]   2/21/24    This note has been reviewed and I agree with the plan of care. This note is co-signed by Felicita Roth MA, [facility] Supervisor, on: 2/21/2024                                                           ______________________________________________________________________    Individual Treatment Plan    Patient's Name: Lilian Quinones  YOB: 1975    Date of Creation: 2/21/24  Date Treatment Plan Last Reviewed/Revised: 2/21/24    DSM5 Diagnoses: 296.32 (F33.1) Major Depressive Disorder, Recurrent Episode, Moderate _ or 300.02 (F41.1) Generalized Anxiety Disorder  Psychosocial / Contextual Factors: loss of significant  relationship, stress within important relationships, stress within important roles in her life  PROMIS (reviewed every 90 days): 15    Referral / Collaboration:  Referral to another professional/service is not indicated at this time..    Anticipated number of session for this episode of care: 9-12 sessions  Anticipation frequency of session:  weekly - every other week  Anticipated Duration of each session: 38-52 minutes  Treatment plan will be reviewed in 90 days or when goals have been changed.       MeasurableTreatment Goal(s) related to diagnosis / functional impairment(s)  Goal 1: Patient will reduce anxiety by 50% per the HAO-7.     I will know I've met my goal when I am able to manage my anxiety without over thinking and ruminating most of the time.      Objective #A     Patient will identify 5 fears / thoughts that contribute to feeling anxious.  Status: New - Date: 2/21/24      Intervention(s)  Therapist will teach CBT and the interaction between thinking and behavior. Pt will be assigned homework of completing thought log to bring to next appointment(s) to discuss further.     Objective #B  Patient will use at least 6 coping skills for anxiety management in the next 12 weeks.  Status: New - Date: 2/21/24     Intervention(s)  Therapist will teach various coping skills for anxiety. Pt will be assigned homework of practicing skills outside of session and come to next appointment(s) ready to discuss thoughts/use of skills .    Objective #C  Patient will use cognitive strategies identified in therapy to challenge anxious thoughts.  Status: New - Date: 2/21/24     Intervention(s)  Therapist will assign homework of implementing cognitive strategies learned in session outside of session. Pt will be asked to bring feedback and experiences to discuss next appointments .      Goal 2: Patient will process grief and reduce feelings of guilt by 50%.    I will know I've met my goal when I am able to function on a daily  basis without having the feelings of guilt all the time about my son's death.      Objective #A  Patient will increase understanding of steps in the grief process.    Status: New - Date: 2/21/24     Intervention(s)  Therapist will discuss the grieving process from various models in session with pt. Pt will be asked to think about information presented in session, reflect on the information outside of session, and come to next appointment(s) ready to discuss the topic further.    Objective #B  Patient will talk to at least two others about losses and coping.    Status: New - Date: 2/21/24     Intervention(s)  Therapist will assign homework of reaching out to friends, family, and support system to discuss her grief when feeling the overwhelming sense of loss. Pt will be asked to bring experiences back into session to discuss emotions and other aspects .    Objective #C  Patient will  learn to challenge thoughts of guilt related to son's death and allow herself to feel emotions without judgement toward herself .  Status: New - Date: 2/21/24     Intervention(s)  Therapist will  discuss ACT and IFS interventions with pt related to self acceptance, identifying and labeling emotions, and what she sees her future to be based in her values .      Goal 3: Patiient will reduce depressive symptoms by 50% per PHQ-9 at initial session.    I will know I've met my goal when I am able to function without the sense of hopelessness/depression the majority of the time.      Objective #A  Patient will Decrease frequency and intensity of feeling down, depressed, hopeless.    Status: New - Date: 2/21/24     Intervention(s)  Therapist will teach emotional regulation skills. Pt will be asked to use skills outside of session and bring feedback to next appointment(s) .    Objective #B  Patient will Improve concentration, focus, and mindfulness in daily activities .    Status: New - Date: 2/21/24     Intervention(s)  Therapist will teach  mindfulness/grounding skills and how to bring herself back to the present moment when feeling overwhelmed with emotions/depression/anxiety .    Objective #C  Patient will Identify negative self-talk and behaviors: challenge core beliefs, myths, and actions.  Status: New - Date: 2/21/24     Intervention(s)  Therapist will assign homework of practicing interventions related to CBT, DBT and ACT to challenge the guilt and other maladaptive thinking/behavior patterns as well as identify what she wants the future of her life to look like .      Patient has reviewed and agreed to the above plan.      Clare Mcarthur, Psychotherapist Trainee, [facility]  February 21, 2024    _______________________________________________________________      Safety Plan:  Adult Short Safety Plan:   Name: Lilian Quinones  YOB: 1975  Date: February 15, 2024   My primary care provider: Shira Olson  Other care team support:  Therapist    My Triggers:  Medical Health chronic pain and remembrance of son who passed     Additional People, Places, and Things that I can access for support: , therapist, friends, family, other support systems in town         What is important to me and makes life worth living: my kids, my , my family and my support network .         GREEN    Good Control  1. I feel good  2. No suicidal thoughts   3. Can work, sleep and play      Action Steps  1. Self-care: balanced meals, exercising, sleep practices, etc.  2. Take your medications as prescribed.  3. Continue meetings with therapist and prescriber.  4.  Do the healthy things that I enjoy.  5. Continue to reach out to support system and engage in activities that bring me miki.           YELLOW  Getting Worse  I have ANY of these:  1. I do not feel good  2. Difficulty Concentrating  3. Sleep is changing  4. Increase/Change in my thoughts to hurt self and/or others, but I can still manage and not act on it.   5. Not taking care of  self.  6. Passive SI - desiring to not be on earth anymore             Action Steps (in addition to the above):  1. Inform your therapist and psychiatric prescriber/PCP.  2. Keep taking your medications as prescribed.    3. Turn to people you can ask for help.  4. Use internal coping strategies -see below.  5. Create safe environment: notify friends/family of increase in symptoms             RED  Get Help  If I have ANY of these:  1. Current and uncontrollable thoughts and/or behaviors to hurt self and/or others.      Actions to manage my safety  1. Contact your emergency person :   2. Call or Text [phone]  3. Call my crisis team- [facility] [phone] [facility]  3. Or Call 911 or go to the emergency room right away          My Internal Coping Strategies include the following:  belly breathing, go to my safe space  , change my body temperature by either an ice pack or warm shower, and use my coping skills    [End for Brief Safety Plan]     Safety Concerns  How To Identify Situations That Make Your Mental Health Worse:  Triggers are things that make your mental health worse.  Look at the list below to help you find your triggers and what you can do about them.     1. Identify Early Warning Signs:    Sometimes symptoms return, even when people do their best to stay well. Symptoms can develop over a short period of time with little or no warning, but most of the time they emerge gradually over several weeks.  Early warning signs are changes that people experience when a relapse is starting. Some early warning signs are common and others are not as common.   Common Early Warning Signs:    Feeling tense or nervous, Trouble sleeping -either too much or too little sleep, Feeling depressed or low, Feeling irritable, and Feeling like not being around other people     2. Identify action steps to take when warning signs are noticed:    Taking Action- It is important to take action if you are  experiencing early warning signs of a relapse.  The faster you act, the more likely it is that you can avoid a full relapse.  It is helpful to identify several specific ways to cope with symptoms.      The following is my list of symptoms and coping strategies that I can use when they are present:    Symptom Coping Strategies   Anxiety -Talk with someone in your support system and let him or her know how you are feeling.  -Use relaxation techniques such as deep breathing or imagery.  -Use positive affirmations to counteract negative self-talk such as  I am learning to let go of worry.    Depression - Schedule your day; include activities you have to do and activities you enjoy doing.  - Get some exercise - walk, run, bike, or swim.  - Give yourself credit for even the smallest things you get done.   Sleep Difficulties   - Go to sleep at the same time every day.  - Do something relaxing before bed, such as drinking herbal tea or listening to music.  - Avoid having discussions about upsetting topics before going to bed.   Delusions   - Distract yourself from the disturbing thought by doing something that requires your attention such as a puzzle.  - Check out your beliefs by talking to someone you trust.    Hallucinations   - Use headphones to listen to music.  - Tell voices to  stop  or say to yourself,  I am safe.   - Ignore the hallucinations as much as possible; focus on other things.   Concentration Difficulties - Minimize distractions so there is only one thing for you to focus on at a time.    - Ask the person you are having a conversation with to slow down or repeat things you are unsure of.             Provider Name/ Credentials:  Clare Mcarthur, Psychotherapist Trainee, [facility]  February 14, 2024    This note has been reviewed and I agree with the plan of care. This note is co-signed by Felicita Roth MA, [facility] Supervisor, on: 2/15/2024

## 2024-02-22 ENCOUNTER — RADIOLOGY INJECTION OFFICE VISIT (OUTPATIENT)
Dept: PALLIATIVE MEDICINE | Facility: CLINIC | Age: 49
End: 2024-02-22
Payer: COMMERCIAL

## 2024-02-22 ENCOUNTER — MYC MEDICAL ADVICE (OUTPATIENT)
Dept: NEUROSURGERY | Facility: CLINIC | Age: 49
End: 2024-02-22

## 2024-02-22 VITALS — SYSTOLIC BLOOD PRESSURE: 109 MMHG | HEART RATE: 67 BPM | DIASTOLIC BLOOD PRESSURE: 73 MMHG | OXYGEN SATURATION: 96 %

## 2024-02-22 DIAGNOSIS — M54.16 LUMBAR RADICULOPATHY: Primary | ICD-10-CM

## 2024-02-22 DIAGNOSIS — M54.12 CERVICAL RADICULOPATHY: ICD-10-CM

## 2024-02-22 PROCEDURE — 62321 NJX INTERLAMINAR CRV/THRC: CPT | Performed by: PAIN MEDICINE

## 2024-02-22 RX ORDER — DEXAMETHASONE SODIUM PHOSPHATE 10 MG/ML
10 INJECTION, SOLUTION INTRAMUSCULAR; INTRAVENOUS ONCE
Status: COMPLETED | OUTPATIENT
Start: 2024-02-22 | End: 2024-02-22

## 2024-02-22 RX ADMIN — DEXAMETHASONE SODIUM PHOSPHATE 10 MG: 10 INJECTION, SOLUTION INTRAMUSCULAR; INTRAVENOUS at 12:25

## 2024-02-22 ASSESSMENT — PAIN SCALES - GENERAL
PAINLEVEL: MODERATE PAIN (5)
PAINLEVEL: MILD PAIN (3)

## 2024-02-22 NOTE — NURSING NOTE
Discharge Information    IV Discontiued Time:  NA    Amount of Fluid Infused:  NA    Discharge Criteria = When patient returns to baseline or as per MD order    Consciousness:  Pt is fully awake    Circulation:  BP +/- 20% of pre-procedure level    Respiration:  Patient is able to breathe deeply    O2 Sat:  Patient is able to maintain O2 Sat >92% on room air    Activity:  Moves 4 extremities on command    Ambulation:  Patient is able to stand and walk or stand and pivot into wheelchair    Dressing:  Clean/dry or No Dressing    Notes:   Discharge instructions and AVS given to patient    Patient meets criteria for discharge?  YES    Admitted to PCU?  No    Responsible adult present to accompany patient home?  Yes    Signature/Title:    bertha stanley RN  RN Care Coordinator  [facility] [place]

## 2024-02-22 NOTE — NURSING NOTE
Pre-procedure Intake  If YES to any questions or NO to having a   Please complete laminated checklist and leave on the computer keyboard for Provider, verbally inform provider if able.    For SCS Trial, RFA's or any sedation procedure:  Have you been fasting? NA  If yes, for how long?     Are you taking any any blood thinners such as Coumadin, Warfarin, Jantoven, Pradaxa Xarelto, Eliquis, Edoxaban, Enoxaparin, Lovenox, Heparin, Arixtra, Fondaparinux, or Fragmin? OR Antiplatelet medication such as Plavix, Brilinta, or Effient?   No   If yes, when did you take your last dose?     Do you take aspirin?  No  If cervical procedure, have you held aspirin for 6 days?       Do you have any allergies to contrast dye, iodine, steroid and/or numbing medications?  NO    Are you currently taking antibiotics or have an active infection?  NO    Have you had a fever/elevated temperature within the past week? NO    Are you currently taking oral steroids? NO    Do you have a ? Yes    Are you pregnant or breastfeeding?  NO    Have you received the COVID-19 vaccine? No   If yes, was it your 1st, 2nd or only dose needed?   Date of most recent vaccine:     Notify provider and RNs if systolic BP >170, diastolic BP >100, P >100 or O2 sats < 90%     Brielle Brewer MA  [facility] [place]

## 2024-02-22 NOTE — PATIENT INSTRUCTIONS
[facility]   Procedure Discharge Instructions    Today you saw:     Dr. Chema Levi,       You had an:  Epidural steroid injection  -cervical      Medications used:  Lidocaine   Bupivacaine   Dexamethasone Omnipaque    Normal saline        Be cautious as umbness and/or weakness in the upper extremities may occur for up to 6-8 hours after the procedure due to effect of the local anesthetic  Do not drive for 6 hours. The effect of the local anesthetic could slow your reflexes.   You may resume your regular activities after 24 hours  Avoid strenuous activity for the first 24 hours  You may shower, however avoid swimming, tub baths or hot tubs for 24 hours following your procedure  You may have a mild to moderate increase in pain for several days following the injection.  It may take up to 14 days for the steroid medication to start working although you may feel the effect as early as a few days after the procedure.     You may use ice packs for 10-15 minutes, 3 to 4 times a day at the injection site for comfort  Do not use heat to painful areas for 6 to 8 hours. This will give the local anesthetic time to wear off and prevent you from accidentally burning your skin.   Unless you have been directed to avoid the use of anti-inflammatory medications (NSAIDS), you may use medications such as ibuprofen, Aleve or Tylenol for pain control if needed.   If you were fasting, you may resume your normal diet and medications after the procedure  If you have diabetes, check your blood sugar more frequently than usual as your blood sugar may be higher than normal for 10-14 days following a steroid injection. Contact your doctor who manages your diabetes if your blood sugar is higher than usual  Possible side effects of steroids that you may experience include flushing, elevated blood pressure, increased appetite, mild headaches and restlessness.  All of these symptoms will get better with time.  If  you experience any of the following, call the Pain Clinic during work hours (Mon-Friday 8-4:30 pm) at [phone] or the Provider Line after hours at [phone]:  -Fever over 100 degree F  -Swelling, bleeding, redness, drainage, warmth at the injection site  -Progressive weakness or numbness in your legs or arms  -Loss of bowel or bladder function  -Unusual headache that is not relieved by Tylenol or other pain reliever  -Unusual new onset of pain that is not improving

## 2024-02-22 NOTE — PROGRESS NOTES
[facility] - Procedure Note        Procedure performed: C7-T1 interlaminar epidural steroid injection with fluoroscopic guidance  Diagnosis: Cervical spondylosis; Cervical radiculitis/radiculopathy  : Chema Levi MD  Anesthesia: none    Indications: Lilian Quinones is a 48 year old female who is seen for cervical epidural steroid injection. The patient describes left-sided neck pain into her upper extremity. The patient has been exhibiting symptoms consistent with cervical intraspinal inflammation and radiculopathy. Symptoms have been persistent, disabling, and intermittently severe. The patient reports minimal improvement with conservative treatment, including meds/PT/prior procedures    Cervical MRI   Segmental analysis:  C2-C3: No spinal or neural foraminal stenosis.     C3-C4: Shallow symmetric disc bulge with effacement of the ventral  thecal sac and minimal mass effect on the ventral cord without cord  signal change. Minimal uncovertebral and facet arthropathy. No  significant neural foraminal stenosis.     C4-C5: Shallow symmetric disc bulge with effacement of the ventral  thecal sac and minimal mass effect of the ventral cord without cord  signal change. Minimal uncovertebral and facet arthropathy. No  significant neural foraminal stenosis.     C5-C6: Shallow symmetric disc bulge with effacement of the ventral  thecal sac and minimal mass effect on the ventral cord without cord  signal change. Minimal uncovertebral and facet arthropathy. No  significant neural foraminal stenosis.     C6-C7: There is a large left central/lateral recess disc extrusion  that extends partially into the left neural foramen (series 9 image  26). The herniated disc material measures approximately 10 mm  transverse x 6 mm AP x 10 mm craniocaudal (series 6 image 10, series  10 image 45). The disc herniation indents the left ventral aspect of  the cord and leads to mild to moderate central spinal  stenosis and  moderate to severe left neural foraminal stenosis. The right neural  foramen is patent. There is mild uncovertebral and facet arthropathy.     C7-T1: No spinal or neural foraminal stenosis.                                                                      IMPRESSION: Degenerative changes of the cervical spine, most  pronounced at C6-C7 where there is a left central/lateral recess disc  extrusion that partially extends into the left neural foramen. Please  see the body of the report for additional details.     Allergies:      Allergies   Allergen Reactions    Amoxicillin Rash    Penicillins Rash        Vitals:  /73   Pulse 67   SpO2 96%     Review of Systems: The patient denies recent fever, chills, illness, use of antibiotics or anticoagulants. All other 10-point review of systems negative.     Procedure: The procedure and risks were explained, and informed written consent was obtained from the patient. Risks include but are not limited to: infection, bleeding, increased pain, and damage to soft tissue, nerve, muscle, and vasculature structures. After getting informed consent, patient was brought into the procedure suite and was placed in a prone position on the procedure table. A Pause for the Cause was performed. Patient was prepped and draped in sterile fashion.     The C7-T1 interspace was identified with use of fluoroscopy in AP view. A 25-gauge, 1.5 inch needle was used to anesthetize the skin and subcutaneous tissue entry site with a total of 2 ml of 1% lidocaine. Under fluoroscopic visualization, a 22-gauge, 3.5 inch Tuohy epidural needle was slowly advanced towards the epidural space a few millimeters left of midline. The latter part of the needle advancement was guided with fluoroscopy in the lateral view. The epidural space was identified using loss of resistance technique. After negative aspiration for heme and cerebrospinal fluid, a total of 1 mL of Omnipaque was injected to  confirm needle placement. 9 mL of contrast was wasted. Epidurogram confirmed spread within the posterior epidural space. 2 ml of 10mg/ml of dexamethasone and 1 ml of preservative free 0.25% bupivacaine was injected. The needle was removed.  Images were saved to PACS.    The patient tolerated the procedure well, and there was no evidence of procedural complications. No new sensory or motor deficits were noted following the procedure. The patient was stable and able to ambulate on discharge home. Post-procedure instructions were provided.     Pre-procedure pain score: 5/10 in the neck, 5/10 in the arm  Post-procedure pain score: 3/10 in the neck, 3/10 in the arm    Assessment/Plan: Lilian Quinones is a 44 year old female s/p cervical interlaminar epidural steroid injection today for cervical spondylosis and radiculitis/radiculopathy.     Following today's procedure, the patient was advised to contact the [facility] for any of the following:   Fever, chills, or night sweats   New onset of pain, numbness, or weakness   Any questions/concerns regarding the procedure  If unable to contact the Pain Center, the patient was instructed to go to a local Emergency Room for any complications.    Follow-up with referring provider in 2 weeks for post-procedure evaluation.    Chema Levi MD   Pain Management

## 2024-02-23 NOTE — TELEPHONE ENCOUNTER
Therese Vargas CNP states     Ok to order a new lumbar spine MRI if pt feels her symptoms have changed/worsened. Please keep the appt with Dr. Bennett as scheduled.     Order placed. Pt updated.

## 2024-02-23 NOTE — TELEPHONE ENCOUNTER
Called for more details.   Attempted to reach out to patient, no answer. Left voice message for them to call clinic back to further discuss.   See [employer] communication   Has appt with Donald next week for continued leg pain   Provider updated

## 2024-02-24 LAB — NONINV COLON CA DNA+OCC BLD SCRN STL QL: NEGATIVE

## 2024-02-27 ENCOUNTER — OFFICE VISIT (OUTPATIENT)
Dept: PHYSICAL MEDICINE AND REHAB | Facility: CLINIC | Age: 49
End: 2024-02-27
Attending: NURSE PRACTITIONER
Payer: COMMERCIAL

## 2024-02-27 VITALS
SYSTOLIC BLOOD PRESSURE: 113 MMHG | OXYGEN SATURATION: 96 % | DIASTOLIC BLOOD PRESSURE: 77 MMHG | WEIGHT: 188 LBS | BODY MASS INDEX: 28.49 KG/M2 | HEART RATE: 82 BPM

## 2024-02-27 DIAGNOSIS — Z98.890 S/P LUMBAR MICRODISCECTOMY: Primary | ICD-10-CM

## 2024-02-27 DIAGNOSIS — M54.12 CERVICAL RADICULOPATHY: ICD-10-CM

## 2024-02-27 DIAGNOSIS — M54.16 LUMBAR RADICULOPATHY: ICD-10-CM

## 2024-02-27 PROCEDURE — 99205 OFFICE O/P NEW HI 60 MIN: CPT | Mod: GC | Performed by: PHYSICAL MEDICINE & REHABILITATION

## 2024-02-27 ASSESSMENT — PAIN SCALES - GENERAL: PAINLEVEL: SEVERE PAIN (6)

## 2024-02-27 NOTE — PATIENT INSTRUCTIONS
It was a pleasure seeing you today in our [facility]. We discussed the following:    #. [facility] Pain Injection Scheduling    An order for a LEFT L5-S1 S1-2 injection with Dr. Maximino de la torre at [facility] has been added today.   You should receive a call to arrange the appointment. You may also call [phone], Option 2 if you do not hear from the schedulers in the next couple of days.    #. EMG    An order for EMG (or nerve test) was added today. The clinic will call you to schedule. You may also call the EMG clinic at [phone] if you do not hear from them in the next few business days.    This is a 2-part test conducted all on the same day to see evaluate some of the quality of your nerve health. It does not test all types of nerves and typically focuses on larger peripheral nerves or nerve roots from the spine. The first part of the test involves some small shocks that help stimulate the nerves in order to see how the nerves conduct or travel along their signal. The second part of the test involves using a small acupuncture type needle with a microphone on the end of it. This allows us to 'see and hear' how the nerves communicate with the muscle.      #. [facility] Referral     An order for evaluation with my colleague in the spine center, Dr. Moss for evaluation discussion of nerve stimulators has been placed.  You should receive a call to arrange the appointment.   You may also call /[phone] if you do not hear from the schedulers in the next couple of days.    Location:  [facility]   Address: [address] [place] [address] [address], [place] [address]    #.  Continue to follow with the chronic pain management clinic    Follow up as needed.

## 2024-02-27 NOTE — LETTER
"2/27/2024       RE: Lilian Quinones  [address]  [place] [address]     Dear Colleague,    Thank you for referring your patient, Lilian Quinones, to the [facility] at [facility]. Please see a copy of my visit note below.    Patient seen at the request of Therese Vargas for an opinion and evaluation of \"s/p lumbar microdiscectomy, cervical radiculopathy\".      HISTORY OF PRESENT ILLNESS:  Lilian Quinones is a 48 year old female who presents with a chief complaint of continued low back pain that radiates into her L leg.     Pain began several years ago and is not associated with trauma.   The pain is localized to the L side low back/glut and extends to L mid-calf; she reports some mild numbness/tingling in her L 5th toe; described as sharp and shooting in nature. Symptoms are worse with standing and driving ; and improved with sitting and stretching her legs out, Advil, oxycodone, and Kratom.     Functional limitations: Reports difficulty walking over a block, standing for periods of time while shopping, and limits ability to sit and work at her desk.      PRIOR INJURIES/TREATMENT:   Ice/Heat: no effect  Brace: not tried  Physical Therapy:   Multiple courses of PT     - Current Pain Medications -   Gabapentin 600mg TID - does cause some sedation   Oxycodone 5-10mg Q8H, last prescription filled 2/14    - Prior/Trialed Pain Medications -   Kratom    Prior Procedures:  Date    Procedure   Improvement (%)  12/7/2022 L L4/5, L5-S1 TFESI   1/11/2023 L L5-S1 TFESI  2/8/2023 L Piriformis inj  ~60%  Reported 6 other Epidural steroid injections in [place]      Prior Related Surgery:   10/3/2023 - Left MIS L5-S1 microdiscectomy   Other (acupuncture, OMT, CMM, TENS, DME, etc.): Physical Therapy, chiropractor    Specialists Seen - (with most recent, available notes and clinic visits reviewed)   1. [facility] - Dr. Valenzuela, " 4/11/2023  2. Pain management clinic - MOHAN Hilliard CNP, Dr Quinones, Dr Levi  3. Neurosurgery - BLAIRE Medeiros, CNP      IMAGING - reviewed     MR Lumbar spine 12/29/2023                                                                   Impression:   1. Postsurgical changes of a microdiscectomy at the L5-S1. There is  postsurgical soft tissue enhancement without organized fluid  collection. Mild epidural scarring. No significant residual spinal  canal or neural foraminal stenosis.     2. Multilevel lumbar spondylosis without high-grade spinal canal or  neural foraminal stenosis.    Other Incidental Findings:  Mild left sided epidural scarring at L5-S1  Right S2 Tarlov cysts.    Review Of Systems:  I am responding to those symptoms which are directly relevant to the specific indication for my consultation. I recommend that the patient follow up with their primary or referring provider to pursue any other symptoms which may be of concern.       Medical History:  She  has a past medical history of Arthritis (2021) and IUD (intrauterine device) in place (03/20/2019).     She  has a past surgical history that includes biopsy; Breast surgery; Mammoplasty reduction (01/01/2008); Biopsy breast; Pr Lap, Kofi Restrict Proc, Longitudinal Gastrectomy (N/A, 06/24/2019); Lasik; Abdomen surgery (6/2019); and Discectomy lumbar posterior microscopic one level (Left, 10/3/2023).    Family History  Her family history includes Anxiety Disorder in her son; Breast Cancer in her mother; Cerebrovascular Disease in her mother; Depression in her sister and son; Diabetes in her father and paternal grandmother; Heart Disease in her maternal grandmother; Hyperlipidemia in her maternal grandfather; Hypertension in her maternal grandfather; Other Cancer in her paternal grandfather; Pancreatic Cancer in her paternal grandmother.     Social History:  Work: on leave currently, works as a lender at [employer]  History of any legal related pain issues:  no  Current living situation: with spouse  She  reports that she has never smoked. She has never used smokeless tobacco. She reports current alcohol use. She reports that she does not use drugs.        Current Medications:   She has a current medication list which includes the following prescription(s): vitamin c, fiber, diazepam, diphenhydramine hcl (sleep), gabapentin, levonorgestrel, melatonin, methocarbamol, multivitamin gummies womens, oxycodone, and propranolol.     Allergies:   Allergies   Allergen Reactions    Amoxicillin Rash    Penicillins Rash       PHYSICAL EXAMINATION:  /77   Pulse 82   Wt 85.3 kg (188 lb)   SpO2 96%   BMI 28.49 kg/m     General: Sitting comfortably in chair, no acute distress  Cardiovascular: Limbs well-perfused  Respiratory: On room air, no increased work of breathing  Neuro/MSK:  -Limited lumbar active range of motion in flexion, good active range of motion with lumbar extension, bilateral rotation, bilateral sidebending  -No tenderness to palpation over lumbar spine, lumbar paraspinal musculature, bilateral greater trochanters, bilateral glutes, bilateral SI joints  -Pain reproduced with lumbar extension and rotation bilaterally, right-sided rotation causing worse pain  -Positive straight leg test on left, negative on right  -Negative figure-of-four, MARIAM, FADIR bilaterally  -Bilateral patellar reflex and Achilles reflex 2/4  -Left hip flexors and knee extensors strength 4+/5, limited secondary to pain  -Left EHL and ankle eversion strength 4+/5  -Sensation intact to soft touch in bilateral lower extremities       ASSESSMENT:  Lilian Quinones is a pleasant 48 year old female who presents with left-sided low back pain that radiates into her left leg which persists after L5-S1 microdiscectomy on 10/30/2023.  Suspect there could be continued irritation to nerve roots, given the radicular pattern of her pain and positive left straight leg raise test.  Can trial a repeat  epidural steroid injection to see if this is the case, patient prefers to see Dr. Levi with whom she has had injections before.  Additionally, recommend obtaining EMG in order to further evaluate the source of Lilian's pain, and whether there is an acute on chronic process.    Also discussed possible candidacy for spinal cord stimulator/DRG stimulator to help with pain, recommended referral to Dr. Moss for further evaluation.    PLAN:  -Obtain EMG to evaluate for radiculopathy versus other nerve injury, as well as chronicity  -updated MRI lumbar spine scheduled per NSGY clinic.   -Plan for Left L5 and S1 TFESI. Patient requests Dr eLvi as she has had  multiple injections with him in the past   -Referral to Dr. Moss for evaluation for spinal cord stimulator versus dorsal root ganglion stimulator  -RTC as needed    Ready to learn, no apparent learning barriers.  Education provided on treatment plan according to patient's preferred learning style.  Patient verbalizes understanding.     Patient seen and discussed with attending physician, Dr. Bennett.  __________________________________  Anjelica Cordero, DO  PGY-4  Physical Medicine and Rehabilitation    I was physically present for the E/M service provided. I agree with the House Officer note and plan, which I have reviewed and edited where appropriate.    Lilian is a very pleasant 48-year-old female who presents with chronic left lumbar radiculopathy.  She has experienced incomplete/inadequate symptomatic relief from multiple pharmacologic trials, repeated courses of physical therapy, interventional pain procedures, and surgery/surgical evaluation.     On review of history, physical exam, and imaging, she appears to have ongoing left lumbar radiculopathy/radicular pain and endorses some worsening postoperative symptoms.  We discussed lumbar TFESI to help with pain in the distribution of her presenting symptoms.  She would like to have this done at the pain clinic  in [place].  She does follow with the pain clinic for chronic pain management and I would defer to them for medication optimization and comprehensive strategies.  I did order an EMG/NCS for further evaluation of her LLE.  Lastly, I have referred her to my colleague at the [facility] for consideration of neuromodulation. I'm not sure that I would have much more to offer beyond the care that she is receiving at this time and she would otherwise like to continue with the pain clinic with whom she has previously established care.         Again, thank you for allowing me to participate in the care of your patient.      Sincerely,    Cory Bennett MD

## 2024-02-27 NOTE — PROGRESS NOTES
"Patient seen at the request of Therese Vargas for an opinion and evaluation of \"s/p lumbar microdiscectomy, cervical radiculopathy\".      HISTORY OF PRESENT ILLNESS:  Lilian Quinones is a 48 year old female who presents with a chief complaint of continued low back pain that radiates into her L leg.     Pain began several years ago and is not associated with trauma.   The pain is localized to the L side low back/glut and extends to L mid-calf; she reports some mild numbness/tingling in her L 5th toe; described as sharp and shooting in nature. Symptoms are worse with standing and driving ; and improved with sitting and stretching her legs out, Advil, oxycodone, and Kratom.     Functional limitations: Reports difficulty walking over a block, standing for periods of time while shopping, and limits ability to sit and work at her desk.      PRIOR INJURIES/TREATMENT:   Ice/Heat: no effect  Brace: not tried  Physical Therapy:   Multiple courses of PT     - Current Pain Medications -   Gabapentin 600mg TID - does cause some sedation   Oxycodone 5-10mg Q8H, last prescription filled 2/14    - Prior/Trialed Pain Medications -   Kratom    Prior Procedures:  Date    Procedure   Improvement (%)  12/7/2022 L L4/5, L5-S1 TFESI   1/11/2023 L L5-S1 TFESI  2/8/2023 L Piriformis inj  ~60%  Reported 6 other Epidural steroid injections in Check      Prior Related Surgery:   10/3/2023 - Left MIS L5-S1 microdiscectomy   Other (acupuncture, OMT, CMM, TENS, DME, etc.): Physical Therapy, chiropractor    Specialists Seen - (with most recent, available notes and clinic visits reviewed)   1. [facility] - Dr. Valenzuela, 4/11/2023  2. Pain management clinic - MOHAN Hilliard CNP, Dr Quinones, Dr Levi  3. Neurosurgery - Dr. Garcia, BLAIRE Vargas, CNP      IMAGING - reviewed     MR Lumbar spine 12/29/2023                                                                   Impression:   1. Postsurgical changes of a microdiscectomy at the L5-S1. There " is  postsurgical soft tissue enhancement without organized fluid  collection. Mild epidural scarring. No significant residual spinal  canal or neural foraminal stenosis.     2. Multilevel lumbar spondylosis without high-grade spinal canal or  neural foraminal stenosis.    Other Incidental Findings:  Mild left sided epidural scarring at L5-S1  Right S2 Tarlov cysts.    Review Of Systems:  I am responding to those symptoms which are directly relevant to the specific indication for my consultation. I recommend that the patient follow up with their primary or referring provider to pursue any other symptoms which may be of concern.       Medical History:  She  has a past medical history of Arthritis (2021) and IUD (intrauterine device) in place (03/20/2019).     She  has a past surgical history that includes biopsy; Breast surgery; Mammoplasty reduction (01/01/2008); Biopsy breast; Pr Lap, Kofi Restrict Proc, Longitudinal Gastrectomy (N/A, 06/24/2019); Lasik; Abdomen surgery (6/2019); and Discectomy lumbar posterior microscopic one level (Left, 10/3/2023).    Family History  Her family history includes Anxiety Disorder in her son; Breast Cancer in her mother; Cerebrovascular Disease in her mother; Depression in her sister and son; Diabetes in her father and paternal grandmother; Heart Disease in her maternal grandmother; Hyperlipidemia in her maternal grandfather; Hypertension in her maternal grandfather; Other Cancer in her paternal grandfather; Pancreatic Cancer in her paternal grandmother.     Social History:  Work: on leave currently, works as a lender at [employer]  History of any legal related pain issues: no  Current living situation: with spouse  She  reports that she has never smoked. She has never used smokeless tobacco. She reports current alcohol use. She reports that she does not use drugs.        Current Medications:   She has a current medication list which includes the following prescription(s): vitamin c,  fiber, diazepam, diphenhydramine hcl (sleep), gabapentin, levonorgestrel, melatonin, methocarbamol, multivitamin gummies womens, oxycodone, and propranolol.     Allergies:   Allergies   Allergen Reactions    Amoxicillin Rash    Penicillins Rash       PHYSICAL EXAMINATION:  /77   Pulse 82   Wt 85.3 kg (188 lb)   SpO2 96%   BMI 28.49 kg/m     General: Sitting comfortably in chair, no acute distress  Cardiovascular: Limbs well-perfused  Respiratory: On room air, no increased work of breathing  Neuro/MSK:  -Limited lumbar active range of motion in flexion, good active range of motion with lumbar extension, bilateral rotation, bilateral sidebending  -No tenderness to palpation over lumbar spine, lumbar paraspinal musculature, bilateral greater trochanters, bilateral glutes, bilateral SI joints  -Pain reproduced with lumbar extension and rotation bilaterally, right-sided rotation causing worse pain  -Positive straight leg test on left, negative on right  -Negative figure-of-four, MARIAM, FADIR bilaterally  -Bilateral patellar reflex and right Achilles reflex 2/4. Absent left AR  -Left hip flexors and knee extensors strength 4+/5, limited secondary to pain  -Left EHL and ankle eversion strength 4+/5  -Sensation intact to soft touch in bilateral lower extremities       ASSESSMENT:  Lilian Quinones is a pleasant 48 year old female who presents with left-sided low back pain that radiates into her left leg which persists after L5-S1 microdiscectomy on 10/30/2023.  Suspect there could be continued irritation to nerve roots, given the radicular pattern of her pain and positive left straight leg raise test.  Can trial a repeat epidural steroid injection to see if this is the case, patient prefers to see Dr. Levi with whom she has had injections before.  Additionally, recommend obtaining EMG in order to further evaluate the source of Lilian's pain, and whether there is an acute on chronic process.    Also discussed  possible candidacy for spinal cord stimulator/DRG stimulator to help with pain, recommended referral to Dr. Moss for further evaluation.    PLAN:  -Obtain EMG to evaluate for radiculopathy versus other nerve injury, as well as chronicity  -updated MRI lumbar spine scheduled per NSGY clinic.   -Plan for Left L5 and S1 TFESI. Patient requests Dr Levi as she has had  multiple injections with him in the past   -Referral to Dr. Moss for evaluation for spinal cord stimulator versus dorsal root ganglion stimulator  -RTC as needed    Ready to learn, no apparent learning barriers.  Education provided on treatment plan according to patient's preferred learning style.  Patient verbalizes understanding.     Patient seen and discussed with attending physician, Dr. Bennett.  __________________________________  Anjelica Cordero, DO  PGY-4  Physical Medicine and Rehabilitation    I was physically present for the E/M service provided. I agree with the House Officer note and plan, which I have reviewed and edited where appropriate.    Lilian is a very pleasant 48-year-old female who presents with chronic left lumbar radiculopathy.  She has experienced incomplete/inadequate symptomatic relief from multiple pharmacologic trials, repeated courses of physical therapy, interventional pain procedures, and surgery/surgical evaluation.     On review of history, physical exam, and imaging, she appears to have ongoing left lumbar radiculopathy/radicular pain and endorses some worsening postoperative symptoms.  We discussed lumbar TFESI to help with pain in the distribution of her presenting symptoms.  She would like to have this done at the pain clinic in [place].  She does follow with the pain clinic for chronic pain management and I would defer to them for medication optimization and comprehensive strategies.  I did order an EMG/NCS for further evaluation of her LLE.  I have referred her to my colleague at the [facility] for  consideration of neuromodulation. I'm not sure that I would have much more to offer beyond the care that she is receiving at this time and she would otherwise like to continue with the pain clinic with whom she has previously established care.  Lastly, she has some concerns regarding the Tarlov cyst noted on imaging.  Imaging was reviewed in detail today. The cyst appears to be right-sided at the S2 level and likely noncontributory to her presenting symptoms; nevertheless, she has an appointment with a specialist in [place] tomorrow for further discussion.  ________________________________   Cory Bennett MD  Department of Physical Medicine & Rehabilitation

## 2024-02-29 ENCOUNTER — THERAPY VISIT (OUTPATIENT)
Dept: PHYSICAL THERAPY | Facility: CLINIC | Age: 49
End: 2024-02-29
Payer: COMMERCIAL

## 2024-02-29 DIAGNOSIS — M54.16 LUMBAR RADICULOPATHY: Primary | ICD-10-CM

## 2024-02-29 PROCEDURE — 97110 THERAPEUTIC EXERCISES: CPT | Mod: GP | Performed by: PHYSICAL THERAPIST

## 2024-02-29 PROCEDURE — 97112 NEUROMUSCULAR REEDUCATION: CPT | Mod: GP | Performed by: PHYSICAL THERAPIST

## 2024-03-01 ENCOUNTER — RADIOLOGY INJECTION OFFICE VISIT (OUTPATIENT)
Dept: PALLIATIVE MEDICINE | Facility: CLINIC | Age: 49
End: 2024-03-01
Payer: COMMERCIAL

## 2024-03-01 VITALS — HEART RATE: 69 BPM | SYSTOLIC BLOOD PRESSURE: 119 MMHG | OXYGEN SATURATION: 100 % | DIASTOLIC BLOOD PRESSURE: 64 MMHG

## 2024-03-01 DIAGNOSIS — Z98.890 S/P LUMBAR MICRODISCECTOMY: ICD-10-CM

## 2024-03-01 DIAGNOSIS — M54.12 CERVICAL RADICULOPATHY: ICD-10-CM

## 2024-03-01 DIAGNOSIS — M54.16 LUMBAR RADICULOPATHY: ICD-10-CM

## 2024-03-01 PROCEDURE — 64483 NJX AA&/STRD TFRM EPI L/S 1: CPT | Mod: LT | Performed by: PAIN MEDICINE

## 2024-03-01 PROCEDURE — 64484 NJX AA&/STRD TFRM EPI L/S EA: CPT | Mod: LT | Performed by: PAIN MEDICINE

## 2024-03-01 RX ORDER — DEXAMETHASONE SODIUM PHOSPHATE 10 MG/ML
10 INJECTION, SOLUTION INTRAMUSCULAR; INTRAVENOUS ONCE
Status: COMPLETED | OUTPATIENT
Start: 2024-03-01 | End: 2024-03-01

## 2024-03-01 RX ADMIN — DEXAMETHASONE SODIUM PHOSPHATE 10 MG: 10 INJECTION, SOLUTION INTRAMUSCULAR; INTRAVENOUS at 11:51

## 2024-03-01 ASSESSMENT — PAIN SCALES - GENERAL
PAINLEVEL: NO PAIN (0)
PAINLEVEL: SEVERE PAIN (7)

## 2024-03-01 NOTE — NURSING NOTE
Pre-procedure Intake  If YES to any questions or NO to having a   Please complete laminated checklist and leave on the computer keyboard for Provider, verbally inform provider if able.    For SCS Trial, RFA's or any sedation procedure:  Have you been fasting? NA  If yes, for how long?     Are you taking any any blood thinners such as Coumadin, Warfarin, Jantoven, Pradaxa Xarelto, Eliquis, Edoxaban, Enoxaparin, Lovenox, Heparin, Arixtra, Fondaparinux, or Fragmin? OR Antiplatelet medication such as Plavix, Brilinta, or Effient?   No   If yes, when did you take your last dose?     Do you take aspirin?  No  If cervical procedure, have you held aspirin for 6 days?       Do you have any allergies to contrast dye, iodine, steroid and/or numbing medications?  NO    Are you currently taking antibiotics or have an active infection?  NO    Have you had a fever/elevated temperature within the past week? NO    Are you currently taking oral steroids? NO    Do you have a ? Yes    Are you pregnant or breastfeeding?  NO    Have you received the COVID-19 vaccine? No   If yes, was it your 1st, 2nd or only dose needed?   Date of most recent vaccine:     Notify provider and RNs if systolic BP >170, diastolic BP >100, P >100 or O2 sats < 90%

## 2024-03-01 NOTE — PROGRESS NOTES
Pre procedure Diagnosis: lumbar radiculopathy, lumbar degenerative disc disease   Post procedure Diagnosis: Same  Procedure performed: lumbar transforaminal epidural steroid injection at left L5, S1, fluoroscopically guided, contrast controlled  Anesthesia: none  Complications: none  Operators: Chema Levi MD     Indications:   Lilian Quinones is a 48 year old female.  They have a history of lbp rad lle.  Exam shows + slump and they have tried conservative treatment including meds/pt/injections.    MRI rev  Options/alternatives, benefits and risks were discussed with the patient including bleeding, infection, tissue trauma, numbness, weakness, paralysis, spinal cord injury, radiation exposure, headache and reaction to medications. Questions were answered to her satisfaction and she agrees to proceed. Voluntary informed consent was obtained and signed.     Vitals were reviewed: Yes  /64   Pulse 69   SpO2 100%   Allergies were reviewed:  Yes   Medications were reviewed:  Yes   Pre-procedure pain score: 7/10    Procedure:  After getting informed consent, patient was brought into the procedure suite and was placed in a prone position on the procedure table.   A Pause for the Cause was performed.  Patient was prepped and draped in sterile fashion.     After identifying the left L5,s1 neuroforamen, the C-arm was rotated to a left lateral oblique angle.  A total of 4ml of Lidocaine 1% was used to anesthetize the skin and the needle track at a skin entry site coaxial with the fluoroscopy beam, and overriding the superior aspect of the neuroforamen.  A 22 gauge 3.5 inch spinal needle was advanced under intermittent fluoroscopy until it entered the foramen superiorly.    The position was then inspected from anteroposterior and lateral views, and the needle adjusted appropriately.  A total of 1ml of Omnipaque-300 was injected, confirming appropriate position, with spread into the nerve root sheath and the  epidural space, with no intravascular uptake. 9ml was wasted    Then, after repeated negative aspiration, a combination of Decadron 10 mg, 0.25% bupivacaine 2 ml, diluted with 3ml of normal saline was injected.     Hemostasis was achieved, the area was cleaned, and bandaids were placed when appropriate.  The patient tolerated the procedure well, and was taken to the recovery room.    Images were saved to PACS.    Post-procedure pain score: 0/10  Follow-up includes:     -f/u with referring provider    Chema Levi MD  [facility]

## 2024-03-01 NOTE — PATIENT INSTRUCTIONS
[facility]   Procedure Discharge Instructions    Today you saw:  Dr. Chema Levi      You had an:  Epidural steroid injection      Medications used:  Lidocaine   Bupivacaine   Dexamethasone Omnipaque   Normal saline        Be cautious when walking. Numbness and/or weakness in the lower extremities may occur for up to 6-8 hours after the procedure due to effect of the local anesthetic  Do not drive for 6 hours. The effect of the local anesthetic could slow your reflexes.   You may resume your regular activities after 24 hours  Avoid strenuous activity for the first 24 hours  You may shower, however avoid swimming, tub baths or hot tubs for 24 hours following your procedure  You may have a mild to moderate increase in pain for several days following the injection.  It may take up to 14 days for the steroid medication to start working although you may feel the effect as early as a few days after the procedure.     You may use ice packs for 10-15 minutes, 3 to 4 times a day at the injection site for comfort  Do not use heat to painful areas for 6 to 8 hours. This will give the local anesthetic time to wear off and prevent you from accidentally burning your skin.   Unless you have been directed to avoid the use of anti-inflammatory medications (NSAIDS), you may use medications such as ibuprofen, Aleve or Tylenol for pain control if needed.   If you have diabetes, check your blood sugar more frequently than usual as your blood sugar may be higher than normal for 10-14 days following a steroid injection. Contact your doctor who manages your diabetes if your blood sugar is higher than usual  Possible side effects of steroids that you may experience include flushing, elevated blood pressure, increased appetite, mild headaches and restlessness.  All of these symptoms will get better with time.  If you experience any of the following, call the Pain Clinic during work hours (Mon-Friday 8-4:30  pm) at [phone] or the Provider Line after hours at [phone]:  -Fever over 100 degree F  -Swelling, bleeding, redness, drainage, warmth at the injection site  -Progressive weakness or numbness in your legs   -Loss of bowel or bladder function  -Unusual new onset of pain that is not improving

## 2024-03-01 NOTE — PROGRESS NOTES
Discharge Information    IV Discontiued Time:  NA    Amount of Fluid Infused:  NA    Discharge Criteria = When patient returns to baseline or as per MD order    Consciousness:  Pt is fully awake    Circulation:  BP +/- 20% of pre-procedure level    Respiration:  Patient is able to breathe deeply    O2 Sat:  Patient is able to maintain O2 Sat >92% on room air    Activity:  Moves 4 extremities on command    Ambulation:  Patient is able to stand and walk or stand and pivot into wheelchair    Dressing:  Clean/dry or No Dressing    Notes:   Discharge instructions and AVS given to patient    Patient meets criteria for discharge?  YES    Admitted to PCU?  No    Responsible adult present to accompany patient home?  Yes    Signature/Title:    Richelle Ma RN  RN Care Coordinator  [facility]

## 2024-03-06 ENCOUNTER — OFFICE VISIT (OUTPATIENT)
Dept: PALLIATIVE MEDICINE | Facility: CLINIC | Age: 49
End: 2024-03-06
Payer: COMMERCIAL

## 2024-03-06 VITALS — SYSTOLIC BLOOD PRESSURE: 122 MMHG | DIASTOLIC BLOOD PRESSURE: 81 MMHG | HEART RATE: 78 BPM

## 2024-03-06 DIAGNOSIS — M54.10 RADICULAR PAIN OF LEFT LOWER EXTREMITY: ICD-10-CM

## 2024-03-06 DIAGNOSIS — M54.16 LUMBAR RADICULOPATHY: ICD-10-CM

## 2024-03-06 DIAGNOSIS — M54.10 RADICULAR PAIN OF UPPER EXTREMITY: ICD-10-CM

## 2024-03-06 DIAGNOSIS — Z98.890 S/P LUMBAR MICRODISCECTOMY: ICD-10-CM

## 2024-03-06 DIAGNOSIS — M54.12 CERVICAL RADICULOPATHY: Primary | ICD-10-CM

## 2024-03-06 DIAGNOSIS — M54.2 NECK PAIN: ICD-10-CM

## 2024-03-06 PROCEDURE — 99214 OFFICE O/P EST MOD 30 MIN: CPT

## 2024-03-06 RX ORDER — GABAPENTIN 300 MG/1
600 CAPSULE ORAL 3 TIMES DAILY
Qty: 180 CAPSULE | Refills: 2 | Status: SHIPPED | OUTPATIENT
Start: 2024-03-06 | End: 2024-06-17

## 2024-03-06 RX ORDER — GABAPENTIN 300 MG/1
600 CAPSULE ORAL 3 TIMES DAILY
Qty: 180 CAPSULE | Refills: 2 | Status: SHIPPED | OUTPATIENT
Start: 2024-03-06 | End: 2024-03-06

## 2024-03-06 ASSESSMENT — PAIN SCALES - PAIN ENJOYMENT GENERAL ACTIVITY SCALE (PEG)
INTERFERED_GENERAL_ACTIVITY: 5
INTERFERED_ENJOYMENT_LIFE: 7
AVG_PAIN_PASTWEEK: 4
PEG_TOTALSCORE: 5.33

## 2024-03-06 ASSESSMENT — PAIN SCALES - GENERAL: PAINLEVEL: MODERATE PAIN (4)

## 2024-03-06 NOTE — Clinical Note
Medardo Bennett,  I saw Lilian today for follow up. She reports significant improvement in LLE pain with left L5-S1, S1-2 TFESI on 3/1. She asked about needing to keep EMG appointment on 3/18. I encouraged her to reach out to you from her end, and at minimum wait two weeks until deciding to cancel, since injection was less than 1 week ago. Just wanted to give you a heads up!  Thanks, Celena Hilliard, DNP, APRN, AGNP-C [facility]

## 2024-03-06 NOTE — PROGRESS NOTES
[facility]     Date of visit: 3/6/2024      Assessment:   Lilian Quinones is a 48 year old female with a past medical history significant for lumbar radiculopathy, pseudotumor, hx depression/HAO, hyperhidrosis disorder, s/p L5-S1 MILD 10/3/23 with Dr. Garcia, s/p gastric bypass 2019, who presents with complaints of LLE pain, neck and BUE pain.      Neck/BUE - Onset of pain a few years ago. Reports neck pain with BUE radicular symptoms. Historically pain primarily left sided, though now bilateral. Describes pain as aching and throbbing, radiates into bilateral shoulders/upper arms, numb/tingling that extends into lower left arm and into 3rd/4th/5th digits of left hand. Limited conservative therapies, no prior injections. Recent cervical MRI demonstrated multilevel degenerative changes with findings that seem consistent with some of her pain symptoms. BUE strength and sensory intact on exam. Etiology likely multifactorial, including underlying degenerative changes of cervical spine, suspect cervical radiculopathy, with overlying myofascial component.   LLE - She is s/p L5-S1 MILD 10/3/23 with Dr. Garcia. Reports worsening LLE pain initially after surgery, some degree of improvement since then but overall pain continues to persist. Discogenic pathology at L4-5 noted on recent lumbar MRI that could explain some of her pain symptoms. BLE strength and sensory intact on exam. Etiology likely multifactorial, including underlying degenerative and postsurgerical changes, suspect lumbar radiculopathy.      Assigned to [place] nursing team.     Visit Diagnoses:  1. Cervical radiculopathy    2. Radicular pain of left lower extremity    3. Radicular pain of upper extremity    4. Lumbar radiculopathy    5. S/P lumbar microdiscectomy    6. Neck pain        Plan:  Diagnosis reviewed, treatment option addressed, and risk/benifits discussed.  Self-care instructions given.  I am recommending a multidisciplinary  treatment plan to help this patient better manage their pain.      Pain Physical Therapy:  YES   Continue working with Kevin Sims PT. She has appreciated chronic pain focused therapies.      Pain Psychologist to address relaxation, behavioral change, coping style, and other factors important to improvement.  NO - not at this time.      Diagnostic Studies:  None today.      Medication Management:   Gabapentin - current dose 600 mg three times daily. She appreciates benefit, increased pain with missed dose. Reports daytime sedation concerns. Recommend trial taper down on daytime doses, recommend decreasing afternoon dose by 300 mg, wait 3 days, then decrease morning dose, goal dose 300-300-600. Advised to update clinic if having increased pain at lower dose. May increase morning dose back up to 600 mg, wait 3 days, then increase afternoon dose to 600 mg as needed. Refilled today.      Potential procedures:   C7-T1 epidural steroid injection on 1/22/24 - reports 75% improvement in neck/radicular RUE pain. Advised she may repeat anytime after 4/22/24, may contact clinic in between visits to request orders for scheduling.   Left L5-S1 and L1-2 TFESI on 3/1/24 (recommended by Dr. Bennett) - reports significant improvement in left leg radicular pain.      Other Orders/Referrals:   Message to Dr. Bennett - she wonders about needing EMG of LLE scheduled on 3/18/24 since her radicular leg pain has significant improved since lumbar WENCESLAO on 3/1/24. Advised her to also send a message to Dr. Bennett from her end.      Follow up with ASIF Regalado CNP in around 3-4 months, or sooner if needed.       Review of Electronic Chart: Today I have also reviewed available medical information in the patient's medical record at [facility] ([facility]) and Care Everywhere (if available), including relevant provider notes, laboratory work, and imaging.     eClena Hilliard, OLIVIA, APRN, AGNP-C  [facility]      -------------------------------------------------    Subjective:    Chief complaint:   Chief Complaint   Patient presents with    Pain       Interval history:  Lilian Quinones is a 48 year old female last seen on 1/24/24.  They are a patient of mine seen in follow up.     Recommendations/plan at the last visit included:  Pain Physical Therapy:  YES   I am referring for targeting stretching, strengthening, and home exercise plan to support functional goals/ADLs.  If you have not been contacted to schedule within 2 business days, please call [phone] to make an appointment. Recommend scheduling with Kevin Sims PT.      Pain Psychologist to address relaxation, behavioral change, coping style, and other factors important to improvement.  NO - not at this time.      Diagnostic Studies:  Reviewed recent cervical and lumbar MRIs - multilevel degenerative changes noted, no red flag findings.      Medication Management:   Start gabapentin 300 mg at bedtime and titrate to goal dose 600 mg three times daily, per instructions on prescription bottle. Monitor for changes in pain level/intensity. May consider dosage increase versus alternative, pending outcome.   Monitor for sedation - may cause dizziness or drowsiness. Be careful driving/moving around until you know effects of medication. Avoid concurrent alcohol use.   Duloxetine - will consider at follow up, pending outcome with gabapentin.   Medical cannabis - she inquired about this today, recommend exploring other potential treatment options initially. Also, given frequent traveling out of state, I am not sure certification for [place] program ideal, as she cannot take medical cannabis products across state lines.   Oxycodone 5 mg - currently takes 2 tabs (10 mg) three times daily as needed. Managed by neurosurgery. Advised that I am not taking over prescribing today, will message neurosurgery team regarding plan for continuation/taper. I recommend tapering dosage as  tolerated, do not recommend long term use or dose escalation.      Potential procedures:   C7-T1 epidural steroid injection - recommended today to target neck pain with radicular shoulder/arm pain. Orders placed to schedule with Dr. Levi/next available physician partner.      Other Orders/Referrals:   Message sent to neurosurgery regarding medication management/recommendations.      Follow up with ASIF Regalado CNP in around 6 weeks.     Since her last visit, Lilian Quinones reports:  -her pain is better as it was at last visit.   -She had C7-T1 WENCESLAO on 1/22/24 with Dr. Levi. She reports improvement in neck/radicular arm pain. She notes at least 75% improvement in pain with CLARISSA.   -She also saw Dr. Bennett for low back pain, had left L5-S1 WENCESLAO on 3/1/24 with Dr. Levi. She reports improvement in low back/leg pain, which is very encouraging.   -She was started on gabapentin at last visit, titration to goal dose 600 mg TID.  -She notes missing gabapentin dose last night and noticed increased pain.   -She does have some sedation effects with gabapentin, would like to trial reduction in daytime doses.   -She is questioning need for EMG with profound benefit with LESI.  -She was referred to pain PT at last visit, working with Kevin Sims. She has appreciated the pain focused therapies, will attend the next 2 scheduled visits.     Pain Information:   Pain rating: averages 4/10 on a 0-10 scale.        HPI from initial visit with me on 1/24/24:  Lilian Quinones is a 48 year old female presents with a chief complaint of neck/BUE pain (radicular symptoms extends into lower LUE), LLE pain (s/p left L5-S1 MILD).      The pain has been present for - onset of left sided neck pain several years ago and has been getting injections to tx with [facility], onset of LLE pain in 2022.    The pain is Severe Pain (7) in severity.    The pain is described as: neck/BUE - left sided pain first and now bilateral neck pain, describes as  "aching and throbbing, radiates into bilateral shoulders/upper arms, numb/tingling that extends into lower left arm and into 3rd, 4th, and 5th digits; LLE pain - worsened after surgery, dull and throbbing pain extends into buttock down back of leg, stops at knee but then numbness/tingling of lateral foot/toes, intermittent sharp qualities (states when oxycodone \"wears off\").   The pain is alleviated by oxycodone (diminishing benefit, still prescribed by NS), rest and body positioning.    It is exacerbated by activity, fluctuating intensity dependent on amount of activity.    Modalities that have been utilized in the past which were helpful include CLARISSA (last injection 10/12/23 was helpful for left sided neck pain), Kratom, NSAIDs (tries not to use as much due to hx gastric bypass).     Things that were not helpful, but tried ,include left L5-S1 MILD 10/2023 (LLE pain worse), ice, topical analgesics, muscle relaxants.    The patient has never tried pain PT, neuropathics (since surgery).  The patient otherwise denies bowel or bladder incontinence, parasthesias, weakness, saddle anesthesia, unintentional weight loss, or fever/chills/sweats.   Lilian Quinones has been seen at a pain clinic in the past. She had injections with pain clinic, had C7-T1 WENCESLAO with Dr. Richey 10/12/23, helped with left sided neck/LUE pain. She reports having lumbar injections in [place] last year.      -She works in [employer], has to be careful with CNS effects from medications. She states that right now oxycodone is helping her just get by.   -She reports taking Kratom before surgery for pain, does not want to get back on it, but notes this was helpful for pain and was not what to do.   -She is potentially interested in medical cannabis.   -She states her son committed suicide 4 years ago (would be 17 now). She states he was at [employer], there were 6 suicides in his school. She states there was a choking challenge going on at the " time, and they had found him doing that.   -She and  have been traveling to help with grief, [place] the road for a period of time after son , sold home and bought 5th wheel. Worked remotely. They came back to [place] for a while, but they plan on getting back out on the road soon. She has 28 year old daughter, in the army and getting a pysch PhD, also has grandchildren now (almost 3 and 1 year old).         Current Pain Treatments:    Medications:               Gabapentin - 600 mg TID     2. Other therapies:               Pain PT                  Current MME: 0    Review of [place] Prescription Monitoring Program (): No concern for abuse or misuse of controlled medications based on this report.     Past pain treatments:  Cervical WENCESLAO - C7-T1 on 24, helpful 75%  Left L5-S1, S1-2 TFESI 3/1/24, helpful     Medications:  Current Outpatient Medications   Medication Sig Dispense Refill    Bioflavonoid Products (VITAMIN C) CHEW Take 2 chew tab by mouth daily      diazepam (VALIUM) 5 MG tablet Take 0.5-1 tablets (2.5-5 mg) by mouth daily as needed for anxiety 15 tablet 2    diphenhydrAMINE HCl, Sleep, (ZZZQUIL PO) Take 50 mg by mouth nightly as needed (for sleep)      gabapentin (NEURONTIN) 300 MG capsule Take 2 capsules (600 mg) by mouth 3 times daily 180 capsule 2    levonorgestrel (MIRENA) 20 MCG/24HR IUD 1 each by Intrauterine route once      melatonin 5 MG tablet Take 10 mg by mouth nightly as needed for sleep      Multiple Vitamins-Minerals (MULTIVITAMIN GUMMIES WOMENS) CHEW Take 2 chew tab by mouth daily      propranolol (INDERAL) 40 MG tablet Take 0.5-1 tablets (20-40 mg) by mouth 2 times daily as needed (for anxiety) 60 tablet 2    Calcium Polycarbophil (FIBER) 625 MG tablet Take 1 tablet by mouth daily      methocarbamol (ROBAXIN) 500 MG tablet Take 1-2 tablets (500-1,000 mg) by mouth 4 times daily as needed for muscle spasms (Patient not taking: Reported on 2023) 40 tablet 1     oxyCODONE (ROXICODONE) 5 MG tablet Take 1-2 tablets (5-10 mg) by mouth every 8 hours as needed for moderate to severe pain MAX 2 pills per day 20 tablet 0       Medical History: any changes in medical history since they were last seen? No      Objective:    Physical Exam:  Blood pressure 122/81, pulse 78, not currently breastfeeding.  Constitutional: Well developed, well nourished, appears stated age.  Gait: Intact   HEENT: Head atraumatic, normocephalic. Eyes without conjunctival injection or jaundice. Oropharynx clear. Neck supple. No obvious neck masses.  Skin: No rash, lesions, or petechiae of exposed skin.   Psychiatric/mental status: Alert, without lethargy or stupor. Speech fluent. Appropriate affect. Mood normal. Able to follow commands without difficulty.     Diagnostic Tests/Imaging/Labs:  [facility] 6/12/23 - GFR 88    BILLING TIME DOCUMENTATION:   The total TIME spent on this patient on the date of the encounter/appointment was 33 minutes.      TOTAL TIME includes:   Time spent preparing to see the patient (reviewing records and tests)   Time spent face to face (or over the phone) with the patient   Time spent ordering tests, medications, procedures and referrals   Time spent Referring and communicating with other healthcare professionals   Time spent documenting clinical information in Epic

## 2024-03-06 NOTE — PATIENT INSTRUCTIONS
Pain Physical Therapy:  YES   Continue working with Kevin Campayam PT. She has appreciated chronic pain focused therapies.      Pain Psychologist to address relaxation, behavioral change, coping style, and other factors important to improvement.  NO - not at this time.      Diagnostic Studies:  None today.      Medication Management:   Gabapentin - current dose 600 mg three times daily. She appreciates benefit, increased pain with missed dose. Reports daytime sedation concerns. Recommend trial taper down on daytime doses, recommend decreasing afternoon dose by 300 mg, wait 3 days, then decrease morning dose, goal dose 300-300-600. Advised to update clinic if having increased pain at lower dose. May increase morning dose back up to 600 mg, wait 3 days, then increase afternoon dose to 600 mg as needed. Refilled today.      Potential procedures:   C7-T1 epidural steroid injection on 1/22/24 - reports 75% improvement in neck/radicular RUE pain. Advised she may repeat anytime after 4/22/24, may contact clinic in between visits to request orders for scheduling.   Left L5-S1 and L1-2 TFESI on 3/1/24 (recommended by Dr. Bennett) - reports significant improvement in left leg radicular pain.      Other Orders/Referrals:   Message to Dr. Bennett - she wonders about needing EMG of LLE scheduled on 3/18/24 since her radicular leg pain has significant improved since lumbar WENCESLAO on 3/1/24. Advised her to also send a message to Dr. Bennett from her end.      Follow up with ASIF Regalado CNP in around 3-4 months, or sooner if needed.     ----------------------------------------------------------------  Clinic Number:  [phone]   Call with any questions about your care and for scheduling assistance.   Calls are returned Monday through Friday between 8 AM and 4:30 PM. We usually get back to you within 2 business days depending on the issue/request.    If we are prescribing your medications:  For opioid medication refills, call the  clinic or send a [employer] message 7 days in advance.  Please include:  Name of requested medication  Name of the pharmacy.  For non-opioid medications, call your pharmacy directly to request a refill. Please allow 3-4 days to be processed.   Per [place] State Law:  All controlled substance prescriptions must be filled within 30 days of being written.    For those controlled substances allowing refills, pickup must occur within 30 days of last fill.      We believe regular attendance is key to your success in our program!    Any time you are unable to keep your appointment we ask that you call us at least 24 hours in advance to cancel.This will allow us to offer the appointment time to another patient.   Multiple missed appointments may lead to dismissal from the clinic.

## 2024-03-15 ENCOUNTER — THERAPY VISIT (OUTPATIENT)
Dept: PHYSICAL THERAPY | Facility: CLINIC | Age: 49
End: 2024-03-15
Payer: COMMERCIAL

## 2024-03-15 DIAGNOSIS — M54.16 LUMBAR RADICULOPATHY: Primary | ICD-10-CM

## 2024-03-15 PROCEDURE — 97112 NEUROMUSCULAR REEDUCATION: CPT | Mod: GP | Performed by: PHYSICAL THERAPIST

## 2024-03-15 PROCEDURE — 97110 THERAPEUTIC EXERCISES: CPT | Mod: GP | Performed by: PHYSICAL THERAPIST

## 2024-03-18 ENCOUNTER — OFFICE VISIT (OUTPATIENT)
Dept: PHYSICAL MEDICINE AND REHAB | Facility: CLINIC | Age: 49
End: 2024-03-18
Attending: PHYSICAL MEDICINE & REHABILITATION
Payer: COMMERCIAL

## 2024-03-18 VITALS
OXYGEN SATURATION: 98 % | BODY MASS INDEX: 28.49 KG/M2 | HEART RATE: 65 BPM | SYSTOLIC BLOOD PRESSURE: 102 MMHG | HEIGHT: 68 IN | DIASTOLIC BLOOD PRESSURE: 68 MMHG | WEIGHT: 188 LBS

## 2024-03-18 DIAGNOSIS — Z98.890 S/P LUMBAR MICRODISCECTOMY: ICD-10-CM

## 2024-03-18 DIAGNOSIS — M54.16 LUMBAR RADICULOPATHY: Primary | ICD-10-CM

## 2024-03-18 DIAGNOSIS — M54.12 CERVICAL RADICULOPATHY: ICD-10-CM

## 2024-03-18 DIAGNOSIS — G89.4 CHRONIC PAIN SYNDROME: ICD-10-CM

## 2024-03-18 PROCEDURE — 95886 MUSC TEST DONE W/N TEST COMP: CPT | Mod: LT | Performed by: PHYSICAL MEDICINE & REHABILITATION

## 2024-03-18 PROCEDURE — 99214 OFFICE O/P EST MOD 30 MIN: CPT | Performed by: STUDENT IN AN ORGANIZED HEALTH CARE EDUCATION/TRAINING PROGRAM

## 2024-03-18 PROCEDURE — 95909 NRV CNDJ TST 5-6 STUDIES: CPT | Performed by: PHYSICAL MEDICINE & REHABILITATION

## 2024-03-18 ASSESSMENT — PAIN SCALES - GENERAL: PAINLEVEL: SEVERE PAIN (6)

## 2024-03-18 NOTE — LETTER
3/18/2024         RE: Lilian Quinones  [address]  [place] [address]        Dear Colleague,    Thank you for referring your patient, Lilian Quinones, to the [facility]. Please see a copy of my visit note below.    ASSESSMENT:  Lilian Quinones is a 48 year old female presents for consultation at the request of Cory Bennett who presents today for new patient evaluation of :         Diagnoses and all orders for this visit:  Lumbar radiculopathy  -     [facility]  Referral; Future  S/P lumbar microdiscectomy  -     Spine  Referral  -     [facility]  Cervical radiculopathy  -     Spine  Referral  Chronic pain syndrome  -     [facility] Referral; Future       Patient is neurologically intact on exam. No myelopathic or red flag symptoms.    Patient is a 48-year-old female with history of chronic left-sided low back pain and lumbosacral radiculopathy in the L5-S1 distribution status post L5-S1 microdiscectomy who presents for evaluation of neuromodulation.  Patient has previously undergone and exhausted conservative treatment options including physical therapy, medication management, and epidural steroid injections with only limited relief.  I believe she is a good candidate for spinal neuromodulation specifically with SCS.  The entire process of SCS therapy was discussed in detail today during her visit, including the nature of the trial and implantation procedures as well as the need for psychological evaluation prior to pursuit of trial.  Patient was also given a pamphlet today with more patient facing information on the spinal cord stimulation process.  All the patient's questions were answered, and she wishes to proceed with the stimulator.  Will send referral for psychological eval and then submit for insurance authorization.    PLAN:  Reviewed spine anatomy and disease  process. Discussed diagnosis and treatment options with the patient today. A shared decision making model was used. The patient's values and choices were respected. The following represents what was discussed and decided upon by the provider and the patient.    1. DIAGNOSTIC TESTS  MRI of thoracic spine was ordered for further characterization of soft tissues and/or neural compression    2. PHYSICAL THERAPY  Patient is already performing a home program, and was encouraged to continue doing so.  Patient previously completed formal PT program  Discussed the importance of core strengthening, ROM, stretching exercises with the patient and how each of these entities is important in decreasing pain.  Explained to the patient that the purpose of physical therapy is to teach the patient a home exercise program.  These exercises need to be performed every day in order to decrease pain and prevent future occurrences of pain.  Likened it to brushing one's teeth.      3. MEDICATIONS:  Discussed multiple medication options today with patient. Discussed risks, side effects, and proper use of medications. Patient verbalized understanding.  Patient follows with the [place] [facility] for pain medication management  No new medications ordered at this visit.    4. INTERVENTIONS:  Spinal cord stimulator trial as a late to last resort treatment for chronic pain associated with lumbar radiculopathy which has been refractory to conservative, medication, and injection based treatments. Patient advised that they will require psychological consultation as part of the evaluation process.  Benefits of spinal cord stimulation include potentially improved pain, sleep, and function along with decreased medication usage. Risks of the spinal cord stimulator trial include infections of the skin, soft tissue, or spine, bleeding complications including spinal bleeding, temporary or permanent nerve or spinal cord injury, and worsening or  non-improvement of pain. The patient is to return to the clinic upon completion of the trial so that the trial stimulator can be safely removed and the patient can be assessed. After reviewing the risks, benefits, and alternatives, the patient was given an opportunity to ask questions. All questions were addressed, and the patient wishes to move forward with the selected injection.    5. OTHER REFERRALS:  A referral was placed to Pain Psychology for neuromodulation psychological evaluation.    6. FOLLOW-UP  As needed.  Patient advised to contact our office for earlier appointment if symptoms worsening or not improving, or any side effects are noticed.    Advised patient to call the Spine Center if symptoms worsen or you have problems controlling bladder and bowel function.   ______________________________________________________________________    SUBJECTIVE:   Lilian Quinones  is a 48 year old female who presents today for new patient evaluation.    Patient reports she has had chronic left-sided low back pain which radiates down the posterior and posterolateral left leg since 2023.  After trialing conservative measures without relief, patient underwent L5-S1 microdiscectomy on 10/30/2023.  This did not fully alleviate her symptoms, so she had an L5-S1 TFESI which gave 3 days of excellent relief before wearing off.  Based on this diagnostic positive result but unfortunately limited therapeutic benefit, patient is referred to our clinic for evaluation of neuromodulation.    Patient reports at times there are paresthesias in the same distribution as of her pain down the back of the left leg.  No alli numbness or weakness reported.  No bladder or bowel incontinence reported.    Patient is also following with the [place] [facility] for medication management.  Currently on multimodal medication therapy with gabapentin, oxycodone, Robaxin.  Feels the gabapentin has been causing some mental fogginess so she plans to  discuss with her pain provider to see if this can be weaned down some.    -Treatment to Date: As above      Oswestry (TU) Questionnaire        3/17/2024    11:53 AM   OSWESTRY DISABILITY INDEX   Count 10   Sum 22   Oswestry Score (%) 44 %       Neck Disability Index:      3/17/2024    11:55 AM   Neck Disability Index (  Gilmar ALBARADO and [employer]. 1991. All rights reserved.; used with permission)   SECTION 1 - PAIN INTENSITY 1   SECTION 2 - PERSONAL CARE 1   SECTION 3 - LIFTING 0   SECTION 4 - READING 2   SECTION 5 - HEADACHES 2   SECTION 6 - CONCENTRATION 4   SECTION 7 - WORK 3   SECTION 8 - DRIVING 2   SECTION 9 - SLEEPING 0   SECTION 10 - RECREATION 1   Count 10   Sum 16   Raw Score: /50 16   Neck Disability Index Score: (%) 32 %              -Medications:    Current Outpatient Medications   Medication     Bioflavonoid Products (VITAMIN C) CHEW     diazepam (VALIUM) 5 MG tablet     diphenhydrAMINE HCl, Sleep, (ZZZQUIL PO)     gabapentin (NEURONTIN) 300 MG capsule     levonorgestrel (MIRENA) 20 MCG/24HR IUD     melatonin 5 MG tablet     Multiple Vitamins-Minerals (MULTIVITAMIN GUMMIES WOMENS) CHEW     propranolol (INDERAL) 40 MG tablet     Calcium Polycarbophil (FIBER) 625 MG tablet     methocarbamol (ROBAXIN) 500 MG tablet     oxyCODONE (ROXICODONE) 5 MG tablet     No current facility-administered medications for this visit.       Allergies   Allergen Reactions     Amoxicillin Rash     Penicillins Rash       Past Medical History:   Diagnosis Date     Arthritis 2021     IUD (intrauterine device) in place 03/20/2019    Mirena        Patient Active Problem List   Diagnosis     Overweight     Generalized anxiety disorder     Hyperhydrosis disorder     Major depression, recurrent (H24)     Lumbar radiculopathy     Hearing loss of left ear, unspecified hearing loss type     Family history of breast cancer     Pseudotumor cerebri     Tinnitus, left       Past Surgical History:   Procedure Laterality Date     ABDOMEN  "SURGERY  2019    Weight loss surgery     BIOPSY      Skin Biopsy     BIOPSY BREAST       BREAST SURGERY      Breast reduction sergury      DISCECTOMY LUMBAR POSTERIOR MICROSCOPIC ONE LEVEL Left 10/3/2023    Procedure: Minimally invasive Left Lumbar 5 to Sacral 1 microdiscectomy;  Surgeon: Milan Garcia MD;  Location:  OR     LASIK       MAMMOPLASTY REDUCTION  2008     SD LAP, HONORIO RESTRICT PROC, LONGITUDINAL GASTRECTOMY N/A 2019    Procedure: LAPAROSCOPIC SLEEVE GASTRECTOMY;  Surgeon: Perfecto Enciso MD;  Location: [facility];  Service: General       Family History   Problem Relation Age of Onset     Breast Cancer Mother      Cerebrovascular Disease Mother      Diabetes Father      Heart Disease Maternal Grandmother      Hypertension Maternal Grandfather      Hyperlipidemia Maternal Grandfather      Pancreatic Cancer Paternal Grandmother      Diabetes Paternal Grandmother      Other Cancer Paternal Grandfather      Depression Sister      Depression Son          by suicide 2019     Anxiety Disorder Son      Melanoma No family hx of      Glaucoma No family hx of      Macular Degeneration No family hx of        Reviewed past medical, surgical, and family history with patient found on new patient intake packet located in EMR Media tab.     SOCIAL HX: Reviewed    ROS: Specifically negative for bowel/bladder dysfunction, balance changes, headache, dizziness, foot drop, fevers, chills, appetite changes, nausea/vomiting, unexplained weight loss. Otherwise 13 systems reviewed are negative. Please see the patient's intake questionnaire from today for details.    OBJECTIVE:  /68 (BP Location: Right arm, Patient Position: Sitting, Cuff Size: Adult Regular)   Pulse 65   Ht 5' 8\" (1.727 m)   Wt 188 lb (85.3 kg)   SpO2 98%   BMI 28.59 kg/m      PHYSICAL EXAMINATION:  --CONSTITUTIONAL: Vital signs as above. No acute distress. The patient is well nourished and well " groomed.  --PSYCHIATRIC: The patient is awake, alert, oriented to person, place, time and answering questions appropriately with clear speech. Appropriate mood and affect   --RESPIRATORY: Normal rhythm and effort. No abnormal accessory muscle breathing patterns noted.   --GROSS MOTOR: Easily arises from a seated position.  Gait is symmetric, narrow based, and intact.  --LUMBAR SPINE: Inspection reveals no evidence of deformity. Range of motion is not limited in flexion, extension, lateral rotation. Mild tenderness to palpation of lumbar paraspinals, no tenderness in spinous processes.  Facet loading (Moseley test) negative, seated SLR positive on the left side  --HIPS: Full range of motion bilaterally.  --LOWER EXTREMITY MOTOR TESTING:  Hip flexion left 5/5, right 5/5  Knee extension left 4/5, right 5/5  Ankle dorsiflexors left 5/5, right 5/5  Ankle plantarflexors left 5/5, right 5/5   Great toe extension left 5/5, right 5/5   Knee flexion left 5/5, right 5/5  --NEUROLOGIC: Sensation to lower extremities is intact bilaterally in L2-S1 dermatomes.  Negative Ladd's bilaterally. Reflexes intact in BLE, no clonus.  --VASCULAR: Warm upper limbs bilaterally. Capillary refill in the upper extremities is less than 1 second.    RESULTS: Available medical records from [facility] and any other outside records were reviewed today.     Imaging:  Available relevant imaging was personally reviewed and interpreted today. The images were shown to the patient and the findings were explained using a spine model.    MRI Lumbar Spine 12/29/23:  Impression:   1. Postsurgical changes of a microdiscectomy at the L5-S1. There is  postsurgical soft tissue enhancement without organized fluid  collection. Mild epidural scarring. No significant residual spinal  canal or neural foraminal stenosis.     2. Multilevel lumbar spondylosis without high-grade spinal canal or  neural foraminal stenosis.    MRI Lumbar Spine 8/30/23:  Impression:  Multilevel lumbar spondylosis without high-grade spinal  canal stenosis at any level. The left subarticular disc protrusion at  L5-S1 with annular fissure lies in close proximity to the descending  left S1 nerve root; however, this appears decreased compared to the  prior study.      Again, thank you for allowing me to participate in the care of your patient.        Sincerely,        Arnav Moss MD

## 2024-03-18 NOTE — PATIENT INSTRUCTIONS
Thank you for choosing the [facility] for your EMG testing.    The ordering provider will receive your final EMG results within the next few days.  Please follow up with your provider for the results and further treatment recommendations.

## 2024-03-18 NOTE — LETTER
3/18/2024         RE: Lilian Quinones  [address]  [place] [address]        Dear Colleague,    Thank you for referring your patient, Lilian Quinones, to the [facility] SPINE [facility] NEUROSURGERY. Please see a copy of my visit note below.    [facility]  [address] [place] [address] [address]  [place] [address]  Office: [phone] Fax: [phone]    Electromyography and Nerve Conduction Study Report        Indication: Patient presents at the request of Dr. Bennett for a left lower extremity EMG.  She has 2-year history of left lower extremity pain and paresthesias down the posterior lateral left leg with paresthesias to the left foot laterally digit 4 and 5.  On exam she has normal sensation to light touch to the bilateral lower extremities, 2+ patellar reflexes 2+ Achilles on the right 0 on the left.  Normal muscle strength throughout the major muscle groups of the bilateral lower extremities on bedside exam.      Pt Exam Discussion (Communication Barriers):  Electromyography and nerve conduction testing, including associated discomfort, risks, benefits, and alternatives was discussed with the patient prior to the procedure.  No learning/ communication barriers; patient verbalized understanding of procedure.  Informed consent was obtained.           Pt Assessment:  Testing was successfully completed; patient tolerated testing well.       Blood Thinners: None Skin Temperature: Warmed 30.2                   EMG/NCS  results:     Nerve Conduction Studies  Motor Sites      Segment Distal Latency Neg. Amp CV F-Latency F-Estimate Comment   Site  (ms) (mV) (m/s) (ms) (ms)    Left Fibular (EDB) Motor   Ankle Ankle-EDB 4.5 5.7       Knee Knee-Ankle 12.6 5.4 50      Left Tibial (AH) Motor   Ankle Ankle-AH 3.0 14.1       Knee Knee-Ankle 11.5 10.9 49        Sensory Sites      Onset Lat Peak Lat Amp CV Comment   Site (ms) (ms) ( V) (m/s)    Left Sural Sensory   B-Ankle 2.8  3.4 7 -    Right Sural Sensory   B-Ankle 3.0 3.9 9 -      H-Reflex Sites      M-Lat H Lat H Neg Amp   Site (ms) (ms) (mV)   Left Tibial (Soleus) H-Reflex   Pop Fossa 6.1 40.6 0.16   Right Tibial (Soleus) H-Reflex   Pop Fossa 10.1 30.7 4.9     H-Reflex Sites      Lt. H Lat Rt. H Lat L-R H Lat L-R H Neg Amp   Site (ms) (ms) (ms) Norm (mV)   Tibial (Soleus) H-Reflex   Pop Fossa 40.6 30.7 *9.9  < 3.0 4.7       NCS Waveforms:    Motor         Sensory         H-Reflex           Electromyography     Side Muscle Nerve Root Ins Act Fibs Psw Fasc Recrt Dur Amp Poly Comment   Left AntTibialis Dp Br Fibular L4-5 Nml Nml Nml Nml Nml Nml Nml 0    Left Gastroc Tibial S1-2 Nml Nml Nml Nml Nml Nml Nml 0    Left Fibularis Long Sup Br Fibular L5-S1 Nml Nml Nml Nml Nml Nml Nml 0    Left VastusLat Femoral L2-4 Nml Nml Nml Nml Nml Nml Nml 0    Left TensorFascLat SupGluteal L4-5, S1 Nml Nml Nml Nml Nml Nml Nml 0    Left GluteusMax InfGluteal L5-S2 Nml Nml Nml Nml Nml Nml Nml 0        Comment NCS: Abnormal study  1.  Normal bilateral sural SNAPs.  2.  Normal left peroneal and tibial CMAP's.  3.  Absent left normal right tibial H reflex.    Comment EMG: Normal study  1.  Normal needle EMG left lower extremity.    Interpretation: Abnormal study: There is electrodiagnostic evidence of:    1.  Asymmetrically absent left tibial H reflex.  In isolation this is nonspecific and nondiagnostic.  Under the correct clinical condition, this can be observed in an age-indeterminate left S1 radiculopathy    2. There is no electrodiagnostic evidence of focal neuropathy in the left lower extremity.    The testing was completed in its entirety by the physician.      It was our pleasure caring for your patient today, if there any questions or concerns please do not hesitate to contact us.      Again, thank you for allowing me to participate in the care of your patient.        Sincerely,        Jerry Courtney DO

## 2024-03-18 NOTE — PROGRESS NOTES
ASSESSMENT:  Lilian Quinones is a 48 year old female presents for consultation at the request of Cory Bennett who presents today for new patient evaluation of :         Diagnoses and all orders for this visit:  Lumbar radiculopathy  -     Adult [facility]  Referral; Future  S/P lumbar microdiscectomy  -     Spine  Referral  -     Adult Mental [facility]  Referral; Future  Cervical radiculopathy  -     Spine  Referral  Chronic pain syndrome  -     [facility]  Referral; Future       Patient is neurologically intact on exam. No myelopathic or red flag symptoms.    Patient is a 48-year-old female with history of chronic left-sided low back pain and lumbosacral radiculopathy in the L5-S1 distribution status post L5-S1 microdiscectomy who presents for evaluation of neuromodulation.  Patient has previously undergone and exhausted conservative treatment options including physical therapy, medication management, and epidural steroid injections with only limited relief.  I believe she is a good candidate for spinal neuromodulation specifically with SCS.  The entire process of SCS therapy was discussed in detail today during her visit, including the nature of the trial and implantation procedures as well as the need for psychological evaluation prior to pursuit of trial.  Patient was also given a pamphlet today with more patient facing information on the spinal cord stimulation process.  All the patient's questions were answered, and she wishes to proceed with the stimulator.  Will send referral for psychological eval and then submit for insurance authorization.    PLAN:  Reviewed spine anatomy and disease process. Discussed diagnosis and treatment options with the patient today. A shared decision making model was used. The patient's values and choices were respected. The following represents what was discussed and decided upon by the provider and the patient.    1.  no DIAGNOSTIC TESTS  MRI of thoracic spine was ordered for further characterization of soft tissues and/or neural compression    2. PHYSICAL THERAPY  Patient is already performing a home program, and was encouraged to continue doing so.  Patient previously completed formal PT program  Discussed the importance of core strengthening, ROM, stretching exercises with the patient and how each of these entities is important in decreasing pain.  Explained to the patient that the purpose of physical therapy is to teach the patient a home exercise program.  These exercises need to be performed every day in order to decrease pain and prevent future occurrences of pain.  Likened it to brushing one's teeth.      3. MEDICATIONS:  Discussed multiple medication options today with patient. Discussed risks, side effects, and proper use of medications. Patient verbalized understanding.  Patient follows with the [facility] for pain medication management  No new medications ordered at this visit.    4. INTERVENTIONS:  Spinal cord stimulator trial as a late to last resort treatment for chronic pain associated with lumbar radiculopathy which has been refractory to conservative, medication, and injection based treatments. Patient advised that they will require psychological consultation as part of the evaluation process.  Benefits of spinal cord stimulation include potentially improved pain, sleep, and function along with decreased medication usage. Risks of the spinal cord stimulator trial include infections of the skin, soft tissue, or spine, bleeding complications including spinal bleeding, temporary or permanent nerve or spinal cord injury, and worsening or non-improvement of pain. The patient is to return to the clinic upon completion of the trial so that the trial stimulator can be safely removed and the patient can be assessed. After reviewing the risks, benefits, and alternatives, the patient was given an opportunity to ask  questions. All questions were addressed, and the patient wishes to move forward with the selected injection.    5. OTHER REFERRALS:  A referral was placed to Pain Psychology for neuromodulation psychological evaluation.    6. FOLLOW-UP  As needed.  Patient advised to contact our office for earlier appointment if symptoms worsening or not improving, or any side effects are noticed.    Advised patient to call the [facility] if symptoms worsen or you have problems controlling bladder and bowel function.   ______________________________________________________________________    SUBJECTIVE:   Lilian Quinones  is a 48 year old female who presents today for new patient evaluation.    Patient reports she has had chronic left-sided low back pain which radiates down the posterior and posterolateral left leg since 2023.  After trialing conservative measures without relief, patient underwent L5-S1 microdiscectomy on 10/30/2023.  This did not fully alleviate her symptoms, so she had an L5-S1 TFESI which gave 3 days of excellent relief before wearing off.  Based on this diagnostic positive result but unfortunately limited therapeutic benefit, patient is referred to our clinic for evaluation of neuromodulation.    Patient reports at times there are paresthesias in the same distribution as of her pain down the back of the left leg.  No alli numbness or weakness reported.  No bladder or bowel incontinence reported.    Patient is also following with the [facility] for medication management.  Currently on multimodal medication therapy with gabapentin, oxycodone, Robaxin.  Feels the gabapentin has been causing some mental fogginess so she plans to discuss with her pain provider to see if this can be weaned down some.    -Treatment to Date: As above      Oswestry (TU) Questionnaire        3/17/2024    11:53 AM   OSWESTRY DISABILITY INDEX   Count 10   Sum 22   Oswestry Score (%) 44 %       Neck Disability Index:       3/17/2024    11:55 AM   Neck Disability Index (  Gilmar ALBARADO and [employer] 1991. All rights reserved.; used with permission)   SECTION 1 - PAIN INTENSITY 1   SECTION 2 - PERSONAL CARE 1   SECTION 3 - LIFTING 0   SECTION 4 - READING 2   SECTION 5 - HEADACHES 2   SECTION 6 - CONCENTRATION 4   SECTION 7 - WORK 3   SECTION 8 - DRIVING 2   SECTION 9 - SLEEPING 0   SECTION 10 - RECREATION 1   Count 10   Sum 16   Raw Score: /50 16   Neck Disability Index Score: (%) 32 %              -Medications:    Current Outpatient Medications   Medication    Bioflavonoid Products (VITAMIN C) CHEW    diazepam (VALIUM) 5 MG tablet    diphenhydrAMINE HCl, Sleep, (ZZZQUIL PO)    gabapentin (NEURONTIN) 300 MG capsule    levonorgestrel (MIRENA) 20 MCG/24HR IUD    melatonin 5 MG tablet    Multiple Vitamins-Minerals (MULTIVITAMIN GUMMIES WOMENS) CHEW    propranolol (INDERAL) 40 MG tablet    Calcium Polycarbophil (FIBER) 625 MG tablet    methocarbamol (ROBAXIN) 500 MG tablet    oxyCODONE (ROXICODONE) 5 MG tablet     No current facility-administered medications for this visit.       Allergies   Allergen Reactions    Amoxicillin Rash    Penicillins Rash       Past Medical History:   Diagnosis Date    Arthritis 2021    IUD (intrauterine device) in place 03/20/2019    Mirena        Patient Active Problem List   Diagnosis    Overweight    Generalized anxiety disorder    Hyperhydrosis disorder    Major depression, recurrent (H24)    Lumbar radiculopathy    Hearing loss of left ear, unspecified hearing loss type    Family history of breast cancer    Pseudotumor cerebri    Tinnitus, left       Past Surgical History:   Procedure Laterality Date    ABDOMEN SURGERY  6/2019    Weight loss surgery    BIOPSY      Skin Biopsy    BIOPSY BREAST      BREAST SURGERY      Breast reduction sergury     DISCECTOMY LUMBAR POSTERIOR MICROSCOPIC ONE LEVEL Left 10/3/2023    Procedure: Minimally invasive Left Lumbar 5 to Sacral 1 microdiscectomy;  Surgeon: Milan Garcia  "MD Kenan;  Location:  OR    LASIK      MAMMOPLASTY REDUCTION  2008    MI LAP, HONORIO RESTRICT PROC, LONGITUDINAL GASTRECTOMY N/A 2019    Procedure: LAPAROSCOPIC SLEEVE GASTRECTOMY;  Surgeon: Perfecto Enciso MD;  Location: [facility];  Service: General       Family History   Problem Relation Age of Onset    Breast Cancer Mother     Cerebrovascular Disease Mother     Diabetes Father     Heart Disease Maternal Grandmother     Hypertension Maternal Grandfather     Hyperlipidemia Maternal Grandfather     Pancreatic Cancer Paternal Grandmother     Diabetes Paternal Grandmother     Other Cancer Paternal Grandfather     Depression Sister     Depression Son          by suicide 2019    Anxiety Disorder Son     Melanoma No family hx of     Glaucoma No family hx of     Macular Degeneration No family hx of        Reviewed past medical, surgical, and family history with patient found on new patient intake packet located in EMR Media tab.     SOCIAL HX: Reviewed    ROS: Specifically negative for bowel/bladder dysfunction, balance changes, headache, dizziness, foot drop, fevers, chills, appetite changes, nausea/vomiting, unexplained weight loss. Otherwise 13 systems reviewed are negative. Please see the patient's intake questionnaire from today for details.    OBJECTIVE:  /68 (BP Location: Right arm, Patient Position: Sitting, Cuff Size: Adult Regular)   Pulse 65   Ht 5' 8\" (1.727 m)   Wt 188 lb (85.3 kg)   SpO2 98%   BMI 28.59 kg/m      PHYSICAL EXAMINATION:  --CONSTITUTIONAL: Vital signs as above. No acute distress. The patient is well nourished and well groomed.  --PSYCHIATRIC: The patient is awake, alert, oriented to person, place, time and answering questions appropriately with clear speech. Appropriate mood and affect   --RESPIRATORY: Normal rhythm and effort. No abnormal accessory muscle breathing patterns noted.   --GROSS MOTOR: Easily arises from a seated position.  Gait is " symmetric, narrow based, and intact.  --LUMBAR SPINE: Inspection reveals no evidence of deformity. Range of motion is not limited in flexion, extension, lateral rotation. Mild tenderness to palpation of lumbar paraspinals, no tenderness in spinous processes.  Facet loading (Moseley test) negative, seated SLR positive on the left side  --HIPS: Full range of motion bilaterally.  --LOWER EXTREMITY MOTOR TESTING:  Hip flexion left 5/5, right 5/5  Knee extension left 4/5, right 5/5  Ankle dorsiflexors left 5/5, right 5/5  Ankle plantarflexors left 5/5, right 5/5   Great toe extension left 5/5, right 5/5   Knee flexion left 5/5, right 5/5  --NEUROLOGIC: Sensation to lower extremities is intact bilaterally in L2-S1 dermatomes.  Negative Ladd's bilaterally. Reflexes intact in BLE, no clonus.  --VASCULAR: Warm upper limbs bilaterally. Capillary refill in the upper extremities is less than 1 second.    RESULTS: Available medical records from [facility] and any other outside records were reviewed today.     Imaging:  Available relevant imaging was personally reviewed and interpreted today. The images were shown to the patient and the findings were explained using a spine model.    MRI Lumbar Spine 12/29/23:  Impression:   1. Postsurgical changes of a microdiscectomy at the L5-S1. There is  postsurgical soft tissue enhancement without organized fluid  collection. Mild epidural scarring. No significant residual spinal  canal or neural foraminal stenosis.     2. Multilevel lumbar spondylosis without high-grade spinal canal or  neural foraminal stenosis.    MRI Lumbar Spine 8/30/23:  Impression: Multilevel lumbar spondylosis without high-grade spinal  canal stenosis at any level. The left subarticular disc protrusion at  L5-S1 with annular fissure lies in close proximity to the descending  left S1 nerve root; however, this appears decreased compared to the  prior study.

## 2024-03-18 NOTE — PROGRESS NOTES
[facility]  [address] [place] [address] [address]  [place] [address]  Office: [phone] Fax: [phone]    Electromyography and Nerve Conduction Study Report        Indication: Patient presents at the request of Dr. Bennett for a left lower extremity EMG.  She has 2-year history of left lower extremity pain and paresthesias down the posterior lateral left leg with paresthesias to the left foot laterally digit 4 and 5.  On exam she has normal sensation to light touch to the bilateral lower extremities, 2+ patellar reflexes 2+ Achilles on the right 0 on the left.  Normal muscle strength throughout the major muscle groups of the bilateral lower extremities on bedside exam.      Pt Exam Discussion (Communication Barriers):  Electromyography and nerve conduction testing, including associated discomfort, risks, benefits, and alternatives was discussed with the patient prior to the procedure.  No learning/ communication barriers; patient verbalized understanding of procedure.  Informed consent was obtained.           Pt Assessment:  Testing was successfully completed; patient tolerated testing well.       Blood Thinners: None Skin Temperature: Warmed 30.2                   EMG/NCS  results:     Nerve Conduction Studies  Motor Sites      Segment Distal Latency Neg. Amp CV F-Latency F-Estimate Comment   Site  (ms) (mV) (m/s) (ms) (ms)    Left Fibular (EDB) Motor   Ankle Ankle-EDB 4.5 5.7       Knee Knee-Ankle 12.6 5.4 50      Left Tibial (AH) Motor   Ankle Ankle-AH 3.0 14.1       Knee Knee-Ankle 11.5 10.9 49        Sensory Sites      Onset Lat Peak Lat Amp CV Comment   Site (ms) (ms) ( V) (m/s)    Left Sural Sensory   B-Ankle 2.8 3.4 7 -    Right Sural Sensory   B-Ankle 3.0 3.9 9 -      H-Reflex Sites      M-Lat H Lat H Neg Amp   Site (ms) (ms) (mV)   Left Tibial (Soleus) H-Reflex   Pop Fossa 6.1 40.6 0.16   Right Tibial (Soleus) H-Reflex   Pop Fossa 10.1 30.7 4.9     H-Reflex Sites      Lt. H  Lat Rt. H Lat L-R H Lat L-R H Neg Amp   Site (ms) (ms) (ms) Norm (mV)   Tibial (Soleus) H-Reflex   Pop Fossa 40.6 30.7 *9.9  < 3.0 4.7       NCS Waveforms:    Motor         Sensory         H-Reflex           Electromyography     Side Muscle Nerve Root Ins Act Fibs Psw Fasc Recrt Dur Amp Poly Comment   Left AntTibialis Dp Br Fibular L4-5 Nml Nml Nml Nml Nml Nml Nml 0    Left Gastroc Tibial S1-2 Nml Nml Nml Nml Nml Nml Nml 0    Left Fibularis Long Sup Br Fibular L5-S1 Nml Nml Nml Nml Nml Nml Nml 0    Left VastusLat Femoral L2-4 Nml Nml Nml Nml Nml Nml Nml 0    Left TensorFascLat SupGluteal L4-5, S1 Nml Nml Nml Nml Nml Nml Nml 0    Left GluteusMax InfGluteal L5-S2 Nml Nml Nml Nml Nml Nml Nml 0        Comment NCS: Abnormal study  1.  Normal bilateral sural SNAPs.  2.  Normal left peroneal and tibial CMAP's.  3.  Absent left normal right tibial H reflex.    Comment EMG: Normal study  1.  Normal needle EMG left lower extremity.    Interpretation: Abnormal study: There is electrodiagnostic evidence of:    1.  Asymmetrically absent left tibial H reflex.  In isolation this is nonspecific and nondiagnostic.  Under the correct clinical condition, this can be observed in an age-indeterminate left S1 radiculopathy    2. There is no electrodiagnostic evidence of focal neuropathy in the left lower extremity.    The testing was completed in its entirety by the physician.      It was our pleasure caring for your patient today, if there any questions or concerns please do not hesitate to contact us.

## 2024-03-21 ENCOUNTER — TELEPHONE (OUTPATIENT)
Dept: PHYSICAL MEDICINE AND REHAB | Facility: CLINIC | Age: 49
End: 2024-03-21
Payer: COMMERCIAL

## 2024-03-21 NOTE — TELEPHONE ENCOUNTER
----- Message from Dena Ayers MD sent at 3/21/2024 11:59 AM CDT -----  Medardo Jauregui,    Would you please call this patient and make a follow up appointment with Dr. Bennett? The goal is to review her response to the injection, to review the EMG result and to discuss the next steps.         Thanks,  Dena

## 2024-03-26 ASSESSMENT — PATIENT HEALTH QUESTIONNAIRE - PHQ9
SUM OF ALL RESPONSES TO PHQ QUESTIONS 1-9: 5
10. IF YOU CHECKED OFF ANY PROBLEMS, HOW DIFFICULT HAVE THESE PROBLEMS MADE IT FOR YOU TO DO YOUR WORK, TAKE CARE OF THINGS AT HOME, OR GET ALONG WITH OTHER PEOPLE: SOMEWHAT DIFFICULT
SUM OF ALL RESPONSES TO PHQ QUESTIONS 1-9: 5

## 2024-03-26 ASSESSMENT — ANXIETY QUESTIONNAIRES
7. FEELING AFRAID AS IF SOMETHING AWFUL MIGHT HAPPEN: NOT AT ALL
7. FEELING AFRAID AS IF SOMETHING AWFUL MIGHT HAPPEN: NOT AT ALL
1. FEELING NERVOUS, ANXIOUS, OR ON EDGE: NOT AT ALL
6. BECOMING EASILY ANNOYED OR IRRITABLE: NOT AT ALL
IF YOU CHECKED OFF ANY PROBLEMS ON THIS QUESTIONNAIRE, HOW DIFFICULT HAVE THESE PROBLEMS MADE IT FOR YOU TO DO YOUR WORK, TAKE CARE OF THINGS AT HOME, OR GET ALONG WITH OTHER PEOPLE: NOT DIFFICULT AT ALL
3. WORRYING TOO MUCH ABOUT DIFFERENT THINGS: NOT AT ALL
5. BEING SO RESTLESS THAT IT IS HARD TO SIT STILL: NOT AT ALL
GAD7 TOTAL SCORE: 0
GAD7 TOTAL SCORE: 0
8. IF YOU CHECKED OFF ANY PROBLEMS, HOW DIFFICULT HAVE THESE MADE IT FOR YOU TO DO YOUR WORK, TAKE CARE OF THINGS AT HOME, OR GET ALONG WITH OTHER PEOPLE?: NOT DIFFICULT AT ALL
2. NOT BEING ABLE TO STOP OR CONTROL WORRYING: NOT AT ALL
4. TROUBLE RELAXING: NOT AT ALL
GAD7 TOTAL SCORE: 0

## 2024-03-27 ENCOUNTER — HOSPITAL ENCOUNTER (OUTPATIENT)
Dept: MRI IMAGING | Facility: CLINIC | Age: 49
Discharge: HOME | End: 2024-03-27
Attending: STUDENT IN AN ORGANIZED HEALTH CARE EDUCATION/TRAINING PROGRAM | Admitting: STUDENT IN AN ORGANIZED HEALTH CARE EDUCATION/TRAINING PROGRAM
Payer: COMMERCIAL

## 2024-03-27 DIAGNOSIS — M54.16 LUMBAR RADICULOPATHY: ICD-10-CM

## 2024-03-27 DIAGNOSIS — G89.4 CHRONIC PAIN SYNDROME: ICD-10-CM

## 2024-03-27 DIAGNOSIS — Z98.890 S/P LUMBAR MICRODISCECTOMY: ICD-10-CM

## 2024-03-27 PROCEDURE — 72146 MRI CHEST SPINE W/O DYE: CPT

## 2024-04-02 ENCOUNTER — OFFICE VISIT (OUTPATIENT)
Dept: PALLIATIVE MEDICINE | Facility: OTHER | Age: 49
End: 2024-04-02
Payer: COMMERCIAL

## 2024-04-02 DIAGNOSIS — G89.4 CHRONIC PAIN SYNDROME: ICD-10-CM

## 2024-04-02 DIAGNOSIS — F34.1 PERSISTENT DEPRESSIVE DISORDER, MILD: Primary | ICD-10-CM

## 2024-04-02 PROCEDURE — 90791 PSYCH DIAGNOSTIC EVALUATION: CPT | Performed by: SOCIAL WORKER

## 2024-04-02 ASSESSMENT — COLUMBIA-SUICIDE SEVERITY RATING SCALE - C-SSRS
2. HAVE YOU ACTUALLY HAD ANY THOUGHTS OF KILLING YOURSELF IN THE PAST MONTH?: NO
2. HAVE YOU ACTUALLY HAD ANY THOUGHTS OF KILLING YOURSELF LIFETIME?: NO
1. IN THE PAST MONTH, HAVE YOU WISHED YOU WERE DEAD OR WISHED YOU COULD GO TO SLEEP AND NOT WAKE UP?: NO
1. IN THE PAST MONTH, HAVE YOU WISHED YOU WERE DEAD OR WISHED YOU COULD GO TO SLEEP AND NOT WAKE UP?: NO
6. HAVE YOU EVER DONE ANYTHING, STARTED TO DO ANYTHING, OR PREPARED TO DO ANYTHING TO END YOUR LIFE?: NO

## 2024-04-03 ENCOUNTER — MYC MEDICAL ADVICE (OUTPATIENT)
Dept: PHYSICAL MEDICINE AND REHAB | Facility: CLINIC | Age: 49
End: 2024-04-03
Payer: COMMERCIAL

## 2024-04-03 DIAGNOSIS — G89.4 CHRONIC PAIN SYNDROME: ICD-10-CM

## 2024-04-03 DIAGNOSIS — Z98.890 S/P LUMBAR MICRODISCECTOMY: Primary | ICD-10-CM

## 2024-04-03 NOTE — PROGRESS NOTES
[facility]         PATIENT'S NAME: Lilian Quinones  PREFERRED NAME: Lilian  PRONOUNS:       MRN: [id number]  : 1975  ADDRESS: [address]  [place] [address]  [facility]  [id number]  DATE OF SERVICE: 24  START TIME: 800  END TIME: 900  PREFERRED PHONE: [phone]   May we leave a program related message: Yes  EMERGENCY CONTACT: was not obtained  .  SERVICE MODALITY:  In-person    UNIVERSAL ADULT Mental Health DIAGNOSTIC ASSESSMENT    Identifying Information:  Patient is a 48 year old,      individual.  Patient was referred for an assessment by   primary care provider.  Patient attended the session alone.    Chief Complaint:   The reason for seeking services at this time is: Pt referred for a mental health diagnostic assessment as part of the approval process for the Spinal Cord Stimulator.  Pt has been coping with chronic back pain for 2 years and it greatly impacts all areas of her life.  Pt hopes that the SCS will allow improved functioning and quality of life.  The problem(s) began 19.    Patient has attempted to resolve these concerns in the past through multiple medical modalities with minimal relief. . Pt is now interested in the Spinal Cord Stimulator as a pain management tool.     Does the patient:     Have a reasonable understanding of what the SCS actually is? (as below)    - Understand that the SCS is implanted surgically, beneath their skin, with wires placed into their spinal column? YES  - Understand that the SCS is made of metal, and that wires may remain in their body permanently? YES  - Understand that the SCS is battery powered, and the battery likely will need to be replaced after 7-10 years? YES  - Understand that the permanent implant is preceded by a trial, where the wires are placed into the spine, but they come out to the surface, and are plugged into an external battery that the patient wears for the duration of the  trial (5-7 days)? YES        Know what the SCS is doing, and what it is not doing? (as below)    - Know that the SCS is distracting their nervous system from the pain they otherwise would feel? YES  - Know that it is not curing the source of the problem? YES  - know the battery needs to be charged weekly, and sometimes daily (depending on how much power they use), and eventually replaced after 7-10 years involving another small surgery?YES  - Know there are generally several months of reprogramming and fine-tuning that are needed after the implant to get the settings  just right ? YES  - Know they will have a remote control, need to learn how to use it to get the most out of the stimulator? YES     Understand the general process start to finish including:    - an understanding their health insurance is presented with all their past treatments tried (meds, procedures, therapy, etc.) and they give approval or denial for the SCS trial? YES     - if the trial is approved and then scheduled that the scheduling is contingent upon appointment availability?  YES     - that the trial takes generally 1 week, at end of trial the patient decides if there was good stimulation/coverage of the painful areas? YES     -an understanding there are bench marks for proceeding with implant such as the need to have at least 50% relief of pain, 50% reduction in medication doses and 50% improvement in function? YES    -an understanding that request for prior approval from the insurance of the permanent SCS implant is required? YES    -an understanding that only when approval is granted is there an attempt to schedule patient and that this schedule is also contingent on provider availability? YES    -an understanding that the implant procedure is done in an outpatient surgery center and that s/he goes home from the surgery center the same day? YES    -an understanding that s/he returns to clinic approximately 1 week post op and has the  reprograming done by device company at that time? YES     -an understanding that s/he returns to clinic at 2-3 weeks post op; has additional reprogramming, and that some restrictions are lifted for driving, lifting, bending, twisting? YES    -an understanding that additional, as needed, visits for more reprogramming can be done out of the office, with just the device company? YES    Social/Family History:  Patient reported they grew up in [facility].  They were raised by biological parents  .  Parents  / .  Patient reported that their childhood was good, with no major concerns.  Patient described their current relationships with family of origin as close and supportive.     The patient describes their cultural background as .  Cultural influences and impact on patient's life structure, values, norms, and healthcare: None.  Contextual influences on patient's health include: Contextual Factors: Individual Factors   .    These factors will be addressed in the Preliminary Treatment plan. Patient identified their preferred language to be English. Patient reported they does not need the assistance of an  or other support involved in therapy.     Patient reported had no significant delays in developmental tasks.   Patient's highest education level was associate degree / vocational certificate  .  Patient identified the following learning problems: none reported.  Modifications will not be used to assist communication in therapy.   Patient reports they are  able to understand written materials.       Patient's current relationship status is  and  is very supportive.  Pt reports this is her 2nd marriage and current  is extremely supportive to her & her children.  Patient identified their sexual orientation as heterosexual.  Patient reported having 4 child(dotty).  Pt reports close relationship with children.  Her 17 year old son passed away a few years ago,  normal ongoing grief/loss that patient and the family continue to experience.  Pt has grandchildren that she is also very close to. Patient identified mother; adult child; pets; friends; spouse; co-worker as part of their support system.  Patient identified the quality of these relationships as good,  .      Patient's current living/housing situation involves staying in own home/apartment.  The immediate members of family and household include Bob Quinones, 52,  and they report that housing is stable.    Patient is currently employed fulltime.  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 Patient reports their finances are obtained through employment; other. Patient does identify finances as a current stressor.      Patient reported that they have been involved with the legal system.  Ex  - OFP and child custody  . Patient does not report being under probation/ parole/ jurisdiction. They are not under any current court jurisdiction. .    Patient's Strengths and Limitations:  Patient identified the following strengths or resources that will help them succeed in treatment: commitment to health and well being, exercise routine, yair / spirituality, friends / good social support, family support, insight, intelligence, positive work environment, motivation, strong social skills, and work ethic. Things that may interfere with the patient's success in treatment include: physical health concerns.     Assessments:  The following assessments were completed by patient for this visit:  PHQ9:       9/25/2023     9:08 AM 11/6/2023     9:43 AM 1/2/2024     4:08 PM 1/3/2024     2:24 PM 2/5/2024     1:08 PM 2/14/2024     9:27 AM 3/26/2024     8:06 AM   PHQ-9 SCORE   PHQ-9 Total Score [facility] 3 (Minimal depression) 7 (Mild depression)  15 (Moderately severe depression) 15 (Moderately severe depression) 14 (Moderate depression) 5 (Mild depression)   PHQ-9 Total Score 3 7 15 15 15  14 5     GAD7:       1/27/2021     8:51 AM 4/26/2023     5:06 PM 7/25/2023     8:24 AM 11/3/2023     4:06 PM 1/18/2024     6:02 AM 2/1/2024     9:14 AM 3/26/2024     8:07 AM   HAO-7 SCORE   Total Score 14 (moderate anxiety) 21 (severe anxiety) 17 (severe anxiety) 21 (severe anxiety) 18 (severe anxiety) 19 (severe anxiety) 0 (minimal anxiety)   Total Score 14 21 17 21 18 19 0     CAGE-AID:       8/5/2020    10:57 AM 2/14/2024     9:41 AM   CAGE-AID Total Score   Total Score      0   Total Score MyChart  0 (A total score of 2 or greater is considered clinically significant)       Information is confidential and restricted. Go to Review Flowsheets to unlock data.    Multiple values from one day are sorted in reverse-chronological order     PROMIS 10-Global Health (only subscores and total score):       9/11/2023    10:00 AM 9/12/2023     9:25 AM 11/14/2023    10:00 AM 12/28/2023    11:00 AM 2/1/2024     9:15 AM 2/14/2024     9:41 AM 3/26/2024     8:09 AM   PROMIS-10 Scores Only   Global Mental Health Score 12  10 7  6 9   Global Physical Health Score 12  10 6  9 9   PROMIS TOTAL - SUBSCORES 24  20 13  15 18       Information is confidential and restricted. Go to Review Flowsheets to unlock data.     [place] Suicide Severity Rating Scale (Lifetime/Recent)      11/23/2020     8:50 PM 2/14/2024     1:32 PM 4/2/2024     9:00 PM   [place] Suicide Severity Rating (Lifetime/Recent)   Q1 Wish to be Dead (Lifetime)   No   Q2 Non-Specific Active Suicidal Thoughts (Lifetime)   No   Q1 Wished to be Dead (Past Month) yes  0-->no   Q2 Suicidal Thoughts (Past Month) no  0-->no   Q3 Suicidal Thought Method no     Q4 Suicidal Intent without Specific Plan no     Q5 Suicide Intent with Specific Plan no     Q6 Suicide Behavior (Lifetime) no  0-->no   Level of Risk per Screen low risk  no risks indicated   Q1 Wish to be Dead (Lifetime)  Y    1. Wish to be Dead (Past 1 Month)  Y    Wish to be Dead Description (Past 1 Month)  I had a son  who passed away. Sometimes I have those thoughts when I get down and miss my kids. I have three other kids and could never pass that pain on to them.    Q2 Non-Specific Active Suicidal Thoughts (Lifetime)  N    Most Severe Ideation Rating (Lifetime)  1    Most Severe Ideation Rating (Past 1 Month)  1    Frequency (Lifetime)  2    Frequency (Past 1 Month)  1    Duration (Lifetime)  1    Duration (Past 1 Month)  1    Controllability (Lifetime)  1    Controllability (Past 1 Month)  1    Deterrents (Lifetime)  1    Deterrents (Past 1 Month)  1    Reasons for Ideation (Lifetime)  5    Reasons for Ideation (Past 1 Month)  5    Actual Attempt (Lifetime)  N    Has subject engaged in non-suicidal self-injurious behavior? (Lifetime)  N    Interrupted Attempts (Lifetime)  N    Aborted or Self-Interrupted Attempt (Lifetime)  N    Preparatory Acts or Behavior (Lifetime)  N    Calculated C-SSRS Risk Score (Lifetime/Recent)  Low Risk        Personal and Family Medical History:  Patient does report a family history of mental health concerns.  Patient reports family history includes Anxiety Disorder in her son; Breast Cancer in her mother; Cerebrovascular Disease in her mother; Depression in her sister and son; Diabetes in her father and paternal grandmother; Heart Disease in her maternal grandmother; Hyperlipidemia in her maternal grandfather; Hypertension in her maternal grandfather; Other Cancer in her paternal grandfather; Pancreatic Cancer in her paternal grandmother..     Patient does report Mental Health Diagnosis and/or Treatment.  Patient Patient reported the following previous diagnoses which include(s): an Anxiety Disorder and Depression.  Patient reported symptoms began several years ago, but the trauma of losing her son caused increase in symptoms.  Feels she is managing appropriately.  Has seen psychiatrist, follows up every 6 months. Reports panic attacks have significant decreased with little need for medications.   Depression comes & goes with the grief/loss process..   Patient has received mental health services in the past: therapy with [facility] Provider .  Psychiatric Hospitalizations: None.  Patient denies a history of civil commitment.  Patient is receiving other mental health services.  These include  Psychotherapy and Psychiatry with [facility] providers.  . Pt diagnosed by [facility] Provider Major depressive disorder, recurrent episode, moderate  and Generalized anxiety disorder    Patient has had a physical exam to rule out medical causes for current symptoms.  Date of last physical exam was within the past year. Client was encouraged to follow up with PCP if symptoms were to develop. The patient has a [facility] Provider, who is named Shira Olson..  Patient reports  Chronic pain and other medical conditions that can impact daily life .  Patient reports pain concerns including back pain, hx of surgery.  Also hx of .  Patient does want help addressing pain concerns..   There are not significant appetite / nutritional concerns / weight changes.   Patient does not report a history of head injury / trauma / cognitive impairment.         Current Outpatient Medications:     Bioflavonoid Products (VITAMIN C) CHEW, Take 2 chew tab by mouth daily, Disp: , Rfl:     Calcium Polycarbophil (FIBER) 625 MG tablet, Take 1 tablet by mouth daily, Disp: , Rfl:     diazepam (VALIUM) 5 MG tablet, Take 0.5-1 tablets (2.5-5 mg) by mouth daily as needed for anxiety, Disp: 15 tablet, Rfl: 2    diphenhydrAMINE HCl, Sleep, (ZZZQUIL PO), Take 50 mg by mouth nightly as needed (for sleep), Disp: , Rfl:     gabapentin (NEURONTIN) 300 MG capsule, Take 2 capsules (600 mg) by mouth 3 times daily, Disp: 180 capsule, Rfl: 2    levonorgestrel (MIRENA) 20 MCG/24HR IUD, 1 each by Intrauterine route once, Disp: , Rfl:     melatonin 5 MG tablet, Take 10 mg by mouth nightly as needed for sleep, Disp: , Rfl:      methocarbamol (ROBAXIN) 500 MG tablet, Take 1-2 tablets (500-1,000 mg) by mouth 4 times daily as needed for muscle spasms (Patient not taking: Reported on 12/28/2023), Disp: 40 tablet, Rfl: 1    Multiple Vitamins-Minerals (MULTIVITAMIN GUMMIES WOMENS) CHEW, Take 2 chew tab by mouth daily, Disp: , Rfl:     oxyCODONE (ROXICODONE) 5 MG tablet, Take 1-2 tablets (5-10 mg) by mouth every 8 hours as needed for moderate to severe pain MAX 2 pills per day, Disp: 20 tablet, Rfl: 0    propranolol (INDERAL) 40 MG tablet, Take 0.5-1 tablets (20-40 mg) by mouth 2 times daily as needed (for anxiety), Disp: 60 tablet, Rfl: 2      Medication Adherence:  Patient reports taking.  taking prescribed medications as prescribed.    Patient Allergies:    Allergies   Allergen Reactions    Amoxicillin Rash    Penicillins Rash       Medical History:    Past Medical History:   Diagnosis Date    Arthritis 2021    IUD (intrauterine device) in place 03/20/2019    Mirena         Current Mental Status Exam:   Appearance:  Appropriate    Eye Contact:  Good   Psychomotor:  Normal       Gait / station:  slow  Attitude / Demeanor: Cooperative   Speech      Rate / Production: Normal/ Responsive      Volume:  Normal  volume      Language:  intact  Mood:   Anxious   Affect:   Appropriate    Thought Content: Clear   Thought Process: Coherent       Associations: No loosening of associations  Insight:   Good   Judgment:  Intact   Orientation:  All  Attention/concentration: Good    Substance Use:   Patient did not report a family history of substance use concerns; see medical history section for details.  Patient has not received chemical dependency treatment in the past.  Patient has not ever been to detox.      Patient is not currently receiving any chemical dependency treatment.           Substance History of use Age of first use Date of last use     Pattern and duration of use (include amounts and frequency)   Alcohol used in the past   12 02/11/24  Occassional use, no hx of substance abuse/dependency   Cannabis   never used     REPORTS SUBSTANCE USE: N/A     Amphetamines   never used     REPORTS SUBSTANCE USE: N/A   Cocaine/crack    never used       REPORTS SUBSTANCE USE: N/A   Hallucinogens never used         REPORTS SUBSTANCE USE: N/A   Inhalants never used         REPORTS SUBSTANCE USE: N/A   Heroin never used         REPORTS SUBSTANCE USE: N/A   Other Opiates currently use 10/2/2024 after surgery 02/13/24 As prescribed   Benzodiazepine   used in the past In my chart i don t remember 12/01/23 As prescribed   Barbiturates never used     REPORTS SUBSTANCE USE: N/A   Over the counter meds used in the past ??  As a chikd when my parents would administer 01/17/24 As prescribed   Caffeine currently use 23   Regular use   Nicotine  never used     REPORTS SUBSTANCE USE: N/A   Other substances not listed above:  Identify:  never used     REPORTS SUBSTANCE USE: N/A     Patient reported the following problems as a result of their substance use: no problems, not applicable.    Substance Use: No symptoms    Based on the negative CAGE score and clinical interview there  are not indications of drug or alcohol abuse.    Significant Losses / Trauma / Abuse / Neglect Issues:   Patient did not   serve in the .  There are indications or report of significant loss, trauma, abuse or neglect issues related to: death of 17 year old son .  Concerns for possible neglect are not present.      Safety Assessment:   Patient denies current homicidal ideation and behaviors.  Patient denies current self-injurious ideation and behaviors.    Patient denied risk behaviors associated with substance use.   Patient denies any high risk behaviors associated with mental health symptoms.  Patient reports the following current concerns for their personal safety: None.  Patient reports there are firearms in the house.     yes, they are secured.    .    History of Safety Concerns:  Patient  denied a history of homicidal ideation.     Patient denied a history of personal safety concerns.    Patient denied a history of assaultive behaviors.    Patient denied a history of sexual assault behaviors.     Patient denied a history of risk behaviors associated with substance use.  Patient denies any history of high risk behaviors associated with mental health symptoms.  Patient reports the following protective factors: dedication to family or friends; healthy fear of risky behaviors or pain    Risk Plan:  See Recommendations for Safety and Risk Management Plan    Review of Symptoms per patient report:   Depression: Lack of interest, Change in energy level, Difficulties concentrating, and Feeling sad, down, or depressed  Colleen:  No Symptoms  Psychosis: No Symptoms  Anxiety: No Symptoms  Panic:  No symptoms  Post Traumatic Stress Disorder:  Experienced traumatic event loss of child    Eating Disorder: No Symptoms  ADD / ADHD:  No symptoms  Conduct Disorder: No symptoms  Autism Spectrum Disorder: No symptoms  Obsessive Compulsive Disorder: No Symptoms    Patient reports the following compulsive behaviors and treatment history:  None reported .      Diagnostic Criteria:   Persistent Depressive Disorder  A. Depressed mood for most of the day, for more days than not, as indicated either by subjective account or observation by others, for at least 2 years. Note: In children and adolescents, mood can be irritable and duration must be at least 1 year.   B. Presence, while depressed, of two (or more) of the following:        - low energy or fatigue        - poor concentration or difficulty making decisions   C. During the 2-year period (1 year for children or adolescents) of the disturbance, the person has never been without the symptoms in Criteria A and B for more than 2 months at a time.  D. Criteria for a major depressive disorder may be continously present for 2 years  E. There has never been a Manic Episode, a  Mixed Episode, or a Hypomanic Episode, and criteria have never been met for Cyclothymic Disorder.   F. The disturbance is not better explained by a persistent schizoaffective disorder, schizophrenia, delusional disorder, or other specified or unspecified schizophrenia spectrum and other psychotic disorder  G. The symptoms are not attributable to the physiological effects of a substance (e.g., a drug of abuse, a medication) or another medical condition (e.g., hypothyroidism).   H. The symptoms cause clinically significant distress or impairment in social, occupational, or other important areas of functioning.     Functional Status:  Patient reports the following functional impairments:  self-care and social interactions.     Nonprogrammatic care:  Patient is requesting basic services to address current mental health concerns.    Clinical Summary:  1. Psychosocial, Cultural and Contextual Factors: Pt is a 48 year old, ,  female with hx of chronic pain, depression  Employed, no learning disability, no major financial concerns. English primary language.  Taoism background.   .  2. Principal DSM5 Diagnoses  (Sustained by DSM5 Criteria Listed Above):   300.4 (F34.1) Persistent Depressive Disorder, Mild.  3. Other Diagnoses that is relevant to services:   Hx of Generalized Anxiety Disorder; Hx of traumatic grief/loss  4. Provisional Diagnosis:     5. Prognosis: Unknown.  6. Likely consequences of symptoms if not treated: unknown.  7. Client strengths include:  caring, educated, empathetic, employed, goal-focused, good listener, has a previous history of therapy, insightful, intelligent, motivated, open to learning, open to suggestions / feedback, responsible parent, support of family, friends and providers, supportive, wants to learn, willing to ask questions, willing to relate to others, and work history .     Recommendations:     1. Plan for Safety and Risk Management:   Safety and Risk: Recommended  that patient call 911 or go to the local ED should there be a change in any of these risk factors..          Report to child / adult protection services was NA.     2. Patient's identified  none identified .     3. Initial Treatment will focus on:    Spinal Cord Stimulator Trial process .     4. Resources/Service Plan:    services are not indicated.   Modifications to assist communication are not indicated.   Additional disability accommodations are not indicated.      5. Collaboration:   Collaboration / coordination of treatment will be initiated with the following  support professionals:  None at this time. Reviewed chart. .      6.  Referrals:   The following referral(s) will be initiated:  None .       A Release of Information has been obtained for the following:  None .     Clinical Substantiation/medical necessity for the above recommendations:  I declare these services are medically necessary and appropriate to the recipient's diagnosis and needs..    7. LILLIAN:    LILLIAN:  Discussed the general effects of drugs and alcohol on health and well-being. Provider gave patient printed information about the  effects of chemical use on their health and well being. Recommendations:   Take medications as prescribed .     8. Records:   These were reviewed at time of assessment.   Information in this assessment was obtained from the medical record and  provided by patient who is a good historian.    Patient's access to their mental health medical record will be withheld due to the following reason(s): mental health diagnostic assessment .    9.   Interactive Complexity: No    10. Safety Plan: None     11. SCS EVAL:  Pt has good understanding of the Spinal Cord Stimulator trial process. She has stable housing, good support system and makes her own medical decisions.  She has reasonable expectations for SCS outcomes. She follows up with all medical appointments and recommendations.  She has hx of depression, anxiety,  traumatic grief/loss but appears to be managing them appropriately.  She sees psychotherapist & psychiatrist within the [facility] system.  She has no chemical dependency concerns.  She did extensive research on the SCS process and knows who to reach out to for additional questions.  She works full-time and has the support of friends, family & her employer.      Provider Name/ Credentials:  MALORIE Dee, [facility]  April 2, 2024       Answers submitted by the patient for this visit:  Patient Health Questionnaire (Submitted on 3/26/2024)  If you checked off any problems, how difficult have these problems made it for you to do your work, take care of things at home, or get along with other people?: Somewhat difficult  PHQ9 TOTAL SCORE: 5  HAO-7 (Submitted on 3/26/2024)  HAO 7 TOTAL SCORE: 0

## 2024-04-03 NOTE — TELEPHONE ENCOUNTER
Please advise patient we just heard back from the psychologist and we do have everything we need to submit for auth. We will get the process started and let the patient know once the insurance company gets back to us.    SCS trial with [employer]

## 2024-04-10 ENCOUNTER — TELEPHONE (OUTPATIENT)
Dept: PHYSICAL MEDICINE AND REHAB | Facility: CLINIC | Age: 49
End: 2024-04-10
Payer: COMMERCIAL

## 2024-04-10 ENCOUNTER — MYC MEDICAL ADVICE (OUTPATIENT)
Dept: PHYSICAL MEDICINE AND REHAB | Facility: CLINIC | Age: 49
End: 2024-04-10
Payer: COMMERCIAL

## 2024-04-10 NOTE — TELEPHONE ENCOUNTER
Patient contacted to inform her that the prior authorization had been received for her [employer] spinal cord stimulator trial.  The date of the procedure was discussed and the date of 4/30/24 with Dr. Moss was agreed upon.     Procedure requirements were discussed with the patient to her verbalized understanding and she was encouraged to contact the [facility] if she had any questions or concerns prior to her upcoming procedure.

## 2024-04-10 NOTE — TELEPHONE ENCOUNTER
Patient is scheduled for a spinal cord stimulator trial with our clinic, and due to significant huong-procedural anxiety they have been given an oral anxiolytic to take prior to arrival. For that reason, consent for the noted procedure was obtained via phone call on 4/10/2024 at 1:35 PM by Arnav Moss MD.    Patient was advised of potential benefits including possible improvements in pain, functioning, and sleep. Patient was also advised of spinal procedure risks including bleeding, bruising, infection of skin, soft tissue, or spine, temporary or permanent neurologic injury, worsening or unimproved pain.  Patient was also advised of alternatives such as physical therapy, medication therapy, or surgical consultation.  After reviewing the benefits and risks, patient elected to proceed with the injection.. Patient was given an opportunity to ask questions or clarify anything they did not understand, and all questions were answered to patient's satisfaction. Patient denied any allergies to iodinated contrast, local anesthetics, or steroid preparations. Patient is aware that blood thinners are to be stopped or continued in accordance with clinic protocols. Patient advised to call us if they have changing or worsening symptoms, any signs of infection, or initiation of treatment for an infection.    TERRELL Morse was present to sign as witness for the consent.

## 2024-04-22 ENCOUNTER — ANCILLARY PROCEDURE (OUTPATIENT)
Dept: MAMMOGRAPHY | Facility: CLINIC | Age: 49
End: 2024-04-22
Attending: NURSE PRACTITIONER
Payer: COMMERCIAL

## 2024-04-22 DIAGNOSIS — Z12.31 VISIT FOR SCREENING MAMMOGRAM: ICD-10-CM

## 2024-04-22 PROCEDURE — 77067 SCR MAMMO BI INCL CAD: CPT | Mod: TC | Performed by: RADIOLOGY

## 2024-04-22 PROCEDURE — 77063 BREAST TOMOSYNTHESIS BI: CPT | Mod: TC | Performed by: RADIOLOGY

## 2024-04-30 ENCOUNTER — RADIOLOGY INJECTION OFFICE VISIT (OUTPATIENT)
Dept: PHYSICAL MEDICINE AND REHAB | Facility: CLINIC | Age: 49
End: 2024-04-30
Attending: STUDENT IN AN ORGANIZED HEALTH CARE EDUCATION/TRAINING PROGRAM
Payer: COMMERCIAL

## 2024-04-30 VITALS
HEART RATE: 61 BPM | TEMPERATURE: 98.2 F | RESPIRATION RATE: 16 BRPM | DIASTOLIC BLOOD PRESSURE: 72 MMHG | OXYGEN SATURATION: 98 % | SYSTOLIC BLOOD PRESSURE: 114 MMHG

## 2024-04-30 DIAGNOSIS — Z98.890 S/P LUMBAR MICRODISCECTOMY: ICD-10-CM

## 2024-04-30 DIAGNOSIS — G89.4 CHRONIC PAIN SYNDROME: ICD-10-CM

## 2024-04-30 PROCEDURE — 63650 IMPLANT NEUROELECTRODES: CPT | Performed by: STUDENT IN AN ORGANIZED HEALTH CARE EDUCATION/TRAINING PROGRAM

## 2024-04-30 RX ORDER — SODIUM CHLORIDE 9 MG/ML
INJECTION, SOLUTION INTRAVENOUS CONTINUOUS PRN
Status: COMPLETED | OUTPATIENT
Start: 2024-04-30 | End: 2024-04-30

## 2024-04-30 RX ORDER — CEFAZOLIN SODIUM 1 G/3ML
INJECTION, POWDER, FOR SOLUTION INTRAMUSCULAR; INTRAVENOUS CONTINUOUS PRN
Status: COMPLETED | OUTPATIENT
Start: 2024-04-30 | End: 2024-04-30

## 2024-04-30 RX ORDER — BUPIVACAINE HYDROCHLORIDE 2.5 MG/ML
INJECTION, SOLUTION EPIDURAL; INFILTRATION; INTRACAUDAL
Status: COMPLETED | OUTPATIENT
Start: 2024-04-30 | End: 2024-04-30

## 2024-04-30 RX ORDER — LIDOCAINE HYDROCHLORIDE 10 MG/ML
INJECTION, SOLUTION EPIDURAL; INFILTRATION; INTRACAUDAL; PERINEURAL
Status: COMPLETED | OUTPATIENT
Start: 2024-04-30 | End: 2024-04-30

## 2024-04-30 RX ADMIN — LIDOCAINE HYDROCHLORIDE 5 ML: 10 INJECTION, SOLUTION EPIDURAL; INFILTRATION; INTRACAUDAL; PERINEURAL at 10:46

## 2024-04-30 RX ADMIN — BUPIVACAINE HYDROCHLORIDE 5 ML: 2.5 INJECTION, SOLUTION EPIDURAL; INFILTRATION; INTRACAUDAL at 10:46

## 2024-04-30 ASSESSMENT — PAIN SCALES - GENERAL
PAINLEVEL: MODERATE PAIN (4)
PAINLEVEL: MILD PAIN (3)

## 2024-04-30 NOTE — PATIENT INSTRUCTIONS
DISCHARGE INSTRUCTIONS    During office hours (8:00 a.m.-4:00 p.m.) questions or concerns may be answered  by calling [facility] Navigation Nurses at  [phone].  Messages received after hours will be returned the following business day.      In the case of an emergency,please dial 911 or seek assistance at the nearest Emergency Room/Urgent Care facility.     All Patients:  You may experience an increase in your symptoms for the first few days    You may use ice on the injection site,as frequently as 20 minutes each hour if needed.     You may take your pain medicine.    You may continue taking your regular medication.    No showering, swimming, tub bath or hot tub until after your follow up appointment.     You mayresume light activities, as tolerated.    Resume your usual diet as tolerated.    Do not drive for 8 hours post injection.             POSSIBLE PROCEDURE SIDE EFFECTS    -Call the Spine Center if you are concerned    IncreasedPain           Increased numbness/tingling      Nausea/Vomiting          Bruising/bleeding at site      Redness or swelling  Difficulty walking      Weakness          Fever greater than 100.5

## 2024-05-06 ENCOUNTER — E-VISIT (OUTPATIENT)
Dept: URGENT CARE | Facility: CLINIC | Age: 49
End: 2024-05-06
Payer: COMMERCIAL

## 2024-05-06 ENCOUNTER — OFFICE VISIT (OUTPATIENT)
Dept: PHYSICAL MEDICINE AND REHAB | Facility: CLINIC | Age: 49
End: 2024-05-06
Payer: COMMERCIAL

## 2024-05-06 VITALS — HEART RATE: 86 BPM | SYSTOLIC BLOOD PRESSURE: 124 MMHG | DIASTOLIC BLOOD PRESSURE: 82 MMHG

## 2024-05-06 DIAGNOSIS — M54.16 LUMBAR RADICULOPATHY: ICD-10-CM

## 2024-05-06 DIAGNOSIS — N89.8 VAGINAL DISCHARGE: Primary | ICD-10-CM

## 2024-05-06 DIAGNOSIS — M96.1 FAILED BACK SYNDROME: ICD-10-CM

## 2024-05-06 DIAGNOSIS — G89.4 CHRONIC PAIN SYNDROME: Primary | ICD-10-CM

## 2024-05-06 PROCEDURE — 99207 PR NON-BILLABLE SERV PER CHARTING: CPT | Performed by: PREVENTIVE MEDICINE

## 2024-05-06 PROCEDURE — 99024 POSTOP FOLLOW-UP VISIT: CPT | Performed by: NURSE PRACTITIONER

## 2024-05-06 RX ORDER — FLUCONAZOLE 150 MG/1
150 TABLET ORAL
Qty: 3 TABLET | Refills: 0 | Status: SHIPPED | OUTPATIENT
Start: 2024-05-06 | End: 2024-05-13

## 2024-05-06 RX ORDER — CEPHALEXIN 500 MG/1
500 CAPSULE ORAL 4 TIMES DAILY
Qty: 28 CAPSULE | Refills: 0 | Status: SHIPPED | OUTPATIENT
Start: 2024-05-06 | End: 2024-05-13

## 2024-05-06 ASSESSMENT — PAIN SCALES - GENERAL: PAINLEVEL: NO PAIN (1)

## 2024-05-06 NOTE — LETTER
5/6/2024         RE: Lilian Quinones  [address]  [place] [address]        Dear Colleague,    Thank you for referring your patient, Lilian Quinones, to the [facility]. Please see a copy of my visit note below.    Lilian Quinones is a 49 year old female  with past medical history significant for pseudotumor cerebri, depression, hyperhydrosis, anxiety, sp L5-S1 microdiscectomy 10/3023 who presents today after spinal cord stimulator trial 4/30/24 by Dr Moss.  Patient has a history of left sided low back pin radiating down the posterolateral left leg despite surgery and conservative treatment options.     Patient received 75% pain relief nerve pain relief during the stimulator trial. She was able sleep all night without waking up. She was able to stand and talk with her brother this morning comfortably without having to constantly shift the weight on her legs. Pre-trial she was taking 3 ibuprofens every few hours, and she was able to stop taking this during her trial. She was able to stop taking tylenol during the trial as well. She has continued her gabapentin at her chronic dosing. Reports classic yeast infection symptoms after having pre-procedure antibiotics, has contacted her pcp.     Patient would like the IPG on the LEFT side. Stimulation area was over the T7-8 disc space therefore lead should be placed mid-T7.    The spinal cord stimulator trial leads were removed without difficulty. Leads were intact. There was no bleeding. No signs of irritation at sites. Chlorhexadine swab was used on the skin. Bacitracin and two band-aids were placed over the lead sites.    -I placed order for neurostimulator insertion  -I ordered Keflex 500 mg 4 times daily for 7 days. The patient should not take these until after implant.   -Patient has Hibiclens at home that they will use for the night before and day of implant.  -I provided the patient with 6 dressings to use for  dressing changes after the surgery.  -she will contact her PCP about potential yeast infection      Areli ALICEAP-C  [facility]  O. [phone]      Again, thank you for allowing me to participate in the care of your patient.        Sincerely,        ASIF Andino CNP

## 2024-05-06 NOTE — PATIENT INSTRUCTIONS
Spinal cord stimulator orders placed.  We will contact you about date/time of procedure  Information given regarding use of pre-procedure soap    Antibiotics post-procedure (keflex) sent to pharmacy. Store this in a cool, dry place  Post-procedure dressings given for post-procedure. Store these in a cool, dry place    ~Please call our [facility] Nurse Navigation line [phone] with any questions or concerns about your treatment plan, if symptoms worsen and you would like to be seen urgently, or if you have any new or worsening numbness, weakness, or problems controlling bladder and bowel function.  ~You are also welcome to contact Areli Betancur via [employer], but please be aware that responses to [employer] message may take 2-3 days due to the high volume of patients seen in clinic.

## 2024-05-06 NOTE — PROGRESS NOTES
Lilian Quinones is a 49 year old female  with past medical history significant for pseudotumor cerebri, depression, hyperhydrosis, anxiety, sp L5-S1 microdiscectomy 10/3023 who presents today after spinal cord stimulator trial 4/30/24 by Dr Moss.  Patient has a history of left sided low back pin radiating down the posterolateral left leg despite surgery and conservative treatment options.     Patient received 75% pain relief nerve pain relief during the stimulator trial. She was able sleep all night without waking up. She was able to stand and talk with her brother this morning comfortably without having to constantly shift the weight on her legs. Pre-trial she was taking 3 ibuprofens every few hours, and she was able to stop taking this during her trial. She was able to stop taking tylenol during the trial as well. She has continued her gabapentin at her chronic dosing. Reports classic yeast infection symptoms after having pre-procedure antibiotics, has contacted her pcp.     Patient would like the IPG on the LEFT side. Stimulation area was over the T7-8 disc space therefore lead should be placed mid-T7.    The spinal cord stimulator trial leads were removed without difficulty. Leads were intact. There was no bleeding. No signs of irritation at sites. Chlorhexadine swab was used on the skin. Bacitracin and two band-aids were placed over the lead sites.    -I placed order for neurostimulator insertion  -I ordered Keflex 500 mg 4 times daily for 7 days. The patient should not take these until after implant.   -Patient has Hibiclens at home that they will use for the night before and day of implant.  -I provided the patient with 6 dressings to use for dressing changes after the surgery.  -she will contact her PCP about potential yeast infection      Areli Betancur P-[facility]  O. [phone]

## 2024-05-06 NOTE — PATIENT INSTRUCTIONS
Thank you for choosing us for your care. Given your symptoms, I would like you to do a lab-only visit to determine what is causing them.  I have placed the orders.  Please schedule an appointment with the lab right here in [facility], or call [phone].  I will let you know when the results are back and next steps to take.  Vaginitis: Care Instructions  Overview     Vaginitis is soreness or infection of the vagina. This common problem can cause itching and burning. And it can cause a change in vaginal discharge. Sometimes it can cause pain during sex. Vaginitis may be caused by bacteria, yeast, or other germs. Some infections that cause it are caught from a sexual partner. Bath products, spermicides, and douches can irritate the vagina too.  This problem can also happen during and after menopause. A drop in estrogen levels during this time can cause dryness, soreness, and pain during sex.  Your doctor can give you medicine to treat vaginitis. And home care may help you feel better. For certain types of infections, your sex partner(s) must be treated too.  Follow-up care is a key part of your treatment and safety. Be sure to make and go to all appointments, and call your doctor if you are having problems. It's also a good idea to know your test results and keep a list of the medicines you take.  How can you care for yourself at home?  Take your medicines exactly as prescribed. Call your doctor if you think you are having a problem with your medicine.  Ask your doctor if your sex partner(s) also needs treatment.  Do not eat or drink anything that has alcohol if you are taking metronidazole (Flagyl).  Wash your vulva daily with water. You also can use a mild, unscented soap if you want.  Do not use scented bath products. And do not use vaginal sprays or douches.  Change out of wet or damp clothes as soon as possible. Wear cotton underwear. And avoid tight clothing that could increase moisture.  Put a washcloth soaked  "in cool water on the area to relieve itching. Or you can take cool baths.  If you have dryness because of menopause, use estrogen cream or pills that your doctor prescribes.  Ask your doctor about when it is okay to have sex.  Use a personal lubricant before sex if you have dryness. Examples are Astroglide, K-Y Jelly, and Wet Lubricant Gel.  When should you call for help?   Call your doctor now or seek immediate medical care if:    You have a fever.     You have new or increased pain in your vagina or pelvis.     You have new or worse vaginal itching or discharge.   Watch closely for changes in your health, and be sure to contact your doctor if:    You have bleeding other than your period.     You do not get better as expected.   Where can you learn more?  Go to https://www.[employer].net/patiented  Enter F219 in the search box to learn more about \"Vaginitis: Care Instructions.\"  Current as of: November 27, 2023               Content Version: 14.0    0382-2071 [employer].   Care instructions adapted under license by your healthcare professional. If you have questions about a medical condition or this instruction, always ask your healthcare professional. [employer] disclaims any warranty or liability for your use of this information.      "

## 2024-05-13 ENCOUNTER — MYC MEDICAL ADVICE (OUTPATIENT)
Dept: PHYSICAL MEDICINE AND REHAB | Facility: CLINIC | Age: 49
End: 2024-05-13
Payer: COMMERCIAL

## 2024-05-14 ENCOUNTER — PREP FOR PROCEDURE (OUTPATIENT)
Dept: PHYSICAL MEDICINE AND REHAB | Facility: CLINIC | Age: 49
End: 2024-05-14
Payer: COMMERCIAL

## 2024-05-14 DIAGNOSIS — M96.1 FAILED BACK SURGICAL SYNDROME: Primary | ICD-10-CM

## 2024-05-15 ENCOUNTER — TELEPHONE (OUTPATIENT)
Dept: PHYSICAL MEDICINE AND REHAB | Facility: CLINIC | Age: 49
End: 2024-05-15
Payer: COMMERCIAL

## 2024-05-15 NOTE — TELEPHONE ENCOUNTER
Attempted to contact patient, but she was unreachable at this time.  A message was left informing the patient that her spinal cord stimulator implant surgery had been scheduled for 6/18/24 at 730a at [facility].      She was encouraged to move forward with scheduling her pre-op physical with her PMD, which needs to be completed within 30 days of her surgical procedure.    The patient was asked to contact this author to confirm the surgery appointment at her earliest convenience.

## 2024-05-16 DIAGNOSIS — F41.1 GENERALIZED ANXIETY DISORDER: ICD-10-CM

## 2024-05-16 RX ORDER — PROPRANOLOL HYDROCHLORIDE 40 MG/1
20-40 TABLET ORAL 2 TIMES DAILY PRN
Qty: 60 TABLET | Refills: 0 | Status: SHIPPED | OUTPATIENT
Start: 2024-05-16 | End: 2024-06-25

## 2024-05-16 NOTE — TELEPHONE ENCOUNTER
"Date of Last Office Visit: 2/6/24  RTC: 3 mo  No shows: 0  Cancellations: 0  Date of Next Office Visit: 0  ------------------------------    Incoming refill from Pharmacy via interface  Medication requested:    propranolol (INDERAL) 40 MG tablet 60 tablet 2 2/6/2024 -- No   Sig - Route: Take 0.5-1 tablets (20-40 mg) by mouth 2 times daily as needed (for anxiety) - Oral     ------------------------------  From last visit note:   \"Take propranolol 20-40mg twice daily as needed for anxiety.                - Plan to try taking one dose scheduled each morning\"       Refill decision: Medication refilled 30 d per protocol. Due to return to clinic, Scheduling has been notified to contact the pt for appointment.       "

## 2024-05-20 NOTE — TELEPHONE ENCOUNTER
Surgical date of 6/18/24 was confirmed with patient and she has scheduled pre-op physical with her PMD for 6/17/24.

## 2024-06-01 ASSESSMENT — PAIN SCALES - PAIN ENJOYMENT GENERAL ACTIVITY SCALE (PEG)
AVG_PAIN_PASTWEEK: 7
INTERFERED_GENERAL_ACTIVITY: 8
INTERFERED_ENJOYMENT_LIFE: 8
PEG_TOTALSCORE: 7.67

## 2024-06-05 NOTE — H&P (VIEW-ONLY)
Video-Visit Details    Type of service:  Video Visit     Originating Location (pt. Location): Home    Distant Location (provider location):  On-site  Platform used for Video Visit: [facility]     Date of visit: 6/6/2024      Assessment:   Lilian Quinones is a 49 year old female with a past medical history significant for lumbar radiculopathy, pseudotumor, hx depression/HAO, hyperhidrosis disorder, s/p L5-S1 MILD 10/3/23 with Dr. Garcia, s/p gastric bypass 2019, who presents with complaints of LLE pain, neck and BUE pain.      Neck/BUE - Onset of pain a few years ago. Reports neck pain with BUE radicular symptoms. Historically pain primarily left sided, though now bilateral. Describes pain as aching and throbbing, radiates into bilateral shoulders/upper arms, numb/tingling that extends into lower left arm and into 3rd/4th/5th digits of left hand. Limited conservative therapies, no prior injections. Recent cervical MRI demonstrated multilevel degenerative changes with findings that seem consistent with some of her pain symptoms. BUE strength and sensory intact on exam. Etiology likely multifactorial, including underlying degenerative changes of cervical spine, suspect cervical radiculopathy, with overlying myofascial component.   LLE - She is s/p L5-S1 MILD 10/3/23 with Dr. Garcia. Reports worsening LLE pain initially after surgery, some degree of improvement since then but overall pain continues to persist. Discogenic pathology at L4-5 noted on recent lumbar MRI that could explain some of her pain symptoms. BLE strength and sensory intact on exam. Etiology likely multifactorial, including underlying degenerative and postsurgerical changes, suspect lumbar radiculopathy.      Assigned to [place] nursing team.    Visit Diagnoses:  1. Chronic pain syndrome    2. S/P lumbar microdiscectomy    3. Radicular pain of left lower extremity    4. Cervical radiculopathy    5. Lumbar radiculopathy         Plan:  Diagnosis reviewed, treatment option addressed, and risk/benifits discussed.  Self-care instructions given.  I am recommending a multidisciplinary treatment plan to help this patient better manage their pain.      Pain Physical Therapy:  Continue activity as tolerated. Previously referred to pain PT and worked with Kevin Sims PT.      Pain Psychologist to address relaxation, behavioral change, coping style, and other factors important to improvement.  NO - not at this time.      Diagnostic Studies:  None today.      Medication Management:   Gabapentin - tapered off in between visits due to side effects. Reports mood changes and states this was affecting her relationships. Not interested in other medications at this time.     Potential procedures:   C7-T1 epidural steroid injection on 1/22/24 - reports 75% improvement in neck/radicular RUE pain. Advised she may repeat anytime after 4/22/24, may contact clinic in between visits to request orders for scheduling.   SCS implant scheduled on 6/18/24 with Dr. Busby.      Other Orders/Referrals:   None      Follow up with ASIF Regalado CNP as needed       Review of Electronic Chart: Today I have also reviewed available medical information in the patient's medical record at [facility] ([facility]) and Care Everywhere (if available), including relevant provider notes, laboratory work, and imaging.     Celena Hilliard DNP, APRN, AGNP-C  [facility]     -------------------------------------------------    Subjective:    Chief complaint:   Chief Complaint   Patient presents with    Pain       Interval history:  Lilian Quinones is a 49 year old female last seen on 3/6/24.  They are a patient of mine seen in follow up.     Recommendations/plan at the last visit included:  Pain Physical Therapy:  YES   Continue working with Kevin Sims PT. She has appreciated chronic pain focused therapies.      Pain Psychologist to address relaxation,  behavioral change, coping style, and other factors important to improvement.  NO - not at this time.      Diagnostic Studies:  None today.      Medication Management:   Gabapentin - current dose 600 mg three times daily. She appreciates benefit, increased pain with missed dose. Reports daytime sedation concerns. Recommend trial taper down on daytime doses, recommend decreasing afternoon dose by 300 mg, wait 3 days, then decrease morning dose, goal dose 300-300-600. Advised to update clinic if having increased pain at lower dose. May increase morning dose back up to 600 mg, wait 3 days, then increase afternoon dose to 600 mg as needed. Refilled today.      Potential procedures:   C7-T1 epidural steroid injection on 1/22/24 - reports 75% improvement in neck/radicular RUE pain. Advised she may repeat anytime after 4/22/24, may contact clinic in between visits to request orders for scheduling.   Left L5-S1 and L1-2 TFESI on 3/1/24 (recommended by Dr. Bennett) - reports significant improvement in left leg radicular pain.      Other Orders/Referrals:   Message to Dr. Bennett - she wonders about needing EMG of LLE scheduled on 3/18/24 since her radicular leg pain has significant improved since lumbar WENCESLAO on 3/1/24. Advised her to also send a message to Dr. Benntet from her end.      Follow up with ASIF Regalado CNP in around 3-4 months, or sooner if needed.     Since her last visit, Lilian Quinones reports:  -She reports tapering off gabapentin quickly. She states gabapentin had bad side effects.   -She has been off for almost 2 weeks.   -She identifies irritability and this was impacting her relationship.   -She is scheduled for SCS implant for 6/18.  -She reports trial went well, she appreciated 75% improvement in leg pain.   -She likes the tonic feeling.   -She would like to stay off of medications at this point.     Pain Information:   Pain rating: averages 7/10 on a 0-10 scale.      Interval history from last visit  "on 3/6/24:  -her pain is better as it was at last visit.   -She had C7-T1 WENCESLAO on 1/22/24 with Dr. Levi. She reports improvement in neck/radicular arm pain. She notes at least 75% improvement in pain with CLARISSA.   -She also saw Dr. Bennett for low back pain, had left L5-S1 WENCESLAO on 3/1/24 with Dr. Levi. She reports improvement in low back/leg pain, which is very encouraging.   -She was started on gabapentin at last visit, titration to goal dose 600 mg TID.  -She notes missing gabapentin dose last night and noticed increased pain.   -She does have some sedation effects with gabapentin, would like to trial reduction in daytime doses.   -She is questioning need for EMG with profound benefit with LESI.  -She was referred to pain PT at last visit, working with Kevin Sims. She has appreciated the pain focused therapies, will attend the next 2 scheduled visits.   Pain Information:              Pain rating: averages 4/10 on a 0-10 scale.           HPI from initial visit with me on 1/24/24:  Lilian Quinones is a 48 year old female presents with a chief complaint of neck/BUE pain (radicular symptoms extends into lower LUE), LLE pain (s/p left L5-S1 MILD).      The pain has been present for - onset of left sided neck pain several years ago and has been getting injections to tx with MHFV, onset of LLE pain in 2022.    The pain is Severe Pain (7) in severity.    The pain is described as: neck/BUE - left sided pain first and now bilateral neck pain, describes as aching and throbbing, radiates into bilateral shoulders/upper arms, numb/tingling that extends into lower left arm and into 3rd, 4th, and 5th digits; LLE pain - worsened after surgery, dull and throbbing pain extends into buttock down back of leg, stops at knee but then numbness/tingling of lateral foot/toes, intermittent sharp qualities (states when oxycodone \"wears off\").   The pain is alleviated by oxycodone (diminishing benefit, still prescribed by NS), rest and " body positioning.    It is exacerbated by activity, fluctuating intensity dependent on amount of activity.    Modalities that have been utilized in the past which were helpful include CLARISSA (last injection 10/12/23 was helpful for left sided neck pain), Kratom, NSAIDs (tries not to use as much due to hx gastric bypass).     Things that were not helpful, but tried ,include left L5-S1 MILD 10/2023 (LLE pain worse), ice, topical analgesics, muscle relaxants.    The patient has never tried pain PT, neuropathics (since surgery).  The patient otherwise denies bowel or bladder incontinence, parasthesias, weakness, saddle anesthesia, unintentional weight loss, or fever/chills/sweats.   Lilian Quinones has been seen at a pain clinic in the past. She had injections with pain clinic, had C7-T1 WENCESLAO with Dr. Richey 10/12/23, helped with left sided neck/LUE pain. She reports having lumbar injections in [place] last year.      -She works in [employer], has to be careful with CNS effects from medications. She states that right now oxycodone is helping her just get by.   -She reports taking Kratom before surgery for pain, does not want to get back on it, but notes this was helpful for pain and was not what to do.   -She is potentially interested in medical cannabis.   -She states her son committed suicide 4 years ago (would be 17 now). She states he was at [employer], there were 6 suicides in his school. She states there was a choking challenge going on at the time, and they had found him doing that.   -She and  have been traveling to help with grief, [place] the road for a period of time after son , sold home and bought [employer]. Worked remotely. They came back to [place] for a while, but they plan on getting back out on the road soon. She has 28 year old daughter, in the army and getting a [employer] PhD, also has grandchildren now (almost 3 and 1 year old).            Current Pain Treatments:     Medications:                Gabapentin - tapered off in between visits due to MH side effects.      2. Other therapies:               SCS implant scheduled on 6/18/24                   Current MME: 0     Review of [place] Prescription Monitoring Program (): No concern for abuse or misuse of controlled medications based on this report.      Past pain treatments:  Cervical WENCESLAO - C7-T1 on 1/22/24, helpful 75%  Left L5-S1, S1-2 TFESI 3/1/24, helpful   Pain PT    Medications:  Current Outpatient Medications   Medication Sig Dispense Refill    Bioflavonoid Products (VITAMIN C) CHEW Take 2 chew tab by mouth daily      diazepam (VALIUM) 5 MG tablet Take 0.5-1 tablets (2.5-5 mg) by mouth daily as needed for anxiety 15 tablet 2    diphenhydrAMINE HCl, Sleep, (ZZZQUIL PO) Take 50 mg by mouth nightly as needed (for sleep)      levonorgestrel (MIRENA) 20 MCG/24HR IUD 1 each by Intrauterine route once      melatonin 5 MG tablet Take 10 mg by mouth nightly as needed for sleep      Multiple Vitamins-Minerals (MULTIVITAMIN GUMMIES WOMENS) CHEW Take 2 chew tab by mouth daily      propranolol (INDERAL) 40 MG tablet Take 0.5-1 tablets (20-40 mg) by mouth 2 times daily as needed (for anxiety) **Due for follow-up visit, please schedule for further refills** 60 tablet 0    gabapentin (NEURONTIN) 300 MG capsule Take 2 capsules (600 mg) by mouth 3 times daily 180 capsule 2       Medical History: any changes in medical history since they were last seen? Yes - SCS trial, scheduled for implant 6/18/24      Objective:    Physical Exam:  GENERAL: alert and no distress  EYES: Eyes grossly normal to inspection.  No discharge or erythema, or obvious scleral/conjunctival abnormalities.  RESP: No audible wheeze, cough, or visible cyanosis.    SKIN: Visible skin clear. No significant rash, abnormal pigmentation or lesions.  NEURO: Cranial nerves grossly intact.  Mentation and speech appropriate for age.  PSYCH: Appropriate affect, tone, and pace of words      Diagnostic Tests/Imaging/Labs:  None     BILLING TIME DOCUMENTATION:   The total TIME spent on this patient on the date of the encounter/appointment was 10 minutes.      TOTAL TIME includes:   Time spent preparing to see the patient (reviewing records and tests)   Time spent face to face (or over the phone) with the patient   Time spent ordering tests, medications, procedures and referrals   Time spent Referring and communicating with other healthcare professionals   Time spent documenting clinical information in Epic

## 2024-06-06 ENCOUNTER — VIRTUAL VISIT (OUTPATIENT)
Dept: PALLIATIVE MEDICINE | Facility: CLINIC | Age: 49
End: 2024-06-06
Payer: COMMERCIAL

## 2024-06-06 DIAGNOSIS — G89.4 CHRONIC PAIN SYNDROME: Primary | ICD-10-CM

## 2024-06-06 DIAGNOSIS — M54.10 RADICULAR PAIN OF LEFT LOWER EXTREMITY: ICD-10-CM

## 2024-06-06 DIAGNOSIS — M54.16 LUMBAR RADICULOPATHY: ICD-10-CM

## 2024-06-06 DIAGNOSIS — M54.12 CERVICAL RADICULOPATHY: ICD-10-CM

## 2024-06-06 DIAGNOSIS — Z98.890 S/P LUMBAR MICRODISCECTOMY: ICD-10-CM

## 2024-06-06 PROCEDURE — 99214 OFFICE O/P EST MOD 30 MIN: CPT | Mod: 95

## 2024-06-06 ASSESSMENT — PAIN SCALES - GENERAL: PAINLEVEL: SEVERE PAIN (6)

## 2024-06-06 NOTE — PATIENT INSTRUCTIONS
Pain Physical Therapy:  Continue activity as tolerated. Previously referred to pain PT and worked with Kevin Sims PT.      Pain Psychologist to address relaxation, behavioral change, coping style, and other factors important to improvement.  NO - not at this time.      Diagnostic Studies:  None today.      Medication Management:   Gabapentin - tapered off in between visits due to side effects. Reports mood changes and states this was affecting her relationships. Not interested in other medications at this time.     Potential procedures:   C7-T1 epidural steroid injection on 1/22/24 - reports 75% improvement in neck/radicular RUE pain. Advised she may repeat anytime after 4/22/24, may contact clinic in between visits to request orders for scheduling.   SCS implant scheduled on 6/18/24 with Dr. Busby.      Other Orders/Referrals:   None      Follow up with ASIF Regalado CNP as needed     ----------------------------------------------------------------  Clinic Number:  [phone]   Call with any questions about your care and for scheduling assistance.   Calls are returned Monday through Friday between 8 AM and 4:30 PM. We usually get back to you within 2 business days depending on the issue/request.    If we are prescribing your medications:  For opioid medication refills, call the clinic or send a [employer] message 7 days in advance.  Please include:  Name of requested medication  Name of the pharmacy.  For non-opioid medications, call your pharmacy directly to request a refill. Please allow 3-4 days to be processed.   Per [place] State Law:  All controlled substance prescriptions must be filled within 30 days of being written.    For those controlled substances allowing refills, pickup must occur within 30 days of last fill.      We believe regular attendance is key to your success in our program!    Any time you are unable to keep your appointment we ask that you call us at least 24 hours in advance to  cancel.This will allow us to offer the appointment time to another patient.   Multiple missed appointments may lead to dismissal from the clinic.

## 2024-06-06 NOTE — PROGRESS NOTES
Lilian is a 49 year old who is being evaluated via a billable video visit.    How would you like to obtain your AVS? [facility]   If the video visit is dropped, the invitation should be resent by: [facility] waiting room.   Will anyone else be joining your video visit? No  Is Pt currently in [place]? Yes    NOTE:  If Pt is not in [place], Appointment needs to be canceled and rescheduled.

## 2024-06-13 ENCOUNTER — MYC MEDICAL ADVICE (OUTPATIENT)
Dept: PHYSICAL MEDICINE AND REHAB | Facility: CLINIC | Age: 49
End: 2024-06-13
Payer: COMMERCIAL

## 2024-06-17 ENCOUNTER — OFFICE VISIT (OUTPATIENT)
Dept: FAMILY MEDICINE | Facility: CLINIC | Age: 49
End: 2024-06-17
Payer: COMMERCIAL

## 2024-06-17 ENCOUNTER — ANESTHESIA EVENT (OUTPATIENT)
Dept: SURGERY | Facility: HOSPITAL | Age: 49
End: 2024-06-17
Payer: COMMERCIAL

## 2024-06-17 VITALS
BODY MASS INDEX: 29.38 KG/M2 | WEIGHT: 193.2 LBS | TEMPERATURE: 97.9 F | OXYGEN SATURATION: 100 % | RESPIRATION RATE: 12 BRPM | SYSTOLIC BLOOD PRESSURE: 116 MMHG | HEART RATE: 77 BPM | DIASTOLIC BLOOD PRESSURE: 80 MMHG

## 2024-06-17 DIAGNOSIS — G89.4 CHRONIC PAIN SYNDROME: ICD-10-CM

## 2024-06-17 DIAGNOSIS — M54.16 LUMBAR RADICULOPATHY: ICD-10-CM

## 2024-06-17 DIAGNOSIS — Z01.818 PREOP GENERAL PHYSICAL EXAM: Primary | ICD-10-CM

## 2024-06-17 DIAGNOSIS — M96.1 FAILED BACK SURGICAL SYNDROME: ICD-10-CM

## 2024-06-17 DIAGNOSIS — B37.31 YEAST INFECTION OF THE VAGINA: ICD-10-CM

## 2024-06-17 PROCEDURE — 99214 OFFICE O/P EST MOD 30 MIN: CPT

## 2024-06-17 RX ORDER — FLUCONAZOLE 150 MG/1
150 TABLET ORAL ONCE
Qty: 1 TABLET | Refills: 0 | Status: SHIPPED | OUTPATIENT
Start: 2024-06-17 | End: 2024-06-17

## 2024-06-17 ASSESSMENT — PATIENT HEALTH QUESTIONNAIRE - PHQ9: SUM OF ALL RESPONSES TO PHQ QUESTIONS 1-9: 7

## 2024-06-17 NOTE — PROGRESS NOTES
Preoperative Evaluation  [facility]  [address]  [place] [address]  Phone: [phone]  Primary Provider: ASIF Whitlock CNP  Pre-op Performing Provider: ASIF Alfaro CNP  Jun 17, 2024 6/13/2024   Surgical Information   What procedure is being done? Spinal cord stimulator implant   Facility or Hospital where procedure/surgery will be performed: [facility]   Who is doing the procedure / surgery? Siddharth Bernal   Date of surgery / procedure: 6/18/2014   Time of surgery / procedure: 7 am   Where do you plan to recover after surgery? at home with family     Fax number for surgical facility: Note does not need to be faxed, will be available electronically in DescribeMe.    [facility]    Assessment & Plan     The proposed surgical procedure is considered INTERMEDIATE risk.    Preop general physical exam  Of note, patient reports previous history of PONV. Reviewed Preparing for Your Surgery with patient along what medications should be held before surgery.   - fluconazole (DIFLUCAN) 150 MG tablet; Take 1 tablet (150 mg) by mouth once for 1 dose    Failed back surgical syndrome    Chronic pain syndrome    Lumbar radiculopathy    Yeast infection of the vagina  Reports yeast infections after antibiotic use. One dose of fluconazole prescribed to patient to take after discontinuation of post op antibiotis.   - fluconazole (DIFLUCAN) 150 MG tablet; Take 1 tablet (150 mg) by mouth once for 1 dose     - No identified additional risk factors other than previously addressed    Preoperative Medication Instructions  Antiplatelet or Anticoagulation Medication Instructions   - Patient is on no antiplatelet or anticoagulation medications.    Additional Medication Instructions  Take all scheduled medications on the day of surgery   - Herbal medications and vitamins: DO NOT TAKE 14 days prior to surgery.    Recommendation  Approval given to proceed with proposed  procedure, without further diagnostic evaluation.    Bunny Quintana is a 49 year old, presenting for the following:  Pre-Op Exam    Does not take propranolol daily. Last took last wednesday.    Takes ibuprofen to help with pain. pain 7/10 takes tylenol as needed. Last took ibuprofen yesterday.          6/17/2024     8:40 AM   Additional Questions   Roomed by alo LEDEZMA related to upcoming procedure: Left leg nerve pain without improvement after Discectomy lumbar in October.         6/13/2024   Pre-Op Questionnaire   Have you ever had a heart attack or stroke? No   Have you ever had surgery on your heart or blood vessels, such as a stent placement, a coronary artery bypass, or surgery on an artery in your head, neck, heart, or legs? No   Do you have chest pain with activity? No   Do you have a history of heart failure? No   Do you currently have a cold, bronchitis or symptoms of other infection? No   Do you have a cough, shortness of breath, or wheezing? No   Do you or anyone in your family have previous history of blood clots? No   Do you or does anyone in your family have a serious bleeding problem such as prolonged bleeding following surgeries or cuts? No   Have you ever had problems with anemia or been told to take iron pills? No   Have you had any abnormal blood loss such as black, tarry or bloody stools, or abnormal vaginal bleeding? No   Have you ever had a blood transfusion? No   Are you willing to have a blood transfusion if it is medically needed before, during, or after your surgery? Yes   Have you or any of your relatives ever had problems with anesthesia? No   Do you have sleep apnea, excessive snoring or daytime drowsiness? No   Do you have any artifical heart valves or other implanted medical devices like a pacemaker, defibrillator, or continuous glucose monitor? No   Do you have artificial joints? No   Are you allergic to latex? No     Health Care Directive  Patient does not have a Health  Care Directive or Living Will: Discussed advance care planning with patient; however, patient declined at this time.    Preoperative Review of    reviewed - controlled substances reflected in medication list.          Patient Active Problem List    Diagnosis Date Noted    Tinnitus, left 02/06/2024     Priority: Medium    Pseudotumor cerebri 06/13/2023     Priority: Medium    Hearing loss of left ear, unspecified hearing loss type 06/12/2023     Priority: Medium    Family history of breast cancer 06/12/2023     Priority: Medium    Lumbar radiculopathy 05/02/2023     Priority: Medium    Major depression, recurrent (H24) 10/06/2021     Priority: Medium    Generalized anxiety disorder 08/22/2016     Priority: Medium      Past Medical History:   Diagnosis Date    Arthritis 2021    IUD (intrauterine device) in place 03/20/2019    Mirena     Past Surgical History:   Procedure Laterality Date    ABDOMEN SURGERY  6/2019    Weight loss surgery    BIOPSY      Skin Biopsy    BIOPSY BREAST      BREAST SURGERY      Breast reduction sergury     DISCECTOMY LUMBAR POSTERIOR MICROSCOPIC ONE LEVEL Left 10/3/2023    Procedure: Minimally invasive Left Lumbar 5 to Sacral 1 microdiscectomy;  Surgeon: Milan Garcia MD;  Location:  OR    LASIK      MAMMOPLASTY REDUCTION  01/01/2008    WA LAP, HONORIO RESTRICT PROC, LONGITUDINAL GASTRECTOMY N/A 06/24/2019    Procedure: LAPAROSCOPIC SLEEVE GASTRECTOMY;  Surgeon: Perfecto Enciso MD;  Location: [facility];  Service: General     Current Outpatient Medications   Medication Sig Dispense Refill    Bioflavonoid Products (VITAMIN C) CHEW Take 2 chew tab by mouth daily      diazepam (VALIUM) 5 MG tablet Take 0.5-1 tablets (2.5-5 mg) by mouth daily as needed for anxiety 15 tablet 2    diphenhydrAMINE HCl, Sleep, (ZZZQUIL PO) Take 50 mg by mouth nightly as needed (for sleep)      fluconazole (DIFLUCAN) 150 MG tablet Take 1 tablet (150 mg) by mouth once for 1 dose 1 tablet 0     "levonorgestrel (MIRENA) 20 MCG/24HR IUD 1 each by Intrauterine route once      melatonin 5 MG tablet Take 10 mg by mouth nightly as needed for sleep      Multiple Vitamins-Minerals (MULTIVITAMIN GUMMIES WOMENS) CHEW Take 2 chew tab by mouth daily      propranolol (INDERAL) 40 MG tablet Take 0.5-1 tablets (20-40 mg) by mouth 2 times daily as needed (for anxiety) **Due for follow-up visit, please schedule for further refills** 60 tablet 0       Allergies   Allergen Reactions    Amoxicillin Rash    Penicillins Rash        Social History     Tobacco Use    Smoking status: Never    Smokeless tobacco: Never   Substance Use Topics    Alcohol use: Not Currently       History   Drug Use No             Review of Systems  CONSTITUTIONAL: NEGATIVE for fever, chills, change in weight  INTEGUMENTARY/SKIN: NEGATIVE for worrisome rashes, moles or lesions  EYES: NEGATIVE for vision changes or irritation  ENT/MOUTH: NEGATIVE for ear, mouth and throat problems  RESP: NEGATIVE for significant cough or SOB  CV: NEGATIVE for chest pain, palpitations or peripheral edema  GI: NEGATIVE for nausea, abdominal pain, heartburn, or change in bowel habits  : NEGATIVE for frequency, dysuria, or hematuria  MUSCULOSKELETAL:POSITIVE  for radicular pain left left  NEURO: NEGATIVE for weakness, dizziness or paresthesias  ENDOCRINE: NEGATIVE for temperature intolerance, skin/hair changes  HEME: NEGATIVE for bleeding problems  PSYCHIATRIC: NEGATIVE for changes in mood or affect    Objective    /80 (BP Location: Right arm, Patient Position: Sitting, Cuff Size: Adult Regular)   Pulse 77   Temp 97.9  F (36.6  C) (Temporal)   Resp 12   Wt 87.6 kg (193 lb 3.2 oz)   SpO2 100%   BMI 29.38 kg/m     Estimated body mass index is 29.38 kg/m  as calculated from the following:    Height as of 3/18/24: 1.727 m (5' 8\").    Weight as of this encounter: 87.6 kg (193 lb 3.2 oz).  Physical Exam  GENERAL: alert and no distress  EYES: Eyes grossly normal to " inspection, PERRL and conjunctivae and sclerae normal  HENT: ear canals and TM's normal, nose and mouth without ulcers or lesions  NECK: no adenopathy, no asymmetry, masses, or scars  RESP: lungs clear to auscultation - no rales, rhonchi or wheezes  CV: regular rate and rhythm, normal S1 S2, no S3 or S4, no murmur, click or rub, no peripheral edema  ABDOMEN: soft, nontender, no hepatosplenomegaly, no masses and bowel sounds normal  MS: no gross musculoskeletal defects noted, no edema  SKIN: no suspicious lesions or rashes  NEURO: Normal strength and tone, mentation intact and speech normal  PSYCH: mentation appears normal, affect normal/bright    Recent Labs   Lab Test 09/25/23  0958   HGB 12.3           Diagnostics  No labs were ordered during this visit.   No EKG required for low risk surgery (cataract, skin procedure, breast biopsy, etc).    Revised Cardiac Risk Index (RCRI)  The patient has the following serious cardiovascular risks for perioperative complications:   - No serious cardiac risks = 0 points     RCRI Interpretation: 0 points: Class I (very low risk - 0.4% complication rate)         Signed Electronically by: ASIF Alfaro CNP  Copy of this evaluation report is provided to requesting physician.

## 2024-06-17 NOTE — PATIENT INSTRUCTIONS

## 2024-06-18 ENCOUNTER — APPOINTMENT (OUTPATIENT)
Dept: RADIOLOGY | Facility: HOSPITAL | Age: 49
End: 2024-06-18
Attending: PAIN MEDICINE
Payer: COMMERCIAL

## 2024-06-18 ENCOUNTER — ANESTHESIA (OUTPATIENT)
Dept: SURGERY | Facility: HOSPITAL | Age: 49
End: 2024-06-18
Payer: COMMERCIAL

## 2024-06-18 ENCOUNTER — HOSPITAL ENCOUNTER (OUTPATIENT)
Facility: HOSPITAL | Age: 49
Discharge: HOME | End: 2024-06-18
Attending: PAIN MEDICINE | Admitting: PAIN MEDICINE
Payer: COMMERCIAL

## 2024-06-18 VITALS
SYSTOLIC BLOOD PRESSURE: 108 MMHG | RESPIRATION RATE: 18 BRPM | BODY MASS INDEX: 29.38 KG/M2 | WEIGHT: 193.2 LBS | TEMPERATURE: 98.7 F | HEART RATE: 58 BPM | DIASTOLIC BLOOD PRESSURE: 63 MMHG | OXYGEN SATURATION: 96 %

## 2024-06-18 DIAGNOSIS — M96.1 FAILED BACK SURGICAL SYNDROME: Primary | ICD-10-CM

## 2024-06-18 PROCEDURE — 250N000011 HC RX IP 250 OP 636: Mod: JZ | Performed by: STUDENT IN AN ORGANIZED HEALTH CARE EDUCATION/TRAINING PROGRAM

## 2024-06-18 PROCEDURE — C1778 LEAD, NEUROSTIMULATOR: HCPCS | Performed by: PAIN MEDICINE

## 2024-06-18 PROCEDURE — 272N000001 HC OR GENERAL SUPPLY STERILE: Performed by: PAIN MEDICINE

## 2024-06-18 PROCEDURE — 258N000003 HC RX IP 258 OP 636: Mod: JZ | Performed by: STUDENT IN AN ORGANIZED HEALTH CARE EDUCATION/TRAINING PROGRAM

## 2024-06-18 PROCEDURE — 710N000012 HC RECOVERY PHASE 2, PER MINUTE: Performed by: PAIN MEDICINE

## 2024-06-18 PROCEDURE — 63650 IMPLANT NEUROELECTRODES: CPT | Performed by: PAIN MEDICINE

## 2024-06-18 PROCEDURE — 250N000009 HC RX 250: Performed by: PAIN MEDICINE

## 2024-06-18 PROCEDURE — 250N000009 HC RX 250: Performed by: STUDENT IN AN ORGANIZED HEALTH CARE EDUCATION/TRAINING PROGRAM

## 2024-06-18 PROCEDURE — 710N000009 HC RECOVERY PHASE 1, LEVEL 1, PER MIN: Performed by: PAIN MEDICINE

## 2024-06-18 PROCEDURE — 63685 INS/RPLC SPI NPG/RCVR POCKET: CPT | Performed by: PAIN MEDICINE

## 2024-06-18 PROCEDURE — 250N000011 HC RX IP 250 OP 636: Performed by: PAIN MEDICINE

## 2024-06-18 PROCEDURE — 999N000141 HC STATISTIC PRE-PROCEDURE NURSING ASSESSMENT: Performed by: PAIN MEDICINE

## 2024-06-18 PROCEDURE — C1713 ANCHOR/SCREW BN/BN,TIS/BN: HCPCS | Performed by: PAIN MEDICINE

## 2024-06-18 PROCEDURE — 999N000182 XR SURGERY CARM FLUORO GREATER THAN 5 MIN

## 2024-06-18 PROCEDURE — 370N000017 HC ANESTHESIA TECHNICAL FEE, PER MIN: Performed by: PAIN MEDICINE

## 2024-06-18 PROCEDURE — 360N000083 HC SURGERY LEVEL 3 W/ FLUORO, PER MIN: Performed by: PAIN MEDICINE

## 2024-06-18 DEVICE — IMPLANTABLE DEVICE: Type: IMPLANTABLE DEVICE | Site: SPINE LUMBAR | Status: FUNCTIONAL

## 2024-06-18 DEVICE — ANCHOR
Type: IMPLANTABLE DEVICE | Site: SPINE LUMBAR | Status: FUNCTIONAL
Brand: CLIK™ X

## 2024-06-18 RX ORDER — MAGNESIUM SULFATE 4 G/50ML
4 INJECTION INTRAVENOUS ONCE
Status: COMPLETED | OUTPATIENT
Start: 2024-06-18 | End: 2024-06-18

## 2024-06-18 RX ORDER — ONDANSETRON 4 MG/1
4 TABLET, ORALLY DISINTEGRATING ORAL EVERY 30 MIN PRN
Status: DISCONTINUED | OUTPATIENT
Start: 2024-06-18 | End: 2024-06-18 | Stop reason: HOSPADM

## 2024-06-18 RX ORDER — FENTANYL CITRATE 50 UG/ML
INJECTION, SOLUTION INTRAMUSCULAR; INTRAVENOUS PRN
Status: DISCONTINUED | OUTPATIENT
Start: 2024-06-18 | End: 2024-06-18

## 2024-06-18 RX ORDER — LIDOCAINE 40 MG/G
CREAM TOPICAL
Status: DISCONTINUED | OUTPATIENT
Start: 2024-06-18 | End: 2024-06-18 | Stop reason: HOSPADM

## 2024-06-18 RX ORDER — BUPIVACAINE HYDROCHLORIDE 2.5 MG/ML
INJECTION, SOLUTION INFILTRATION; PERINEURAL PRN
Status: DISCONTINUED | OUTPATIENT
Start: 2024-06-18 | End: 2024-06-18 | Stop reason: HOSPADM

## 2024-06-18 RX ORDER — SODIUM CHLORIDE, SODIUM LACTATE, POTASSIUM CHLORIDE, CALCIUM CHLORIDE 600; 310; 30; 20 MG/100ML; MG/100ML; MG/100ML; MG/100ML
INJECTION, SOLUTION INTRAVENOUS CONTINUOUS
Status: DISCONTINUED | OUTPATIENT
Start: 2024-06-18 | End: 2024-06-18 | Stop reason: HOSPADM

## 2024-06-18 RX ORDER — CEFAZOLIN SODIUM/WATER 2 G/20 ML
2 SYRINGE (ML) INTRAVENOUS
Status: COMPLETED | OUTPATIENT
Start: 2024-06-18 | End: 2024-06-18

## 2024-06-18 RX ORDER — NALOXONE HYDROCHLORIDE 0.4 MG/ML
0.1 INJECTION, SOLUTION INTRAMUSCULAR; INTRAVENOUS; SUBCUTANEOUS
Status: DISCONTINUED | OUTPATIENT
Start: 2024-06-18 | End: 2024-06-18 | Stop reason: HOSPADM

## 2024-06-18 RX ORDER — LORAZEPAM 2 MG/ML
.5-1 INJECTION INTRAMUSCULAR
Status: DISCONTINUED | OUTPATIENT
Start: 2024-06-18 | End: 2024-06-18 | Stop reason: HOSPADM

## 2024-06-18 RX ORDER — PROPOFOL 10 MG/ML
INJECTION, EMULSION INTRAVENOUS PRN
Status: DISCONTINUED | OUTPATIENT
Start: 2024-06-18 | End: 2024-06-18

## 2024-06-18 RX ORDER — LIDOCAINE HYDROCHLORIDE 10 MG/ML
INJECTION, SOLUTION INFILTRATION; PERINEURAL PRN
Status: DISCONTINUED | OUTPATIENT
Start: 2024-06-18 | End: 2024-06-18

## 2024-06-18 RX ORDER — KETOROLAC TROMETHAMINE 30 MG/ML
15 INJECTION, SOLUTION INTRAMUSCULAR; INTRAVENOUS
Status: DISCONTINUED | OUTPATIENT
Start: 2024-06-18 | End: 2024-06-18 | Stop reason: HOSPADM

## 2024-06-18 RX ORDER — MEPERIDINE HYDROCHLORIDE 25 MG/ML
12.5 INJECTION INTRAMUSCULAR; INTRAVENOUS; SUBCUTANEOUS EVERY 5 MIN PRN
Status: DISCONTINUED | OUTPATIENT
Start: 2024-06-18 | End: 2024-06-18 | Stop reason: HOSPADM

## 2024-06-18 RX ORDER — FENTANYL CITRATE 50 UG/ML
25 INJECTION, SOLUTION INTRAMUSCULAR; INTRAVENOUS EVERY 5 MIN PRN
Status: DISCONTINUED | OUTPATIENT
Start: 2024-06-18 | End: 2024-06-18 | Stop reason: HOSPADM

## 2024-06-18 RX ORDER — OXYCODONE HYDROCHLORIDE 5 MG/1
5 TABLET ORAL EVERY 6 HOURS PRN
Qty: 12 TABLET | Refills: 0 | Status: SHIPPED | OUTPATIENT
Start: 2024-06-18 | End: 2024-06-21

## 2024-06-18 RX ORDER — DEXAMETHASONE SODIUM PHOSPHATE 4 MG/ML
4 INJECTION, SOLUTION INTRA-ARTICULAR; INTRALESIONAL; INTRAMUSCULAR; INTRAVENOUS; SOFT TISSUE
Status: DISCONTINUED | OUTPATIENT
Start: 2024-06-18 | End: 2024-06-18 | Stop reason: HOSPADM

## 2024-06-18 RX ORDER — FENTANYL CITRATE 50 UG/ML
50 INJECTION, SOLUTION INTRAMUSCULAR; INTRAVENOUS EVERY 5 MIN PRN
Status: DISCONTINUED | OUTPATIENT
Start: 2024-06-18 | End: 2024-06-18 | Stop reason: HOSPADM

## 2024-06-18 RX ORDER — KETAMINE HYDROCHLORIDE 10 MG/ML
INJECTION INTRAMUSCULAR; INTRAVENOUS PRN
Status: DISCONTINUED | OUTPATIENT
Start: 2024-06-18 | End: 2024-06-18

## 2024-06-18 RX ORDER — HYDROMORPHONE HCL IN WATER/PF 6 MG/30 ML
0.4 PATIENT CONTROLLED ANALGESIA SYRINGE INTRAVENOUS EVERY 5 MIN PRN
Status: DISCONTINUED | OUTPATIENT
Start: 2024-06-18 | End: 2024-06-18 | Stop reason: HOSPADM

## 2024-06-18 RX ORDER — HYDROMORPHONE HCL IN WATER/PF 6 MG/30 ML
0.2 PATIENT CONTROLLED ANALGESIA SYRINGE INTRAVENOUS EVERY 5 MIN PRN
Status: DISCONTINUED | OUTPATIENT
Start: 2024-06-18 | End: 2024-06-18 | Stop reason: HOSPADM

## 2024-06-18 RX ORDER — DEXAMETHASONE SODIUM PHOSPHATE 10 MG/ML
4 INJECTION, SOLUTION INTRAMUSCULAR; INTRAVENOUS
Status: DISCONTINUED | OUTPATIENT
Start: 2024-06-18 | End: 2024-06-18 | Stop reason: HOSPADM

## 2024-06-18 RX ORDER — PROPOFOL 10 MG/ML
INJECTION, EMULSION INTRAVENOUS CONTINUOUS PRN
Status: DISCONTINUED | OUTPATIENT
Start: 2024-06-18 | End: 2024-06-18

## 2024-06-18 RX ORDER — ONDANSETRON 2 MG/ML
4 INJECTION INTRAMUSCULAR; INTRAVENOUS EVERY 30 MIN PRN
Status: DISCONTINUED | OUTPATIENT
Start: 2024-06-18 | End: 2024-06-18 | Stop reason: HOSPADM

## 2024-06-18 RX ORDER — OXYCODONE HYDROCHLORIDE 5 MG/1
10 TABLET ORAL
Status: DISCONTINUED | OUTPATIENT
Start: 2024-06-18 | End: 2024-06-18 | Stop reason: HOSPADM

## 2024-06-18 RX ORDER — OXYCODONE HYDROCHLORIDE 5 MG/1
5 TABLET ORAL
Status: DISCONTINUED | OUTPATIENT
Start: 2024-06-18 | End: 2024-06-18 | Stop reason: HOSPADM

## 2024-06-18 RX ORDER — ONDANSETRON 2 MG/ML
INJECTION INTRAMUSCULAR; INTRAVENOUS PRN
Status: DISCONTINUED | OUTPATIENT
Start: 2024-06-18 | End: 2024-06-18

## 2024-06-18 RX ORDER — DEXAMETHASONE SODIUM PHOSPHATE 10 MG/ML
INJECTION, SOLUTION INTRAMUSCULAR; INTRAVENOUS PRN
Status: DISCONTINUED | OUTPATIENT
Start: 2024-06-18 | End: 2024-06-18

## 2024-06-18 RX ORDER — LIDOCAINE HYDROCHLORIDE 10 MG/ML
INJECTION, SOLUTION INFILTRATION; PERINEURAL PRN
Status: DISCONTINUED | OUTPATIENT
Start: 2024-06-18 | End: 2024-06-18 | Stop reason: HOSPADM

## 2024-06-18 RX ADMIN — FENTANYL CITRATE 50 MCG: 50 INJECTION INTRAMUSCULAR; INTRAVENOUS at 07:34

## 2024-06-18 RX ADMIN — PHENYLEPHRINE HYDROCHLORIDE 100 MCG: 10 INJECTION INTRAVENOUS at 08:45

## 2024-06-18 RX ADMIN — SODIUM CHLORIDE, POTASSIUM CHLORIDE, SODIUM LACTATE AND CALCIUM CHLORIDE: 600; 310; 30; 20 INJECTION, SOLUTION INTRAVENOUS at 09:23

## 2024-06-18 RX ADMIN — KETAMINE HYDROCHLORIDE 10 MG: 10 INJECTION INTRAMUSCULAR; INTRAVENOUS at 08:01

## 2024-06-18 RX ADMIN — PHENYLEPHRINE HYDROCHLORIDE 100 MCG: 10 INJECTION INTRAVENOUS at 08:40

## 2024-06-18 RX ADMIN — FENTANYL CITRATE 50 MCG: 50 INJECTION INTRAMUSCULAR; INTRAVENOUS at 09:23

## 2024-06-18 RX ADMIN — Medication 2 G: at 07:37

## 2024-06-18 RX ADMIN — ONDANSETRON 4 MG: 2 INJECTION INTRAMUSCULAR; INTRAVENOUS at 07:37

## 2024-06-18 RX ADMIN — LIDOCAINE HYDROCHLORIDE 4 ML: 10 INJECTION, SOLUTION INFILTRATION; PERINEURAL at 07:34

## 2024-06-18 RX ADMIN — KETAMINE HYDROCHLORIDE 10 MG: 10 INJECTION INTRAMUSCULAR; INTRAVENOUS at 07:34

## 2024-06-18 RX ADMIN — MAGNESIUM SULFATE HEPTAHYDRATE 4 G: 80 INJECTION, SOLUTION INTRAVENOUS at 06:46

## 2024-06-18 RX ADMIN — DEXAMETHASONE SODIUM PHOSPHATE 5 MG: 10 INJECTION, SOLUTION INTRAMUSCULAR; INTRAVENOUS at 08:08

## 2024-06-18 RX ADMIN — FENTANYL CITRATE 25 MCG: 50 INJECTION, SOLUTION INTRAMUSCULAR; INTRAVENOUS at 10:33

## 2024-06-18 RX ADMIN — MIDAZOLAM 2 MG: 1 INJECTION INTRAMUSCULAR; INTRAVENOUS at 07:30

## 2024-06-18 RX ADMIN — PROPOFOL 150 MCG/KG/MIN: 10 INJECTION, EMULSION INTRAVENOUS at 07:34

## 2024-06-18 RX ADMIN — FENTANYL CITRATE 25 MCG: 50 INJECTION, SOLUTION INTRAMUSCULAR; INTRAVENOUS at 10:43

## 2024-06-18 RX ADMIN — SODIUM CHLORIDE, POTASSIUM CHLORIDE, SODIUM LACTATE AND CALCIUM CHLORIDE: 600; 310; 30; 20 INJECTION, SOLUTION INTRAVENOUS at 06:40

## 2024-06-18 RX ADMIN — PROPOFOL 20 MG: 10 INJECTION, EMULSION INTRAVENOUS at 09:23

## 2024-06-18 RX ADMIN — PROPOFOL 20 MG: 10 INJECTION, EMULSION INTRAVENOUS at 07:34

## 2024-06-18 ASSESSMENT — ACTIVITIES OF DAILY LIVING (ADL)
ADLS_ACUITY_SCORE: 35
ADLS_ACUITY_SCORE: 36
ADLS_ACUITY_SCORE: 35
ADLS_ACUITY_SCORE: 35
ADLS_ACUITY_SCORE: 36
ADLS_ACUITY_SCORE: 35
ADLS_ACUITY_SCORE: 35

## 2024-06-18 NOTE — DISCHARGE INSTRUCTIONS
First 10-14 days after implant:     ð Remove procedural dressings on (48 hours post procedure) and cover each incision with Gauze Sponge Dressing using provided dressings and tape.     ð Change dressings daily;     ð Wait to shower until (72 hours post procedure). Then remove dressings, shower, pat incisions dry and allow to air dry thoroughly prior to replacing the dressings;     ð Apply cold packs 20 minutes at a time, several times daily as needed for procedural pain.     ð Do not submerge incisions in water until after (10-14 days post procedure) ;     ð No lifting greater than 5 lbs. until after (10-14 days post procedure);     ð No driving until after (10-14 days post procedure);     ð No bending/twisting at the waist more than 30 degrees in any direction until after (10-14 days post procedure);     ð Monitor incisions for signs of infection, i.e., redness, swelling, temperature greater than 100.4F, drainage from incisions that is yellow, green or has an odor and call Pain Clinic and stimulator representative.     ð Return to Spine Clinic (or present to local provider) on (10-14 days post procedure) to inspect incisions and remove staples/sutures.     You may restart aspirin and other blood thinning medications after staples are removed.     2-6 weeks after implant:     ð You may drive, raise your arms and lift up to 10 lbs.     ð Continue to refrain from bending, twisting, and turning.     ð Avoid vigorous activity (weight lifting, biking, running).     ð Before restarting vigorous exercise discuss the activity with your health care provider.        6-12 weeks after implant:     ð At this point in your recovery, the leads are healed in so that you may lift objects without weight restriction.     ð It is recommended that you refrain from jarring activities such as jogging, horseback riding, riding all-terrain vehicles until 3 months after your permanent implant date.     ð Please continue to be mindful that  extreme twisting of your spinal column can put your stimulator leads at risk of movement which could affect your stimulation.     ð Reasons to call your doctor:     o If you develop signs of infection such as a temperature of greater than 100.4 F, chills, increased swelling tenderness or redness at the site, increased pain or pain not relieved by medications, draining or oozing or bad smelling odor coming from the site, or bleeding from the incision sites.     o Progressive numbness and weakness of your legs - go to the emergency department.     ~Please call Nurse Navigation line [phone] with any questions or concerns about your treatment plan, if symptoms worsen and you would like to be seen urgently, or if you have problems controlling bladder and bowel function.

## 2024-06-18 NOTE — ANESTHESIA PREPROCEDURE EVALUATION
Anesthesia Pre-Procedure Evaluation    Patient: Lilian Quinones   MRN: [id number] : 1975        Procedure : Procedure(s):  INSERTION, SPINAL CORD STIMULATOR, STAGE 2          Past Medical History:   Diagnosis Date    Arthritis     Failed back surgical syndrome     HAO (generalized anxiety disorder)     IUD (intrauterine device) in place 2019    Mirena    Lumbar radiculopathy     Major depression, recurrent (H24)     Other chronic pain     PONV (postoperative nausea and vomiting)     Tinnitus, left       Past Surgical History:   Procedure Laterality Date    ABDOMEN SURGERY  2019    Weight loss surgery    BIOPSY      Skin Biopsy    BIOPSY BREAST      BREAST SURGERY      Breast reduction sergury     DISCECTOMY LUMBAR POSTERIOR MICROSCOPIC ONE LEVEL Left 10/3/2023    Procedure: Minimally invasive Left Lumbar 5 to Sacral 1 microdiscectomy;  Surgeon: Milan Garcia MD;  Location:  OR    LASIK      MAMMOPLASTY REDUCTION  2008    GA LAP, HONORIO RESTRICT PROC, LONGITUDINAL GASTRECTOMY N/A 2019    Procedure: LAPAROSCOPIC SLEEVE GASTRECTOMY;  Surgeon: Perfecto Enciso MD;  Location: [facility];  Service: General      Allergies   Allergen Reactions    Amoxicillin Rash    Penicillins Rash      Social History     Tobacco Use    Smoking status: Never    Smokeless tobacco: Never   Substance Use Topics    Alcohol use: Not Currently      Wt Readings from Last 1 Encounters:   24 87.6 kg (193 lb 3.2 oz)        Anesthesia Evaluation            ROS/MED HX  ENT/Pulmonary:  - neg pulmonary ROS     Neurologic:  - neg neurologic ROS     Cardiovascular:  - neg cardiovascular ROS     METS/Exercise Tolerance: >4 METS    Hematologic:  - neg hematologic  ROS     Musculoskeletal:  - neg musculoskeletal ROS (+)  arthritis,             GI/Hepatic:  - neg GI/hepatic ROS     Renal/Genitourinary:  - neg Renal ROS     Endo:  - neg endo ROS     Psychiatric/Substance Use:  - neg psychiatric ROS      Infectious Disease:  - neg infectious disease ROS     Malignancy:  - neg malignancy ROS     Other:  - neg other ROS    (+)  , H/O Chronic Pain (failed back syndrome),         Physical Exam    Airway  airway exam normal      Mallampati: II       Respiratory Devices and Support         Dental       (+) Minor Abnormalities - some fillings, tiny chips      Cardiovascular   cardiovascular exam normal          Pulmonary   pulmonary exam normal                OUTSIDE LABS:  CBC:   Lab Results   Component Value Date    WBC 5.0 09/25/2023    WBC 5.6 12/04/2019    HGB 12.3 09/25/2023    HGB 14.7 12/04/2019    HCT 38.1 09/25/2023    HCT 44.3 12/04/2019     09/25/2023     12/04/2019     BMP:   Lab Results   Component Value Date     06/12/2023     12/04/2019    POTASSIUM 4.1 06/12/2023    POTASSIUM 4.3 12/04/2019    CHLORIDE 108 (H) 06/12/2023    CHLORIDE 106 12/04/2019    CO2 24 06/12/2023    CO2 26 12/04/2019    BUN 12.0 06/12/2023    BUN 8 12/04/2019    CR 0.81 06/12/2023    CR 0.79 12/04/2019    GLC 97 06/12/2023    GLC 91 12/04/2019     COAGS:   Lab Results   Component Value Date    PTT 31 06/19/2019    INR 1.00 06/19/2019     POC:   Lab Results   Component Value Date    HCG Negative 10/03/2023     HEPATIC:   Lab Results   Component Value Date    ALBUMIN 4.0 12/04/2019    PROTTOTAL 6.5 12/04/2019    ALT <9 12/04/2019    AST 12 12/04/2019    ALKPHOS 50 12/04/2019    BILITOTAL 0.5 12/04/2019     OTHER:   Lab Results   Component Value Date    A1C 5.3 03/05/2019    BRITTNEY 8.6 06/12/2023    TSH 2.12 03/05/2019       Anesthesia Plan    ASA Status:  2    NPO Status:  NPO Appropriate    Anesthesia Type: MAC.   Induction: Intravenous, Propofol.   Maintenance: TIVA.        Consents    Anesthesia Plan(s) and associated risks, benefits, and realistic alternatives discussed. Questions answered and patient/representative(s) expressed understanding.     - Discussed:     - Discussed with:  Patient             Postoperative Care    Pain management: IV analgesics, Multi-modal analgesia.   PONV prophylaxis: Ondansetron (or other 5HT-3), Dexamethasone or Solumedrol, Background Propofol Infusion     Comments:    Other Comments:              Garry Victoria MD    I have reviewed the pertinent notes and labs in the chart from the past 30 days and (re)examined the patient.  Any updates or changes from those notes are reflected in this note.

## 2024-06-18 NOTE — OP NOTE
"Implant SPINAL CORD STIMULATOR WITH FLUOROSCOPIC GUIDANCE  Performed on: 6/18/2024  Pre Procedure Diagnosis: LBP, lumbar radiculitis, failed low back syndrome   Post Procedure Diagnosis: Same  Procedure Performed: Implant spinal Cord Stimulator   Clinical Scenario: As per office notes   Anesthesia/Fluids: As per intra-procedure documentation   Vital Signs: As per intra-procedure documentation   Levels Treated: Entered at the T12-L1 interlaminar epidural space   First Assistant: Arnav Moss MD    CC:     Lilian Quinones 49-year-old female with failed back surgery syndrome.    The risks and benefits were discussed with the patient.  The patient had had a previous encounter to specifically discuss spinal cord stimulator trial risks and benefits.  All of the patient's questions were answered including the risks, benefits, alternative treatment options, and prosnosis.  The risks include but are not limited to infection, allergic reaction, nerve damage, paralysis, epidural hematoma (with possible need for surgical evactuation), syncope, headache, respiratory or cardiac arrest, spinal cord injury, death, and scar formation).       The patient was given 2 grams of Ancef IV pre-procedure.     Procedure: The patient was brought into our room 6 and was placed in the prone position and then prepped (with betadine and then Iodoban sheet) and draped in a surgical sterile fashion.    After localization of the T12-L1 epidural space using and the overlying soft tissue, and approximately 2-1/2 inch line was marked just to the left of the midline.  Local anesthetic one-to-one mixture of 1% lidocaine and 0.25% bupivacaine was then used.  Incision was then made with a 15-gauge scalpel.  A small pocket was made giving enough room for anchors in the future. Using the posterolateral approach, the 14 guage needle was inserted down to the posterior aspect of the L1 lamina and then \"walked off\" the superior aspect of this lamina into the " T12-L1 epidural space. The epidural space was localized with loss of resistance and negative aspirate for CSF or blood. The 8 contact [employer] lead was passed up so that just the superior most lead was at the location of approximately mid T7 right of the spinous process. Lateral views confirmed posterior epidural space placement.  A second 14-gauge needle was then inserted at the same level of the posterior aspect of L1 lamina and walked off into the superior aspect of this lamina into the T12-L1 epidural space.  Another 8 contact [employer] lead was passed to the approximately mid T7 just left of the spinous process.  Lateral views were then obtained confirming posterior epidural space space placement.    The needle and stylette were then removed out of both of the leads under live fluoroscopy.  The top of the lead continues to be at mid T7 and both leads.  We then anchored the leads using a fixate device to the fascia and was screwed the anchors down to an audible click.    Attention was then brought to the left flank area where an approximately 3 inch line was drawn and then anesthetized with a one-to-one mixture of 1% lidocaine and 0.25% bupivacaine.  An incision was then made and a small pocket for the IPG was created.  A tunneling device was then taken from the midline incision to the left flank incision and the leads were brought through underneath the skin to the IPG pocket.  The leads were then inserted into the IPG/battery and locked down with a wrench with an audible click.    Complex programming of multiple electrode configurations was performed.  Each incision was in irrigated with preservative-free normal saline.  Both incisions were closed with 3-0 Vicryl and staples superficially.      The patient will follow-up this 10 to 14 days for staple removal.  Patient given contact information in the discharge summary in case of emergency.

## 2024-06-18 NOTE — ANESTHESIA POSTPROCEDURE EVALUATION
Patient: Lilian Quinones    Procedure: Procedure(s):  INSERTION, SPINAL CORD STIMULATOR, STAGE 2       Anesthesia Type:  MAC    Note:     Postop Pain Control: Uneventful            Sign Out: Well controlled pain   PONV: No   Neuro/Psych: Uneventful            Sign Out: Acceptable/Baseline neuro status   Airway/Respiratory: Uneventful            Sign Out: Acceptable/Baseline resp. status   CV/Hemodynamics: Uneventful            Sign Out: Acceptable CV status; No obvious hypovolemia; No obvious fluid overload   Other NRE: NONE   DID A NON-ROUTINE EVENT OCCUR? No           Last vitals:  Vitals Value Taken Time   /80 06/18/24 1050   Temp 37.1  C (98.7  F) 06/18/24 1045   Pulse 71 06/18/24 1055   Resp 16 06/18/24 1050   SpO2 96 % 06/18/24 1055   Vitals shown include unfiled device data.    Electronically Signed By: aGrry Victoria MD  June 18, 2024  11:02 AM

## 2024-06-18 NOTE — ANESTHESIA CARE TRANSFER NOTE
Patient: Lilian Quinones    Procedure: Procedure(s):  INSERTION, SPINAL CORD STIMULATOR, STAGE 2       Diagnosis: Failed back surgical syndrome [M96.1]  Diagnosis Additional Information: No value filed.    Anesthesia Type:   MAC     Note:    Oropharynx: oropharynx clear of all foreign objects  Level of Consciousness: awake  Oxygen Supplementation: room air    Independent Airway: airway patency satisfactory and stable  Dentition: dentition unchanged  Vital Signs Stable: post-procedure vital signs reviewed and stable  Report to RN Given: handoff report given  Patient transferred to: PACU    Handoff Report: Identifed the Patient, Identified the Reponsible Provider, Reviewed the pertinent medical history, Discussed the surgical course, Reviewed Intra-OP anesthesia mangement and issues during anesthesia, Set expectations for post-procedure period and Allowed opportunity for questions and acknowledgement of understanding      Vitals:  Vitals Value Taken Time   /62 06/18/24 1020   Temp     Pulse 71 06/18/24 1021   Resp 29 06/18/24 1021   SpO2 95 % 06/18/24 1021   Vitals shown include unfiled device data.    Electronically Signed By: ASIF Mari CRNA  June 18, 2024  10:22 AM

## 2024-06-18 NOTE — BRIEF OP NOTE
So on his   [facility]    Brief Operative Note    Pre-operative diagnosis: Failed back surgical syndrome [M96.1]  Post-operative diagnosis Failed back surgery syndrome    Procedure: INSERTION, SPINAL CORD STIMULATOR, STAGE 2, Left - Spine    Surgeon: Surgeons and Role:     * Siddharth Busby,  - Primary  Anesthesia: MAC with Local   Estimated Blood Loss: Less than 10 ml    Drains: None  Specimens: * No specimens in log *  Findings:   None.  Complications: None.  Implants:   Implant Name Type Inv. Item Serial No.  Lot No. LRB No. Used Action   LEAD KIT STIM LINEAR W/ENHANCED STYLE 50CM SC-2218-50 - [id number] Leads LEAD KIT STIM LINEAR W/ENHANCED STYLE 50CM SC-2218-50 [id number] [employer]  Left 1 Implanted   LEAD KIT STIM LINEAR W/ENHANCED STYLE 50CM SC-2218-50 - [id number] Leads LEAD KIT STIM LINEAR W/ENHANCED STYLE 50CM SC-2218-50 [id number] [employer]  Left 1 Implanted   Wave writer alpha implantable pulse generator kit   [id number]  [id number] Left 1 Implanted   ANCHOR CLIK X - [id number] [employer] ANCHOR CLIK X [id number] [employer] [id number] Left 1 Implanted

## 2024-06-24 ENCOUNTER — E-VISIT (OUTPATIENT)
Dept: URGENT CARE | Facility: CLINIC | Age: 49
End: 2024-06-24
Payer: COMMERCIAL

## 2024-06-24 DIAGNOSIS — N94.9 VAGINAL DISCOMFORT: Primary | ICD-10-CM

## 2024-06-24 PROCEDURE — 99421 OL DIG E/M SVC 5-10 MIN: CPT | Performed by: FAMILY MEDICINE

## 2024-06-24 NOTE — PATIENT INSTRUCTIONS
Thank you for choosing us for your care. Given your symptoms, I would like you to do a lab-only visit to determine what is causing them.  I have placed the orders.  Please schedule an appointment with the lab right here [place], or call [phone].  I will let you know when the results are back and next steps to take.    If lab tests are negative, you may need to be evaluated in person to arrive at the correct diagnosis. I hope you feel better soon    Schedule a Lab Only appointment here.

## 2024-06-25 ENCOUNTER — VIRTUAL VISIT (OUTPATIENT)
Dept: PSYCHIATRY | Facility: CLINIC | Age: 49
End: 2024-06-25
Attending: PSYCHIATRY & NEUROLOGY
Payer: COMMERCIAL

## 2024-06-25 VITALS — HEIGHT: 68 IN | WEIGHT: 193 LBS | BODY MASS INDEX: 29.25 KG/M2

## 2024-06-25 DIAGNOSIS — F41.1 GENERALIZED ANXIETY DISORDER: Primary | ICD-10-CM

## 2024-06-25 PROCEDURE — G2211 COMPLEX E/M VISIT ADD ON: HCPCS | Mod: 95 | Performed by: PSYCHIATRY & NEUROLOGY

## 2024-06-25 PROCEDURE — 99214 OFFICE O/P EST MOD 30 MIN: CPT | Mod: 24 | Performed by: PSYCHIATRY & NEUROLOGY

## 2024-06-25 RX ORDER — DIAZEPAM 5 MG
5 TABLET ORAL DAILY PRN
Qty: 15 TABLET | Refills: 2 | Status: SHIPPED | OUTPATIENT
Start: 2024-06-25

## 2024-06-25 RX ORDER — PROPRANOLOL HYDROCHLORIDE 40 MG/1
40 TABLET ORAL DAILY PRN
Qty: 90 TABLET | Refills: 0 | Status: SHIPPED | OUTPATIENT
Start: 2024-06-25

## 2024-06-25 ASSESSMENT — PAIN SCALES - GENERAL: PAINLEVEL: MODERATE PAIN (5)

## 2024-06-25 NOTE — PATIENT INSTRUCTIONS
**For crisis resources, please see the information at the end of this document**   Patient Education    Thank you for coming to the [facility] [place] [facility].     Lab Testing:  If you had lab testing today and your results are reassuring or normal they will be mailed to you or sent through [employer] within 7 days. If the lab tests need quick action we will call you with the results. The phone number we will call with results is # [phone]. If this is not the best number please call our clinic and change the number.     Medication Refills:  If you need any refills please call your pharmacy and they will contact us. Our fax number for refills is [phone].   Three business days of notice are needed for general medication refill requests.   Five business days of notice are needed for controlled substance refill requests.   If you need to change to a different pharmacy, please contact the new pharmacy directly. The new pharmacy will help you get your medications transferred.     Contact Us:  Please call [phone] during business hours (8-5:00 M-F).   If you have medication related questions after clinic hours, or on the weekend, please call [phone].     Financial Assistance [phone]   Medical Records [phone]       MENTAL HEALTH CRISIS RESOURCES:  For a emergency help, please call 911 or go to the nearest Emergency Department.     Emergency Walk-In Options:   EmPATH Unit @ [place] ([place]): [phone] - Specialized mental health emergency area designed to be calming  [facility] ([place]): [phone]  [facility] ([place]): [phone]  [facility]): [phone]    [facility] Crisis Information:   [place]: [phone]  [place]: [phone]  [place] ([place]) - Adult: [phone]     Child: [phone]  [place] - Adult: [phone]     Child: [phone]  [place]:  [phone]  List of all [facility] resources:   https://[place].gov/dhs/people-we-serve/adults/health-care/mental-health/resources/crisis-contacts.jsp    National Crisis Information:   Crisis Text Line: Text  [place]  to 729319  Suicide & Crisis Lifeline: 988  National Suicide Prevention Lifeline: 3-381-465-TALK (1-991.514.6785)       For online chat options, visit https://suicidepreventionlifeline.org/chat/  Poison Control Center: 9-350-702-5978  Trans Lifeline: 3-161-778-7784 - Hotline for transgender people of all ages  The [employer] Project: 6-624-630-3933 - Hotline for LGBT youth     For Non-Emergency Support:   Fast Tracker: Mental Health & Substance Use Disorder Resources -   https://www.[employer].org/

## 2024-06-25 NOTE — NURSING NOTE
Is the patient currently in the state of [place]? YES    Visit mode:VIDEO    If the visit is dropped, the patient can be reconnected by: VIDEO VISIT: Text to cell phone:   Telephone Information:   Mobile [phone]       Will anyone else be joining the visit? NO  (If patient encounters technical issues they should call [phone] :[phone])    How would you like to obtain your AVS? MyChart    Are changes needed to the allergy or medication list? No  Patient denies any changes since echeck-in regarding medication and allergies and states all information entered during echeck-in remains accurate.    Are refills needed on medications prescribed by this physician? Yes    Reason for visit: RECHECK    Flor REEDERF       Was the patient seen in the last year in this department? Yes  7/30/19  Does patient have an active prescription for medications requested? No     Received Request Via: Pharmacy

## 2024-06-25 NOTE — PROGRESS NOTES
Virtual Visit Details  Type of service:  Video Visit   Originating Location (pt. Location): Home  Distant Location (provider location):  Off-site  Platform used for Video Visit: [facility]  MEDICAL PROGRESS NOTE     Lilian Quinones is a 49 year old. Initial consultation on 08/05/20. Referred by Kristen M Kehr for evaluation of depression.      Assessment & Plan    History and interview support the following diagnoses:  Generalized anxiety disorder  PTSD / Complex grief  Depression, unspecified (MDD vs secondary to anxiety / trauma / grief)  Tinnitus, left (started with URI 2/2023)    Lilian notes that the propranolol has been helpful, and has been using it. She still feels that she would like diazepam available for anxiety situations.   She was trialed on gabapentin for a few months by pain mgmt and found it to have significant side effects, including anxiety, nightmares, and suicidal ideations. Feels much better now being off of it.   We did the goal of being off of meds, and aligned that our goals both are for her not to be on any meds that make her feel worse.   Tinnitus (unilateral left, started with URI 02/2023) also continues to be problematic, and has not shown improvement, now present for the last year. We previously discussed that management of stress and anxiety is important in the course of tinnitus as well.   She did recently establish with a primary care provider. Moving forward, if there is no additional psychiatric intervention to make, I will recommend having Lilian transition her prescribing to primary care.     Move back to [place] in 2023 brought a lot of triggers / associations for her, which likely indicates that the trauma elements of her grief are unresolved. She should still be engaging in trauma therapy, if she is encountering elements that are interfering with her ability to function ideally. They moved back to [place] to be with  their new granddaughters born recently, which is a major positive.   Notes significant attentional issues that have persisted since her son's death. These were not present prior to that, although does note that both sons have ADHD, and maybe there were some elements that were less obvious that were problematic earlier in life, hard to say. I ordered ADHD evaluation to help parse apart her various symptoms and add the historical context that could be helpful. I think that she likely does not have ADHD, but it is a possibility. Would be easier to treat and diagnose if anxiety were better controlled, and that would be easier to address if her trauma symptoms were also better addressed.   The fact that she was previously on bupropion and it helped with attentional symptoms, but caused significant anxiety does not marcial well for the potential use of stimulants. It does support the potential use of antiadreneric options like clonidine or guanfacine going forward.   She went off of sertraline due to concerns about emotional blunting. This does not rule out the use of other antidepressant options in the future. May consider alternatives like escitalopram if needed, given side effects with sertraline.     She uses diazepam sparingly to help with anxiety. She is aware that this is not a sustainable solution. It is not bad to use the diazepam, but it is the opposite of an intervention that would help her achieve her goal of being okay. She can continue to use it, but it must not be the only intervention she has available. Her symptoms will not be at goal until she no longer needs the diazepam. Goal would be to be off of it within 6 months.     PSYCHOTROPIC DRUG INTERACTIONS: None   MANAGEMENT:  N/A     Plan     1) PSYCHOTROPIC MEDICATIONS:  - May take propranolol 40mg daily as needed for anxiety   - Taking it consistently in the morning on work days  - May take diazepam 5mg daily as needed for severe anxiety    - Taking  melatonin 10mg at bedtime as needed for sleep  - Taking diphenhydramine (Zzzquil) 50mg QHS PRN for sleep    2) THERAPY: Psychotherapy is a primary recommendation for the treatment of mood symptoms, even in the context of grief.   Previously engaged in therapy with grief counselor, did EMDR.     3) NEXT DUE:   Labs- Routine monitoring is not indicated for current psychotropic medication regimen   ECG- Routine monitoring is not indicated for current psychotropic medication regimen     4) REFERRALS: 04/2023 - Referred for ADHD evaluation, scheduled 10/2023    5) : None    6) DISPOSITION: RTC 3 months or sooner if needed    Treatment Risk Statement:  The patient understands the risks, benefits, adverse effects and alternatives. Agrees to treatment with the capacity to do so. No medical contraindications to treatment. Agrees to call clinic for any problems. The patient understands to call 911 or go to the nearest ED if life threatening or urgent symptoms occur. Crisis numbers are provided routinely in the After Visit Summary.       PROVIDER: Joss Rod MD    Level of Medical Decision Making:   - At least 1 chronic problem that is not stable  - Engaged in prescription drug management during visit (discussed any medication benefits, side effects, alternatives, etc.)     The longitudinal plan of care for the diagnosis(es)/condition(s) as documented were addressed during this visit. Due to the added complexity in care, I will continue to support Lilian in the subsequent management and with ongoing continuity of care.       Pertinent Background   Lilian does not report any history of psychiatric diagnoses prior to the death of her son on 11/12/2019. Since that time, she has contended with complex grief and trauma symptoms which have included elements of depression and anxiety. Her grief has been severe, and has included elements of generalized anxiety, panic, and trauma symptoms. She likely met criteria  for PTSD at some point. How she is in an adjustment phase, but still with significant depressive symptoms, unclear how persistent, recurrent this will be.      Interval History    Had a spinal cord stimulator implanted last week. She is feeling very optimistic about this since she had a positive trial last month. She notes that the pain hasn't been that bad so far. Has 19 staples coming out on Monday. It's on, but on a low program so not providing a lot of relief yet. Will be having the programming on Monday.   Had to come off of gabapentin. She will never take it again. It made her suicidal, made anxiety awful, and was just a really awful experience. The withdrawal was awful as well, feels like she only got through it because she had Valium at the time as well.   Things are going much better now.   Takes the propranolol in the morning on work days and this seems to help with the stress that can peak in the mornings.   Not taking Valium often these days, but some days does when things get really difficult.   The tinnitus is still pretty horrible, but the spinal stuff was in the forefront more recently. She knows tinnitus will probably be more of the main issue once the pain improves.   Still has a goal of not being on any medications. The oxy and gabapentin were such a nightmare that it would be nice not to deal with anything.        Discussion points from past visits:   Anxiety continues to be the main issue, worries about catastrophic scenarios, like people dying, and tends to spiral.   Before Reese , things were never this difficult. She was very structured and organized.   She only uses the diazepam when things get very severe, maybe 2-3x per week. She does try to minimize this.   Has not been having issues with tiredness since being on bupropion.   Notes that she still tends to use diazepam with triggers, like when her son's friends email her. Social media has quite a few triggers. This year is  "particularly hard especially on social media as he would have been graduating.   She isn't sure if the medications are working at times. She still has anxiety, and doesn't expect it to go away, understands that grief if an ongoing process, and can last the rest of her life. Her goal would be that the medications would take the edge off, so she is able to work more.   Anxiety continues to be an issue especially in the mornings. She does occasionally take the diazepam (Valium) for this in the mornings when it spikes. Has not been taking it every day, but does note that it helps with that heavy feeling in her chest.     Recent Substance Use  Alcohol- None  Tobacco- None  Caffeine- 1 cups/day of coffee  Opioids- None  Narcan Kit- N/A  Cannabis- Has tried CBD but didn't really help.   Other Illicit Drugs- none    Current Social Hx:  FINANCIAL SUPPORT- Works as  /  remotely.  works as caterer.   LIVING SITUATION / RELATIONSHIPS- Currently living and working remotely. Sold their house because it was too much of a reminder of their son. Thinking about buying another house now. Has 2 dogs and 2 cats.  Son lives in [place], recently had a child. Youngest son is in [employer] in [place]. Daughter is in the [employer], had a child in 2021.   SOCIAL/ SPIRITUAL SUPPORT- \"Absolutely\", but still feels very lonely since her loss.        Medical Review of Systems    Dizziness/orthostasis- Has in the past, but generally no.   Headaches- No.   GI- No. Has lost 100 lbs since gastric sleeve surgery last year.   Has chronic pain related to bulging disc, gets shots once or twice per year.      Psych Summary Points   08/2020 - Started propranolol 40mg BID  10/2020 - Started bupropion. Decreased propranolol to 20mg due to drug intx and low HR.   11/2020 - Completed ADHD evaluation. Determined that acute symptoms are more likely due to PTSD than to ADHD.   01/2021 - Increased bupropion to 300mg, but anxiety increased, so " decreased back down.   02/2021 - Tried buspirone after nephew passed away, felt that it increased anxiety.   04/2021 - Changed propranolol to PRN and discontinued bupropion.   11/2021 - Increased sertraline to 150mg.   12/2021 - Increased sertraline to 200mg.   05/2022 - Tapered off of sertraline.   02/2023 - Had severe head cold, dx'd with intractable tinnitus and idiopathic hypertension afterwards.   04/2023 - Started buspirone. Took for 1.5 months, but it caused worsening of tinnitus and brain read.   07/2023 - Started vilazodone 10mg. Discontinued due to nausea.   11/2023 - Started propranolol again due to persistent anxiety and workplace stress.   01/2024 - Cross titrated by pain mgmt from oxycodone to gabapentin.   06/2024 - Had spinal cord stimulator implanted. Went off gabapentin due to severe sfx.      Psychiatric Medication Trials       Drug /  Start Date Dose (mg) Helpful Adverse Effects DC Reason / Date   Citalopram 11/2019 40 probably     Sertraline 10/2021 200 yes Initial anxiety, emotional blunting    Bupropion XL 10/2020 300 yes Anxiety / agitation at 300 Helped with concentration and energy   Buspirone 2/21, 4/23 15 BID no Increased anxiety, tinnitus and brain read    Vilazodone 7/23 10  nausea    Gabapentin 1-5/2024 600 TID  Suicidal thoughts, severe anxiety Sfx, had withdrawal   Benadryl       Lorazepam 11/2019 2 daily PRN yes     Diazepam 08/2020 10 yes sedating    Propranolol 08/2020 40 BID yes     melatonin          Past Medical History      CARE TEAM:   PCP- Shira Olson with [facility]  Therapist- Alejandra Lui at [facility] - Grief Counselor - Will be starting EMDR.     Neurologic Hx [head injury, seizures, etc.]: Concussion from skiing injury in 2012.   Patient Active Problem List   Diagnosis    Generalized anxiety disorder    Major depression, recurrent (H24)    Lumbar radiculopathy    Hearing loss of left ear, unspecified hearing loss type    Family history  "of breast cancer    Pseudotumor cerebri    Tinnitus, left     Past Medical History:   Diagnosis Date    Arthritis 2021    Failed back surgical syndrome     HAO (generalized anxiety disorder)     IUD (intrauterine device) in place 03/20/2019    Mirena    Lumbar radiculopathy     Major depression, recurrent (H24)     Other chronic pain     PONV (postoperative nausea and vomiting)     Tinnitus, left       Allergies    Amoxicillin and Penicillins     Medications      Current Outpatient Medications   Medication Sig Dispense Refill    Bioflavonoid Products (VITAMIN C) CHEW Take 2 chew tab by mouth daily      diazepam (VALIUM) 5 MG tablet Take 0.5-1 tablets (2.5-5 mg) by mouth daily as needed for anxiety 15 tablet 2    diphenhydrAMINE HCl, Sleep, (ZZZQUIL PO) Take 50 mg by mouth nightly as needed (for sleep)      levonorgestrel (MIRENA) 20 MCG/24HR IUD 1 each by Intrauterine route once      melatonin 5 MG tablet Take 10 mg by mouth nightly as needed for sleep      Multiple Vitamins-Minerals (MULTIVITAMIN GUMMIES WOMENS) CHEW Take 2 chew tab by mouth daily      naloxone (NARCAN) 4 MG/0.1ML nasal spray Spray 1 spray (4 mg) into one nostril alternating nostrils as needed for opioid reversal every 2-3 minutes until assistance arrives 0.2 mL 0    propranolol (INDERAL) 40 MG tablet Take 0.5-1 tablets (20-40 mg) by mouth 2 times daily as needed (for anxiety) **Due for follow-up visit, please schedule for further refills** 60 tablet 0      Physical Exam  (Vitals Only)   Ht 1.727 m (5' 8\")   Wt 87.5 kg (193 lb)   BMI 29.35 kg/m      Pulse Readings from Last 5 Encounters:   06/18/24 58   06/17/24 77   05/06/24 86   04/30/24 61   03/18/24 65     Wt Readings from Last 5 Encounters:   06/25/24 87.5 kg (193 lb)   06/18/24 87.6 kg (193 lb 3.2 oz)   06/17/24 87.6 kg (193 lb 3.2 oz)   03/18/24 85.3 kg (188 lb)   02/27/24 85.3 kg (188 lb)     BP Readings from Last 5 Encounters:   06/18/24 108/63   06/17/24 116/80   05/06/24 124/82 "   04/30/24 114/72   03/18/24 102/68      Mental Status Exam   Alertness: alert  and oriented  Appearance: adequately groomed  Behavior/Demeanor: cooperative and calm, with good eye contact   Speech: normal and regular rate and rhythm  Language: intact and no problems  Psychomotor: normal or unremarkable  Mood: Anxiety has been high  Affect: full range and appropriate; was congruent to mood; was congruent to content  Thought Process/Associations: unremarkable  Thought Content:  Reports none;  Denies suicidal ideation, violent ideation and delusions  Perception:  Reports none;  Denies auditory hallucinations and visual hallucinations  Insight: intact  Judgment: intact  Cognition: does  appear grossly intact; formal cognitive testing was not done  Gait and Station: not observed     Labs and Data          3/26/2024     8:06 AM 6/17/2024     9:33 AM 6/24/2024    12:04 PM   PHQ-9 SCORE   PHQ-9 Total Score MyChart 5 (Mild depression)  9 (Mild depression)   PHQ-9 Total Score 5 7 9         1/18/2024     6:02 AM 2/1/2024     9:14 AM 3/26/2024     8:07 AM   HAO-7 SCORE   Total Score 18 (severe anxiety) 19 (severe anxiety) 0 (minimal anxiety)   Total Score 18 19 0       Recent Labs   Lab Test 06/12/23  0747 12/04/19  1223 06/19/19  1740   CR 0.81 0.79 0.72   GFRESTIMATED 88 >60 >90     Recent Labs   Lab Test 12/04/19  1223 06/19/19  1740   AST 12 15   ALT <9 33   ALKPHOS 50 53     Recent Labs   Lab Test 03/05/19  1232 03/05/19  0000 09/19/16  0935   TSH 2.12 2.12 2.50     ECG 6/19/19 QTc = 414ms

## 2024-06-26 ENCOUNTER — LAB (OUTPATIENT)
Dept: LAB | Facility: CLINIC | Age: 49
End: 2024-06-26
Payer: COMMERCIAL

## 2024-06-26 DIAGNOSIS — N94.9 VAGINAL DISCOMFORT: ICD-10-CM

## 2024-06-26 LAB
CLUE CELLS: ABNORMAL
TRICHOMONAS, WET PREP: ABNORMAL
WBC'S/HIGH POWER FIELD, WET PREP: ABNORMAL
YEAST, WET PREP: ABNORMAL

## 2024-06-26 PROCEDURE — 87210 SMEAR WET MOUNT SALINE/INK: CPT

## 2024-06-27 NOTE — PROGRESS NOTES
Assessment:   Lilian Quinones is a 49 year old y.o. female with past medical history significant for hearing loss of left ear, pseudotumor cerebri, tinnitus, generalized anxiety disorder, depression who presents today for staple removal after [employer] spinal cord stimulator implant June 18, 2024.  Patient has a history of an L5-S1 microdiscectomy in August 2023.  She has had ongoing left-sided low back pain and lumbar radicular pain which had been refractory to conservative treatment.  Patient reports slight improvement in her pain already.  Denies any problems with the incisions.    Staples were removed without difficulty.  Incisions healed well without erythema, edema, drainage.  Plan:     A shared decision making plan was used.  The patient's values and choices were respected.  The following represents what was discussed and decided upon by the physician assistant and the patient.  A [employer] representative was present for the visit.  Dr. Moss also was present for the staple removal.    1.  DIAGNOSTIC TESTS: I reviewed the MRI lumbar spine.    2.  PHYSICAL THERAPY: No physical therapy was ordered.    3.  MEDICATIONS: No changes are made to the patient's medications.    4.  INTERVENTIONS: No interventions were ordered today.    5.  PATIENT EDUCATION: Reminded patient to continue following her postop activity restrictions.    6.  FOLLOW-UP: Patient will follow-up with Dr. Yu in 1 month.  If she has questions or concerns in the meantime, she should not hesitate to call.    Subjective:     Lilian Quinones is a 49 year old female who presents today for staple removal following [employer] spinal cord stimulator implant June 18, 2024.  She denies any problems with the incision.  She already notes some improvement in her pain.    Patient rates her pain today as a 6 out of 10.  At its worst it is an 8 of 10.  At its best it is a 3 out of 10.  Pain is aggravated with standing and alleviated  with reclining.         Objective:   CONSTITUTIONAL:  Vital signs as above.  No acute distress.  The patient is well nourished and well groomed.    PSYCHIATRIC:  The patient is awake, alert, oriented to person, place and time.  The patient is answering questions appropriately with clear speech.  Normal affect.  HEENT: Normocephalic, atraumatic.  Sclera clear.    SKIN: Incisions well-healed without erythema, edema, drainage.  MUSCULOSKELETAL:  Gait is non-antalgic.  Moving all extremities equally.  NEUROLOGICAL: Cranial nerves II through XII grossly intact.     RESULTS: I reviewed the MRI lumbar spine dated December 29, 2023.  This shows postoperative changes to the microdiscectomy at L5-S1.  There is mild epidural scarring.  There is no significant residual spinal canal or neuroforaminal stenosis.  At L4-5 there is a disc bulge and facet arthropathy with mild to moderate left greater than right foraminal stenosis.

## 2024-07-01 ENCOUNTER — OFFICE VISIT (OUTPATIENT)
Dept: PHYSICAL MEDICINE AND REHAB | Facility: CLINIC | Age: 49
End: 2024-07-01
Payer: COMMERCIAL

## 2024-07-01 VITALS
WEIGHT: 193 LBS | HEIGHT: 68 IN | DIASTOLIC BLOOD PRESSURE: 69 MMHG | SYSTOLIC BLOOD PRESSURE: 122 MMHG | HEART RATE: 66 BPM | BODY MASS INDEX: 29.25 KG/M2

## 2024-07-01 DIAGNOSIS — M96.1 FAILED BACK SURGICAL SYNDROME: Primary | ICD-10-CM

## 2024-07-01 PROCEDURE — 99024 POSTOP FOLLOW-UP VISIT: CPT | Performed by: PHYSICIAN ASSISTANT

## 2024-07-01 NOTE — PATIENT INSTRUCTIONS
Follow up in approximately four weeks with Dr. Bennett    Continue to follow your activity restrictions

## 2024-07-01 NOTE — LETTER
7/1/2024      Lilian Quinones  [address]  [place] [address]      Dear Colleague,    Thank you for referring your patient, Lilian Quinones, to the [facility]. Please see a copy of my visit note below.    Assessment:   Lilian Quinones is a 49 year old y.o. female with past medical history significant for hearing loss of left ear, pseudotumor cerebri, tinnitus, generalized anxiety disorder, depression who presents today for staple removal after [employer] spinal cord stimulator implant June 18, 2024.  Patient has a history of an L5-S1 microdiscectomy in August 2023.  She has had ongoing left-sided low back pain and lumbar radicular pain which had been refractory to conservative treatment.  Patient reports slight improvement in her pain already.  Denies any problems with the incisions.    Staples were removed without difficulty.  Incisions healed well without erythema, edema, drainage.  Plan:     A shared decision making plan was used.  The patient's values and choices were respected.  The following represents what was discussed and decided upon by the physician assistant and the patient.  A "[employer] representative was present for the visit.  Dr. Moss also was present for the staple removal.    1.  DIAGNOSTIC TESTS: I reviewed the MRI lumbar spine.    2.  PHYSICAL THERAPY: No physical therapy was ordered.    3.  MEDICATIONS: No changes are made to the patient's medications.    4.  INTERVENTIONS: No interventions were ordered today.    5.  PATIENT EDUCATION: Reminded patient to continue following her postop activity restrictions.    6.  FOLLOW-UP: Patient will follow-up with Dr. Yu in 1 month.  If she has questions or concerns in the meantime, she should not hesitate to call.    Subjective:     Lilian Quinones is a 49 year old female who presents today for staple removal following [employer] spinal cord stimulator implant June 18, 2024.  She  denies any problems with the incision.  She already notes some improvement in her pain.    Patient rates her pain today as a 6 out of 10.  At its worst it is an 8 of 10.  At its best it is a 3 out of 10.  Pain is aggravated with standing and alleviated with reclining.         Objective:   CONSTITUTIONAL:  Vital signs as above.  No acute distress.  The patient is well nourished and well groomed.    PSYCHIATRIC:  The patient is awake, alert, oriented to person, place and time.  The patient is answering questions appropriately with clear speech.  Normal affect.  HEENT: Normocephalic, atraumatic.  Sclera clear.    SKIN: Incisions well-healed without erythema, edema, drainage.  MUSCULOSKELETAL:  Gait is non-antalgic.  Moving all extremities equally.  NEUROLOGICAL: Cranial nerves II through XII grossly intact.     RESULTS: I reviewed the MRI lumbar spine dated December 29, 2023.  This shows postoperative changes to the microdiscectomy at L5-S1.  There is mild epidural scarring.  There is no significant residual spinal canal or neuroforaminal stenosis.  At L4-5 there is a disc bulge and facet arthropathy with mild to moderate left greater than right foraminal stenosis.      Again, thank you for allowing me to participate in the care of your patient.        Sincerely,        Shae Higgins PA-C

## 2024-07-13 ENCOUNTER — HEALTH MAINTENANCE LETTER (OUTPATIENT)
Age: 49
End: 2024-07-13

## 2024-09-25 ENCOUNTER — MYC MEDICAL ADVICE (OUTPATIENT)
Dept: NEUROSURGERY | Facility: CLINIC | Age: 49
End: 2024-09-25
Payer: COMMERCIAL

## 2024-09-26 NOTE — TELEPHONE ENCOUNTER
Per Surgery Scheduler,     [facility] cannot do anything once procedure is completed. Billing is the only option to dispute. If Financial Counseling couldn't help her, that means she is able to pay. Surgery was a year ago, so not sure anything can be done now    Pt updated

## 2024-09-26 NOTE — TELEPHONE ENCOUNTER
"Pt sent in myc stating she had two surgery claims denied after surgery in 2023. Issues with the preauth, per the pt. She is outside the 180 days so there is no appeal option. Per pt, we have 18 months to submit notes from her surgery or \"provider write off\". Pt states she has been working with billing without luck. Requesting notes from 10/3/23 (surgery date).     Pt sent message back with our financial counseling number. Asked what fax number we should send the op note to and routed message to surgery scheduler to inquire if there are any other resources for pt.   "

## 2024-10-03 ENCOUNTER — TELEPHONE (OUTPATIENT)
Dept: PSYCHIATRY | Facility: CLINIC | Age: 49
End: 2024-10-03
Payer: COMMERCIAL

## 2024-10-03 NOTE — TELEPHONE ENCOUNTER
Health Call Center    Phone Message    May a detailed message be left on voicemail: yes     Reason for Call: Form or Letter   Type or form/letter needing completion: Letter of emotional support for new service dog Xavier  Provider: Dr. Rod  Date form needed: Writer called patient to reschedule appointment with Dr. Rod. Patient asked to get a letter of emotional support for her new service dog as she had one before for her previous service dog that passed away.   Once completed: Send letter to patient's mychart?     Action Taken: Other: [facility] Nurse Pool     Travel Screening: Not Applicable     Date of Service: 10/3/24

## 2024-10-03 NOTE — LETTER
October 3, 2024      RE: Lilian Quinones  [address] [address]  [address]  [place] [address]         To Whom It May Concern,    Lilian Quinones is currently under my care. I am familiar with her history and the functional limitations imposed by her diagnosis . She meets the definition of disability under the Americans with Disabilities Act, the Fair Housing Act, and the Rehabilitation Act of 1973.    Due to Lilian's emotional/mental disability, she has certain limitations related to social interaction/coping with stress/anxiety, etc. In order to help alleviate these difficulties, and to enhance the ability to live independently and to fully use and enjoy the housing unit where they live, I have prescribed Lilian the use of an Emotional Support Animal (SHERRELL) and this should be accommodated to the extent legally allowed.    There is a significant evidence base for the therapeutic benefits of ESAs in the treatment of certain mental health conditions, such as that for which Lilian is currently being treated.     This letter is in support of the use of an SHERRELL as an appropriate and evidence based medical intervention. The training, certification, and veterinary care of the animal is the responsibility of the animal's owner, who agrees to appropriate care and responsibility for the animal and its behaviors.       Sincerely,         Joss Rod MD

## 2024-12-10 ENCOUNTER — VIRTUAL VISIT (OUTPATIENT)
Dept: PSYCHIATRY | Facility: CLINIC | Age: 49
End: 2024-12-10
Attending: PSYCHIATRY & NEUROLOGY
Payer: COMMERCIAL

## 2024-12-10 VITALS — BODY MASS INDEX: 28.79 KG/M2 | WEIGHT: 190 LBS | HEIGHT: 68 IN

## 2024-12-10 DIAGNOSIS — F41.1 GENERALIZED ANXIETY DISORDER: ICD-10-CM

## 2024-12-10 DIAGNOSIS — F51.05 INSOMNIA DUE TO OTHER MENTAL DISORDER: ICD-10-CM

## 2024-12-10 DIAGNOSIS — F43.10 POSTTRAUMATIC STRESS DISORDER: Primary | ICD-10-CM

## 2024-12-10 DIAGNOSIS — F99 INSOMNIA DUE TO OTHER MENTAL DISORDER: ICD-10-CM

## 2024-12-10 DIAGNOSIS — F32.A DEPRESSION, UNSPECIFIED DEPRESSION TYPE: ICD-10-CM

## 2024-12-10 PROCEDURE — G2211 COMPLEX E/M VISIT ADD ON: HCPCS | Mod: 95 | Performed by: PSYCHIATRY & NEUROLOGY

## 2024-12-10 PROCEDURE — 90833 PSYTX W PT W E/M 30 MIN: CPT | Mod: 95 | Performed by: PSYCHIATRY & NEUROLOGY

## 2024-12-10 PROCEDURE — 99214 OFFICE O/P EST MOD 30 MIN: CPT | Mod: 95 | Performed by: PSYCHIATRY & NEUROLOGY

## 2024-12-10 RX ORDER — DIAZEPAM 5 MG/1
5 TABLET ORAL DAILY PRN
Qty: 15 TABLET | Refills: 2 | Status: SHIPPED | OUTPATIENT
Start: 2024-12-10

## 2024-12-10 RX ORDER — GUANFACINE 1 MG/1
1 TABLET, EXTENDED RELEASE ORAL AT BEDTIME
Qty: 90 TABLET | Refills: 0 | Status: SHIPPED | OUTPATIENT
Start: 2024-12-10

## 2024-12-10 ASSESSMENT — ANXIETY QUESTIONNAIRES
1. FEELING NERVOUS, ANXIOUS, OR ON EDGE: NEARLY EVERY DAY
6. BECOMING EASILY ANNOYED OR IRRITABLE: SEVERAL DAYS
GAD7 TOTAL SCORE: 16
2. NOT BEING ABLE TO STOP OR CONTROL WORRYING: NEARLY EVERY DAY
5. BEING SO RESTLESS THAT IT IS HARD TO SIT STILL: MORE THAN HALF THE DAYS
8. IF YOU CHECKED OFF ANY PROBLEMS, HOW DIFFICULT HAVE THESE MADE IT FOR YOU TO DO YOUR WORK, TAKE CARE OF THINGS AT HOME, OR GET ALONG WITH OTHER PEOPLE?: VERY DIFFICULT
GAD7 TOTAL SCORE: 16
4. TROUBLE RELAXING: SEVERAL DAYS
7. FEELING AFRAID AS IF SOMETHING AWFUL MIGHT HAPPEN: NEARLY EVERY DAY
IF YOU CHECKED OFF ANY PROBLEMS ON THIS QUESTIONNAIRE, HOW DIFFICULT HAVE THESE PROBLEMS MADE IT FOR YOU TO DO YOUR WORK, TAKE CARE OF THINGS AT HOME, OR GET ALONG WITH OTHER PEOPLE: VERY DIFFICULT
3. WORRYING TOO MUCH ABOUT DIFFERENT THINGS: NEARLY EVERY DAY
GAD7 TOTAL SCORE: 16
7. FEELING AFRAID AS IF SOMETHING AWFUL MIGHT HAPPEN: NEARLY EVERY DAY

## 2024-12-10 ASSESSMENT — PAIN SCALES - GENERAL: PAINLEVEL_OUTOF10: SEVERE PAIN (6)

## 2024-12-10 ASSESSMENT — PATIENT HEALTH QUESTIONNAIRE - PHQ9
10. IF YOU CHECKED OFF ANY PROBLEMS, HOW DIFFICULT HAVE THESE PROBLEMS MADE IT FOR YOU TO DO YOUR WORK, TAKE CARE OF THINGS AT HOME, OR GET ALONG WITH OTHER PEOPLE: VERY DIFFICULT
SUM OF ALL RESPONSES TO PHQ QUESTIONS 1-9: 11
SUM OF ALL RESPONSES TO PHQ QUESTIONS 1-9: 11

## 2024-12-10 NOTE — NURSING NOTE
Current patient location: [address] [place], [place] [address]angie Frost, [place] [address]    Is the patient currently in the state of [place]? YES    Visit mode:VIDEO    If the visit is dropped, the patient can be reconnected by:VIDEO VISIT: Text to cell phone:   Telephone Information:   Mobile [phone]       Will anyone else be joining the visit? NO  (If patient encounters technical issues they should call [phone]    Are changes needed to the allergy or medication list? No    Are refills needed on medications prescribed by this physician? YES    Rooming Documentation:  Questionnaire(s) completed    Reason for visit: RECHECK    Nani ESPINOZA

## 2024-12-10 NOTE — PATIENT INSTRUCTIONS
**For crisis resources, please see the information at the end of this document**   Patient Education    Thank you for coming to the [facility] [place] [facility].     Lab Testing:  If you had lab testing today and your results are reassuring or normal they will be mailed to you or sent through [employer] within 7 days. If the lab tests need quick action we will call you with the results. The phone number we will call with results is # [phone]. If this is not the best number please call our clinic and change the number.     Medication Refills:  If you need any refills please call your pharmacy and they will contact us. Our fax number for refills is [phone].   Three business days of notice are needed for general medication refill requests.   Five business days of notice are needed for controlled substance refill requests.   If you need to change to a different pharmacy, please contact the new pharmacy directly. The new pharmacy will help you get your medications transferred.     Contact Us:  Please call [phone] during business hours (8-5:00 M-F).   If you have medication related questions after clinic hours, or on the weekend, please call [phone].     Financial Assistance [phone]   Medical Records [phone]       MENTAL HEALTH CRISIS RESOURCES:  For a emergency help, please call 911 or go to the nearest Emergency Department.     Emergency Walk-In Options:   [facility] Unit @ [facility] ([place]): [phone] - Specialized mental health emergency area designed to be calming  [facility] ([place]): [phone]  [facility] ([place]): [phone]  [facility]): [phone]    [facility] Crisis Information:   [place]: [phone]  [place]: [phone]  [place] ([place]) - Adult: [phone]     Child: [phone]  [place] - Adult: [phone]     Child: [phone]  [place]:  [phone]  List of all [facility] resources:   https://[place].gov/dhs/people-we-serve/adults/health-care/mental-health/resources/crisis-contacts.jsp    National Crisis Information:   Crisis Text Line: Text  [place]  to 372041  Suicide & Crisis Lifeline: 988  National Suicide Prevention Lifeline: 2-628-355-TALK (1-821.641.7723)       For online chat options, visit https://suicidepreventionlifeline.org/chat/  Poison Control Center: 2-185-401-5966  Trans Lifeline: 5-913-383-6509 - Hotline for transgender people of all ages  The V[employer] Project: 1-740-898-2488 - Hotline for LGBT youth     For Non-Emergency Support:   Fast Tracker: Mental Health & Substance Use Disorder Resources -   https://www.[employer].org/

## 2024-12-10 NOTE — PROGRESS NOTES
Virtual Visit Details  Type of service:  Video Visit   Originating Location (pt. Location): Home  Distant Location (provider location):  Off-site  Platform used for Video Visit: [facility]  MEDICAL PROGRESS NOTE     Lilian Quinones is a 49 year old. Initial consultation on 08/05/20. Referred by Kristen M Kehr for evaluation of depression.      Assessment & Plan    History and interview support the following diagnoses:  PTSD / Complex grief  Generalized anxiety disorder  Depression, unspecified (MDD vs secondary to anxiety / trauma / grief)  Tinnitus, left (started with URI 2/2023)  Insomnia, unspecified    Lilian ***    notes that the propranolol has been helpful, and has been using it. She still feels that she would like diazepam available for anxiety situations.   She was trialed on gabapentin for a few months by pain mgmt and found it to have significant side effects, including anxiety, nightmares, and suicidal ideations. Feels much better now being off of it.   We did the goal of being off of meds, and aligned that our goals both are for her not to be on any meds that make her feel worse.   Tinnitus (unilateral left, started with URI 02/2023) also continues to be problematic, and has not shown improvement, now present for the last year. We previously discussed that management of stress and anxiety is important in the course of tinnitus as well.   She did recently establish with a primary care provider. Moving forward, if there is no additional psychiatric intervention to make, I will recommend having Lilian transition her prescribing to primary care.       Move back to [place] in 2023 brought a lot of triggers / associations for her, which likely indicates that the trauma elements of her grief are unresolved. She should still be engaging in trauma therapy, if she is encountering elements that are interfering with her ability to function ideally.  They moved back to [place] to be with their new granddaughters born recently, which is a major positive.   Notes significant attentional issues that have persisted since her son's death. These were not present prior to that, although does note that both sons have ADHD, and maybe there were some elements that were less obvious that were problematic earlier in life, hard to say. I ordered ADHD evaluation to help parse apart her various symptoms and add the historical context that could be helpful. I think that she likely does not have ADHD, but it is a possibility. Would be easier to treat and diagnose if anxiety were better controlled, and that would be easier to address if her trauma symptoms were also better addressed.   The fact that she was previously on bupropion and it helped with attentional symptoms, but caused significant anxiety does not marcial well for the potential use of stimulants. It does support the potential use of antiadreneric options like clonidine or guanfacine going forward.   She went off of sertraline due to concerns about emotional blunting. This does not rule out the use of other antidepressant options in the future. May consider alternatives like escitalopram if needed, given side effects with sertraline.     She uses diazepam sparingly to help with anxiety. She is aware that this is not a sustainable solution. It is not bad to use the diazepam, but it is the opposite of an intervention that would help her achieve her goal of being okay. She can continue to use it, but it must not be the only intervention she has available. Her symptoms will not be at goal until she no longer needs the diazepam. Goal would be to be off of it within 6 months.     PSYCHOTROPIC DRUG INTERACTIONS: None   MANAGEMENT:  N/A     Plan     1) PSYCHOTROPIC MEDICATIONS:   - Start guanfacine ER (Intuniv) 1mg at bedtime. In 1 week, if tolerating well, increase to 2mg at bedtime  - May take propranolol 40mg daily as needed for  anxiety   - Taking it consistently in the morning on work days  - May take diazepam 5mg daily as needed for severe anxiety    - Taking melatonin 10mg at bedtime as needed for sleep  - Taking diphenhydramine (Zzzquil) 50mg QHS PRN for sleep    2) THERAPY: Psychotherapy is a primary recommendation for the treatment of mood symptoms, even in the context of grief.   Previously engaged in therapy with grief counselor, did EMDR.     3) NEXT DUE:   Labs- Routine monitoring is not indicated for current psychotropic medication regimen   ECG- Routine monitoring is not indicated for current psychotropic medication regimen     4)  / REFERRALS: 04/2023 - Referred for ADHD evaluation, scheduled 10/2023.     5) DISPOSITION: RTC 3 months or sooner if needed    Treatment Risk Statement:  The patient understands the risks, benefits, adverse effects and alternatives. Agrees to treatment with the capacity to do so. No medical contraindications to treatment. Agrees to call clinic for any problems. The patient understands to call 911 or go to the nearest ED if life threatening or urgent symptoms occur. Crisis numbers are provided routinely in the After Visit Summary.       PROVIDER: Joss Rod MD    Level of Medical Decision Making:   - At least 1 chronic problem that is not stable  - Engaged in prescription drug management during visit (discussed any medication benefits, side effects, alternatives, etc.)     The longitudinal plan of care for the diagnosis(es)/condition(s) as documented were addressed during this visit. Due to the added complexity in care, I will continue to support Lilian in the subsequent management and with ongoing continuity of care.       Pertinent Background   Lilian does not report any history of psychiatric diagnoses prior to the death of her son on 11/12/2019. Since that time, she has contended with complex grief and trauma symptoms which have included elements of depression and anxiety. Her  grief has been severe, and has included elements of generalized anxiety, panic, and trauma symptoms. She likely met criteria for PTSD at some point. How she is in an adjustment phase, but still with significant depressive symptoms, unclear how persistent, recurrent this will be.                 Interval History   Lilian notes that as she was filling out the questionnaires, she did note that she is a pretty significant worrier with awful anxiety.   She has been continuing to have severe back pain issues and has had injections for this. Feels like after her son , things got much worse both emotionally and physically, and it was never like that before.   She does note that her paternal grandfather did have severe   Does note that this is the 5 year lacy from when Reese .   Does have some moments of miki. They will be traveling for the winter again, going back to [place] again, and being in the sun helps. The surgeries have at least allowed her to do more hiking and biking, and so they do try to get out as much as they can. Does feel like she is a pessimist now, a 'Mena Downer'   Job is very stressful, with getting yelled at frequently by clients. 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 She used to like her job, but really just hates it now, but has been doing this for 30 years, so doesn't know what else she would do.   We talked about past psychotherapy experiences. She has had good experiences before. She did do a program once called [employer] because her  is a vet, and it was intense and difficult, but was a helpful program, and did help with the PTSD around the night Reese .   There is a therapist she liked named Alejandra Lui in [place] where she is from, where she works in [place].   One of her sons is back from [place] and on 100% disability for PTSD.   Other son, she believes, has addiction issues, but hasn't admitted to it.   Daughter is doing well, is in a doctorate program.   It's hard when she always  worries about when of her sons is going to die by suicide.   Still takes propranolol rarely, but mostly stopped  Takes diazepam rarely, mostly just for sleep on tough occasions.   No panic attacks.   The left sided tinnitus is still pretty horrible, but the spinal stuff was in the forefront more recently. She knows tinnitus will probably be more of the main issue once the pain improves. Doesn't think it's likely that this will get better. She has also lost most of her hearing in that ear.        Discussion points from past visits:   Anxiety continues to be the main issue, worries about catastrophic scenarios, like people dying, and tends to spiral.   Before Reese , things were never this difficult. She was very structured and organized.   She only uses the diazepam when things get very severe, maybe 2-3x per week. She does try to minimize this.   Has not been having issues with tiredness since being on bupropion.   Notes that she still tends to use diazepam with triggers, like when her son's friends email her. Social media has quite a few triggers. This year is particularly hard especially on social media as he would have been graduating.   She isn't sure if the medications are working at times. She still has anxiety, and doesn't expect it to go away, understands that grief if an ongoing process, and can last the rest of her life. Her goal would be that the medications would take the edge off, so she is able to work more.   Anxiety continues to be an issue especially in the mornings. She does occasionally take the diazepam (Valium) for this in the mornings when it spikes. Has not been taking it every day, but does note that it helps with that heavy feeling in her chest.     Recent Substance Use  Alcohol- None  Tobacco- None  Caffeine- 1 cups/day of coffee  Opioids- None  Narcan Kit- N/A  Cannabis- Has tried CBD but didn't really help.     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   LIVING SITUATION / RELATIONSHIPS- Currently living and working remotely. Sold their house because it was too much of a reminder of their son. Thinking about buying another house now. Has 2 dogs and 2 cats.  Son lives in [place], recently had a child. Youngest son is in [employer] in [place]. Daughter is in the [employer], had a child in 2021.   SOCIAL/ SPIRITUAL SUPPORT- \"Absolutely\", but still feels very lonely since her loss.        Medical Review of Systems    Dizziness/orthostasis- Has in the past, but generally no.   Headaches- No.   GI- No. Has lost 100 lbs since gastric sleeve surgery last year.   Has chronic pain related to bulging disc, gets shots once or twice per year.      Psych Summary Points   08/2020 - Started propranolol 40mg BID  10/2020 - Started bupropion. Decreased propranolol to 20mg due to drug intx and low HR.   11/2020 - Completed ADHD evaluation. Determined that acute symptoms are more likely due to PTSD than to ADHD.   01/2021 - Increased bupropion to 300mg, but anxiety increased, so decreased back down.   02/2021 - Tried buspirone after nephew passed away, felt that it increased anxiety.   04/2021 - Changed propranolol to PRN and discontinued bupropion.   11/2021 - Increased sertraline to 150mg.   12/2021 - Increased sertraline to 200mg.   05/2022 - Tapered off of sertraline.   02/2023 - Had severe head cold, dx'd with intractable tinnitus and idiopathic hypertension afterwards.   04/2023 - Started buspirone. Took for 1.5 months, but it caused worsening of tinnitus and brain read.   07/2023 - Started vilazodone 10mg. Discontinued due to nausea.   11/2023 - Started propranolol again due to persistent anxiety and workplace stress.   01/2024 - Cross titrated by pain mgmt from oxycodone to gabapentin.   06/2024 - Had spinal cord stimulator implanted. Went off gabapentin due to severe sfx.   12/2024 - ***     Psychiatric Medication Trials       Drug /  Start " Date Dose (mg) Helpful Adverse Effects DC Reason / Date   Citalopram 11/2019 40 probably     Sertraline 10/2021 200 yes Initial anxiety, emotional blunting    Bupropion XL 10/2020 300 yes Anxiety / agitation at 300 Helped with concentration and energy   Buspirone 2/21, 4/23 15 BID no Increased anxiety, tinnitus and brain read    Vilazodone 7/23 10  nausea    Gabapentin 1-5/2024 600 TID  Suicidal thoughts, severe anxiety Sfx, had withdrawal   Benadryl       Lorazepam 11/2019 2 daily PRN yes     Diazepam 08/2020 10 yes sedating    Guanfacine 12/204       Propranolol 08/2020 40 BID yes     melatonin          Past Medical History      CARE TEAM:   PCP- Shira Olson with [facility]  Therapist- Alejandra Lui at [facility] - Grief Counselor - Will be starting EMDR.     Neurologic Hx [head injury, seizures, etc.]: Concussion from skiing injury in 2012.   Patient Active Problem List   Diagnosis    Generalized anxiety disorder    Major depression, recurrent (H)    Lumbar radiculopathy    Hearing loss of left ear, unspecified hearing loss type    Family history of breast cancer    Pseudotumor cerebri    Tinnitus, left     Past Medical History:   Diagnosis Date    Arthritis 2021    Failed back surgical syndrome     HAO (generalized anxiety disorder)     IUD (intrauterine device) in place 03/20/2019    Mirena    Lumbar radiculopathy     Major depression, recurrent (H)     Other chronic pain     PONV (postoperative nausea and vomiting)     Tinnitus, left       Allergies    Amoxicillin and Penicillins     Medications      Current Outpatient Medications   Medication Sig Dispense Refill    Bioflavonoid Products (VITAMIN C) CHEW Take 2 chew tab by mouth daily      diazepam (VALIUM) 5 MG tablet Take 1 tablet (5 mg) by mouth daily as needed for anxiety 15 tablet 2    diphenhydrAMINE HCl, Sleep, (ZZZQUIL PO) Take 50 mg by mouth nightly as needed (for sleep)      levonorgestrel (MIRENA) 20 MCG/24HR IUD 1 each  "by Intrauterine route once      melatonin 5 MG tablet Take 10 mg by mouth nightly as needed for sleep      Multiple Vitamins-Minerals (MULTIVITAMIN GUMMIES WOMENS) CHEW Take 2 chew tab by mouth daily      propranolol (INDERAL) 40 MG tablet Take 1 tablet (40 mg) by mouth daily as needed (for anxiety) 90 tablet 0      Physical Exam  (Vitals Only)   Ht 1.727 m (5' 8\")   Wt 86.2 kg (190 lb)   BMI 28.89 kg/m      Pulse Readings from Last 5 Encounters:   07/01/24 66   06/18/24 58   06/17/24 77   05/06/24 86   04/30/24 61     Wt Readings from Last 5 Encounters:   12/10/24 86.2 kg (190 lb)   07/01/24 87.5 kg (193 lb)   06/25/24 87.5 kg (193 lb)   06/18/24 87.6 kg (193 lb 3.2 oz)   06/17/24 87.6 kg (193 lb 3.2 oz)     BP Readings from Last 5 Encounters:   07/01/24 122/69   06/18/24 108/63   06/17/24 116/80   05/06/24 124/82   04/30/24 114/72      Mental Status Exam   Alertness: alert  and oriented  Appearance: adequately groomed  Behavior/Demeanor: cooperative and calm, with good eye contact   Speech: normal and regular rate and rhythm  Language: intact and no problems  Psychomotor: normal or unremarkable  Mood: Anxiety has been high  Affect: full range and appropriate; was congruent to mood; was congruent to content  Thought Process/Associations: unremarkable  Thought Content:  Reports none;  Denies suicidal ideation, violent ideation and delusions  Perception:  Reports none;  Denies auditory hallucinations and visual hallucinations  Insight: intact  Judgment: intact  Cognition: does  appear grossly intact; formal cognitive testing was not done  Gait and Station: not observed     Labs and Data          6/17/2024     9:33 AM 6/24/2024    12:04 PM 12/10/2024     8:20 AM   PHQ-9 SCORE   PHQ-9 Total Score MyChart  9 (Mild depression) 11 (Moderate depression)   PHQ-9 Total Score 7 9 11        Patient-reported         2/1/2024     9:14 AM 3/26/2024     8:07 AM 12/10/2024     8:22 AM   HAO-7 SCORE   Total Score 19 (severe " anxiety) 0 (minimal anxiety) 16 (severe anxiety)   Total Score 19 0 16        Patient-reported       Recent Labs   Lab Test 06/12/23  0747 12/04/19  1223 06/19/19  1740   CR 0.81 0.79 0.72   GFRESTIMATED 88 >60 >90     Recent Labs   Lab Test 12/04/19  1223 06/19/19  1740   AST 12 15   ALT <9 33   ALKPHOS 50 53     Recent Labs   Lab Test 03/05/19  1232 03/05/19  0000   TSH 2.12 2.12     ECG 6/19/19 QTc = 414ms    Psychiatry Individual Psychotherapy Note   Psychotherapy start time - 3:48 PM  Psychotherapy end time - 4:13 PM  Date treatment plan last reviewed with patient - 12/10/24  Subjective: This supportive psychotherapy session addressed issues related to goals of therapy and current psychosocial stressors. Patient's reaction: Action in the context of mental status appropriate for ambulatory setting.    Interactive complexity indicated? No  Plan: RTC in timeframe noted above  Psychotherapy services during this visit included myself and the patient.   Treatment Plan      SYMPTOMS; PROBLEMS   MEASURABLE GOALS;    FUNCTIONAL IMPROVEMENT / GAINS INTERVENTIONS DISCHARGE CRITERIA   PTSD   {Measurable Goals:492311} Supportive / psychodynamic {PSYDC:978604}   {*Time billed for psychotherapy must be excluded from E/M time based billing*  Add on therapy codes-  81003 - 16-37 minutes  17355 - 38-52 minutes  24680 - 53+ minutes  *This blue text will disappear automatically when note is signed, no need to delete*:[id number]}

## 2025-05-06 ENCOUNTER — MYC MEDICAL ADVICE (OUTPATIENT)
Dept: PSYCHIATRY | Facility: CLINIC | Age: 50
End: 2025-05-06
Payer: COMMERCIAL

## 2025-05-06 ENCOUNTER — VIRTUAL VISIT (OUTPATIENT)
Dept: PSYCHIATRY | Facility: CLINIC | Age: 50
End: 2025-05-06
Attending: PSYCHIATRY & NEUROLOGY
Payer: COMMERCIAL

## 2025-05-06 DIAGNOSIS — F41.1 GENERALIZED ANXIETY DISORDER: ICD-10-CM

## 2025-05-06 DIAGNOSIS — F51.05 INSOMNIA DUE TO OTHER MENTAL DISORDER: ICD-10-CM

## 2025-05-06 DIAGNOSIS — F32.A DEPRESSION, UNSPECIFIED DEPRESSION TYPE: ICD-10-CM

## 2025-05-06 DIAGNOSIS — F43.10 POSTTRAUMATIC STRESS DISORDER: Primary | ICD-10-CM

## 2025-05-06 DIAGNOSIS — F99 INSOMNIA DUE TO OTHER MENTAL DISORDER: ICD-10-CM

## 2025-05-06 RX ORDER — GUANFACINE 2 MG/1
2 TABLET, EXTENDED RELEASE ORAL AT BEDTIME
Qty: 90 TABLET | Refills: 0 | Status: SHIPPED | OUTPATIENT
Start: 2025-05-06

## 2025-05-06 RX ORDER — DIAZEPAM 5 MG/1
5 TABLET ORAL DAILY PRN
Qty: 15 TABLET | Refills: 1 | Status: SHIPPED | OUTPATIENT
Start: 2025-05-06

## 2025-05-06 ASSESSMENT — ANXIETY QUESTIONNAIRES
GAD7 TOTAL SCORE: 21
7. FEELING AFRAID AS IF SOMETHING AWFUL MIGHT HAPPEN: NEARLY EVERY DAY
7. FEELING AFRAID AS IF SOMETHING AWFUL MIGHT HAPPEN: NEARLY EVERY DAY
8. IF YOU CHECKED OFF ANY PROBLEMS, HOW DIFFICULT HAVE THESE MADE IT FOR YOU TO DO YOUR WORK, TAKE CARE OF THINGS AT HOME, OR GET ALONG WITH OTHER PEOPLE?: EXTREMELY DIFFICULT
6. BECOMING EASILY ANNOYED OR IRRITABLE: NEARLY EVERY DAY
4. TROUBLE RELAXING: NEARLY EVERY DAY
5. BEING SO RESTLESS THAT IT IS HARD TO SIT STILL: NEARLY EVERY DAY
1. FEELING NERVOUS, ANXIOUS, OR ON EDGE: NEARLY EVERY DAY
GAD7 TOTAL SCORE: 21
GAD7 TOTAL SCORE: 21
3. WORRYING TOO MUCH ABOUT DIFFERENT THINGS: NEARLY EVERY DAY
IF YOU CHECKED OFF ANY PROBLEMS ON THIS QUESTIONNAIRE, HOW DIFFICULT HAVE THESE PROBLEMS MADE IT FOR YOU TO DO YOUR WORK, TAKE CARE OF THINGS AT HOME, OR GET ALONG WITH OTHER PEOPLE: EXTREMELY DIFFICULT
2. NOT BEING ABLE TO STOP OR CONTROL WORRYING: NEARLY EVERY DAY

## 2025-05-06 ASSESSMENT — PATIENT HEALTH QUESTIONNAIRE - PHQ9
SUM OF ALL RESPONSES TO PHQ QUESTIONS 1-9: 14
10. IF YOU CHECKED OFF ANY PROBLEMS, HOW DIFFICULT HAVE THESE PROBLEMS MADE IT FOR YOU TO DO YOUR WORK, TAKE CARE OF THINGS AT HOME, OR GET ALONG WITH OTHER PEOPLE: EXTREMELY DIFFICULT
SUM OF ALL RESPONSES TO PHQ QUESTIONS 1-9: 14

## 2025-05-06 ASSESSMENT — PAIN SCALES - GENERAL: PAINLEVEL_OUTOF10: MODERATE PAIN (5)

## 2025-05-06 NOTE — NURSING NOTE
Current patient location:  In [place] at [facility]     Is the patient currently in the state of [place]? YES    Visit mode: VIDEO    If the visit is dropped, the patient can be reconnected by:VIDEO VISIT: Text to cell phone:   Telephone Information:   Mobile [phone]       Will anyone else be joining the visit? NO  (If patient encounters technical issues they should call [phone] :[id number])    Are changes needed to the allergy or medication list? No    Are refills needed on medications prescribed by this physician? NO    Rooming Documentation:  Questionnaire(s) completed    Reason for visit: RECHROMA REEDERF

## 2025-05-06 NOTE — PROGRESS NOTES
Virtual Visit Details  Type of service:  Video Visit   Originating Location (pt. Location): Home  Distant Location (provider location):  On-site  Platform used for Video Visit: [facility]  MEDICAL PROGRESS NOTE     Lilian Quinones is a 50 year old. Initial consultation on 08/05/20. Referred by Kristen M Kehr for evaluation of depression.      Assessment & Plan    History and interview support the following diagnoses:  PTSD / Complex grief  Generalized anxiety disorder  Depression, unspecified (MDD vs secondary to anxiety / trauma / grief)  Insomnia, unspecified    Tinnitus, left (started with URI 2/2023)    Lilian continues to suffer from significant anxiety. Notes that she only takes diazepam 2-3x per week, but would take it more if it didn't make her tired and if not for the dependence risk.   Overall this is still a clear sign that her anxiety is not sufficiently controlled. She took guanfacine 1mg for a while and it didn't help, so discontinued it. Unfortunately she did not contact me about this, so we weren't able to address at the time that this was a test dose and unlikely to be helpful. She was also concerned about side effect risks from reading them online. I always encourage people to do their own research, but I do ask that they bring what they find to me to discuss. Today she found an AI summary on [employer] that said that weight gain is common with guanfacine, but we discussed that this is not actually true, with the incidence being 2-3%. At one point she said that she would rather just have anxiety than even take the risk of having possible side effects.   I know that Lilian does not want to be on medications, but I am worried about the severity of her anxiety. After discussing, she did agree to try the guanfacine at a higher dose, given that she tolerated it well. I will reach out to her in 2 weeks about how things are going and to  increase the dose if going okay.     Also of note, she did stop taking propranolol. This is notable to me, as her note said that it wasn't helping, although previously I had recorded that she said she was taking it almost every morning before work. I wonder if her anxiety worsened to the point where it wasn't sufficient anymore.     Tinnitus (unilateral left, started with URI 02/2023) also continues to be problematic, and has not shown improvement, now present for almost 2 years. We previously discussed that management of stress and anxiety is important in the course of tinnitus as well.     Move back to [place] in 2023 brought a lot of triggers / associations for her, which likely indicates that the trauma elements of her grief are unresolved. She should still be engaging in trauma therapy, if she is encountering elements that are interfering with her ability to function ideally. They moved back to [place] to be with their new granddaughters born recently, which is a major positive.   Notes significant attentional issues that have persisted since her son's death. These were not present prior to that, although does note that both sons have ADHD, and maybe there were some elements that were less obvious that were problematic earlier in life, hard to say. I ordered ADHD evaluation to help parse apart her various symptoms and add the historical context that could be helpful. I think that she likely does not have ADHD, but it is a possibility. Would be easier to treat and diagnose if anxiety were better controlled, and that would be easier to address if her trauma symptoms were also better addressed.   The fact that she was previously on bupropion and it helped with attentional symptoms, but caused significant anxiety does not marcial well for the potential use of stimulants. It does support the potential use of antiadreneric options like clonidine or guanfacine going forward.   She went off of sertraline due to concerns about  emotional blunting. This does not rule out the use of other antidepressant options in the future. May consider alternatives like escitalopram if needed, given side effects with sertraline.     She uses diazepam sparingly to help with anxiety. She is aware that this is not a sustainable solution. It is not bad to use the diazepam, but it is the opposite of an intervention that would help her achieve her goal of being okay. She can continue to use it, but it must not be the only intervention she has available. Her symptoms will not be at goal until she no longer needs the diazepam. Goal would be to be off of it within 6 months.     PSYCHOTROPIC DRUG INTERACTIONS: None   MANAGEMENT:  N/A     Plan     1) PSYCHOTROPIC MEDICATIONS:   - Start guanfacine ER (Intuniv) 2mg at bedtime  - Will connect in 2 weeks about whether to increase dose further.   - May take diazepam 5mg daily as needed for severe anxiety    - Taking melatonin 10mg at bedtime as needed for sleep  - Taking diphenhydramine (Zzzquil) 50mg QHS PRN for sleep    2) THERAPY: Psychotherapy is a primary recommendation for the treatment of anxiety, stress, and mood symptoms.   Previously engaged in therapy with grief counselor, did EMDR.     3) NEXT DUE:   Labs- Routine monitoring is not indicated for current psychotropic medication regimen   ECG- Routine monitoring is not indicated for current psychotropic medication regimen     4)  / REFERRALS: 04/2023 - Referred for ADHD evaluation, scheduled 10/2023.     5) DISPOSITION: RTC 6 weeks or sooner if needed    Treatment Risk Statement:  The patient understands the risks, benefits, adverse effects and alternatives. Agrees to treatment with the capacity to do so. No medical contraindications to treatment. Agrees to call clinic for any problems. The patient understands to call 911 or go to the nearest ED if life threatening or urgent symptoms occur. Crisis numbers are provided routinely in the After Visit  Summary.       PROVIDER: Joss Rod MD    Level of Medical Decision Making:   - At least 1 chronic problem that is not stable  - Engaged in prescription drug management during visit (discussed any medication benefits, side effects, alternatives, etc.)     The longitudinal plan of care for the diagnosis(es)/condition(s) as documented were addressed during this visit. Due to the added complexity in care, I will continue to support Lilian in the subsequent management and with ongoing continuity of care.       Pertinent Background   Lilian does not report any history of psychiatric diagnoses prior to the death of her son on 11/12/2019. Since that time, she has contended with complex grief and trauma symptoms which have included elements of depression and anxiety. Her grief has been severe, and has included elements of generalized anxiety, panic, and trauma symptoms. She likely met criteria for PTSD at some point. How she is in an adjustment phase, but still with significant depressive symptoms, unclear how persistent, recurrent this will be.                 Interval History   Lilian notes that they were in [place] for the winter and are back to [place] now, which is always a difficult transition.   One of her sons is back from [facility] and on 100% disability for PTSD.   Her son Williams is in [place] and struggling with meth addiction. She thought he was going to attend [employer]. His biological dad was addicted to meth as well. This causes her a lot of anxiety.   Daughter is in her doctorate program for clinical psychology.   Still working remote which is great, but is also incredibly busy right now. Lots of people want to build houses in [place] right now.   Got spinal cord stimulator last July, and did get about 50% relief with this, but still taking multiple ibuprofen per day. She has been able to engage in activities like biking, kayaking with this. Still is careful with her back.   She has been taking Valium very  frequently. Maybe 2-3x per week at this point, but it does make her tired.   Focus and concentration is also still a big concern.        Discussion points from past visits:   Anxiety continues to be the main issue, worries about catastrophic scenarios, like people dying, and tends to spiral.   Before Reese , things were never this difficult. She was very structured and organized.   She only uses the diazepam when things get very severe, maybe 2-3x per week. She does try to minimize this.   Has not been having issues with tiredness since being on bupropion.   Notes that she still tends to use diazepam with triggers, like when her son's friends email her. Social media has quite a few triggers. This year is particularly hard especially on social media as he would have been graduating.   She isn't sure if the medications are working at times. She still has anxiety, and doesn't expect it to go away, understands that grief if an ongoing process, and can last the rest of her life. Her goal would be that the medications would take the edge off, so she is able to work more.   Anxiety continues to be an issue especially in the mornings. She does occasionally take the diazepam (Valium) for this in the mornings when it spikes. Has not been taking it every day, but does note that it helps with that heavy feeling in her chest.     Recent Substance Use  Alcohol- None  Tobacco- None  Caffeine- 1 cups/day of coffee  Opioids- None  Narcan Kit- N/A  Cannabis- Has tried CBD but didn't really help.     Current Social Hx:  FINANCIAL SUPPORT- Works as  /  remotely.  works as caterer.   LIVING SITUATION / RELATIONSHIPS- Currently living and working remotely. Sold their house because it was too much of a reminder of their son. Thinking about buying another house now. Has 2 dogs and 2 cats.  Son lives in [place], recently had a child. Youngest son is in [employer] in [place]. Daughter is in the [employer], had a  "child in 2021.   SOCIAL/ SPIRITUAL SUPPORT- \"Absolutely\", but still feels very lonely since her loss.        Medical Review of Systems    Dizziness/orthostasis- Has in the past, but generally no.   Headaches- No.   GI- No. Lost 100 lbs after gastric sleeve surgery in 2019.   Has chronic pain related to bulging disc, gets shots once or twice per year.      Psych Summary Points   08/2020 - Started propranolol 40mg BID  10/2020 - Started bupropion. Decreased propranolol to 20mg due to drug intx and low HR.   11/2020 - Completed ADHD evaluation. Determined that acute symptoms are more likely due to PTSD than to ADHD.   01/2021 - Increased bupropion to 300mg, but anxiety increased, so decreased back down.   02/2021 - Tried buspirone after nephew passed away, felt that it increased anxiety.   04/2021 - Changed propranolol to PRN and discontinued bupropion.   11/2021 - Increased sertraline to 150mg.   12/2021 - Increased sertraline to 200mg.   05/2022 - Tapered off of sertraline.   02/2023 - Had severe head cold, dx'd with intractable tinnitus and idiopathic hypertension afterwards.   04/2023 - Started buspirone. Took for 1.5 months, but it caused worsening of tinnitus and brain read.   07/2023 - Started vilazodone 10mg. Discontinued due to nausea.   11/2023 - Started propranolol again due to persistent anxiety and workplace stress.   01/2024 - Cross titrated by pain mgmt from oxycodone to gabapentin.   06/2024 - Had spinal cord stimulator implanted. Went off gabapentin due to severe sfx.   12/2024 - Started guanfacine 1mg at bedtime, but didn't stay on it.   05/2025 - Started guanfacine again at 2mg.      Psychiatric Medication Trials       Drug /  Start Date Dose (mg) Helpful Adverse Effects DC Reason / Date   Citalopram 11/2019 40 probably     Sertraline 10/2021 200 yes Initial anxiety, emotional blunting    Bupropion XL 10/2020 300 yes Anxiety / agitation at 300 Helped with concentration and energy   Buspirone 2/21, " 4/23 15 BID no Increased anxiety, tinnitus and brain read    Vilazodone 7/23 10  nausea    Gabapentin 1-5/2024 600 TID  Suicidal thoughts, severe anxiety Sfx, had withdrawal   Benadryl       Lorazepam 11/2019 2 daily PRN yes     Diazepam 08/2020 10 yes sedating    Guanfacine 12/204       Propranolol 08/2020 40 BID yes     melatonin          Past Medical History      CARE TEAM:   PCP- Shira Olson with [facility] [place]  Therapist- Alejandra Lui at [facility] - Grief Counselor - Will be starting EMDR.     Neurologic Hx [head injury, seizures, etc.]: Concussion from skiing injury in 2012.   Patient Active Problem List   Diagnosis    Generalized anxiety disorder    Major depression, recurrent    Lumbar radiculopathy    Hearing loss of left ear, unspecified hearing loss type    Family history of breast cancer    Pseudotumor cerebri    Tinnitus, left     Past Medical History:   Diagnosis Date    Arthritis 2021    Failed back surgical syndrome     IUD (intrauterine device) in place 03/20/2019    Mirena    Lumbar radiculopathy     Major depression, recurrent     Other chronic pain     PONV (postoperative nausea and vomiting)     Tinnitus, left       Allergies    Amoxicillin and Penicillins     Medications      Current Outpatient Medications   Medication Sig Dispense Refill    Bioflavonoid Products (VITAMIN C) CHEW Take 2 chew tab by mouth daily      diazepam (VALIUM) 5 MG tablet Take 1 tablet (5 mg) by mouth daily as needed for anxiety. 15 tablet 2    diphenhydrAMINE HCl, Sleep, (ZZZQUIL PO) Take 50 mg by mouth nightly as needed (for sleep)      levonorgestrel (MIRENA) 20 MCG/24HR IUD 1 each by Intrauterine route once      melatonin 5 MG tablet Take 10 mg by mouth nightly as needed for sleep      Multiple Vitamins-Minerals (MULTIVITAMIN GUMMIES WOMENS) CHEW Take 2 chew tab by mouth daily      guanFACINE (INTUNIV) 1 MG TB24 24 hr tablet Take 1 tablet (1 mg) by mouth at bedtime. 90 tablet 0     propranolol (INDERAL) 40 MG tablet Take 1 tablet (40 mg) by mouth daily as needed (for anxiety) 90 tablet 0      Physical Exam  (Vitals Only)   There were no vitals taken for this visit.    Pulse Readings from Last 5 Encounters:   07/01/24 66   06/18/24 58   06/17/24 77   05/06/24 86   04/30/24 61     Wt Readings from Last 5 Encounters:   12/10/24 86.2 kg (190 lb)   07/01/24 87.5 kg (193 lb)   06/25/24 87.5 kg (193 lb)   06/18/24 87.6 kg (193 lb 3.2 oz)   06/17/24 87.6 kg (193 lb 3.2 oz)     BP Readings from Last 5 Encounters:   07/01/24 122/69   06/18/24 108/63   06/17/24 116/80   05/06/24 124/82   04/30/24 114/72      Mental Status Exam   Alertness: alert  and oriented  Appearance: adequately groomed  Behavior/Demeanor: cooperative and calm, with good eye contact   Speech: normal and regular rate and rhythm  Language: intact and no problems  Psychomotor: normal or unremarkable  Mood: Anxiety has been high  Affect: full range and appropriate; was congruent to mood; was congruent to content  Thought Process/Associations: unremarkable  Thought Content:  Reports none;  Denies suicidal ideation, violent ideation and delusions  Perception:  Reports none;  Denies auditory hallucinations and visual hallucinations  Insight: intact  Judgment: intact  Cognition: does  appear grossly intact; formal cognitive testing was not done  Gait and Station: not observed     Labs and Data          6/24/2024    12:04 PM 12/10/2024     8:20 AM 5/6/2025     1:31 PM   PHQ-9 SCORE   PHQ-9 Total Score MyChart 9 (Mild depression) 11 (Moderate depression) 14 (Moderate depression)   PHQ-9 Total Score 9 11  14        Patient-reported         3/26/2024     8:07 AM 12/10/2024     8:22 AM 5/6/2025     1:32 PM   HAO-7 SCORE   Total Score 0 (minimal anxiety) 16 (severe anxiety) 21 (severe anxiety)   Total Score 0 16  21        Patient-reported       Recent Labs   Lab Test 06/12/23  0747 12/04/19  1223 06/19/19  1740   CR 0.81 0.79 0.72   GFRESTIMATED  88 >60 >90     Recent Labs   Lab Test 12/04/19  1223 06/19/19  1740   AST 12 15   ALT <9 33   ALKPHOS 50 53     Recent Labs   Lab Test 03/05/19  1232 03/05/19  0000   TSH 2.12 2.12     ECG 6/19/19 QTc = 414ms    Psychiatry Individual Psychotherapy Note   Psychotherapy start time - 4:17 PM  Psychotherapy end time - 4:17 PM  Date treatment plan last reviewed with patient - 12/10/24  Subjective: This supportive psychotherapy session addressed issues related to goals of therapy and current psychosocial stressors. Patient's reaction: Action in the context of mental status appropriate for ambulatory setting.    Interactive complexity indicated? No  Plan: RTC in timeframe noted above  Psychotherapy services during this visit included myself and the patient.   Treatment Plan      SYMPTOMS; PROBLEMS   MEASURABLE GOALS;    FUNCTIONAL IMPROVEMENT / GAINS INTERVENTIONS DISCHARGE CRITERIA   PTSD   Reduce number of intrusive thoughts.  Supportive / psychodynamic marked symptom improvement

## 2025-05-06 NOTE — PATIENT INSTRUCTIONS
**For crisis resources, please see the information at the end of this document**   Patient Education    Thank you for coming to the [facility] [place] [facility].     Lab Testing:  If you had lab testing today and your results are reassuring or normal they will be mailed to you or sent through [employer] (SAM) within 7 days. If the lab tests need quick action we will call you with the results. The phone number we will call with results is # [phone]. If this is not the best number please call our clinic and change the number.     Medication Refills:  If you need any refills please call your pharmacy and they will contact us. Our fax number for refills is [phone].   Three business days of notice are needed for general medication refill requests.   Five business days of notice are needed for controlled substance refill requests.   If you need to change to a different pharmacy, please contact the new pharmacy directly. The new pharmacy will help you get your medications transferred.     Contact Us:  Please call [phone] during business hours (8-5:00 M-F).   If you have medication related questions after clinic hours, or on the weekend, please call [phone].     Financial Assistance [phone]   Medical Records [phone]       MENTAL HEALTH CRISIS RESOURCES:  For a emergency help, please call 911 or go to the nearest Emergency Department.     Emergency Walk-In Options:   EmPATH Unit @ [facility] ([place]): [phone] - Specialized mental health emergency area designed to be calming  [facility] ([place]): [phone]  [facility] ([place]): [phone]  [facility]): [phone]    [facility] Crisis Information:   [place]: [phone]  [place]: [phone]  [place] ([place]) - Adult: [phone]     Child: [phone]  [place] - Adult: [phone]     Child: [phone]  [place]:  [phone]  List of all [facility] resources:   https://[place].gov/dhs/people-we-serve/adults/health-care/mental-health/resources/crisis-contacts.jsp    National Crisis Information:   Crisis Text Line: Text  [place]  to 960618  Suicide & Crisis Lifeline: 988  National Suicide Prevention Lifeline: 3-226-409-TALK (1-598.730.1587)       For online chat options, visit https://suicidepreventionlifeline.org/chat/  Poison Control Center: 1-555-633-9718  Trans Lifeline: 2-702-991-4123 - Hotline for transgender people of all ages  The Veto Project: 1-087-878-7103 - Hotline for LGBT youth     For Non-Emergency Support:   Fast Tracker: Mental Health & Substance Use Disorder Resources -   https://www.[employer].org/

## 2025-05-20 RX ORDER — GUANFACINE 3 MG/1
3 TABLET, EXTENDED RELEASE ORAL AT BEDTIME
Qty: 90 TABLET | Refills: 0 | Status: SHIPPED | OUTPATIENT
Start: 2025-05-20

## 2025-05-20 NOTE — TELEPHONE ENCOUNTER
1) PSYCHOTROPIC MEDICATIONS:   - Start guanfacine ER (Intuniv) 2mg at bedtime  - Will connect in 2 weeks about whether to increase dose further.   - May take diazepam 5mg daily as needed for severe anxiety     - Taking melatonin 10mg at bedtime as needed for sleep  - Taking diphenhydramine (Zzzquil) 50mg QHS PRN for sleep         Medication unable to be refilled by RN due to criteria not met as indicated.                 []Eligibility - not seen in the last year              []Supervision - no future appointment              []Compliance - no shows, cancellations or lapse in therapy              []Verification - order discrepancy              []Controlled medication              []Medication not included in policy              [x]90-day supply request              [x]Other: New med

## 2025-06-17 ENCOUNTER — VIRTUAL VISIT (OUTPATIENT)
Dept: PSYCHIATRY | Facility: CLINIC | Age: 50
End: 2025-06-17
Attending: PSYCHIATRY & NEUROLOGY
Payer: COMMERCIAL

## 2025-06-17 ENCOUNTER — MYC MEDICAL ADVICE (OUTPATIENT)
Dept: PSYCHIATRY | Facility: CLINIC | Age: 50
End: 2025-06-17
Payer: COMMERCIAL

## 2025-06-17 DIAGNOSIS — F41.1 GENERALIZED ANXIETY DISORDER: Primary | ICD-10-CM

## 2025-06-17 DIAGNOSIS — F51.05 INSOMNIA DUE TO OTHER MENTAL DISORDER: ICD-10-CM

## 2025-06-17 DIAGNOSIS — F99 INSOMNIA DUE TO OTHER MENTAL DISORDER: ICD-10-CM

## 2025-06-17 DIAGNOSIS — F32.A DEPRESSION, UNSPECIFIED DEPRESSION TYPE: ICD-10-CM

## 2025-06-17 DIAGNOSIS — F41.1 GENERALIZED ANXIETY DISORDER: ICD-10-CM

## 2025-06-17 DIAGNOSIS — F43.10 POSTTRAUMATIC STRESS DISORDER: ICD-10-CM

## 2025-06-17 PROBLEM — G47.00 INSOMNIA: Status: ACTIVE | Noted: 2025-06-17

## 2025-06-17 RX ORDER — LORAZEPAM 0.5 MG/1
0.5 TABLET ORAL DAILY PRN
Qty: 20 TABLET | Refills: 0 | Status: SHIPPED | OUTPATIENT
Start: 2025-06-17

## 2025-06-17 RX ORDER — DESVENLAFAXINE 25 MG/1
25 TABLET, EXTENDED RELEASE ORAL DAILY
Qty: 30 TABLET | Refills: 1 | Status: SHIPPED | OUTPATIENT
Start: 2025-06-17

## 2025-06-17 ASSESSMENT — ANXIETY QUESTIONNAIRES
6. BECOMING EASILY ANNOYED OR IRRITABLE: NEARLY EVERY DAY
7. FEELING AFRAID AS IF SOMETHING AWFUL MIGHT HAPPEN: NEARLY EVERY DAY
7. FEELING AFRAID AS IF SOMETHING AWFUL MIGHT HAPPEN: NEARLY EVERY DAY
GAD7 TOTAL SCORE: 21
GAD7 TOTAL SCORE: 21
3. WORRYING TOO MUCH ABOUT DIFFERENT THINGS: NEARLY EVERY DAY
IF YOU CHECKED OFF ANY PROBLEMS ON THIS QUESTIONNAIRE, HOW DIFFICULT HAVE THESE PROBLEMS MADE IT FOR YOU TO DO YOUR WORK, TAKE CARE OF THINGS AT HOME, OR GET ALONG WITH OTHER PEOPLE: EXTREMELY DIFFICULT
GAD7 TOTAL SCORE: 21
5. BEING SO RESTLESS THAT IT IS HARD TO SIT STILL: NEARLY EVERY DAY
2. NOT BEING ABLE TO STOP OR CONTROL WORRYING: NEARLY EVERY DAY
4. TROUBLE RELAXING: NEARLY EVERY DAY
8. IF YOU CHECKED OFF ANY PROBLEMS, HOW DIFFICULT HAVE THESE MADE IT FOR YOU TO DO YOUR WORK, TAKE CARE OF THINGS AT HOME, OR GET ALONG WITH OTHER PEOPLE?: EXTREMELY DIFFICULT
1. FEELING NERVOUS, ANXIOUS, OR ON EDGE: NEARLY EVERY DAY

## 2025-06-17 ASSESSMENT — PATIENT HEALTH QUESTIONNAIRE - PHQ9
10. IF YOU CHECKED OFF ANY PROBLEMS, HOW DIFFICULT HAVE THESE PROBLEMS MADE IT FOR YOU TO DO YOUR WORK, TAKE CARE OF THINGS AT HOME, OR GET ALONG WITH OTHER PEOPLE: VERY DIFFICULT
SUM OF ALL RESPONSES TO PHQ QUESTIONS 1-9: 12
SUM OF ALL RESPONSES TO PHQ QUESTIONS 1-9: 12

## 2025-06-17 ASSESSMENT — PAIN SCALES - GENERAL: PAINLEVEL_OUTOF10: MODERATE PAIN (5)

## 2025-06-17 NOTE — PROGRESS NOTES
Virtual Visit Details  Type of service:  Video Visit   Originating Location (pt. Location): Home  Distant Location (provider location):  On-site  Platform used for Video Visit: [facility]  MEDICAL PROGRESS NOTE     Lilian Quinones is a 50 year old. Initial consultation on 08/05/20. Referred by Kristen M Kehr for evaluation of depression.     CARE TEAM:   PCP- Shira Olson with [facility]  Therapist- Alejandra Lui at [facility] - Grief Counselor - Will be starting EMDR.        Assessment & Plan    History and interview support the following diagnoses:  PTSD / Complex grief  Generalized anxiety disorder  Depression, unspecified (MDD vs secondary to anxiety / trauma / grief)  Insomnia, unspecified    Tinnitus, left (started with URI 2/2023)    Lilian continues to suffer from significant anxiety. She takes the diazepam less frequently because it makes her tired and she is wary of , but anxiety is becoming increasingly problematic.   Psychotherapy is essential but logistically problematic with their frequent travel. They are only in [place] for a week or two at a time.   Guanfacine was a little helpful for attention, but didn't touch anxiety, and caused significant side effects so will discontinue. She doesn't recall propranolol being helpful, although notes reflect that it was at some point. This is notable to me, as her note said that it wasn't helping, although previously I had recorded that she said she was taking it almost every morning before work. I wonder if her anxiety worsened to the point where it wasn't sufficient anymore. Could consider trying this again in the future, potentially.   Given that diazepam has been too sedating to really be useful, decided to switch back to lorazepam at this time, at a lower dose to start. Did discuss that the goal is to make it just enough to be helpful, and not too helpful, because  the more effective it is, the worse it makes the underlying anxiety.   Suggested desvenlafaxine (Pristiq) at this time as an option that would be more likely to be helpful with cognitive / attentional issues, as well as potentially helpful with anxiety. She has not been on an SNRI before. She is sensitive to meds, so will start at a low dose.     Tinnitus (unilateral left, started with URI 02/2023) also continues to be problematic, and has not shown improvement, now present for almost 2 years. We previously discussed that management of stress and anxiety is important in the course of tinnitus as well.     Move back to [place] in 2023 brought a lot of triggers / associations for her, which likely indicates that the trauma elements of her grief are unresolved. She should still be engaging in trauma therapy, if she is encountering elements that are interfering with her ability to function ideally. They moved back to [place] to be with their new granddaughters born recently, which is a major positive.   Notes significant attentional issues that have persisted since her son's death. These were not present prior to that, although does note that both sons have ADHD, and maybe there were some elements that were less obvious that were problematic earlier in life, hard to say. I ordered ADHD evaluation to help parse apart her various symptoms and add the historical context that could be helpful. I think that she likely does not have ADHD, but it is a possibility. Would be easier to treat and diagnose if anxiety were better controlled, and that would be easier to address if her trauma symptoms were also better addressed.   The fact that she was previously on bupropion and it helped with attentional symptoms, but caused significant anxiety does not marcial well for the potential use of stimulants. It does support the potential use of antiadreneric options like clonidine or guanfacine going forward.   She went off of sertraline due to  concerns about emotional blunting. This does not rule out the use of other antidepressant options in the future. May consider alternatives like escitalopram if needed, given side effects with sertraline.     She uses diazepam sparingly to help with anxiety. She is aware that this is not a sustainable solution. It is not bad to use the diazepam, but it is the opposite of an intervention that would help her achieve her goal of being okay. She can continue to use it, but it must not be the only intervention she has available. Her symptoms will not be at goal until she no longer needs the diazepam. Goal would be to be off of it within 6 months.     PSYCHOTROPIC DRUG INTERACTIONS: None   MANAGEMENT:  N/A     Plan     1) PSYCHOTROPIC MEDICATIONS:   - Discontinue guanfacine ER  - Discontinue diazepam  - Start desvenlafaxine (Pristiq) 25mg daily  - Will take lorazepam with this new medication for the first few days to help with anticipatory anxiety.   - Will send [employer] message in 2 weeks to check on progress  - May take lorazepam 0.5mg daily as needed for severe anxiety    - Taking melatonin 10mg at bedtime as needed for sleep  - Taking diphenhydramine (Zzzquil) 50mg QHS PRN for sleep    2) THERAPY: Psychotherapy is a primary recommendation for the treatment of anxiety, stress, and mood symptoms.   Previously engaged in therapy with grief counselor, did EMDR.     3) NEXT DUE:   Labs- Routine monitoring is not indicated for current psychotropic medication regimen   ECG- Routine monitoring is not indicated for current psychotropic medication regimen     4)  / REFERRALS: 04/2023 - Referred for ADHD evaluation, scheduled 10/2023.     5) DISPOSITION: RTC 6 weeks or sooner if needed    Treatment Risk Statement:  The patient understands the risks, benefits, adverse effects and alternatives. Agrees to treatment with the capacity to do so. No medical contraindications to treatment. Agrees to call clinic for any  problems. The patient understands to call 911 or go to the nearest ED if life threatening or urgent symptoms occur. Crisis numbers are provided routinely in the After Visit Summary.       PROVIDER: Joss Rod MD    Level of Medical Decision Making:   - At least 1 chronic problem that is not stable  - Engaged in prescription drug management during visit (discussed any medication benefits, side effects, alternatives, etc.)     The longitudinal plan of care for the diagnosis(es)/condition(s) as documented were addressed during this visit. Due to the added complexity in care, I will continue to support Lilian in the subsequent management and with ongoing continuity of care.       Pertinent Background   Lilian does not report any history of psychiatric diagnoses prior to the death of her son on 11/12/2019. Since that time, she has contended with complex grief and trauma symptoms which have included elements of depression and anxiety. Her grief has been severe, and has included elements of generalized anxiety, panic, and trauma symptoms. She likely met criteria for PTSD at some point. How she is in an adjustment phase, but still with significant depressive symptoms, unclear how persistent, recurrent this will be.                 Interval History   Lilian does think that the guanfacine may be helping with the attentional symptoms, but doesn't really feel like it has made a big difference with anxiety.   Also seems like it has made her more tired throughout the day as well, sometimes like walking through mud.   Being in [place] to be hard, as they travel back and forth between [place] and [place].   Work continues to be a major stressor.        Discussion points from past visits:   Her son is back from [facility] and on 100% disability for PTSD. Other son Williams is in [place] and struggling with meth addiction. She thought he was going to attend [employer]. His biological dad was addicted to meth as well.  "This causes her a lot of anxiety. Daughter is in her doctorate program for clinical psychology.   Anxiety continues to be the main issue, worries about catastrophic scenarios, like people dying, and tends to spiral.   Before Reese , things were never this difficult. She was very structured and organized.   She only uses the diazepam when things get very severe, maybe 2-3x per week. She does try to minimize this.   Has not been having issues with tiredness since being on bupropion.   Notes that she still tends to use diazepam with triggers, like when her son's friends email her. Social media has quite a few triggers. This year is particularly hard especially on social media as he would have been graduating.   She isn't sure if the medications are working at times. She still has anxiety, and doesn't expect it to go away, understands that grief if an ongoing process, and can last the rest of her life. Her goal would be that the medications would take the edge off, so she is able to work more.   Anxiety continues to be an issue especially in the mornings. She does occasionally take the diazepam (Valium) for this in the mornings when it spikes. Has not been taking it every day, but does note that it helps with that heavy feeling in her chest.     Recent Substance Use  Alcohol- None  Tobacco- None  Caffeine- 1 cups/day of coffee  Opioids- None  Narcan Kit- N/A  Cannabis- Has tried CBD but didn't really help.     Current Social Hx:  FINANCIAL SUPPORT- Works as  /  remotely.  works as caterer.   LIVING SITUATION / RELATIONSHIPS- Currently living and working remotely. Sold their house because it was too much of a reminder of their son. Thinking about buying another house now. Has 2 dogs and 2 cats.  Son lives in [place], recently had a child. Youngest son is in [employer] in [place]. Daughter is in the [place], had a child in .   SOCIAL/ SPIRITUAL SUPPORT- \"Absolutely\", but still feels very " lonely since her loss.        Medical Review of Systems    Dizziness/orthostasis- Has in the past, but generally no.   Headaches- No.   GI- No. Lost 100 lbs after gastric sleeve surgery in 2019.   Has chronic pain related to bulging disc, gets shots once or twice per year.      Psych Summary Points   08/2020 - Started propranolol 40mg BID  10/2020 - Started bupropion. Decreased propranolol to 20mg due to drug intx and low HR.   11/2020 - Completed ADHD evaluation. Determined that acute symptoms are more likely due to PTSD than to ADHD.   01/2021 - Increased bupropion to 300mg, but anxiety increased, so decreased back down.   02/2021 - Tried buspirone after nephew passed away, felt that it increased anxiety.   04/2021 - Changed propranolol to PRN and discontinued bupropion.   11/2021 - Increased sertraline to 150mg.   12/2021 - Increased sertraline to 200mg.   05/2022 - Tapered off of sertraline.   02/2023 - Had severe head cold, dx'd with intractable tinnitus and idiopathic hypertension afterwards.   04/2023 - Started buspirone. Took for 1.5 months, but it caused worsening of tinnitus and brain read.   07/2023 - Started vilazodone 10mg. Discontinued due to nausea.   11/2023 - Started propranolol again due to persistent anxiety and workplace stress.   01/2024 - Cross titrated by pain mgmt from oxycodone to gabapentin.   06/2024 - Had spinal cord stimulator implanted. Went off gabapentin due to severe sfx.   12/2024 - Started guanfacine 1mg at bedtime, but didn't stay on it. Stopped taking propranolol.   05/2025 - Started guanfacine again at 2mg. Had orthostasis and significant sedation with this.   06/2025 - Discontinued guanfacine due to sfx.      Psychiatric Medication Trials       Drug /  Start Date Dose (mg) Helpful Adverse Effects DC Reason / Date   citalopram 11/2019 40 probably     sertraline 10/2021 200 yes Initial anxiety, emotional blunting    bupropion XL 10/2020 300 yes Anxiety / agitation at 300 Helped  with concentration and energy   buspirone 2/21, 4/23 15 BID no Increased anxiety, tinnitus and brain read    vilazodone 7/23 10  nausea    Pristiq 6/2025 25      gabapentin 1-5/2024 600 TID  Suicidal thoughts, severe anxiety Sfx, had withdrawal   Benadryl       lorazepam 11/2019 2 PRN yes     diazepam 08/2020 10 yes sedating    guanfacine 12/2024 2 A little sedating Helped w/ attention, not anx   propranolol 08/2020 40 BID yes     melatonin          Past Medical History      Neurologic Hx [head injury, seizures, etc.]: Concussion from skiing injury in 2012.   Patient Active Problem List   Diagnosis    Generalized anxiety disorder    Major depression, recurrent    Lumbar radiculopathy    Hearing loss of left ear, unspecified hearing loss type    Family history of breast cancer    Pseudotumor cerebri    Tinnitus, left     Past Medical History:   Diagnosis Date    Arthritis 2021    Failed back surgical syndrome     IUD (intrauterine device) in place 03/20/2019    Mirena    Lumbar radiculopathy     Major depression, recurrent     Other chronic pain     PONV (postoperative nausea and vomiting)     Tinnitus, left       Allergies    Amoxicillin and Penicillins     Medications      Current Outpatient Medications   Medication Sig Dispense Refill    Bioflavonoid Products (VITAMIN C) CHEW Take 2 chew tab by mouth daily      diazepam (VALIUM) 5 MG tablet Take 1 tablet (5 mg) by mouth daily as needed for anxiety. 15 tablet 0    diphenhydrAMINE HCl, Sleep, (ZZZQUIL PO) Take 50 mg by mouth nightly as needed (for sleep)      guanFACINE (INTUNIV) 2 MG TB24 24 hr tablet Take 1 tablet (2 mg) by mouth at bedtime. 90 tablet 0    guanFACINE HCl (INTUNIV) 3 MG TB24 24 hr tablet Take 1 tablet (3 mg) by mouth at bedtime. 90 tablet 0    levonorgestrel (MIRENA) 20 MCG/24HR IUD 1 each by Intrauterine route once      melatonin 5 MG tablet Take 10 mg by mouth nightly as needed for sleep      Multiple Vitamins-Minerals (MULTIVITAMIN GUMMIES  WOMENS) CHEW Take 2 chew tab by mouth daily        Physical Exam  (Vitals Only)   There were no vitals taken for this visit.    Pulse Readings from Last 5 Encounters:   07/01/24 66   06/18/24 58   06/17/24 77   05/06/24 86   04/30/24 61     Wt Readings from Last 5 Encounters:   12/10/24 86.2 kg (190 lb)   07/01/24 87.5 kg (193 lb)   06/25/24 87.5 kg (193 lb)   06/18/24 87.6 kg (193 lb 3.2 oz)   06/17/24 87.6 kg (193 lb 3.2 oz)     BP Readings from Last 5 Encounters:   07/01/24 122/69   06/18/24 108/63   06/17/24 116/80   05/06/24 124/82   04/30/24 114/72      Mental Status Exam   Alertness: alert  and oriented  Appearance: adequately groomed  Behavior/Demeanor: cooperative and calm, with good eye contact   Speech: normal and regular rate and rhythm  Language: intact and no problems  Psychomotor: normal or unremarkable  Mood: Anxiety has been high  Affect: full range and appropriate; was congruent to mood; was congruent to content  Thought Process/Associations: unremarkable  Thought Content:  Reports none;  Denies suicidal ideation, violent ideation and delusions  Perception:  Reports none;  Denies auditory hallucinations and visual hallucinations  Insight: intact  Judgment: intact  Cognition: does  appear grossly intact; formal cognitive testing was not done  Gait and Station: not observed     Labs and Data          12/10/2024     8:20 AM 5/6/2025     1:31 PM 6/17/2025    11:28 AM   PHQ-9 SCORE   PHQ-9 Total Score MyChart 11 (Moderate depression) 14 (Moderate depression) 12 (Moderate depression)   PHQ-9 Total Score 11  14  12        Patient-reported         12/10/2024     8:22 AM 5/6/2025     1:32 PM 6/17/2025    11:31 AM   HAO-7 SCORE   Total Score 16 (severe anxiety) 21 (severe anxiety) 21 (severe anxiety)   Total Score 16  21  21        Patient-reported       Recent Labs   Lab Test 06/12/23  0747 12/04/19  1223 06/19/19  1740   CR 0.81 0.79 0.72   GFRESTIMATED 88 >60 >90     Recent Labs   Lab Test 12/04/19  1223  06/19/19  1740   AST 12 15   ALT <9 33   ALKPHOS 50 53     Recent Labs   Lab Test 03/05/19  1232 03/05/19  0000   TSH 2.12 2.12     ECG 6/19/19 QTc = 414ms    Psychiatry Individual Psychotherapy Note   Psychotherapy start time - 3:08 PM  Psychotherapy end time - 3:08 PM  Date treatment plan last reviewed with patient - 12/10/24  Subjective: This supportive psychotherapy session addressed issues related to goals of therapy and current psychosocial stressors. Patient's reaction: Action in the context of mental status appropriate for ambulatory setting.    Interactive complexity indicated? No  Plan: RTC in timeframe noted above  Psychotherapy services during this visit included myself and the patient.   Treatment Plan      SYMPTOMS; PROBLEMS   MEASURABLE GOALS;    FUNCTIONAL IMPROVEMENT / GAINS INTERVENTIONS DISCHARGE CRITERIA   PTSD   Reduce number of intrusive thoughts.  Supportive / psychodynamic marked symptom improvement

## 2025-06-17 NOTE — NURSING NOTE
Current patient location: in [place]    Is the patient currently in the state of [place]? YES    Visit mode: VIDEO    If the visit is dropped, the patient can be reconnected by:VIDEO VISIT: Text to cell phone:   Telephone Information:   Mobile [phone]       Will anyone else be joining the visit? NO  (If patient encounters technical issues they should call [phone] :[phone])    Are changes needed to the allergy or medication list? Yes please remove from med list per pt:  -guanFACINE (INTUNIV) 2 MG TB24 24 hr tablet (correct dose already on chart)    Are refills needed on medications prescribed by this physician? NO    Rooming Documentation:  Questionnaire(s) completed    Reason for visit: JANINE REEDERF

## 2025-06-17 NOTE — PATIENT INSTRUCTIONS
**For crisis resources, please see the information at the end of this document**   Patient Education    Thank you for coming to the [facility] [place] [facility].     Lab Testing:  If you had lab testing today and your results are reassuring or normal they will be mailed to you or sent through [employer] within 7 days. If the lab tests need quick action we will call you with the results. The phone number we will call with results is # [phone]. If this is not the best number please call our clinic and change the number.     Medication Refills:  If you need any refills please call your pharmacy and they will contact us. Our fax number for refills is [phone].   Three business days of notice are needed for general medication refill requests.   Five business days of notice are needed for controlled substance refill requests.   If you need to change to a different pharmacy, please contact the new pharmacy directly. The new pharmacy will help you get your medications transferred.     Contact Us:  Please call [phone] during business hours (8-5:00 M-F).   If you have medication related questions after clinic hours, or on the weekend, please call [phone].     Financial Assistance [phone]   Medical Records [phone]       MENTAL HEALTH CRISIS RESOURCES:  For a emergency help, please call 911 or go to the nearest Emergency Department.     Emergency Walk-In Options:   EmPATH Unit @ [facility] ([place]): [phone] - Specialized mental health emergency area designed to be calming  [facility] ([place]): [phone]  [facility] ([place]): [phone]  [facility]): [phone]    [facility] Crisis Information:   [place]: [phone]  [place]: [phone]  [place] ([place]) - Adult: [phone]     Child: [phone]  [place] - Adult: [phone]     Child: [phone]  [place]:  [phone]  List of all [facility] resources:   https://[place].gov/dhs/people-we-serve/adults/health-care/mental-health/resources/crisis-contacts.jsp    National Crisis Information:   Crisis Text Line: Text  [place]  to 199732  Suicide & Crisis Lifeline: 988  National Suicide Prevention Lifeline: 3-339-653-TALK (1-969.541.4237)       For online chat options, visit https://suicidepreventionlifeline.org/chat/  Poison Control Center: 2-715-139-3805  Trans Lifeline: 6-259-803-5182 - Hotline for transgender people of all ages  The Veto Project: 9-980-558-9082 - Hotline for LGBT youth     For Non-Emergency Support:   Fast Tracker: Mental Health & Substance Use Disorder Resources -   https://www.ideelickVIDDIXn.org/

## 2025-06-23 ENCOUNTER — TELEPHONE (OUTPATIENT)
Dept: PHYSICAL MEDICINE AND REHAB | Facility: CLINIC | Age: 50
End: 2025-06-23
Payer: COMMERCIAL

## 2025-06-23 NOTE — TELEPHONE ENCOUNTER
Other: Patient is calling in for her spinal cord stimulator. Patient is having back pain and wondering who she should be speaking with for programming or on this issue?     Could we send this information to you in [facility] or would you prefer to receive a phone call?:   Patient would prefer a phone call   Okay to leave a detailed message?: Yes at Home number on file [phone] (home)

## 2025-06-24 NOTE — TELEPHONE ENCOUNTER
Call and spoke with patient. Pt reports she wants to discuss programming issues. Instructed her to call the device rep first to see what they would recommend. She stated understanding.

## 2025-07-03 RX ORDER — DESVENLAFAXINE 50 MG/1
50 TABLET, FILM COATED, EXTENDED RELEASE ORAL DAILY
Qty: 30 TABLET | Refills: 1 | Status: SHIPPED | OUTPATIENT
Start: 2025-07-03

## 2025-07-03 RX ORDER — LORAZEPAM 1 MG/1
1 TABLET ORAL DAILY PRN
Qty: 15 TABLET | Refills: 0 | Status: SHIPPED | OUTPATIENT
Start: 2025-07-03

## 2025-07-07 ENCOUNTER — MYC MEDICAL ADVICE (OUTPATIENT)
Dept: PALLIATIVE MEDICINE | Facility: CLINIC | Age: 50
End: 2025-07-07
Payer: COMMERCIAL

## 2025-07-07 NOTE — TELEPHONE ENCOUNTER
Patient requesting injection orders.   Patient last seen June 2024.   Educate patient that a new referral is needed or an injection order can be requested from another provider that patient has seen recently.     Richelle Ma RN

## 2025-07-29 ENCOUNTER — HOSPITAL ENCOUNTER (OUTPATIENT)
Dept: MAMMOGRAPHY | Facility: CLINIC | Age: 50
Discharge: HOME | End: 2025-07-29
Attending: NURSE PRACTITIONER
Payer: COMMERCIAL

## 2025-07-29 DIAGNOSIS — Z12.31 VISIT FOR SCREENING MAMMOGRAM: ICD-10-CM

## 2025-07-29 PROCEDURE — 77063 BREAST TOMOSYNTHESIS BI: CPT

## 2025-07-30 ENCOUNTER — OFFICE VISIT (OUTPATIENT)
Dept: FAMILY MEDICINE | Facility: CLINIC | Age: 50
End: 2025-07-30
Payer: COMMERCIAL

## 2025-07-30 VITALS
HEART RATE: 99 BPM | BODY MASS INDEX: 29.58 KG/M2 | OXYGEN SATURATION: 99 % | SYSTOLIC BLOOD PRESSURE: 122 MMHG | WEIGHT: 195.2 LBS | TEMPERATURE: 97.8 F | HEIGHT: 68 IN | RESPIRATION RATE: 18 BRPM | DIASTOLIC BLOOD PRESSURE: 72 MMHG

## 2025-07-30 DIAGNOSIS — M47.12 CERVICAL SPONDYLOSIS WITH MYELOPATHY: ICD-10-CM

## 2025-07-30 DIAGNOSIS — R21 RASH: ICD-10-CM

## 2025-07-30 DIAGNOSIS — M50.30 BULGING OF CERVICAL INTERVERTEBRAL DISC: ICD-10-CM

## 2025-07-30 DIAGNOSIS — Z00.00 ROUTINE GENERAL MEDICAL EXAMINATION AT A HEALTH CARE FACILITY: Primary | ICD-10-CM

## 2025-07-30 DIAGNOSIS — G43.719 INTRACTABLE CHRONIC MIGRAINE WITHOUT AURA AND WITHOUT STATUS MIGRAINOSUS: ICD-10-CM

## 2025-07-30 DIAGNOSIS — N89.8 VAGINAL ITCHING: ICD-10-CM

## 2025-07-30 DIAGNOSIS — Z01.84 IMMUNITY STATUS TESTING: ICD-10-CM

## 2025-07-30 DIAGNOSIS — Z12.11 SCREEN FOR COLON CANCER: ICD-10-CM

## 2025-07-30 DIAGNOSIS — Z12.4 CERVICAL CANCER SCREENING: ICD-10-CM

## 2025-07-30 DIAGNOSIS — N89.8 VAGINAL DRYNESS: ICD-10-CM

## 2025-07-30 DIAGNOSIS — M48.02 FORAMINAL STENOSIS OF CERVICAL REGION: ICD-10-CM

## 2025-07-30 PROCEDURE — G2211 COMPLEX E/M VISIT ADD ON: HCPCS | Performed by: NURSE PRACTITIONER

## 2025-07-30 PROCEDURE — 3074F SYST BP LT 130 MM HG: CPT | Performed by: NURSE PRACTITIONER

## 2025-07-30 PROCEDURE — 1126F AMNT PAIN NOTED NONE PRSNT: CPT | Performed by: NURSE PRACTITIONER

## 2025-07-30 PROCEDURE — 99214 OFFICE O/P EST MOD 30 MIN: CPT | Mod: 25 | Performed by: NURSE PRACTITIONER

## 2025-07-30 PROCEDURE — 87210 SMEAR WET MOUNT SALINE/INK: CPT | Performed by: NURSE PRACTITIONER

## 2025-07-30 PROCEDURE — 3078F DIAST BP <80 MM HG: CPT | Performed by: NURSE PRACTITIONER

## 2025-07-30 PROCEDURE — 99396 PREV VISIT EST AGE 40-64: CPT | Performed by: NURSE PRACTITIONER

## 2025-07-30 RX ORDER — SUMATRIPTAN SUCCINATE 25 MG/1
TABLET ORAL
Qty: 60 TABLET | Refills: 1 | Status: SHIPPED | OUTPATIENT
Start: 2025-07-30

## 2025-07-30 RX ORDER — TRIAMCINOLONE ACETONIDE 1 MG/G
CREAM TOPICAL
Qty: 15 G | Refills: 1 | Status: SHIPPED | OUTPATIENT
Start: 2025-07-30

## 2025-07-30 RX ORDER — ESTRADIOL 0.1 MG/G
CREAM VAGINAL
Qty: 127.5 G | Refills: 3 | Status: SHIPPED | OUTPATIENT
Start: 2025-07-31

## 2025-07-30 RX ORDER — ONDANSETRON 4 MG/1
4 TABLET, ORALLY DISINTEGRATING ORAL EVERY 8 HOURS PRN
Qty: 30 TABLET | Refills: 0 | Status: SHIPPED | OUTPATIENT
Start: 2025-07-30

## 2025-07-30 SDOH — HEALTH STABILITY: PHYSICAL HEALTH: ON AVERAGE, HOW MANY DAYS PER WEEK DO YOU ENGAGE IN MODERATE TO STRENUOUS EXERCISE (LIKE A BRISK WALK)?: 2 DAYS

## 2025-07-30 SDOH — HEALTH STABILITY: PHYSICAL HEALTH: ON AVERAGE, HOW MANY MINUTES DO YOU ENGAGE IN EXERCISE AT THIS LEVEL?: 30 MIN

## 2025-07-30 ASSESSMENT — ANXIETY QUESTIONNAIRES
7. FEELING AFRAID AS IF SOMETHING AWFUL MIGHT HAPPEN: NEARLY EVERY DAY
8. IF YOU CHECKED OFF ANY PROBLEMS, HOW DIFFICULT HAVE THESE MADE IT FOR YOU TO DO YOUR WORK, TAKE CARE OF THINGS AT HOME, OR GET ALONG WITH OTHER PEOPLE?: VERY DIFFICULT
2. NOT BEING ABLE TO STOP OR CONTROL WORRYING: MORE THAN HALF THE DAYS
GAD7 TOTAL SCORE: 15
3. WORRYING TOO MUCH ABOUT DIFFERENT THINGS: MORE THAN HALF THE DAYS
4. TROUBLE RELAXING: MORE THAN HALF THE DAYS
GAD7 TOTAL SCORE: 15
6. BECOMING EASILY ANNOYED OR IRRITABLE: MORE THAN HALF THE DAYS
1. FEELING NERVOUS, ANXIOUS, OR ON EDGE: MORE THAN HALF THE DAYS
IF YOU CHECKED OFF ANY PROBLEMS ON THIS QUESTIONNAIRE, HOW DIFFICULT HAVE THESE PROBLEMS MADE IT FOR YOU TO DO YOUR WORK, TAKE CARE OF THINGS AT HOME, OR GET ALONG WITH OTHER PEOPLE: VERY DIFFICULT
5. BEING SO RESTLESS THAT IT IS HARD TO SIT STILL: MORE THAN HALF THE DAYS
7. FEELING AFRAID AS IF SOMETHING AWFUL MIGHT HAPPEN: NEARLY EVERY DAY
GAD7 TOTAL SCORE: 15

## 2025-07-30 ASSESSMENT — PATIENT HEALTH QUESTIONNAIRE - PHQ9

## 2025-07-30 ASSESSMENT — PAIN SCALES - GENERAL: PAINLEVEL_OUTOF10: NO PAIN (0)

## 2025-07-30 ASSESSMENT — SOCIAL DETERMINANTS OF HEALTH (SDOH): HOW OFTEN DO YOU GET TOGETHER WITH FRIENDS OR RELATIVES?: ONCE A WEEK

## 2025-07-30 NOTE — PROGRESS NOTES
Preventive Care Visit  [facility] ASIF Fair CNP, Family Medicine  Jul 30, 2025      Assessment & Plan     Cervical cancer screening  - HPV and Gynecologic Cytology Panel - Recommended Age 30 - 65 Years    Cervical spondylosis with myelopathy  Foraminal stenosis of cervical region  Bulging of cervical intervertebral disc  Will refer to pain management per request for repeat cervical injection.  - Pain Management  Referral; Future    Routine general medical examination at a health care facility  Screening guidelines reviewed.  Scheduled to return for fasting labs  - Basic metabolic panel; Future  - Lipid panel reflex to direct LDL Fasting; Future  - Hemoglobin A1c; Future    Vaginal dryness  Will recheck hormone levels when returns for labs.  Plan to start on vaginal estrogen.  Educated on use.  Notify if no improvement  - estradiol (ESTRACE) 0.1 MG/GM vaginal cream; Insert applicatorful into the vagina nightly until symptoms improve then decrease use to 3 times per week.  Place pea-sized amount on labia nightly until symptoms improve then can decrease use to 3 times per week.  - Follicle stimulating hormone; Future  - Estradiol; Future    Vaginal itching  Will check a wet prep here today.  Will plan to start on vaginal estrogen.  Educated on use  Notify if no improvement.  - estradiol (ESTRACE) 0.1 MG/GM vaginal cream; Insert applicatorful into the vagina nightly until symptoms improve then decrease use to 3 times per week.  Place pea-sized amount on labia nightly until symptoms improve then can decrease use to 3 times per week.  - Wet preparation    Screen for colon cancer  - Colonoscopy Screening  Referral; Future    Immunity status testing  Will check immunity status here today.  If is not immune would recommend receiving hepatitis A vaccine series in the future  - Hepatitis A Antibody Total; Future    Intractable chronic migraine without aura and without status  "migrainosus  Push fluids  Try and decrease stress  Get plenty of rest  Take over the counter tylenol or ibuprofen as needed  Educated regarding warning signs to watch for.   - SUMAtriptan (IMITREX) 25 MG tablet; Take 1 to 2 tablets at onset of headache.  May repeat in 2 hours if needed. Max 4 tablets/24 hours.  - ondansetron (ZOFRAN ODT) 4 MG ODT tab; Take 1 tablet (4 mg) by mouth every 8 hours as needed for nausea.    Rash  Prescription given for triamcinolone.  Educated on use and possible side effects.  Notify if no improvement and may need referral to Derm.  - triamcinolone (KENALOG) 0.1 % external cream; Apply a thin layer to affected area behind the right ear twice per day until rash has resolved.  Do not use for more than 2 weeks.    The longitudinal plan of care for the diagnosis(es)/condition(s) as documented were addressed during this visit. Due to the added complexity in care, I will continue to support Lilian in the subsequent management and with ongoing continuity of care.    BMI  Estimated body mass index is 29.68 kg/m  as calculated from the following:    Height as of this encounter: 1.727 m (5' 8\").    Weight as of this encounter: 88.5 kg (195 lb 3.2 oz).     Counseling  Appropriate preventive services were addressed with this patient via screening, questionnaire, or discussion as appropriate for fall prevention, nutrition, physical activity, Tobacco-use cessation, social engagement, weight loss and cognition.  Checklist reviewing preventive services available has been given to the patient.  Reviewed patient's diet, addressing concerns and/or questions.   She is at risk for lack of exercise and has been provided with information to increase physical activity for the benefit of her well-being.   She is at risk for psychosocial distress and has been provided with information to reduce risk.       Subjective   Lilian is a 50 year old, presenting for the following:  Physical (Non fasting ), Derm Problem " (Mole bilateral axillary , and right side of ear ), and Consult (Preventative care for yeast infection as she gets them often, migraines, neck pain injections when to removal IUD - will hold off pap until doing iud removal )        7/30/2025     4:19 PM   Additional Questions   Roomed by Merle Winchester   Accompanied by n/a         7/30/2025     4:19 PM   Patient Reported Additional Medications   Patient reports taking the following new medications n/a          HPI       Advance Care Planning    Patient states has Health Care Directive and will send to [facility].        7/30/2025   General Health   How would you rate your overall physical health? (!) FAIR   Feel stress (tense, anxious, or unable to sleep) Very much   (!) STRESS CONCERN      7/30/2025   Nutrition   Three or more servings of calcium each day? (!) NO   Diet: Regular (no restrictions)   How many servings of fruit and vegetables per day? (!) 2-3   How many sweetened beverages each day? 0-1         7/30/2025   Exercise   Days per week of moderate/strenous exercise 2 days   Average minutes spent exercising at this level 30 min   (!) EXERCISE CONCERN      7/30/2025   Social Factors   Frequency of gathering with friends or relatives Once a week   Worry food won't last until get money to buy more     No   Food not last or not have enough money for food?     No   Do you have housing? (Housing is defined as stable permanent housing and does not include staying outside in a car, in a tent, in an abandoned building, in an overnight shelter, or couch-surfing.)     Yes   Are you worried about losing your housing?     No   Lack of transportation?     No   Unable to get utilities (heat,electricity)?     No       Information is confidential and restricted. Go to Review Flowsheets to unlock data.    Multiple values from one day are sorted in reverse-chronological order         7/30/2025   Fall Risk   Fallen 2 or more times in the past year? No   Trouble with walking  or balance? No          7/30/2025   Dental   Dentist two times every year? Yes       Today's PHQ-9 Score:       7/30/2025     9:22 AM   PHQ-9 SCORE   PHQ-9 Total Score MyChart 13 (Moderate depression)   PHQ-9 Total Score 13        Patient-reported         7/30/2025   Substance Use   Alcohol more than 3/day or more than 7/wk No   Do you use any other substances recreationally? No     Social History     Tobacco Use    Smoking status: Never     Passive exposure: Never    Smokeless tobacco: Never   Vaping Use    Vaping status: Never Used   Substance Use Topics    Alcohol use: Not Currently    Drug use: No           7/29/2025   LAST FHS-7 RESULTS   1st degree relative breast or ovarian cancer Yes   Any relative bilateral breast cancer No   Any male have breast cancer No   Any ONE woman have BOTH breast AND ovarian cancer Yes   Any woman with breast cancer before 50yrs No   2 or more relatives with breast AND/OR ovarian cancer No   2 or more relatives with breast AND/OR bowel cancer No                7/30/2025   STI Screening   New sexual partner(s) since last STI/HIV test? No     History of abnormal Pap smear:         Latest Ref Rng & Units 8/12/2019     6:02 PM 8/12/2019     6:00 PM 9/19/2016     9:30 AM   PAP / HPV   PAP (Historical)  NIL      HPV 16 DNA NEG^Negative  Negative  Negative    HPV 18 DNA NEG^Negative  Negative  Negative    Other HR HPV NEG^Negative  Negative  Negative      ASCVD Risk   The 10-year ASCVD risk score (Anastasiia MAC, et al., 2019) is: 1.1%    Values used to calculate the score:      Age: 50 years      Sex: Female      Is Non- : No      Diabetic: No      Tobacco smoker: No      Systolic Blood Pressure: 122 mmHg      Is BP treated: No      HDL Cholesterol: 76 mg/dL      Total Cholesterol: 222 mg/dL         Reviewed and updated as needed this visit by Provider                  Here for physical.  Is not fasting.  Continues to work remotely, [place] the [place] in  [place]  Rash behind right ear for the last couple of weeks.  Staying the same size.  Does not itch.  No new products, medications.  Pain clinic in [facility]-then had injection in neck and shoulder.  Neck injection beneficial.  Would like to have repeated  Right shoulder injection did not work   Spinal cord stimulator in place.  Changes made yesterday, pain control low  Getting headaches.  About 4 times per year.  Gets nauseated.  Come on in the middle of the night, will wake with them.  Vomits.  Sensitive to light.  No recent head trauma or concussions.  Does not feel them coming on since they started in the middle of the night.  Able to walk, talk, eat, drink, move arms and legs when gets them.    Last Pap: 2019-normal/neg. Abnormal years had LEEP. All normal since.   Last mammogram: 7/25-Mom with breast cancer.   Last BMD: N/A  Last Colonoscopy: Never, no family history  Last eye exam: Within the last year. Lasik 7 years ago  Last dental exam: every 6 mo  Last tetanus vaccine: 2016  Last influenza vaccine: Declines   Last shingles vaccine: Plans to go to pharmacy  Last pneumonia vaccine: Plans to go to pharmacy  Last COVID vaccine: Declines  Last COVID booster: Declines.   Hep C screen: Plans to do here today  HIV screen: Declines low risk  AAA screen (age 65-78 with smoking hx): N/A  IVD (HTN, Hyperlipid, Smoking): N/A  Lung CA screening (50-77, 20 pk smoking hx) Quit within 15 years: N/A           Objective    Exam  /72   Pulse 99   Temp 97.8  F (36.6  C) (Temporal)   Resp 18   Ht 1.727 m (5' 8\")   Wt 88.5 kg (195 lb 3.2 oz)   LMP  (LMP Unknown)   SpO2 99%   Breastfeeding No   BMI 29.68 kg/m     Estimated body mass index is 29.68 kg/m  as calculated from the following:    Height as of this encounter: 1.727 m (5' 8\").    Weight as of this encounter: 88.5 kg (195 lb 3.2 oz).    Physical Exam  Constitutional:       Appearance: Normal appearance. She is well-developed.   HENT:      Head: Normocephalic and " atraumatic.      Right Ear: Tympanic membrane and external ear normal. No middle ear effusion.      Left Ear: Tympanic membrane and external ear normal.  No middle ear effusion.      Nose: No mucosal edema.      Mouth/Throat:      Pharynx: Uvula midline.   Eyes:      Pupils: Pupils are equal, round, and reactive to light.   Neck:      Thyroid: No thyromegaly.      Vascular: No carotid bruit.   Cardiovascular:      Rate and Rhythm: Normal rate and regular rhythm.      Heart sounds: Normal heart sounds.   Pulmonary:      Effort: Pulmonary effort is normal.      Breath sounds: Normal breath sounds.   Abdominal:      General: Bowel sounds are normal.      Palpations: Abdomen is soft.      Tenderness: There is no abdominal tenderness.   Genitourinary:     Labia:         Right: No lesion.         Left: No lesion.       Vagina: Normal. No vaginal discharge or erythema.      Cervix: No cervical motion tenderness or discharge.      Uterus: Not enlarged.       Adnexa:         Right: No mass or tenderness.          Left: No mass or tenderness.     Musculoskeletal:         General: Normal range of motion.      Cervical back: Normal range of motion.   Skin:     General: Skin is warm and dry.      Findings: No rash.             Comments: Multiple scattered benign nodules   Neurological:      Mental Status: She is alert.   Psychiatric:         Behavior: Behavior normal.             Signed Electronically by: ASIF Whitlock CNP    Answers submitted by the patient for this visit:  Patient Health Questionnaire (Submitted on 7/30/2025)  If you checked off any problems, how difficult have these problems made it for you to do your work, take care of things at home, or get along with other people?: Somewhat difficult  PHQ9 TOTAL SCORE: 13

## 2025-07-30 NOTE — PATIENT INSTRUCTIONS
"Please go to the pharmacy for your pneumonia vaccine, Prevnar 20.  This vaccine helps protect against 20 types strains of streptococcal pneumonia that can cause serious infections in adults-pneumonia.  After the vaccine it is common to have some injection site redness, warmth and mild swelling.  This should resolve on its own after 24 to 48 hours.  Applying a cool compress to the site can help to ease the discomfort.  Also, you may feel a bit tired and rundown for 28 to 48 hours after receiving the injection.  If you are over age 65 you only need to get this vaccine once.  If you are under age 65 this vaccine should be repeated in 5 years.     Please go to the pharmacy to receive your shingles vaccine, Shingrix.  It is a series of 2.  You should receive the first vaccine and then get the second vaccine in at least 2 months.  If more time lapses that is okay.  Do see a reaction similar to tetanus where your arm gets red, stiff sometimes warm to touch.  After the second dose it is very common to feel tired and rundown for 24 hours or so. I RECOMMEND GETTING THIS VACCINE ALL BY ITSELF.      IUD is due for removal in 2027      Patient Education   Preventive Care Advice   This is general advice we often give to help people stay healthy. Your care team may have specific advice just for you. Please talk to your care team about your own preventive care needs.  Lifestyle  Exercise at least 150 minutes each week (30 minutes a day, 5 days a week).  Do muscle strengthening activities 2 days a week. These help control your weight and prevent disease.  No smoking.  Wear sunscreen to prevent skin cancer.  Take time with family and friends.  Have your home tested for radon every 2 to 5 years. Radon is a colorless, odorless gas that can harm your lungs. To learn more, go to www.health.[facility].[place].us and search for \"Radon in Homes.\"  Keep guns unloaded and locked up in a safe place like a safe or gun vault, or, use a gun lock and hide " "the keys. Always lock away bullets separately. To learn more, visit dps.[place].gov and search for \"safe gun storage.\"  Nutrition  Eat 5 or more servings of fruits and vegetables each day.  Try wheat bread, brown rice and whole grain pasta (instead of white bread, rice, and pasta).  Get enough calcium and vitamin D. Check the label on foods and aim for 100% of the RDA (recommended daily allowance).  Regular exams  Have a dental exam and cleaning every 6 months.  Older adults: Ask your care team how often to have memory testing.  See your health care team every year to talk about:  Any changes in your health.  Any medicines your care team has prescribed.  Preventive care, family planning, and ways to prevent chronic diseases.  Shots (vaccines)   HPV shots (up to age 26), if you've never had them before.  Hepatitis B shots (up to age 59), if you've never had them before.  COVID-19 shot: Get this shot when it's due.  Flu shot: Get a flu shot every year.  Tetanus shot: Get a tetanus shot every 10 years.  Pneumococcal, hepatitis A, and RSV shots: Ask your care team if you need these based on your risk.  Shingles shot (for age 50 and up).  General health tests  Diabetes screening:  Starting at age 35, Get screened for diabetes at least every 3 years.  If you are younger than age 35, ask your care team if you should be screened for diabetes.  Cholesterol test: At age 39, start having a cholesterol test every 5 years, or more often if advised.  Bone density scan (DEXA): At age 50, ask your care team if you should have this scan for osteoporosis (brittle bones).  Hepatitis C: Get tested at least once in your life.  Abdominal aortic aneurysm screening: Talk to your doctor about having this screening if you:  Have ever smoked; and  Are biologically male; and  Are between the ages of 65 and 75.  STIs (sexually transmitted infections)  Before age 24: Ask your care team if you should be screened for STIs.  After age 24: Get screened " for STIs if you're at risk. You are at risk for STIs (including HIV) if:  You are sexually active with more than one person.  You don't use condoms every time.  You or a partner was diagnosed with a sexually transmitted infection.  If you are at risk for HIV, ask about PrEP medicine to prevent HIV.  Get tested for HIV at least once in your life, whether you are at risk for HIV or not.  Cancer screening tests  Cervical cancer screening: If you have a cervix, begin getting regular cervical cancer screening tests at age 21. Most people who have regular screenings with normal results can stop after age 65. Talk about this with your provider.  Breast cancer scan (mammogram): If you've ever had breasts, begin having regular mammograms starting at age 40. This is a scan to check for breast cancer.  Colon cancer screening: It is important to start screening for colon cancer at age 45.  Have a colonoscopy test every 10 years (or more often if you're at risk) Or, ask your provider about stool tests like a FIT test every year or Cologuard test every 3 years.  To learn more about your testing options, visit: www.[employer]/473476.pdf.  For help making a decision, visit: [email or web address].  Prostate cancer screening test: If you have a prostate and are age 55 to 69, ask your provider if you would benefit from a yearly prostate cancer screening test.  Lung cancer screening: If you are a current or former smoker age 50 to 80, ask your care team if ongoing lung cancer screenings are right for you.    For informational purposes only. Not to replace the advice of your health care provider. Copyright   2023 [facility]. All rights reserved. Clinically reviewed by the [facility]. [employer] [id number] - REV 6/25.  Learning About Stress  What is stress?     Stress is your body's response to a hard situation. Your body can have a physical, emotional, or mental response. Stress is a fact of life for most  people, and it affects everyone differently. What causes stress for you may not be stressful for someone else.  A lot of things can cause stress. You may feel stress when you go on a job interview, take a test, or run a race. This kind of short-term stress is normal and even useful. It can help you if you need to work hard or react quickly. For example, stress can help you finish an important job on time.  Long-term stress is caused by ongoing stressful situations or events. Examples of long-term stress include long-term health problems, ongoing problems at work, or conflicts in your family. Long-term stress can harm your health.  How does stress affect your health?  When you are stressed, your body responds as though you are in danger. It makes hormones that speed up your heart, make you breathe faster, and give you a burst of energy. This is called the fight-or-flight stress response. If the stress is over quickly, your body goes back to normal and no harm is done.  But if stress happens too often or lasts too long, it can have bad effects. Long-term stress can make you more likely to get sick, and it can make symptoms of some diseases worse. If you tense up when you are stressed, you may develop neck, shoulder, or low back pain. Stress is linked to high blood pressure and heart disease.  Stress also harms your emotional health. It can make you phipps, tense, or depressed. Your relationships may suffer, and you may not do well at work or school.  What can you do to manage stress?  You can try these things to help manage stress:   Do something active. Exercise or activity can help reduce stress. Walking is a great way to get started. Even everyday activities such as housecleaning or yard work can help.  Try yoga or chintan chi. These techniques combine exercise and meditation. You may need some training at first to learn them.  Do something you enjoy. For example, listen to music or go to a movie. Practice your hobby or  "do volunteer work.  Meditate. This can help you relax, because you are not worrying about what happened before or what may happen in the future.  Do guided imagery. Imagine yourself in any setting that helps you feel calm. You can use online videos, books, or a teacher to guide you.  Do breathing exercises. For example:  From a standing position, bend forward from the waist with your knees slightly bent. Let your arms dangle close to the floor.  Breathe in slowly and deeply as you return to a standing position. Roll up slowly and lift your head last.  Hold your breath for just a few seconds in the standing position.  Breathe out slowly and bend forward from the waist.  Let your feelings out. Talk, laugh, cry, and express anger when you need to. Talking with supportive friends or family, a counselor, or a yair leader about your feelings is a healthy way to relieve stress. Avoid discussing your feelings with people who make you feel worse.  Write. It may help to write about things that are bothering you. This helps you find out how much stress you feel and what is causing it. When you know this, you can find better ways to cope.  What can you do to prevent stress?  You might try some of these things to help prevent stress:  Manage your time. This helps you find time to do the things you want and need to do.  Get enough sleep. Your body recovers from the stresses of the day while you are sleeping.  Get support. Your family, friends, and community can make a difference in how you experience stress.  Limit your news feed. Avoid or limit time on social media or news that may make you feel stressed.  Do something active. Exercise or activity can help reduce stress. Walking is a great way to get started.  Where can you learn more?  Go to https://www.healthwise.net/patiented  Enter N032 in the search box to learn more about \"Learning About Stress.\"  Current as of: October 24, 2024  Content Version: 14.5 2024-2025 [employer].   Care instructions adapted under license by your healthcare professional. If you have questions about a medical condition or this instruction, always ask your healthcare professional. [facility] [employer] disclaims any warranty or liability for your use of this information.    Recovering From Depression: Care Instructions  Overview    Sticking to your treatment plan is important as you recover from depression. It may take time for your symptoms to get better after you start treatment. Try not to give up if you don't feel better right away. Make sure you keep going to counseling and taking any prescribed medicine if they are part of your treatment plan.  Focus on things that can help you feel better, such as being with friends and family. Try to eat healthy foods, be active, and get enough sleep. Take things slowly as you begin to recover.  Follow-up care is a key part of your treatment and safety. Be sure to make and go to all appointments, and call your doctor if you are having problems. It's also a good idea to know your test results and keep a list of the medicines you take.  How can you care for yourself at home?  Be realistic  If you have a large task to do, break it up into smaller steps you can handle, and just do what you can.  You may want to put off important decisions until your depression has lifted. If you have plans that will have a major impact on your life, such as marriage, divorce, or a job change, try to wait a bit. Talk it over with friends and loved ones who can help you look at the overall picture first.  Reaching out to people for help is important. Do not isolate yourself. Let your family and friends help you. Find someone you can trust and confide in, and talk to that person.  Be patient, and be kind to yourself. Remember that depression is not your fault and is not something you can overcome with willpower alone. Treatment is important for depression, just like for any  other illness. Feeling better takes time, and your mood will improve little by little.  Stay active  Stay busy and get outside. Take a walk, or try some other light exercise.  Talk with your doctor about an exercise program. Exercise can help with mild depression.  Go to a movie or concert. Take part in a Druze activity or other social gathering. Go to a ball game.  Ask a friend to have dinner with you.  Take care of yourself  Eat healthy foods such as fresh fruits and vegetables, whole grains, and lean protein. If you have lost your appetite, eat small snacks rather than large meals.  Avoid using marijuana and other drugs and drinking alcohol. Do not take medicines that have not been prescribed for you. They may interfere with medicines you may be taking for depression, or they may make your depression worse.  Take your medicines exactly as they are prescribed. You may start to feel better within 1 to 3 weeks of taking antidepressant medicine. But it can take as many as 6 to 8 weeks to see more improvement. If you have questions or concerns about your medicines, or if you do not notice any improvement by 3 weeks, talk to your doctor.  Continue to take your medicine after your symptoms improve. Taking your medicine for at least 6 months after you feel better can help keep you from getting depressed again. If this isn't the first time you have been depressed, your doctor may recommend you to take medicine even longer.  If you have any side effects from your medicine, tell your doctor. Many side effects are mild and will go away on their own after you have been taking the medicine for a few weeks. Some may last longer. Talk to your doctor if side effects are bothering you too much. You might be able to try a different medicine.  Continue counseling. It may help prevent depression from returning, especially if you've had multiple episodes of depression. Talk with your counselor if you are having a hard time attending  your sessions or you think the sessions aren't working. Don't just stop going.  Get enough sleep. Talk to your doctor if you are having problems sleeping.  Avoid sleeping pills unless they are prescribed by the doctor treating your depression. Sleeping pills may make you groggy during the day, and they may interact with other medicine you are taking.  If you have any other illnesses, such as diabetes, heart disease, or high blood pressure, make sure to continue with your treatment. Tell your doctor about all of the medicines you take, including those with or without a prescription.  Where to get help 24 hours a day, 7 days a week  If you or someone you know talks about suicide, self-harm, a mental health crisis, a substance use crisis, or any other kind of emotional distress, get help right away. You can:  Call the Suicide and Crisis Lifeline at [employer].  Text HOME to 881222 to access the Crisis Text Line.  Consider saving these numbers in your phone.  Go to [place] for more information or to chat online.  Call 661 anytime you think you may need emergency care. For example, call if:  You feel like hurting yourself or someone else.  Someone you know has depression and is about to attempt or is attempting suicide.  Where to get help 24 hours a day, 7 days a week  If you or someone you know talks about suicide, self-harm, a mental health crisis, a substance use crisis, or any other kind of emotional distress, get help right away. You can:  Call the Suicide and Crisis Lifeline at [address].  Text HOME to 249954 to access the Crisis Text Line.  Consider saving these numbers in your phone.  Go to [employer] for more information or to chat online.  Call your doctor now or seek immediate medical care if:  You hear voices.  Someone you know has depression and:  Starts to give away possessions.  Uses illegal drugs or drinks alcohol heavily.  Talks or writes about death, including writing suicide notes or talking about  "guns, knives, or pills.  Starts to spend a lot of time alone.  Acts very aggressively or suddenly appears calm.  Watch closely for changes in your health, and be sure to contact your doctor if:  You do not get better as expected.  Where can you learn more?  Go to https://www.[employer].net/patiented  Enter N529 in the search box to learn more about \"Recovering From Depression: Care Instructions.\"  Current as of: July 31, 2024  Content Version: 14.5 2024-2025 [employer].   Care instructions adapted under license by your healthcare professional. If you have questions about a medical condition or this instruction, always ask your healthcare professional. [employer] disclaims any warranty or liability for your use of this information.       "

## 2025-07-31 ENCOUNTER — LAB (OUTPATIENT)
Dept: LAB | Facility: CLINIC | Age: 50
End: 2025-07-31
Payer: COMMERCIAL

## 2025-07-31 ENCOUNTER — VIRTUAL VISIT (OUTPATIENT)
Dept: PSYCHIATRY | Facility: CLINIC | Age: 50
End: 2025-07-31
Attending: PSYCHIATRY & NEUROLOGY
Payer: COMMERCIAL

## 2025-07-31 DIAGNOSIS — N89.8 VAGINAL DRYNESS: ICD-10-CM

## 2025-07-31 DIAGNOSIS — F41.1 GENERALIZED ANXIETY DISORDER: ICD-10-CM

## 2025-07-31 DIAGNOSIS — Z00.00 ROUTINE GENERAL MEDICAL EXAMINATION AT A HEALTH CARE FACILITY: ICD-10-CM

## 2025-07-31 DIAGNOSIS — Z01.84 IMMUNITY STATUS TESTING: ICD-10-CM

## 2025-07-31 DIAGNOSIS — F99 INSOMNIA DUE TO OTHER MENTAL DISORDER: ICD-10-CM

## 2025-07-31 DIAGNOSIS — F51.05 INSOMNIA DUE TO OTHER MENTAL DISORDER: ICD-10-CM

## 2025-07-31 DIAGNOSIS — F43.10 POSTTRAUMATIC STRESS DISORDER: Primary | ICD-10-CM

## 2025-07-31 DIAGNOSIS — F32.A DEPRESSION, UNSPECIFIED DEPRESSION TYPE: ICD-10-CM

## 2025-07-31 LAB
ANION GAP SERPL CALCULATED.3IONS-SCNC: 8 MMOL/L (ref 7–15)
BUN SERPL-MCNC: 8.9 MG/DL (ref 6–20)
CALCIUM SERPL-MCNC: 9.2 MG/DL (ref 8.8–10.4)
CHLORIDE SERPL-SCNC: 109 MMOL/L (ref 98–107)
CHOLEST SERPL-MCNC: 239 MG/DL
CREAT SERPL-MCNC: 0.81 MG/DL (ref 0.51–0.95)
EGFRCR SERPLBLD CKD-EPI 2021: 87 ML/MIN/1.73M2
EST. AVERAGE GLUCOSE BLD GHB EST-MCNC: 105 MG/DL
ESTRADIOL SERPL-MCNC: 20 PG/ML
FASTING STATUS PATIENT QL REPORTED: YES
FASTING STATUS PATIENT QL REPORTED: YES
FSH SERPL IRP2-ACNC: 63.9 MIU/ML
GLUCOSE SERPL-MCNC: 91 MG/DL (ref 70–99)
HAV AB SER QL IA: NONREACTIVE
HBA1C MFR BLD: 5.3 % (ref 0–5.6)
HCO3 SERPL-SCNC: 27 MMOL/L (ref 22–29)
HDLC SERPL-MCNC: 79 MG/DL
LDLC SERPL CALC-MCNC: 141 MG/DL
NONHDLC SERPL-MCNC: 160 MG/DL
POTASSIUM SERPL-SCNC: 4.2 MMOL/L (ref 3.4–5.3)
SODIUM SERPL-SCNC: 144 MMOL/L (ref 135–145)
TRIGL SERPL-MCNC: 93 MG/DL

## 2025-07-31 RX ORDER — DESVENLAFAXINE 100 MG/1
100 TABLET, EXTENDED RELEASE ORAL DAILY
Qty: 30 TABLET | Refills: 0 | Status: SHIPPED | OUTPATIENT
Start: 2025-07-31

## 2025-07-31 ASSESSMENT — PAIN SCALES - GENERAL: PAINLEVEL_OUTOF10: MILD PAIN (3)

## 2025-07-31 NOTE — PROGRESS NOTES
Virtual Visit Details  Type of service:  Video Visit   Originating Location (pt. Location): Home  Distant Location (provider location):  Off-site  Platform used for Video Visit: [facility]  MEDICAL PROGRESS NOTE     Lilian Quinones is a 50 year old. Initial consultation on 08/05/20. Referred by Kristen M Kehr for evaluation of depression.     CARE TEAM:   PCP- Shira Olson with [facility]  Therapist- Alejandra Lui at [facility] - Grief Counselor - Will be starting EMDR.        Assessment & Plan    History and interview support the following diagnoses:  PTSD / Complex grief  Generalized anxiety disorder  Depression, unspecified (MDD vs secondary to anxiety / trauma / grief)  Insomnia, unspecified    Tinnitus, left (started with URI 2/2023)    Lilian feels that things have gotten a lot better recently since starting Pristiq, and very fortunately hasn't had any side effects with this medication! While anxiety is improved, it is still present. Will increase the dose at this time, and gauge for effect. Could try a 75mg dose if there are concerns about side effects.     Doesn't recall propranolol being helpful, although notes reflect that it was at some point. Previously she had reported taking it almost every morning before work. I wonder if her anxiety worsened to the point where it wasn't sufficient anymore. Could consider trying this again in the future, potentially.     Tinnitus (unilateral left, started with URI 02/2023) also continues to be problematic, and has not shown improvement, now present for almost 2 years. We previously discussed that management of stress and anxiety is important in the course of tinnitus as well.     Move back to [place] in 2023 brought a lot of triggers / associations for her, which likely indicates that the trauma elements of her grief are unresolved. She should still be engaging in trauma  therapy, if she is encountering elements that are interfering with her ability to function ideally. They moved back to [place] to be with their new granddaughters born recently, which is a major positive.   Notes significant attentional issues that have persisted since her son's death. These were not present prior to that, although does note that both sons have ADHD, and maybe there were some elements that were less obvious that were problematic earlier in life, hard to say. I ordered ADHD evaluation to help parse apart her various symptoms and add the historical context that could be helpful. I think that she likely does not have ADHD, but it is a possibility. Would be easier to treat and diagnose if anxiety were better controlled, and that would be easier to address if her trauma symptoms were also better addressed.   The fact that she was previously on bupropion and it helped with attentional symptoms, but caused significant anxiety does not marcial well for the potential use of stimulants. It does support the potential use of antiadreneric options like clonidine or guanfacine going forward.   She went off of sertraline due to concerns about emotional blunting. This does not rule out the use of other antidepressant options in the future. May consider alternatives like escitalopram if needed, given side effects with sertraline.     She uses diazepam sparingly to help with anxiety. She is aware that this is not a sustainable solution. It is not bad to use the diazepam, but it is the opposite of an intervention that would help her achieve her goal of being okay. She can continue to use it, but it must not be the only intervention she has available. Her symptoms will not be at goal until she no longer needs the diazepam. Goal would be to be off of it within 6 months.     PSYCHOTROPIC DRUG INTERACTIONS: None   MANAGEMENT:  N/A     Plan     1) PSYCHOTROPIC MEDICATIONS:   - Discontinue guanfacine ER  - Discontinue diazepam  -  Start desvenlafaxine (Pristiq) 25mg daily  - Will take lorazepam with this new medication for the first few days to help with anticipatory anxiety.   - Will send Netviewer message in 2 weeks to check on progress  - May take lorazepam (Ativan) 1mg daily as needed for severe anxiety    - Taking melatonin 10mg at bedtime as needed for sleep  - Taking diphenhydramine (Zzzquil) 50mg QHS PRN for sleep    2) THERAPY: Psychotherapy is a primary recommendation for the treatment of anxiety, stress, and mood symptoms.   Previously engaged in therapy with grief counselor, did EMDR.     3) NEXT DUE:   Labs- Routine monitoring is not indicated for current psychotropic medication regimen   ECG- Routine monitoring is not indicated for current psychotropic medication regimen     4)  / REFERRALS: 04/2023 - Referred for ADHD evaluation, scheduled 10/2023.     5) DISPOSITION: RTC 6 weeks or sooner if needed    Treatment Risk Statement:  The patient understands the risks, benefits, adverse effects and alternatives. Agrees to treatment with the capacity to do so. No medical contraindications to treatment. Agrees to call clinic for any problems. The patient understands to call 911 or go to the nearest ED if life threatening or urgent symptoms occur. Crisis numbers are provided routinely in the After Visit Summary.       PROVIDER: Joss Rod MD    Level of Medical Decision Making:   - At least 1 chronic problem that is not stable  - Engaged in prescription drug management during visit (discussed any medication benefits, side effects, alternatives, etc.)     The longitudinal plan of care for the diagnosis(es)/condition(s) as documented were addressed during this visit. Due to the added complexity in care, I will continue to support Lilian in the subsequent management and with ongoing continuity of care.       Pertinent Background   Lilian does not report any history of psychiatric diagnoses prior to the death of her son  on 2019. Since that time, she has contended with complex grief and trauma symptoms which have included elements of depression and anxiety. Her grief has been severe, and has included elements of generalized anxiety, panic, and trauma symptoms. She likely met criteria for PTSD at some point. How she is in an adjustment phase, but still with significant depressive symptoms, unclear how persistent, recurrent this will be.                 Interval History   Lilian feels like Magaly has been a good fit, no side effects, and has some more energy. Anxiety is still there.   Ativan was better than the Valium, less sedating.   Going to [place] and then coming back in October for a while for work.   Is with granddaughter Hubert right now at a resort in [place].   Work continues to be a major stressor.        Discussion points from past visits:   Her son is back from [facility] and on 100% disability for PTSD. Other son Williams is in [place] and struggling with meth addiction. She thought he was going to attend [employer]. His biological dad was addicted to meth as well. This causes her a lot of anxiety. Daughter is in her doctorate program for clinical psychology.   Anxiety continues to be the main issue, worries about catastrophic scenarios, like people dying, and tends to spiral.   Before Reese , things were never this difficult. She was very structured and organized.   She only uses the diazepam when things get very severe, maybe 2-3x per week. She does try to minimize this.   Has not been having issues with tiredness since being on bupropion.   Notes that she still tends to use diazepam with triggers, like when her son's friends email her. Social media has quite a few triggers. This year is particularly hard especially on social media as he would have been graduating.   She isn't sure if the medications are working at times. She still has anxiety, and doesn't expect it to go away, understands that grief if  "an ongoing process, and can last the rest of her life. Her goal would be that the medications would take the edge off, so she is able to work more.   Anxiety continues to be an issue especially in the mornings. She does occasionally take the diazepam (Valium) for this in the mornings when it spikes. Has not been taking it every day, but does note that it helps with that heavy feeling in her chest.     Recent Substance Use  Alcohol- None  Tobacco- None  Caffeine- 1 cups/day of coffee  Cannabis- Has tried CBD but didn't really help.     Current Social Hx:  FINANCIAL SUPPORT- Works as  /  remotely.  works as caterer.   LIVING SITUATION / RELATIONSHIPS- Currently living and working remotely. Sold their house because it was too much of a reminder of their son. Thinking about buying another house now. Has 2 dogs and 2 cats.  Son lives in [place], recently had a child. Youngest son is in [employer] in [place]. Daughter is in the [employer], had a child in 2021.   SOCIAL/ SPIRITUAL SUPPORT- \"Absolutely\", but still feels very lonely since her loss.        Medical Review of Systems    Dizziness/orthostasis- Has in the past, but generally no.   Headaches- No.   GI- No. Lost 100 lbs after gastric sleeve surgery in 2019.   Has chronic pain related to bulging disc, gets shots once or twice per year.      Psych Summary Points   08/2020 - Started propranolol 40mg BID  10/2020 - Started bupropion. Decreased propranolol to 20mg due to drug intx and low HR.   11/2020 - Completed ADHD evaluation. Determined that acute symptoms are more likely due to PTSD than to ADHD.   01/2021 - Increased bupropion to 300mg, but anxiety increased, so decreased back down.   02/2021 - Tried buspirone after nephew passed away, felt that it increased anxiety.   04/2021 - Changed propranolol to PRN and discontinued bupropion.   11/2021 - Increased sertraline to 150mg.   12/2021 - Increased sertraline to 200mg.   05/2022 - Tapered off " of sertraline.   02/2023 - Had severe head cold, dx'd with intractable tinnitus and idiopathic hypertension afterwards.   04/2023 - Started buspirone. Took for 1.5 months, but it caused worsening of tinnitus and brain read.   07/2023 - Started vilazodone 10mg. Discontinued due to nausea.   11/2023 - Started propranolol again due to persistent anxiety and workplace stress.   01/2024 - Cross titrated by pain mgmt from oxycodone to gabapentin.   06/2024 - Had spinal cord stimulator implanted. Went off gabapentin due to severe sfx.   12/2024 - Started guanfacine 1mg at bedtime, but didn't stay on it. Stopped taking propranolol.   05/2025 - Started guanfacine again at 2mg. Had orthostasis and significant sedation with this.   06/2025 - Discontinued guanfacine due to sfx. Switched from diazepam to lorazepam 1mg, found it less sedating. Start Pristiq and increased to 50mg, noticed anxiety benefit.      Psychiatric Medication Trials       Drug /  Start Date Dose (mg) Helpful Adverse Effects DC Reason / Date   citalopram 11/2019 40 probably     sertraline 10/2021 200 yes Initial anxiety, emotional blunting    bupropion XL 10/2020 300 yes Anxiety / agitation at 300 Helped with concentration and energy   buspirone 2/21, 4/23 15 BID no Increased anxiety, tinnitus and brain read    vilazodone 7/23 10  nausea    Pristiq 6/2025 50 yes     gabapentin 1-5/2024 600 TID  Suicidal thoughts, severe anxiety Sfx, had withdrawal   Benadryl       lorazepam 11/2019 2 PRN yes     diazepam 08/2020 10 yes sedating    guanfacine 12/2024 2 A little sedating Helped w/ attention, not anx   propranolol 08/2020 40 BID yes     melatonin          Past Medical History      Neurologic Hx [head injury, seizures, etc.]: Concussion from skiing injury in 2012.   Patient Active Problem List   Diagnosis    Generalized anxiety disorder    Major depression, recurrent    Lumbar radiculopathy    Hearing loss of left ear, unspecified hearing loss type    Family  history of breast cancer    Pseudotumor cerebri    Tinnitus, left    Insomnia     Past Medical History:   Diagnosis Date    Arthritis 2021    Failed back surgical syndrome     IUD (intrauterine device) in place 03/20/2019    Mirena    Lumbar radiculopathy     Major depression, recurrent     Other chronic pain     PONV (postoperative nausea and vomiting)     Tinnitus, left       Allergies    Amoxicillin and Penicillins     Medications      Current Outpatient Medications   Medication Sig Dispense Refill    Bioflavonoid Products (VITAMIN C) CHEW Take 2 chew tab by mouth daily      desvenlafaxine (PRISTIQ) 50 MG 24 hr tablet Take 1 tablet (50 mg) by mouth daily. 30 tablet 1    diphenhydrAMINE HCl, Sleep, (ZZZQUIL PO) Take 50 mg by mouth nightly as needed (for sleep)      estradiol (ESTRACE) 0.1 MG/GM vaginal cream Insert applicatorful into the vagina nightly until symptoms improve then decrease use to 3 times per week.  Place pea-sized amount on labia nightly until symptoms improve then can decrease use to 3 times per week. 127.5 g 3    levonorgestrel (MIRENA) 20 MCG/24HR IUD 1 each by Intrauterine route once      LORazepam (ATIVAN) 1 MG tablet Take 1 tablet (1 mg) by mouth daily as needed for anxiety. 15 tablet 0    melatonin 5 MG tablet Take 10 mg by mouth nightly as needed for sleep      Multiple Vitamins-Minerals (MULTIVITAMIN GUMMIES WOMENS) CHEW Take 2 chew tab by mouth daily      ondansetron (ZOFRAN ODT) 4 MG ODT tab Take 1 tablet (4 mg) by mouth every 8 hours as needed for nausea. 30 tablet 0    SUMAtriptan (IMITREX) 25 MG tablet Take 1 to 2 tablets at onset of headache.  May repeat in 2 hours if needed. Max 4 tablets/24 hours. 60 tablet 1    triamcinolone (KENALOG) 0.1 % external cream Apply a thin layer to affected area behind the right ear twice per day until rash has resolved.  Do not use for more than 2 weeks. 15 g 1      Physical Exam  (Vitals Only)   LMP  (LMP Unknown)     Pulse Readings from Last 5  Encounters:   07/30/25 99   07/01/24 66   06/18/24 58   06/17/24 77   05/06/24 86     Wt Readings from Last 5 Encounters:   07/30/25 88.5 kg (195 lb 3.2 oz)   12/10/24 86.2 kg (190 lb)   07/01/24 87.5 kg (193 lb)   06/25/24 87.5 kg (193 lb)   06/18/24 87.6 kg (193 lb 3.2 oz)     BP Readings from Last 5 Encounters:   07/30/25 122/72   07/01/24 122/69   06/18/24 108/63   06/17/24 116/80   05/06/24 124/82      Mental Status Exam   Alertness: alert  and oriented  Appearance: adequately groomed  Behavior/Demeanor: cooperative and calm, with good eye contact   Speech: normal and regular rate and rhythm  Language: intact and no problems  Psychomotor: normal or unremarkable  Mood: Anxiety has been high  Affect: full range and appropriate; was congruent to mood; was congruent to content  Thought Process/Associations: unremarkable  Thought Content:  Reports none;  Denies suicidal ideation, violent ideation and delusions  Perception:  Reports none;  Denies auditory hallucinations and visual hallucinations  Insight: intact  Judgment: intact  Cognition: does  appear grossly intact; formal cognitive testing was not done  Gait and Station: not observed     Labs and Data          5/6/2025     1:31 PM 6/17/2025    11:28 AM 7/30/2025     9:22 AM   PHQ-9 SCORE   PHQ-9 Total Score MyChart 14 (Moderate depression) 12 (Moderate depression) 13 (Moderate depression)   PHQ-9 Total Score 14  12  13        Patient-reported         5/6/2025     1:32 PM 6/17/2025    11:31 AM 7/30/2025     9:34 AM   HAO-7 SCORE   Total Score 21 (severe anxiety) 21 (severe anxiety) 15 (severe anxiety)   Total Score 21  21  15        Patient-reported       Recent Labs   Lab Test 06/12/23  0747 12/04/19  1223 06/19/19  1740   CR 0.81 0.79 0.72   GFRESTIMATED 88 >60 >90     Recent Labs   Lab Test 12/04/19  1223 06/19/19  1740   AST 12 15   ALT <9 33   ALKPHOS 50 53     Recent Labs   Lab Test 03/05/19  1232 03/05/19  0000   TSH 2.12 2.12     ECG 6/19/[place]  [facility] Individual Psychotherapy Note   Psychotherapy start time - 8:42 AM  Psychotherapy end time - 8:42 AM  Date treatment plan last reviewed with patient - 12/10/24  Subjective: This supportive psychotherapy session addressed issues related to goals of therapy and current psychosocial stressors. Patient's reaction: Action in the context of mental status appropriate for ambulatory setting.    Interactive complexity indicated? No  Plan: RTC in timeframe noted above  Psychotherapy services during this visit included myself and the patient.   Treatment Plan      SYMPTOMS; PROBLEMS   MEASURABLE GOALS;    FUNCTIONAL IMPROVEMENT / GAINS INTERVENTIONS DISCHARGE CRITERIA   PTSD   Reduce number of intrusive thoughts.  Supportive / psychodynamic marked symptom improvement

## 2025-07-31 NOTE — NURSING NOTE
Current patient location: [place]    Is the patient currently in the state of [place]? YES    Visit mode: VIDEO    If the visit is dropped, the patient can be reconnected by:VIDEO VISIT: Text to cell phone:   Telephone Information:   Mobile [phone]       Will anyone else be joining the visit? NO  (If patient encounters technical issues they should call [phone] :[phone])    Are changes needed to the allergy or medication list? No    Are refills needed on medications prescribed by this physician? YES    Rooming Documentation:  Questionnaire(s) completed    Reason for visit: JANINE ESPINOZA

## 2025-07-31 NOTE — PATIENT INSTRUCTIONS
**For crisis resources, please see the information at the end of this document**   Patient Education    Thank you for coming to the [facility] [place] [facility].     Lab Testing:  If you had lab testing today and your results are reassuring or normal they will be mailed to you or sent through [employer]wheel within 7 days. If the lab tests need quick action we will call you with the results. The phone number we will call with results is # [phone]. If this is not the best number please call our clinic and change the number.     Medication Refills:  If you need any refills please call your pharmacy and they will contact us. Our fax number for refills is [phone].   Three business days of notice are needed for general medication refill requests.   Five business days of notice are needed for controlled substance refill requests.   If you need to change to a different pharmacy, please contact the new pharmacy directly. The new pharmacy will help you get your medications transferred.     Contact Us:  Please call [phone] during business hours (8-5:00 M-F).   If you have medication related questions after clinic hours, or on the weekend, please call [phone].     Financial Assistance [phone]   Medical Records [phone]       MENTAL HEALTH CRISIS RESOURCES:  For a emergency help, please call 911 or go to the nearest Emergency Department.     Emergency Walk-In Options:   EmPATH Unit @ [facility] ([place]): [phone] - Specialized mental health emergency area designed to be calming  [facility] ([place]): [phone]  [facility] ([place]): [phone]  [facility]): [phone]    [facility] Crisis Information:   [place]: [phone]  [place]: [phone]  [place] ([place]) - Adult: [phone]     Child: [phone]  [place] - Adult: [phone]     Child: [phone]  [place]:  [phone]  List of all [facility] resources:   https://[place].gov/dhs/people-we-serve/adults/health-care/mental-health/resources/crisis-contacts.jsp    National Crisis Information:   Crisis Text Line: Text  [place]  to 650461  Suicide & Crisis Lifeline: 988  National Suicide Prevention Lifeline: 7-028-483-TALK (1-899.696.5396)       For online chat options, visit https://suicidepreventionlifeline.org/chat/  Poison Control Center: 9-764-520-3559  Trans Lifeline: 8-959-218-0773 - Hotline for transgender people of all ages  The Veto Project: 9-027-504-2387 - Hotline for LGBT youth     For Non-Emergency Support:   Fast Tracker: Mental Health & Substance Use Disorder Resources -   https://www.[employer].org/

## 2025-08-05 LAB
BKR AP ASSOCIATED HPV REPORT: NORMAL
BKR LAB AP GYN ADEQUACY: NORMAL
BKR LAB AP GYN INTERPRETATION: NORMAL
BKR LAB AP PREVIOUS ABNORMAL: NORMAL
PATH REPORT.COMMENTS IMP SPEC: NORMAL
PATH REPORT.COMMENTS IMP SPEC: NORMAL
PATH REPORT.RELEVANT HX SPEC: NORMAL

## 2025-08-21 ENCOUNTER — VIRTUAL VISIT (OUTPATIENT)
Dept: FAMILY MEDICINE | Facility: CLINIC | Age: 50
End: 2025-08-21
Payer: COMMERCIAL

## 2025-08-21 DIAGNOSIS — N89.8 VAGINAL DRYNESS: ICD-10-CM

## 2025-08-21 DIAGNOSIS — N89.8 VAGINAL ITCHING: ICD-10-CM

## 2025-08-21 DIAGNOSIS — R45.86 MOOD CHANGES: ICD-10-CM

## 2025-08-21 DIAGNOSIS — Z79.890 HORMONE REPLACEMENT THERAPY: ICD-10-CM

## 2025-08-21 DIAGNOSIS — N95.1 MENOPAUSAL SYNDROME (HOT FLASHES): Primary | ICD-10-CM

## 2025-08-21 RX ORDER — ESTRADIOL 0.03 MG/D
1 PATCH TRANSDERMAL WEEKLY
Qty: 8 PATCH | Refills: 0 | Status: SHIPPED | OUTPATIENT
Start: 2025-08-21

## 2025-09-04 ENCOUNTER — MYC REFILL (OUTPATIENT)
Dept: PSYCHIATRY | Facility: CLINIC | Age: 50
End: 2025-09-04
Payer: COMMERCIAL

## 2025-09-04 DIAGNOSIS — F41.1 GENERALIZED ANXIETY DISORDER: ICD-10-CM

## 2025-09-04 RX ORDER — DESVENLAFAXINE 100 MG/1
100 TABLET, EXTENDED RELEASE ORAL DAILY
Qty: 30 TABLET | Refills: 0 | Status: SHIPPED | OUTPATIENT
Start: 2025-09-04

## (undated) DEVICE — GLOVE UNDER INDICATOR PI SZ 8.5 LF 41685

## (undated) DEVICE — FIXATE SUTURING DEVICE

## (undated) DEVICE — Device

## (undated) DEVICE — SYR 10ML FINGER CONTROL W/O NDL 309695

## (undated) DEVICE — PREP CHLORAPREP 26ML TINTED HI-LITE ORANGE 930815

## (undated) DEVICE — SYRINGE 10ML FILL SALINE FLUSH STERILE 306553

## (undated) DEVICE — DRAPE MICROSCOPE LEICA 54X150" AR8033650

## (undated) DEVICE — SUCTION MANIFOLD NEPTUNE 2 SYS 1 PORT 702-025-000

## (undated) DEVICE — ESU PENCIL W/HOLSTER E2350H

## (undated) DEVICE — NDL SPINAL 18GA 3.5" 405184

## (undated) DEVICE — BLADE KNIFE SURG 15 371115

## (undated) DEVICE — DRSG STERI STRIP 1/2X4" R1547

## (undated) DEVICE — ESU GROUND PAD UNIVERSAL W/O CORD

## (undated) DEVICE — ESU PENCIL SMOKE EVAC W/ROCKER SWITCH 0703-047-000

## (undated) DEVICE — DRSG GAUZE 4X4" 3033

## (undated) DEVICE — DRAPE COVER C-ARM SEAMLESS SNAP-KAP 03-KP26 LATEX FREE

## (undated) DEVICE — SU VICRYL 2-0 CT-2 CR 8X18" J726D

## (undated) DEVICE — SYRINGE LUER LOCK 7CC 405291

## (undated) DEVICE — ESU ELEC BLADE 6" COATED/INSULATED E1455-6

## (undated) DEVICE — SU MONOCRYL 4-0 PS-2 18" UND Y496G

## (undated) DEVICE — ESU ELEC BLADE 2.75" COATED/INSULATED E1455

## (undated) DEVICE — TUBING SUCTION SOFT 20'X3/16" 0036570

## (undated) DEVICE — TOOL DISSECT MIDAS MR8 14CM TELESC MATCH 2.5 MR8-T14MH25

## (undated) DEVICE — SU VICRYL 0 CT-2 CR 8X18" J727D

## (undated) DEVICE — ESU GROUND PAD ADULT REM W/15' CORD E7507DB

## (undated) DEVICE — NDL 25GA 1.5" 305127

## (undated) DEVICE — SU ETHILON 2-0 FS 18" 664G

## (undated) DEVICE — DRSG PRIMAPORE 03 1/8X6" 66000318

## (undated) DEVICE — GLOVE BIOGEL PI MICRO INDICATOR UNDERGLOVE SZ 8.0 48980

## (undated) DEVICE — SPONGE COTTONOID 1/2X1/2" 80-1400

## (undated) DEVICE — LINEN TOWEL PACK X5 5464

## (undated) DEVICE — GOWN SURGICAL SMARTGOWN 2XL 89075

## (undated) DEVICE — ADH LIQUID MASTISOL TOPICAL VIAL 2-3ML 0523-48

## (undated) DEVICE — CUSTOM PACK GEN MAJOR SBA5BGMHEA

## (undated) DEVICE — SPONGE SURGIFOAM 50

## (undated) DEVICE — DRAPE C-ARMOR 5 SIDED 5523

## (undated) DEVICE — NEEDLE SPINAL DISP 22X4-3/4 QUIN 333315

## (undated) DEVICE — POSITIONER PT PRONESAFE HEAD REST W/DERMAPROX INSERT 40599

## (undated) DEVICE — DRAPE LAP/CHOLE W/O POUCH 89225

## (undated) DEVICE — DRAPE MAYO STAND 23X54 8337

## (undated) DEVICE — RX SURGIFLO HEMOSTATIC MATRIX W/THROMBIN 8ML 2994

## (undated) DEVICE — SUTURE VICRYL+ 3-0 8-18 SH/CR VLT VCP774D

## (undated) DEVICE — SYR EAR BULB 3OZ 0035830

## (undated) DEVICE — SOL WATER IRRIG 1000ML BOTTLE 2F7114

## (undated) DEVICE — GLOVE BIOGEL PI ULTRATOUCH G SZ 8.0 42180

## (undated) DEVICE — STPL SKIN 35W 6.9MM  PXW35

## (undated) DEVICE — DRAPE STERI TOWEL LG 1010

## (undated) DEVICE — PACK SPINE SM CUSTOM SNE15SSFSK

## (undated) DEVICE — DRAPE C-ARM 60X42" 1013

## (undated) DEVICE — DECANTER VIAL 2006S

## (undated) DEVICE — FREELINK REMOTE CONTROL KIT

## (undated) DEVICE — GLOVE BIOGEL PI MICRO SZ 7.5 48575

## (undated) DEVICE — DRAPE IOBAN INCISE 23X17" 6650EZ

## (undated) RX ORDER — BUPIVACAINE HYDROCHLORIDE AND EPINEPHRINE 5; 5 MG/ML; UG/ML
INJECTION, SOLUTION EPIDURAL; INTRACAUDAL; PERINEURAL
Status: DISPENSED
Start: 2023-10-03

## (undated) RX ORDER — LIDOCAINE HYDROCHLORIDE 10 MG/ML
INJECTION, SOLUTION EPIDURAL; INFILTRATION; INTRACAUDAL; PERINEURAL
Status: DISPENSED
Start: 2024-06-18

## (undated) RX ORDER — FENTANYL CITRATE 50 UG/ML
INJECTION, SOLUTION INTRAMUSCULAR; INTRAVENOUS
Status: DISPENSED
Start: 2023-10-03

## (undated) RX ORDER — BUPIVACAINE HYDROCHLORIDE 2.5 MG/ML
INJECTION, SOLUTION EPIDURAL; INFILTRATION; INTRACAUDAL
Status: DISPENSED
Start: 2024-06-18

## (undated) RX ORDER — OXYCODONE HYDROCHLORIDE 5 MG/1
TABLET ORAL
Status: DISPENSED
Start: 2023-10-03

## (undated) RX ORDER — DEXAMETHASONE SODIUM PHOSPHATE 4 MG/ML
INJECTION, SOLUTION INTRA-ARTICULAR; INTRALESIONAL; INTRAMUSCULAR; INTRAVENOUS; SOFT TISSUE
Status: DISPENSED
Start: 2023-10-03

## (undated) RX ORDER — METHYLPREDNISOLONE ACETATE 40 MG/ML
INJECTION, SUSPENSION INTRA-ARTICULAR; INTRALESIONAL; INTRAMUSCULAR; SOFT TISSUE
Status: DISPENSED
Start: 2023-10-03

## (undated) RX ORDER — FENTANYL CITRATE 0.05 MG/ML
INJECTION, SOLUTION INTRAMUSCULAR; INTRAVENOUS
Status: DISPENSED
Start: 2023-10-03

## (undated) RX ORDER — PROPOFOL 10 MG/ML
INJECTION, EMULSION INTRAVENOUS
Status: DISPENSED
Start: 2023-10-03

## (undated) RX ORDER — ONDANSETRON 2 MG/ML
INJECTION INTRAMUSCULAR; INTRAVENOUS
Status: DISPENSED
Start: 2023-10-03

## (undated) RX ORDER — FENTANYL CITRATE 50 UG/ML
INJECTION, SOLUTION INTRAMUSCULAR; INTRAVENOUS
Status: DISPENSED
Start: 2024-06-18

## (undated) RX ORDER — GABAPENTIN 100 MG/1
CAPSULE ORAL
Status: DISPENSED
Start: 2023-10-03

## (undated) RX ORDER — CLINDAMYCIN PHOSPHATE 900 MG/50ML
INJECTION, SOLUTION INTRAVENOUS
Status: DISPENSED
Start: 2023-10-03